# Patient Record
Sex: FEMALE | Race: WHITE | Employment: OTHER | URBAN - NONMETROPOLITAN AREA
[De-identification: names, ages, dates, MRNs, and addresses within clinical notes are randomized per-mention and may not be internally consistent; named-entity substitution may affect disease eponyms.]

---

## 2017-01-06 DIAGNOSIS — M15.9 GENERALIZED OA: ICD-10-CM

## 2017-01-31 ENCOUNTER — OFFICE VISIT (OUTPATIENT)
Dept: INTERNAL MEDICINE | Age: 77
End: 2017-01-31

## 2017-01-31 VITALS
HEIGHT: 62 IN | SYSTOLIC BLOOD PRESSURE: 136 MMHG | WEIGHT: 243 LBS | BODY MASS INDEX: 44.72 KG/M2 | DIASTOLIC BLOOD PRESSURE: 76 MMHG

## 2017-01-31 DIAGNOSIS — I10 BENIGN ESSENTIAL HTN: ICD-10-CM

## 2017-01-31 DIAGNOSIS — E66.01 MORBID OBESITY WITH BMI OF 40.0-44.9, ADULT (HCC): ICD-10-CM

## 2017-01-31 DIAGNOSIS — E66.01 MORBID OBESITY WITH BMI OF 45.0-49.9, ADULT (HCC): ICD-10-CM

## 2017-01-31 DIAGNOSIS — K76.0 NAFLD (NONALCOHOLIC FATTY LIVER DISEASE): ICD-10-CM

## 2017-01-31 DIAGNOSIS — R79.89 ABNORMAL LFTS: ICD-10-CM

## 2017-01-31 DIAGNOSIS — I10 ESSENTIAL HYPERTENSION: ICD-10-CM

## 2017-01-31 DIAGNOSIS — E78.00 HYPERCHOLESTEREMIA: ICD-10-CM

## 2017-01-31 DIAGNOSIS — R06.02 SHORTNESS OF BREATH: ICD-10-CM

## 2017-01-31 DIAGNOSIS — I48.0 PAROXYSMAL ATRIAL FIBRILLATION (HCC): ICD-10-CM

## 2017-01-31 DIAGNOSIS — I42.8 NONISCHEMIC CARDIOMYOPATHY (HCC): Primary | ICD-10-CM

## 2017-01-31 DIAGNOSIS — Z95.810 BIVENTRICULAR IMPLANTABLE CARDIOVERTER-DEFIBRILLATOR IN SITU: ICD-10-CM

## 2017-01-31 PROCEDURE — 3017F COLORECTAL CA SCREEN DOC REV: CPT | Performed by: INTERNAL MEDICINE

## 2017-01-31 PROCEDURE — 4040F PNEUMOC VAC/ADMIN/RCVD: CPT | Performed by: INTERNAL MEDICINE

## 2017-01-31 PROCEDURE — 1036F TOBACCO NON-USER: CPT | Performed by: INTERNAL MEDICINE

## 2017-01-31 PROCEDURE — G8419 CALC BMI OUT NRM PARAM NOF/U: HCPCS | Performed by: INTERNAL MEDICINE

## 2017-01-31 PROCEDURE — 1090F PRES/ABSN URINE INCON ASSESS: CPT | Performed by: INTERNAL MEDICINE

## 2017-01-31 PROCEDURE — 3014F SCREEN MAMMO DOC REV: CPT | Performed by: INTERNAL MEDICINE

## 2017-01-31 PROCEDURE — G8484 FLU IMMUNIZE NO ADMIN: HCPCS | Performed by: INTERNAL MEDICINE

## 2017-01-31 PROCEDURE — 1123F ACP DISCUSS/DSCN MKR DOCD: CPT | Performed by: INTERNAL MEDICINE

## 2017-01-31 PROCEDURE — G8400 PT W/DXA NO RESULTS DOC: HCPCS | Performed by: INTERNAL MEDICINE

## 2017-01-31 PROCEDURE — 93000 ELECTROCARDIOGRAM COMPLETE: CPT | Performed by: INTERNAL MEDICINE

## 2017-01-31 PROCEDURE — G8427 DOCREV CUR MEDS BY ELIG CLIN: HCPCS | Performed by: INTERNAL MEDICINE

## 2017-01-31 PROCEDURE — 99213 OFFICE O/P EST LOW 20 MIN: CPT | Performed by: INTERNAL MEDICINE

## 2017-01-31 RX ORDER — AMIODARONE HYDROCHLORIDE 200 MG/1
200 TABLET ORAL DAILY
Qty: 30 TABLET | Refills: 11 | Status: SHIPPED | OUTPATIENT
Start: 2017-01-31 | End: 2018-01-18 | Stop reason: SDUPTHER

## 2017-01-31 RX ORDER — NITROGLYCERIN 0.4 MG/1
0.4 TABLET SUBLINGUAL EVERY 5 MIN PRN
Qty: 25 TABLET | Refills: 5 | Status: SHIPPED | OUTPATIENT
Start: 2017-01-31 | End: 2018-07-24 | Stop reason: ALTCHOICE

## 2017-01-31 RX ORDER — QUINAPRIL 20 MG/1
40 TABLET ORAL 2 TIMES DAILY
Qty: 120 TABLET | Refills: 11 | Status: SHIPPED | OUTPATIENT
Start: 2017-01-31 | End: 2018-01-18 | Stop reason: SDUPTHER

## 2017-01-31 RX ORDER — DIGOXIN 125 MCG
125 TABLET ORAL DAILY
Qty: 30 TABLET | Refills: 11 | Status: SHIPPED | OUTPATIENT
Start: 2017-01-31 | End: 2018-01-18 | Stop reason: SDUPTHER

## 2017-01-31 RX ORDER — METOPROLOL SUCCINATE 200 MG/1
200 TABLET, EXTENDED RELEASE ORAL DAILY
Qty: 30 TABLET | Refills: 11 | Status: SHIPPED | OUTPATIENT
Start: 2017-01-31 | End: 2018-01-18 | Stop reason: SDUPTHER

## 2017-01-31 RX ORDER — LEVOTHYROXINE SODIUM 0.12 MG/1
125 TABLET ORAL DAILY
Qty: 30 TABLET | Refills: 11 | Status: SHIPPED | OUTPATIENT
Start: 2017-01-31 | End: 2018-01-18 | Stop reason: SDUPTHER

## 2017-01-31 RX ORDER — FUROSEMIDE 80 MG
TABLET ORAL
Qty: 30 TABLET | Refills: 11 | Status: SHIPPED | OUTPATIENT
Start: 2017-01-31 | End: 2017-08-07 | Stop reason: DRUGHIGH

## 2017-01-31 RX ORDER — SPIRONOLACTONE 25 MG/1
25 TABLET ORAL EVERY OTHER DAY
Qty: 15 TABLET | Refills: 11 | Status: SHIPPED | OUTPATIENT
Start: 2017-01-31 | End: 2018-01-18 | Stop reason: SDUPTHER

## 2017-02-01 ENCOUNTER — TELEPHONE (OUTPATIENT)
Dept: INTERNAL MEDICINE | Age: 77
End: 2017-02-01

## 2017-02-20 ENCOUNTER — NURSE ONLY (OUTPATIENT)
Dept: FAMILY MEDICINE CLINIC | Age: 77
End: 2017-02-20

## 2017-02-20 ENCOUNTER — TELEPHONE (OUTPATIENT)
Dept: FAMILY MEDICINE CLINIC | Age: 77
End: 2017-02-20

## 2017-02-20 DIAGNOSIS — I48.0 PAROXYSMAL ATRIAL FIBRILLATION (HCC): Primary | ICD-10-CM

## 2017-02-20 LAB
INTERNATIONAL NORMALIZATION RATIO, POC: 2.1
PROTHROMBIN TIME, POC: NORMAL

## 2017-02-20 PROCEDURE — 85610 PROTHROMBIN TIME: CPT | Performed by: FAMILY MEDICINE

## 2017-03-21 ENCOUNTER — NURSE ONLY (OUTPATIENT)
Dept: FAMILY MEDICINE CLINIC | Age: 77
End: 2017-03-21

## 2017-03-21 ENCOUNTER — TELEPHONE (OUTPATIENT)
Dept: FAMILY MEDICINE CLINIC | Age: 77
End: 2017-03-21

## 2017-03-21 DIAGNOSIS — I48.0 PAROXYSMAL ATRIAL FIBRILLATION (HCC): ICD-10-CM

## 2017-03-21 LAB
INTERNATIONAL NORMALIZATION RATIO, POC: 1.8
PROTHROMBIN TIME, POC: NORMAL

## 2017-03-21 PROCEDURE — 85610 PROTHROMBIN TIME: CPT | Performed by: FAMILY MEDICINE

## 2017-04-17 ENCOUNTER — NURSE ONLY (OUTPATIENT)
Dept: FAMILY MEDICINE CLINIC | Age: 77
End: 2017-04-17

## 2017-04-17 ENCOUNTER — TELEPHONE (OUTPATIENT)
Dept: FAMILY MEDICINE CLINIC | Age: 77
End: 2017-04-17

## 2017-04-17 DIAGNOSIS — I48.0 PAROXYSMAL ATRIAL FIBRILLATION (HCC): ICD-10-CM

## 2017-04-17 LAB
INTERNATIONAL NORMALIZATION RATIO, POC: 2.5
PROTHROMBIN TIME, POC: NORMAL

## 2017-04-17 PROCEDURE — 85610 PROTHROMBIN TIME: CPT | Performed by: FAMILY MEDICINE

## 2017-05-25 ENCOUNTER — NURSE ONLY (OUTPATIENT)
Dept: FAMILY MEDICINE CLINIC | Age: 77
End: 2017-05-25

## 2017-05-25 ENCOUNTER — TELEPHONE (OUTPATIENT)
Dept: FAMILY MEDICINE CLINIC | Age: 77
End: 2017-05-25

## 2017-05-25 DIAGNOSIS — I48.0 PAROXYSMAL ATRIAL FIBRILLATION (HCC): ICD-10-CM

## 2017-05-25 LAB
INTERNATIONAL NORMALIZATION RATIO, POC: 2.6
PROTHROMBIN TIME, POC: NORMAL

## 2017-05-25 PROCEDURE — 85610 PROTHROMBIN TIME: CPT | Performed by: FAMILY MEDICINE

## 2017-07-11 ENCOUNTER — NURSE ONLY (OUTPATIENT)
Dept: FAMILY MEDICINE CLINIC | Age: 77
End: 2017-07-11

## 2017-07-11 ENCOUNTER — TELEPHONE (OUTPATIENT)
Dept: FAMILY MEDICINE CLINIC | Age: 77
End: 2017-07-11

## 2017-07-11 DIAGNOSIS — I48.0 PAROXYSMAL ATRIAL FIBRILLATION (HCC): ICD-10-CM

## 2017-07-11 LAB
INTERNATIONAL NORMALIZATION RATIO, POC: 2.4
PROTHROMBIN TIME, POC: NORMAL

## 2017-07-11 PROCEDURE — 85610 PROTHROMBIN TIME: CPT | Performed by: FAMILY MEDICINE

## 2017-07-25 ENCOUNTER — OFFICE VISIT (OUTPATIENT)
Dept: INTERNAL MEDICINE CLINIC | Age: 77
End: 2017-07-25
Payer: MEDICARE

## 2017-07-25 VITALS
WEIGHT: 247 LBS | DIASTOLIC BLOOD PRESSURE: 80 MMHG | BODY MASS INDEX: 45.45 KG/M2 | HEIGHT: 62 IN | SYSTOLIC BLOOD PRESSURE: 110 MMHG | HEART RATE: 72 BPM

## 2017-07-25 DIAGNOSIS — I48.0 PAROXYSMAL ATRIAL FIBRILLATION (HCC): ICD-10-CM

## 2017-07-25 DIAGNOSIS — I42.8 NONISCHEMIC CARDIOMYOPATHY (HCC): Primary | ICD-10-CM

## 2017-07-25 DIAGNOSIS — T50.905S MEDICATION ADVERSE EFFECT, SEQUELA: ICD-10-CM

## 2017-07-25 DIAGNOSIS — I10 ESSENTIAL HYPERTENSION: ICD-10-CM

## 2017-07-25 PROCEDURE — 1123F ACP DISCUSS/DSCN MKR DOCD: CPT | Performed by: INTERNAL MEDICINE

## 2017-07-25 PROCEDURE — G8400 PT W/DXA NO RESULTS DOC: HCPCS | Performed by: INTERNAL MEDICINE

## 2017-07-25 PROCEDURE — 3017F COLORECTAL CA SCREEN DOC REV: CPT | Performed by: INTERNAL MEDICINE

## 2017-07-25 PROCEDURE — 1090F PRES/ABSN URINE INCON ASSESS: CPT | Performed by: INTERNAL MEDICINE

## 2017-07-25 PROCEDURE — G8417 CALC BMI ABV UP PARAM F/U: HCPCS | Performed by: INTERNAL MEDICINE

## 2017-07-25 PROCEDURE — 1036F TOBACCO NON-USER: CPT | Performed by: INTERNAL MEDICINE

## 2017-07-25 PROCEDURE — 4040F PNEUMOC VAC/ADMIN/RCVD: CPT | Performed by: INTERNAL MEDICINE

## 2017-07-25 PROCEDURE — G8427 DOCREV CUR MEDS BY ELIG CLIN: HCPCS | Performed by: INTERNAL MEDICINE

## 2017-07-25 PROCEDURE — 99213 OFFICE O/P EST LOW 20 MIN: CPT | Performed by: INTERNAL MEDICINE

## 2017-07-25 PROCEDURE — 3014F SCREEN MAMMO DOC REV: CPT | Performed by: INTERNAL MEDICINE

## 2017-07-31 ENCOUNTER — HOSPITAL ENCOUNTER (OUTPATIENT)
Dept: PULMONOLOGY | Age: 77
Discharge: HOME | End: 2017-07-31
Payer: MEDICARE

## 2017-07-31 DIAGNOSIS — I48.0 PAROXYSMAL ATRIAL FIBRILLATION (HCC): ICD-10-CM

## 2017-07-31 DIAGNOSIS — T50.905S MEDICATION ADVERSE EFFECT, SEQUELA: ICD-10-CM

## 2017-07-31 DIAGNOSIS — I42.8 NONISCHEMIC CARDIOMYOPATHY (HCC): ICD-10-CM

## 2017-07-31 PROCEDURE — 94729 DIFFUSING CAPACITY: CPT

## 2017-07-31 PROCEDURE — 94010 BREATHING CAPACITY TEST: CPT

## 2017-08-07 ENCOUNTER — HOSPITAL ENCOUNTER (OUTPATIENT)
Age: 77
Discharge: HOME | End: 2017-08-07
Payer: MEDICARE

## 2017-08-07 ENCOUNTER — TELEPHONE (OUTPATIENT)
Dept: INTERNAL MEDICINE CLINIC | Age: 77
End: 2017-08-07

## 2017-08-07 DIAGNOSIS — I42.8 NONISCHEMIC CARDIOMYOPATHY (HCC): Primary | ICD-10-CM

## 2017-08-07 DIAGNOSIS — I48.0 PAROXYSMAL ATRIAL FIBRILLATION (HCC): ICD-10-CM

## 2017-08-07 DIAGNOSIS — N28.9 RENAL INSUFFICIENCY: ICD-10-CM

## 2017-08-07 DIAGNOSIS — T50.905S MEDICATION ADVERSE EFFECT, SEQUELA: ICD-10-CM

## 2017-08-07 DIAGNOSIS — I42.8 NONISCHEMIC CARDIOMYOPATHY (HCC): ICD-10-CM

## 2017-08-07 DIAGNOSIS — I10 ESSENTIAL HYPERTENSION: ICD-10-CM

## 2017-08-07 LAB
ALBUMIN SERPL-MCNC: 3.7 G/DL (ref 3.5–5.1)
ALP BLD-CCNC: 132 U/L (ref 38–126)
ALT SERPL-CCNC: 47 U/L (ref 11–66)
ANION GAP SERPL CALCULATED.3IONS-SCNC: 12 MEQ/L (ref 8–16)
AST SERPL-CCNC: 56 U/L (ref 5–40)
BILIRUB SERPL-MCNC: 0.4 MG/DL (ref 0.3–1.2)
BILIRUBIN DIRECT: < 0.2 MG/DL (ref 0–0.3)
BUN BLDV-MCNC: 31 MG/DL (ref 7–22)
CALCIUM SERPL-MCNC: 9.3 MG/DL (ref 8.5–10.5)
CHLORIDE BLD-SCNC: 102 MEQ/L (ref 98–111)
CO2: 26 MEQ/L (ref 23–33)
CREAT SERPL-MCNC: 1.4 MG/DL (ref 0.4–1.2)
GFR SERPL CREATININE-BSD FRML MDRD: 37 ML/MIN/1.73M2
GLUCOSE BLD-MCNC: 163 MG/DL (ref 70–108)
POTASSIUM SERPL-SCNC: 4.7 MEQ/L (ref 3.5–5.2)
SODIUM BLD-SCNC: 140 MEQ/L (ref 135–145)
TOTAL PROTEIN: 7.6 G/DL (ref 6.1–8)
TSH SERPL DL<=0.05 MIU/L-ACNC: 2.11 UIU/ML (ref 0.4–4.2)

## 2017-08-07 PROCEDURE — 84443 ASSAY THYROID STIM HORMONE: CPT

## 2017-08-07 PROCEDURE — 36415 COLL VENOUS BLD VENIPUNCTURE: CPT

## 2017-08-07 PROCEDURE — 82248 BILIRUBIN DIRECT: CPT

## 2017-08-07 PROCEDURE — 80053 COMPREHEN METABOLIC PANEL: CPT

## 2017-08-07 RX ORDER — FUROSEMIDE 80 MG
40 TABLET ORAL DAILY
COMMUNITY
End: 2018-07-24 | Stop reason: SDUPTHER

## 2017-08-15 ENCOUNTER — TELEPHONE (OUTPATIENT)
Dept: INTERNAL MEDICINE CLINIC | Age: 77
End: 2017-08-15

## 2017-08-15 ENCOUNTER — HOSPITAL ENCOUNTER (OUTPATIENT)
Age: 77
Discharge: HOME | End: 2017-08-15
Payer: MEDICARE

## 2017-08-15 DIAGNOSIS — R73.9 HYPERGLYCEMIA: Primary | ICD-10-CM

## 2017-08-15 LAB
ANION GAP SERPL CALCULATED.3IONS-SCNC: 13 MEQ/L (ref 8–16)
BUN BLDV-MCNC: 27 MG/DL (ref 7–22)
CALCIUM SERPL-MCNC: 9.3 MG/DL (ref 8.5–10.5)
CHLORIDE BLD-SCNC: 101 MEQ/L (ref 98–111)
CO2: 24 MEQ/L (ref 23–33)
CREAT SERPL-MCNC: 1.2 MG/DL (ref 0.4–1.2)
GFR SERPL CREATININE-BSD FRML MDRD: 44 ML/MIN/1.73M2
GLUCOSE BLD-MCNC: 188 MG/DL (ref 70–108)
POTASSIUM SERPL-SCNC: 4.8 MEQ/L (ref 3.5–5.2)
SODIUM BLD-SCNC: 138 MEQ/L (ref 135–145)

## 2017-08-15 PROCEDURE — 36415 COLL VENOUS BLD VENIPUNCTURE: CPT

## 2017-08-15 PROCEDURE — 80048 BASIC METABOLIC PNL TOTAL CA: CPT

## 2017-08-17 ENCOUNTER — TELEPHONE (OUTPATIENT)
Dept: FAMILY MEDICINE CLINIC | Age: 77
End: 2017-08-17

## 2017-08-17 ENCOUNTER — NURSE ONLY (OUTPATIENT)
Dept: FAMILY MEDICINE CLINIC | Age: 77
End: 2017-08-17
Payer: MEDICARE

## 2017-08-17 DIAGNOSIS — I48.0 PAROXYSMAL ATRIAL FIBRILLATION (HCC): ICD-10-CM

## 2017-08-17 LAB
INTERNATIONAL NORMALIZATION RATIO, POC: 2
PROTHROMBIN TIME, POC: NORMAL

## 2017-08-17 PROCEDURE — 85610 PROTHROMBIN TIME: CPT | Performed by: FAMILY MEDICINE

## 2017-08-28 LAB
AVERAGE GLUCOSE: NORMAL
HBA1C MFR BLD: 6.7 %

## 2017-09-09 ENCOUNTER — PATIENT MESSAGE (OUTPATIENT)
Dept: INTERNAL MEDICINE CLINIC | Age: 77
End: 2017-09-09

## 2017-09-13 ENCOUNTER — TELEPHONE (OUTPATIENT)
Dept: INTERNAL MEDICINE CLINIC | Age: 77
End: 2017-09-13

## 2017-09-18 ENCOUNTER — TELEPHONE (OUTPATIENT)
Dept: ADMINISTRATIVE | Age: 77
End: 2017-09-18

## 2017-09-18 NOTE — TELEPHONE ENCOUNTER
Telephone Outcome: called pt to schedule a Medicare Annual Wellness visit. She wants to look at the packet before deciding to schedule the AWV.   Mailed 9/19

## 2017-09-20 ENCOUNTER — NURSE ONLY (OUTPATIENT)
Dept: FAMILY MEDICINE CLINIC | Age: 77
End: 2017-09-20
Payer: MEDICARE

## 2017-09-20 ENCOUNTER — TELEPHONE (OUTPATIENT)
Dept: FAMILY MEDICINE CLINIC | Age: 77
End: 2017-09-20

## 2017-09-20 DIAGNOSIS — I48.0 PAROXYSMAL ATRIAL FIBRILLATION (HCC): ICD-10-CM

## 2017-09-20 LAB
INTERNATIONAL NORMALIZATION RATIO, POC: 1.4
PROTHROMBIN TIME, POC: NORMAL

## 2017-09-20 PROCEDURE — 85610 PROTHROMBIN TIME: CPT | Performed by: FAMILY MEDICINE

## 2017-09-27 ENCOUNTER — TELEPHONE (OUTPATIENT)
Dept: FAMILY MEDICINE CLINIC | Age: 77
End: 2017-09-27

## 2017-09-27 ENCOUNTER — NURSE ONLY (OUTPATIENT)
Dept: FAMILY MEDICINE CLINIC | Age: 77
End: 2017-09-27
Payer: MEDICARE

## 2017-09-27 DIAGNOSIS — I48.0 PAROXYSMAL ATRIAL FIBRILLATION (HCC): ICD-10-CM

## 2017-09-27 LAB
INTERNATIONAL NORMALIZATION RATIO, POC: 1.6
PROTHROMBIN TIME, POC: NORMAL

## 2017-09-27 PROCEDURE — 85610 PROTHROMBIN TIME: CPT | Performed by: FAMILY MEDICINE

## 2017-11-03 ENCOUNTER — NURSE ONLY (OUTPATIENT)
Dept: FAMILY MEDICINE CLINIC | Age: 77
End: 2017-11-03
Payer: MEDICARE

## 2017-11-03 ENCOUNTER — TELEPHONE (OUTPATIENT)
Dept: FAMILY MEDICINE CLINIC | Age: 77
End: 2017-11-03

## 2017-11-03 DIAGNOSIS — I48.0 PAROXYSMAL ATRIAL FIBRILLATION (HCC): ICD-10-CM

## 2017-11-03 LAB
INTERNATIONAL NORMALIZATION RATIO, POC: 2.8
PROTHROMBIN TIME, POC: NORMAL

## 2017-11-03 PROCEDURE — 85610 PROTHROMBIN TIME: CPT | Performed by: FAMILY MEDICINE

## 2017-11-03 NOTE — TELEPHONE ENCOUNTER
----- Message from Brian Dover MD sent at 11/3/2017 12:26 PM EDT -----  INR good. Continue same dosage and recheck INR in 1 month.

## 2017-11-21 ENCOUNTER — OFFICE VISIT (OUTPATIENT)
Dept: FAMILY MEDICINE CLINIC | Age: 77
End: 2017-11-21
Payer: MEDICARE

## 2017-11-21 VITALS
SYSTOLIC BLOOD PRESSURE: 136 MMHG | HEIGHT: 62 IN | TEMPERATURE: 97.6 F | DIASTOLIC BLOOD PRESSURE: 82 MMHG | HEART RATE: 76 BPM | RESPIRATION RATE: 20 BRPM | WEIGHT: 242.8 LBS | BODY MASS INDEX: 44.68 KG/M2

## 2017-11-21 DIAGNOSIS — M25.512 ACUTE PAIN OF BOTH SHOULDERS: ICD-10-CM

## 2017-11-21 DIAGNOSIS — M25.511 ACUTE PAIN OF BOTH SHOULDERS: ICD-10-CM

## 2017-11-21 DIAGNOSIS — R53.1 GENERALIZED WEAKNESS: ICD-10-CM

## 2017-11-21 DIAGNOSIS — I48.0 PAROXYSMAL ATRIAL FIBRILLATION (HCC): ICD-10-CM

## 2017-11-21 DIAGNOSIS — M54.41 ACUTE MIDLINE LOW BACK PAIN WITH RIGHT-SIDED SCIATICA: Primary | ICD-10-CM

## 2017-11-21 LAB
INTERNATIONAL NORMALIZATION RATIO, POC: 3.1
PROTHROMBIN TIME, POC: NORMAL

## 2017-11-21 PROCEDURE — G8427 DOCREV CUR MEDS BY ELIG CLIN: HCPCS | Performed by: FAMILY MEDICINE

## 2017-11-21 PROCEDURE — 85610 PROTHROMBIN TIME: CPT | Performed by: FAMILY MEDICINE

## 2017-11-21 PROCEDURE — 4040F PNEUMOC VAC/ADMIN/RCVD: CPT | Performed by: FAMILY MEDICINE

## 2017-11-21 PROCEDURE — 1123F ACP DISCUSS/DSCN MKR DOCD: CPT | Performed by: FAMILY MEDICINE

## 2017-11-21 PROCEDURE — G8417 CALC BMI ABV UP PARAM F/U: HCPCS | Performed by: FAMILY MEDICINE

## 2017-11-21 PROCEDURE — 1036F TOBACCO NON-USER: CPT | Performed by: FAMILY MEDICINE

## 2017-11-21 PROCEDURE — G8484 FLU IMMUNIZE NO ADMIN: HCPCS | Performed by: FAMILY MEDICINE

## 2017-11-21 PROCEDURE — 1090F PRES/ABSN URINE INCON ASSESS: CPT | Performed by: FAMILY MEDICINE

## 2017-11-21 PROCEDURE — 3017F COLORECTAL CA SCREEN DOC REV: CPT | Performed by: FAMILY MEDICINE

## 2017-11-21 PROCEDURE — G8400 PT W/DXA NO RESULTS DOC: HCPCS | Performed by: FAMILY MEDICINE

## 2017-11-21 PROCEDURE — 99214 OFFICE O/P EST MOD 30 MIN: CPT | Performed by: FAMILY MEDICINE

## 2017-11-21 RX ORDER — PREDNISONE 20 MG/1
TABLET ORAL
Qty: 15 TABLET | Refills: 0 | Status: SHIPPED | OUTPATIENT
Start: 2017-11-21 | End: 2018-01-18 | Stop reason: ALTCHOICE

## 2017-11-21 ASSESSMENT — ENCOUNTER SYMPTOMS
BLOOD IN STOOL: 0
CONSTIPATION: 0
VOMITING: 0
NAUSEA: 0
SORE THROAT: 0
ABDOMINAL PAIN: 0
EYE PAIN: 0
COUGH: 0
BACK PAIN: 1
WHEEZING: 0
RHINORRHEA: 0
SHORTNESS OF BREATH: 0
CHEST TIGHTNESS: 0
DIARRHEA: 0

## 2017-11-22 ENCOUNTER — TELEPHONE (OUTPATIENT)
Dept: FAMILY MEDICINE CLINIC | Age: 77
End: 2017-11-22

## 2017-11-22 DIAGNOSIS — M25.512 ACUTE PAIN OF BOTH SHOULDERS: Primary | ICD-10-CM

## 2017-11-22 DIAGNOSIS — M25.511 ACUTE PAIN OF BOTH SHOULDERS: Primary | ICD-10-CM

## 2017-11-28 ENCOUNTER — HOSPITAL ENCOUNTER (OUTPATIENT)
Dept: OCCUPATIONAL THERAPY | Age: 77
Setting detail: THERAPIES SERIES
Discharge: HOME | End: 2017-11-28
Payer: MEDICARE

## 2017-11-28 PROCEDURE — G8984 CARRY CURRENT STATUS: HCPCS

## 2017-11-28 PROCEDURE — 97165 OT EVAL LOW COMPLEX 30 MIN: CPT

## 2017-11-28 PROCEDURE — G8985 CARRY GOAL STATUS: HCPCS

## 2017-11-28 PROCEDURE — 97110 THERAPEUTIC EXERCISES: CPT

## 2017-11-28 ASSESSMENT — PAIN DESCRIPTION - LOCATION
LOCATION: SHOULDER
LOCATION: SHOULDER

## 2017-11-28 ASSESSMENT — PAIN DESCRIPTION - ORIENTATION
ORIENTATION: RIGHT;LEFT
ORIENTATION: RIGHT;LEFT

## 2017-11-28 ASSESSMENT — PAIN SCALES - GENERAL
PAINLEVEL_OUTOF10: 2
PAINLEVEL_OUTOF10: 2

## 2017-11-28 NOTE — PROGRESS NOTES
Shoulder Ext Rotation  0-90: 58       RUE PROM (degrees)  R Shoulder Flex  0-180: 138  R Shoulder ABduction 0-180: 165  R Shoulder Int Rotation  0-70: 92  R Shoulder Ext Rotation  0-90: 88  RUE AROM (degrees)  R Shoulder Flexion 0-180: 122  R Shoulder Extension 0-45: 55  R Shoulder ABduction 0-180: 118  R Shoulder Int Rotation  0-70: thumb tip 6 inches above the waistband  R Shoulder Ext Rotation 0-90: 65                       Hand Assessment Comment  Hand Assessment Comment: no complaints with coordination         ADL  Additional Comments: At this time patient reports increased difficulty with dressing, washing hair, lifting up a dish into an upper cupboard, sleeping (reports that is her greatest problem)          Activity Tolerance: Additional Comments: Patient tolerated evaluation and initiation of treatment well. Assessment:  Performance deficits / Impairments: Decreased ADL status, Decreased ROM, Decreased endurance, Decreased high-level IADLs, Decreased strength  Assessment: Overall, low complexity for the evaluation: low complexity for history, medium complexity for exam due to performance deficits, and low complexity for decision making. Prognosis: Good  Clinical Decision Making: Clinical Decision making was of Low Complexity as the result of analysis of data from a problem focused assessment, a consideration of a limited number of treatment options, no significant comorbidities affecting the plan of care and no modification or assistance required to complete the evaluation. Patient Education:  Patient Education: OT POC, goals          Treatment Initiated : Initiation of HEP adn performance 10 reps each: supine hands clasped bench press, and flexion overhead to tolerance, scapular pinches, backward shoulder circles without pain, and table slides in flexion with hands clasped.       Plan:  Times per week: 2x  Plan weeks: 6 weeks  Current Treatment Recommendations: Strengthening, ROM, Patient/Caregiver Education & Training, Manual Therapy:  Jt Manip, Manual Therapy:  STM  Plan Comment: POC initiated this date  Specific instructions for Next Treatment: ROM, eventual strengthening as needed    Goals:  Patient goals : decrease pain, increase strength    Short term goals  Time Frame for Short term goals: 4 weeks  Short term goal 1: Patient will increase right shoulder AROM greater than 140 degrees flexion, 130 degrees abduction, thumb tip 7 inches above the waistband and 80 degrees ER and left shoulder AROM greater than 130 degrees flexion, 115 degrees abduction, and 70 degrees ER to increase ability to dress. Short term goal 2: Patient will report pain with activity no greater than 2/10 to increase ability to gather dishes from the cupboard. Short term goal 3: Patient will be independent with HEP to increase ease and ability with sleep. Long term goals  Time Frame for Long term goals : 6 weeks  Long term goal 1: Patient will sleep through the night without waking due to pain in the shoulders. Long term goal 2: Patient will remove dishes from the cupboard without pain level increase. OT G-codes  Functional Assessment Tool Used: Upper Extremity Functional Scale  Score: 63  Functional Limitation: Carrying, moving and handling objects  Carrying, Moving and Handling Objects Current Status (): At least 20 percent but less than 40 percent impaired, limited or restricted  Carrying, Moving and Handling Objects Goal Status (): At least 1 percent but less than 20 percent impaired, limited or restricted       Evaluation Complexity: Based on the findings of patient history, examination, clinical presentation, and decision making during this evaluation, this patient is of low complexity.     Ama Rutledge, MOT, OTR/L [id number]

## 2017-12-04 ENCOUNTER — HOSPITAL ENCOUNTER (OUTPATIENT)
Dept: PHYSICAL THERAPY | Age: 77
Setting detail: THERAPIES SERIES
Discharge: HOME | End: 2017-12-04
Payer: MEDICARE

## 2017-12-05 ENCOUNTER — HOSPITAL ENCOUNTER (OUTPATIENT)
Dept: OCCUPATIONAL THERAPY | Age: 77
Setting detail: THERAPIES SERIES
Discharge: HOME | End: 2017-12-05
Payer: MEDICARE

## 2018-01-03 ENCOUNTER — TELEPHONE (OUTPATIENT)
Dept: FAMILY MEDICINE CLINIC | Age: 78
End: 2018-01-03

## 2018-01-03 ENCOUNTER — NURSE ONLY (OUTPATIENT)
Dept: FAMILY MEDICINE CLINIC | Age: 78
End: 2018-01-03
Payer: MEDICARE

## 2018-01-03 DIAGNOSIS — I48.0 PAROXYSMAL ATRIAL FIBRILLATION (HCC): ICD-10-CM

## 2018-01-03 LAB
INTERNATIONAL NORMALIZATION RATIO, POC: 1.5
PROTHROMBIN TIME, POC: NORMAL

## 2018-01-03 PROCEDURE — 85610 PROTHROMBIN TIME: CPT | Performed by: FAMILY MEDICINE

## 2018-01-03 NOTE — TELEPHONE ENCOUNTER
----- Message from Elizabeth Felix MD sent at 1/3/2018  3:17 PM EST -----  INR good. Continue same dosage and recheck INR in 1 month.

## 2018-01-18 ENCOUNTER — NURSE ONLY (OUTPATIENT)
Dept: INTERNAL MEDICINE CLINIC | Age: 78
End: 2018-01-18
Payer: MEDICARE

## 2018-01-18 ENCOUNTER — TELEPHONE (OUTPATIENT)
Dept: INTERNAL MEDICINE CLINIC | Age: 78
End: 2018-01-18

## 2018-01-18 ENCOUNTER — OFFICE VISIT (OUTPATIENT)
Dept: INTERNAL MEDICINE CLINIC | Age: 78
End: 2018-01-18
Payer: MEDICARE

## 2018-01-18 VITALS
BODY MASS INDEX: 44.99 KG/M2 | DIASTOLIC BLOOD PRESSURE: 90 MMHG | OXYGEN SATURATION: 94 % | HEIGHT: 62 IN | SYSTOLIC BLOOD PRESSURE: 160 MMHG | WEIGHT: 244.5 LBS

## 2018-01-18 DIAGNOSIS — I10 ESSENTIAL HYPERTENSION: ICD-10-CM

## 2018-01-18 DIAGNOSIS — K76.0 NAFLD (NONALCOHOLIC FATTY LIVER DISEASE): ICD-10-CM

## 2018-01-18 DIAGNOSIS — I48.0 PAROXYSMAL ATRIAL FIBRILLATION (HCC): ICD-10-CM

## 2018-01-18 DIAGNOSIS — I42.8 NONISCHEMIC CARDIOMYOPATHY (HCC): Primary | ICD-10-CM

## 2018-01-18 DIAGNOSIS — R73.9 HYPERGLYCEMIA: Primary | ICD-10-CM

## 2018-01-18 DIAGNOSIS — T50.905S ADVERSE EFFECT OF DRUG, SEQUELA: ICD-10-CM

## 2018-01-18 DIAGNOSIS — E78.00 HYPERCHOLESTEREMIA: ICD-10-CM

## 2018-01-18 LAB
ALBUMIN SERPL-MCNC: 3.9 G/DL (ref 3.5–5.1)
ALP BLD-CCNC: 115 U/L (ref 38–126)
ALT SERPL-CCNC: 40 U/L (ref 11–66)
ANION GAP SERPL CALCULATED.3IONS-SCNC: 17 MEQ/L (ref 8–16)
AST SERPL-CCNC: 57 U/L (ref 5–40)
BILIRUB SERPL-MCNC: 0.4 MG/DL (ref 0.3–1.2)
BILIRUBIN DIRECT: < 0.2 MG/DL (ref 0–0.3)
BUN BLDV-MCNC: 26 MG/DL (ref 7–22)
CALCIUM SERPL-MCNC: 9.5 MG/DL (ref 8.5–10.5)
CHLORIDE BLD-SCNC: 100 MEQ/L (ref 98–111)
CO2: 23 MEQ/L (ref 23–33)
CREAT SERPL-MCNC: 1.1 MG/DL (ref 0.4–1.2)
GFR SERPL CREATININE-BSD FRML MDRD: 48 ML/MIN/1.73M2
GLUCOSE BLD-MCNC: 169 MG/DL (ref 70–108)
POTASSIUM SERPL-SCNC: 4.3 MEQ/L (ref 3.5–5.2)
SODIUM BLD-SCNC: 140 MEQ/L (ref 135–145)
TOTAL PROTEIN: 7.1 G/DL (ref 6.1–8)
TSH SERPL DL<=0.05 MIU/L-ACNC: 2.32 UIU/ML (ref 0.4–4.2)

## 2018-01-18 PROCEDURE — 3017F COLORECTAL CA SCREEN DOC REV: CPT | Performed by: INTERNAL MEDICINE

## 2018-01-18 PROCEDURE — 1036F TOBACCO NON-USER: CPT | Performed by: INTERNAL MEDICINE

## 2018-01-18 PROCEDURE — G8417 CALC BMI ABV UP PARAM F/U: HCPCS | Performed by: INTERNAL MEDICINE

## 2018-01-18 PROCEDURE — G8484 FLU IMMUNIZE NO ADMIN: HCPCS | Performed by: INTERNAL MEDICINE

## 2018-01-18 PROCEDURE — G8400 PT W/DXA NO RESULTS DOC: HCPCS | Performed by: INTERNAL MEDICINE

## 2018-01-18 PROCEDURE — 4040F PNEUMOC VAC/ADMIN/RCVD: CPT | Performed by: INTERNAL MEDICINE

## 2018-01-18 PROCEDURE — 1123F ACP DISCUSS/DSCN MKR DOCD: CPT | Performed by: INTERNAL MEDICINE

## 2018-01-18 PROCEDURE — 36415 COLL VENOUS BLD VENIPUNCTURE: CPT | Performed by: INTERNAL MEDICINE

## 2018-01-18 PROCEDURE — 1090F PRES/ABSN URINE INCON ASSESS: CPT | Performed by: INTERNAL MEDICINE

## 2018-01-18 PROCEDURE — 93000 ELECTROCARDIOGRAM COMPLETE: CPT | Performed by: INTERNAL MEDICINE

## 2018-01-18 PROCEDURE — 99213 OFFICE O/P EST LOW 20 MIN: CPT | Performed by: INTERNAL MEDICINE

## 2018-01-18 PROCEDURE — G8427 DOCREV CUR MEDS BY ELIG CLIN: HCPCS | Performed by: INTERNAL MEDICINE

## 2018-01-18 RX ORDER — DIGOXIN 125 MCG
125 TABLET ORAL DAILY
Qty: 30 TABLET | Refills: 11 | Status: SHIPPED | OUTPATIENT
Start: 2018-01-18 | End: 2019-01-28 | Stop reason: SDUPTHER

## 2018-01-18 RX ORDER — SPIRONOLACTONE 25 MG/1
25 TABLET ORAL EVERY OTHER DAY
Qty: 15 TABLET | Refills: 11 | Status: SHIPPED | OUTPATIENT
Start: 2018-01-18 | End: 2019-01-28 | Stop reason: SDUPTHER

## 2018-01-18 RX ORDER — QUINAPRIL 20 MG/1
40 TABLET ORAL 2 TIMES DAILY
Qty: 120 TABLET | Refills: 11 | Status: SHIPPED | OUTPATIENT
Start: 2018-01-18 | End: 2018-04-25 | Stop reason: SDUPTHER

## 2018-01-18 RX ORDER — AMIODARONE HYDROCHLORIDE 200 MG/1
200 TABLET ORAL DAILY
Qty: 30 TABLET | Refills: 11 | Status: SHIPPED | OUTPATIENT
Start: 2018-01-18 | End: 2019-01-28 | Stop reason: SDUPTHER

## 2018-01-18 RX ORDER — LEVOTHYROXINE SODIUM 0.12 MG/1
125 TABLET ORAL DAILY
Qty: 30 TABLET | Refills: 11 | Status: SHIPPED | OUTPATIENT
Start: 2018-01-18 | End: 2019-01-28 | Stop reason: SDUPTHER

## 2018-01-18 RX ORDER — METOPROLOL SUCCINATE 200 MG/1
200 TABLET, EXTENDED RELEASE ORAL DAILY
Qty: 30 TABLET | Refills: 11 | Status: SHIPPED | OUTPATIENT
Start: 2018-01-18 | End: 2019-01-28 | Stop reason: SDUPTHER

## 2018-01-18 ASSESSMENT — PATIENT HEALTH QUESTIONNAIRE - PHQ9
1. LITTLE INTEREST OR PLEASURE IN DOING THINGS: 0
2. FEELING DOWN, DEPRESSED OR HOPELESS: 0
SUM OF ALL RESPONSES TO PHQ9 QUESTIONS 1 & 2: 0
SUM OF ALL RESPONSES TO PHQ QUESTIONS 1-9: 0

## 2018-01-18 NOTE — PROGRESS NOTES
See above  Neurological ROS: no di\zzy spells  Dermatological ROS:  Hx melanoma hand; sees [facility]    Blood pressure (!) 160/90, height 5' 2\" (1.575 m), weight 244 lb 8 oz (110.9 kg), SpO2 94 %. Physical Examination: General appearance - alert, well appearing, and in no distress and overweight  Mental status - alert, oriented to person, place, and time  Neck - supple, no significant adenopathy, no JVD, or carotid bruits  Chest - clear to auscultation, no wheezes, rales or rhonchi, symmetric air entry  Heart - normal rate and regular rhythm, wide split S2  Abdomen - soft, nontender, nondistended, no masses or organomegaly  Neurological - alert, oriented, normal speech, no focal findings or movement disorder noted;   Musculoskeletal - no joint tenderness, deformity or swelling  Extremities - peripheral pulses normal, no pedal edema, no clubbing or cyanosis  Skin - skin graft hand  Breasts- no obvious masses, lesions         1. Nonischemic cardiomyopathy ([address]     2. Essential hypertension  EKG 12 Lead    Basic Metabolic Panel   3. Paroxysmal atrial fibrillation (HCC)  EKG 12 Lead    Basic Metabolic Panel    Hepatic Function Panel    TSH    Diffusion Capacity    Spirometry without bronchodilator   4. Hypercholesteremia     5. NAFLD (nonalcoholic fatty liver disease)  Hepatic Function Panel   6. Adverse effect of drug, sequela  Basic Metabolic Panel    Hepatic Function Panel    TSH    Diffusion Capacity    Spirometry without bronchodilator       Orders Placed This Encounter   Procedures    Basic Metabolic Panel     Standing Status:   Future     Standing Expiration Date:   1/18/2019    Hepatic Function Panel     Standing Status:   Future     Standing Expiration Date:   1/18/2019    TSH     Standing Status:   Future     Standing Expiration Date:   1/18/2019    EKG 12 Lead     Order Specific Question:   Reason for Exam?     Answer:    Other    Diffusion Capacity     Standing Status:   Future     Standing Expiration Date:   1/18/2019     Scheduling Instructions:      Please arrange at [facility] [place] on amiodarone    Spirometry without bronchodilator     Standing Status:   Future     Standing Expiration Date:   1/18/2019     Scheduling Instructions:      Please arrange at [facility] on amiodarone        IMP:  nonisch cm  afib  biv/icd     PLAN:  1. Advised to take periodic bps  2. Discussed entresto; will ck coverage  3. amio testing  Will schedule follow up appointment in 6m. Continue medications at current doses. Signed By:  Roldan Red M.D.

## 2018-01-22 ENCOUNTER — HOSPITAL ENCOUNTER (OUTPATIENT)
Dept: PULMONOLOGY | Age: 78
Discharge: HOME | End: 2018-01-22
Payer: MEDICARE

## 2018-01-22 DIAGNOSIS — T50.905S ADVERSE EFFECT OF DRUG, SEQUELA: ICD-10-CM

## 2018-01-22 DIAGNOSIS — I48.0 PAROXYSMAL ATRIAL FIBRILLATION (HCC): ICD-10-CM

## 2018-01-22 PROCEDURE — 94729 DIFFUSING CAPACITY: CPT

## 2018-01-22 PROCEDURE — 94010 BREATHING CAPACITY TEST: CPT

## 2018-02-01 DIAGNOSIS — I48.20 CHRONIC ATRIAL FIBRILLATION (HCC): ICD-10-CM

## 2018-02-01 RX ORDER — WARFARIN SODIUM 3 MG/1
TABLET ORAL
Qty: 90 TABLET | Refills: 2 | Status: SHIPPED | OUTPATIENT
Start: 2018-02-01 | End: 2018-10-31 | Stop reason: SDUPTHER

## 2018-02-09 ENCOUNTER — TELEPHONE (OUTPATIENT)
Dept: FAMILY MEDICINE CLINIC | Age: 78
End: 2018-02-09

## 2018-02-09 ENCOUNTER — NURSE ONLY (OUTPATIENT)
Dept: FAMILY MEDICINE CLINIC | Age: 78
End: 2018-02-09
Payer: MEDICARE

## 2018-02-09 DIAGNOSIS — I48.0 PAROXYSMAL ATRIAL FIBRILLATION (HCC): ICD-10-CM

## 2018-02-09 LAB
INTERNATIONAL NORMALIZATION RATIO, POC: 1.8
PROTHROMBIN TIME, POC: NORMAL

## 2018-02-09 PROCEDURE — 85610 PROTHROMBIN TIME: CPT | Performed by: FAMILY MEDICINE

## 2018-02-16 LAB
AVERAGE GLUCOSE: NORMAL
HBA1C MFR BLD: 6.6 %

## 2018-02-19 ENCOUNTER — TELEPHONE (OUTPATIENT)
Dept: INTERNAL MEDICINE CLINIC | Age: 78
End: 2018-02-19

## 2018-02-19 NOTE — TELEPHONE ENCOUNTER
Per hippa note, left message on voice mail that A1C is good at 6.6. If any questions, please call the office.

## 2018-02-26 RX ORDER — FUROSEMIDE 40 MG/1
40 TABLET ORAL DAILY
Qty: 30 TABLET | Refills: 11 | Status: SHIPPED | OUTPATIENT
Start: 2018-02-26 | End: 2018-10-02 | Stop reason: SDUPTHER

## 2018-03-07 ENCOUNTER — NURSE ONLY (OUTPATIENT)
Dept: FAMILY MEDICINE CLINIC | Age: 78
End: 2018-03-07
Payer: MEDICARE

## 2018-03-07 ENCOUNTER — TELEPHONE (OUTPATIENT)
Dept: FAMILY MEDICINE CLINIC | Age: 78
End: 2018-03-07

## 2018-03-07 DIAGNOSIS — I48.0 PAROXYSMAL ATRIAL FIBRILLATION (HCC): Primary | ICD-10-CM

## 2018-03-07 LAB
INTERNATIONAL NORMALIZATION RATIO, POC: 1.7
PROTHROMBIN TIME, POC: NORMAL

## 2018-03-07 PROCEDURE — 93793 ANTICOAG MGMT PT WARFARIN: CPT | Performed by: FAMILY MEDICINE

## 2018-03-07 PROCEDURE — 85610 PROTHROMBIN TIME: CPT | Performed by: FAMILY MEDICINE

## 2018-03-07 NOTE — TELEPHONE ENCOUNTER
----- Message from Abdoulaye Keene MD sent at 3/7/2018 11:44 AM EST -----  INR good. Continue same dosage and recheck INR in 1 month. Refer to Anticoagulation Tracking section for medication maintenance plan and return INR date. Office staff will notify patient of dose adjustments ( if applicable) and return INR date.

## 2018-04-13 ENCOUNTER — TELEPHONE (OUTPATIENT)
Dept: FAMILY MEDICINE CLINIC | Age: 78
End: 2018-04-13

## 2018-04-13 ENCOUNTER — NURSE ONLY (OUTPATIENT)
Dept: FAMILY MEDICINE CLINIC | Age: 78
End: 2018-04-13
Payer: MEDICARE

## 2018-04-13 DIAGNOSIS — I48.0 PAROXYSMAL ATRIAL FIBRILLATION (HCC): ICD-10-CM

## 2018-04-13 LAB
INTERNATIONAL NORMALIZATION RATIO, POC: 2.4
PROTHROMBIN TIME, POC: NORMAL

## 2018-04-13 PROCEDURE — 85610 PROTHROMBIN TIME: CPT | Performed by: FAMILY MEDICINE

## 2018-04-13 PROCEDURE — 93793 ANTICOAG MGMT PT WARFARIN: CPT | Performed by: FAMILY MEDICINE

## 2018-04-25 RX ORDER — QUINAPRIL 20 MG/1
40 TABLET ORAL 2 TIMES DAILY
Qty: 360 TABLET | Refills: 3 | Status: SHIPPED | OUTPATIENT
Start: 2018-04-25 | End: 2018-07-25 | Stop reason: ALTCHOICE

## 2018-05-02 ENCOUNTER — TELEPHONE (OUTPATIENT)
Dept: INTERNAL MEDICINE CLINIC | Age: 78
End: 2018-05-02

## 2018-05-22 ENCOUNTER — NURSE ONLY (OUTPATIENT)
Dept: FAMILY MEDICINE CLINIC | Age: 78
End: 2018-05-22
Payer: MEDICARE

## 2018-05-22 DIAGNOSIS — I48.0 PAROXYSMAL ATRIAL FIBRILLATION (HCC): ICD-10-CM

## 2018-05-22 LAB
INTERNATIONAL NORMALIZATION RATIO, POC: 2.2
PROTHROMBIN TIME, POC: NORMAL

## 2018-05-22 PROCEDURE — 85610 PROTHROMBIN TIME: CPT | Performed by: FAMILY MEDICINE

## 2018-05-23 ENCOUNTER — TELEPHONE (OUTPATIENT)
Dept: FAMILY MEDICINE CLINIC | Age: 78
End: 2018-05-23

## 2018-07-03 ENCOUNTER — NURSE ONLY (OUTPATIENT)
Dept: FAMILY MEDICINE CLINIC | Age: 78
End: 2018-07-03
Payer: MEDICARE

## 2018-07-03 DIAGNOSIS — I48.0 PAROXYSMAL ATRIAL FIBRILLATION (HCC): ICD-10-CM

## 2018-07-03 LAB
INTERNATIONAL NORMALIZATION RATIO, POC: 2.3
PROTHROMBIN TIME, POC: NORMAL

## 2018-07-03 PROCEDURE — 85610 PROTHROMBIN TIME: CPT | Performed by: FAMILY MEDICINE

## 2018-07-03 NOTE — PROGRESS NOTES
Pt walked in for INR, fingerstick obtained, Pt tolerated well. Pt notified continue same dose of coumadin and recheck INR in 1 month.

## 2018-07-24 ENCOUNTER — HOSPITAL ENCOUNTER (OUTPATIENT)
Age: 78
Discharge: HOME | End: 2018-07-24
Payer: MEDICARE

## 2018-07-24 ENCOUNTER — OFFICE VISIT (OUTPATIENT)
Dept: INTERNAL MEDICINE CLINIC | Age: 78
End: 2018-07-24
Payer: MEDICARE

## 2018-07-24 VITALS
DIASTOLIC BLOOD PRESSURE: 90 MMHG | SYSTOLIC BLOOD PRESSURE: 152 MMHG | HEART RATE: 70 BPM | OXYGEN SATURATION: 95 % | HEIGHT: 62 IN | WEIGHT: 238.5 LBS | BODY MASS INDEX: 43.89 KG/M2

## 2018-07-24 DIAGNOSIS — E03.4 HYPOTHYROIDISM DUE TO ACQUIRED ATROPHY OF THYROID: ICD-10-CM

## 2018-07-24 DIAGNOSIS — I48.20 CHRONIC ATRIAL FIBRILLATION (HCC): ICD-10-CM

## 2018-07-24 DIAGNOSIS — I42.8 NONISCHEMIC CARDIOMYOPATHY (HCC): ICD-10-CM

## 2018-07-24 DIAGNOSIS — T50.905S ADVERSE EFFECT OF DRUG, SEQUELA: ICD-10-CM

## 2018-07-24 DIAGNOSIS — I48.20 CHRONIC ATRIAL FIBRILLATION (HCC): Primary | ICD-10-CM

## 2018-07-24 DIAGNOSIS — K76.0 NAFLD (NONALCOHOLIC FATTY LIVER DISEASE): ICD-10-CM

## 2018-07-24 DIAGNOSIS — Z95.810 BIVENTRICULAR IMPLANTABLE CARDIOVERTER-DEFIBRILLATOR IN SITU: ICD-10-CM

## 2018-07-24 LAB
ALBUMIN SERPL-MCNC: 3.6 G/DL (ref 3.5–5.1)
ALP BLD-CCNC: 123 U/L (ref 38–126)
ALT SERPL-CCNC: 31 U/L (ref 11–66)
ANION GAP SERPL CALCULATED.3IONS-SCNC: 16 MEQ/L (ref 8–16)
AST SERPL-CCNC: 44 U/L (ref 5–40)
BILIRUB SERPL-MCNC: 0.5 MG/DL (ref 0.3–1.2)
BILIRUBIN DIRECT: < 0.2 MG/DL (ref 0–0.3)
BUN BLDV-MCNC: 22 MG/DL (ref 7–22)
CALCIUM SERPL-MCNC: 9.4 MG/DL (ref 8.5–10.5)
CHLORIDE BLD-SCNC: 101 MEQ/L (ref 98–111)
CO2: 23 MEQ/L (ref 23–33)
CREAT SERPL-MCNC: 1.3 MG/DL (ref 0.4–1.2)
GFR SERPL CREATININE-BSD FRML MDRD: 40 ML/MIN/1.73M2
GLUCOSE BLD-MCNC: 129 MG/DL (ref 70–108)
POTASSIUM SERPL-SCNC: 4.5 MEQ/L (ref 3.5–5.2)
SODIUM BLD-SCNC: 140 MEQ/L (ref 135–145)
TOTAL PROTEIN: 7.2 G/DL (ref 6.1–8)
TSH SERPL DL<=0.05 MIU/L-ACNC: 3.57 UIU/ML (ref 0.4–4.2)

## 2018-07-24 PROCEDURE — 99213 OFFICE O/P EST LOW 20 MIN: CPT | Performed by: INTERNAL MEDICINE

## 2018-07-24 PROCEDURE — 80053 COMPREHEN METABOLIC PANEL: CPT

## 2018-07-24 PROCEDURE — 1101F PT FALLS ASSESS-DOCD LE1/YR: CPT | Performed by: INTERNAL MEDICINE

## 2018-07-24 PROCEDURE — 4040F PNEUMOC VAC/ADMIN/RCVD: CPT | Performed by: INTERNAL MEDICINE

## 2018-07-24 PROCEDURE — 82248 BILIRUBIN DIRECT: CPT

## 2018-07-24 PROCEDURE — 93000 ELECTROCARDIOGRAM COMPLETE: CPT | Performed by: INTERNAL MEDICINE

## 2018-07-24 PROCEDURE — 1090F PRES/ABSN URINE INCON ASSESS: CPT | Performed by: INTERNAL MEDICINE

## 2018-07-24 PROCEDURE — G8427 DOCREV CUR MEDS BY ELIG CLIN: HCPCS | Performed by: INTERNAL MEDICINE

## 2018-07-24 PROCEDURE — G8400 PT W/DXA NO RESULTS DOC: HCPCS | Performed by: INTERNAL MEDICINE

## 2018-07-24 PROCEDURE — 1123F ACP DISCUSS/DSCN MKR DOCD: CPT | Performed by: INTERNAL MEDICINE

## 2018-07-24 PROCEDURE — 84443 ASSAY THYROID STIM HORMONE: CPT

## 2018-07-24 PROCEDURE — 3017F COLORECTAL CA SCREEN DOC REV: CPT | Performed by: INTERNAL MEDICINE

## 2018-07-24 PROCEDURE — 36415 COLL VENOUS BLD VENIPUNCTURE: CPT

## 2018-07-24 PROCEDURE — 1036F TOBACCO NON-USER: CPT | Performed by: INTERNAL MEDICINE

## 2018-07-24 PROCEDURE — G8417 CALC BMI ABV UP PARAM F/U: HCPCS | Performed by: INTERNAL MEDICINE

## 2018-07-24 NOTE — PATIENT INSTRUCTIONS
To start the new med [employer]), stop the Accupril for 36 hrs before (2.5 doses); when you start the new med, call in some pressures over the next few days. Suggest you start it the first of the week so our office will be open.

## 2018-07-26 ENCOUNTER — HOSPITAL ENCOUNTER (OUTPATIENT)
Dept: PULMONOLOGY | Age: 78
Discharge: HOME | End: 2018-07-26
Payer: MEDICARE

## 2018-07-26 DIAGNOSIS — T50.905S ADVERSE EFFECT OF DRUG, SEQUELA: ICD-10-CM

## 2018-07-26 DIAGNOSIS — I48.20 CHRONIC ATRIAL FIBRILLATION (HCC): ICD-10-CM

## 2018-07-26 PROCEDURE — 94729 DIFFUSING CAPACITY: CPT

## 2018-07-26 PROCEDURE — 94010 BREATHING CAPACITY TEST: CPT

## 2018-08-13 ENCOUNTER — NURSE ONLY (OUTPATIENT)
Dept: FAMILY MEDICINE CLINIC | Age: 78
End: 2018-08-13
Payer: MEDICARE

## 2018-08-13 ENCOUNTER — TELEPHONE (OUTPATIENT)
Dept: FAMILY MEDICINE CLINIC | Age: 78
End: 2018-08-13

## 2018-08-13 DIAGNOSIS — I48.0 PAROXYSMAL ATRIAL FIBRILLATION (HCC): ICD-10-CM

## 2018-08-13 LAB
INTERNATIONAL NORMALIZATION RATIO, POC: 2.2
PROTHROMBIN TIME, POC: NORMAL

## 2018-08-13 PROCEDURE — 85610 PROTHROMBIN TIME: CPT | Performed by: FAMILY MEDICINE

## 2018-08-13 NOTE — TELEPHONE ENCOUNTER
----- Message from Tam Boothe MD sent at 8/13/2018  5:50 PM EDT -----  INR good. Continue same dosage and recheck INR in 1 month.

## 2018-08-27 RX ORDER — FUROSEMIDE 80 MG
40 TABLET ORAL DAILY
Qty: 15 TABLET | Refills: 10 | Status: SHIPPED | OUTPATIENT
Start: 2018-08-27 | End: 2018-10-16 | Stop reason: SDUPTHER

## 2018-08-29 ENCOUNTER — TELEPHONE (OUTPATIENT)
Dept: INTERNAL MEDICINE CLINIC | Age: 78
End: 2018-08-29

## 2018-08-29 ENCOUNTER — OFFICE VISIT (OUTPATIENT)
Dept: INTERNAL MEDICINE CLINIC | Age: 78
End: 2018-08-29
Payer: MEDICARE

## 2018-08-29 VITALS
SYSTOLIC BLOOD PRESSURE: 136 MMHG | HEART RATE: 55 BPM | WEIGHT: 226.5 LBS | RESPIRATION RATE: 18 BRPM | OXYGEN SATURATION: 97 % | BODY MASS INDEX: 41.43 KG/M2 | DIASTOLIC BLOOD PRESSURE: 86 MMHG

## 2018-08-29 DIAGNOSIS — Z95.810 BIVENTRICULAR IMPLANTABLE CARDIOVERTER-DEFIBRILLATOR IN SITU: ICD-10-CM

## 2018-08-29 DIAGNOSIS — R79.89 ABNORMAL LFTS: ICD-10-CM

## 2018-08-29 DIAGNOSIS — I42.8 NONISCHEMIC CARDIOMYOPATHY (HCC): Primary | ICD-10-CM

## 2018-08-29 DIAGNOSIS — E66.01 MORBID OBESITY WITH BMI OF 40.0-44.9, ADULT (HCC): ICD-10-CM

## 2018-08-29 DIAGNOSIS — R05.9 COUGH: ICD-10-CM

## 2018-08-29 PROCEDURE — 1123F ACP DISCUSS/DSCN MKR DOCD: CPT | Performed by: INTERNAL MEDICINE

## 2018-08-29 PROCEDURE — 4040F PNEUMOC VAC/ADMIN/RCVD: CPT | Performed by: INTERNAL MEDICINE

## 2018-08-29 PROCEDURE — 1036F TOBACCO NON-USER: CPT | Performed by: INTERNAL MEDICINE

## 2018-08-29 PROCEDURE — 1101F PT FALLS ASSESS-DOCD LE1/YR: CPT | Performed by: INTERNAL MEDICINE

## 2018-08-29 PROCEDURE — 99213 OFFICE O/P EST LOW 20 MIN: CPT | Performed by: INTERNAL MEDICINE

## 2018-08-29 PROCEDURE — 3017F COLORECTAL CA SCREEN DOC REV: CPT | Performed by: INTERNAL MEDICINE

## 2018-08-29 PROCEDURE — G8400 PT W/DXA NO RESULTS DOC: HCPCS | Performed by: INTERNAL MEDICINE

## 2018-08-29 PROCEDURE — G8427 DOCREV CUR MEDS BY ELIG CLIN: HCPCS | Performed by: INTERNAL MEDICINE

## 2018-08-29 PROCEDURE — G8417 CALC BMI ABV UP PARAM F/U: HCPCS | Performed by: INTERNAL MEDICINE

## 2018-08-29 PROCEDURE — 1090F PRES/ABSN URINE INCON ASSESS: CPT | Performed by: INTERNAL MEDICINE

## 2018-08-29 NOTE — PROGRESS NOTES
conditions. Duration of 5 years. , Disp: 1 each, Rfl: 0    Probiotic Product (PROBIOTIC FORMULA PO), Take 1 tablet by mouth daily. , Disp: , Rfl:     Misc. Devices (LUMBAR SUPPORT CUSHION) MISC, by Does not apply route. Dx: low back pain, sciatica., Disp: 1 each, Rfl: 0    B Complex Vitamins (B COMPLEX 1 PO), Take  by mouth daily. , Disp: , Rfl:     Ascorbic Acid (VITAMIN C) 1000 MG tablet, Take 1,000 mg by mouth 2 times daily. , Disp: , Rfl:     Multiple Vitamin (MULTI-VITAMIN PO), Take  by mouth daily. , Disp: , Rfl:     Calcium Carbonate (CALCIUM 600 PO), Take  by mouth 2 times daily. , Disp: , Rfl:     BIOTIN PO, Take 5,000 mg by mouth daily. , Disp: , Rfl:   Allergies   Allergen Reactions    Ativan [Lorazepam]      Gets anxiety    Codeine     Nystatin      Mycostatin powder    Percocet [Oxycodone-Acetaminophen]     Relafen [Nabumetone]      Stomach upset    Tape Lendel Lyell Tape]     Celebrex [Celecoxib] Rash    Lorazepam Anxiety       Objective  Vitals:    08/29/18 1419   BP: 136/86   Pulse: 55   Resp: 18   SpO2: 97%     General Appearance:  awake, alert, oriented, in no acute distress and overweight  Lungs:  Bilateral basilar rales  Heart:  Heart sounds are normal.  Regular rate and rhythm without murmur, gallop or rub. Abdomen:  Soft, non-tender, normal bowel sounds. No bruits, organomegaly or masses. Extremities: Extremities warm to touch, pink, with no edema. Assessment  1. lv systolic dysfx- boost Entresto  2. Cough- cxr, procalcitonin  3.  See 6 wk    Plan  above

## 2018-08-30 ENCOUNTER — HOSPITAL ENCOUNTER (OUTPATIENT)
Age: 78
Discharge: HOME | End: 2018-08-30
Payer: MEDICARE

## 2018-08-30 ENCOUNTER — HOSPITAL ENCOUNTER (OUTPATIENT)
Dept: GENERAL RADIOLOGY | Age: 78
Discharge: HOME | End: 2018-08-30
Payer: MEDICARE

## 2018-08-30 DIAGNOSIS — R05.9 COUGH: ICD-10-CM

## 2018-08-30 DIAGNOSIS — I42.8 NONISCHEMIC CARDIOMYOPATHY (HCC): ICD-10-CM

## 2018-08-30 LAB
ANION GAP SERPL CALCULATED.3IONS-SCNC: 13 MEQ/L (ref 8–16)
BUN BLDV-MCNC: 20 MG/DL (ref 7–22)
CALCIUM SERPL-MCNC: 9.4 MG/DL (ref 8.5–10.5)
CHLORIDE BLD-SCNC: 101 MEQ/L (ref 98–111)
CO2: 25 MEQ/L (ref 23–33)
CREAT SERPL-MCNC: 1.1 MG/DL (ref 0.4–1.2)
GFR SERPL CREATININE-BSD FRML MDRD: 48 ML/MIN/1.73M2
GLUCOSE BLD-MCNC: 140 MG/DL (ref 70–108)
POTASSIUM SERPL-SCNC: 4.5 MEQ/L (ref 3.5–5.2)
PROCALCITONIN: 0.07 NG/ML (ref 0.01–0.09)
SODIUM BLD-SCNC: 139 MEQ/L (ref 135–145)

## 2018-08-30 PROCEDURE — 84145 PROCALCITONIN (PCT): CPT

## 2018-08-30 PROCEDURE — 80048 BASIC METABOLIC PNL TOTAL CA: CPT

## 2018-08-30 PROCEDURE — 71046 X-RAY EXAM CHEST 2 VIEWS: CPT

## 2018-08-30 PROCEDURE — 36415 COLL VENOUS BLD VENIPUNCTURE: CPT

## 2018-09-04 ENCOUNTER — TELEPHONE (OUTPATIENT)
Dept: INTERNAL MEDICINE CLINIC | Age: 78
End: 2018-09-04

## 2018-09-10 ENCOUNTER — OFFICE VISIT (OUTPATIENT)
Dept: FAMILY MEDICINE CLINIC | Age: 78
End: 2018-09-10
Payer: MEDICARE

## 2018-09-10 VITALS
RESPIRATION RATE: 14 BRPM | DIASTOLIC BLOOD PRESSURE: 84 MMHG | WEIGHT: 225.2 LBS | SYSTOLIC BLOOD PRESSURE: 128 MMHG | HEART RATE: 64 BPM | BODY MASS INDEX: 41.19 KG/M2

## 2018-09-10 DIAGNOSIS — I48.0 PAROXYSMAL ATRIAL FIBRILLATION (HCC): ICD-10-CM

## 2018-09-10 DIAGNOSIS — M51.36 DDD (DEGENERATIVE DISC DISEASE), LUMBAR: Primary | ICD-10-CM

## 2018-09-10 DIAGNOSIS — T45.511A COUMADIN TOXICITY, ACCIDENTAL OR UNINTENTIONAL, INITIAL ENCOUNTER: ICD-10-CM

## 2018-09-10 DIAGNOSIS — E66.01 MORBID OBESITY WITH BMI OF 40.0-44.9, ADULT (HCC): ICD-10-CM

## 2018-09-10 LAB
INTERNATIONAL NORMALIZATION RATIO, POC: 5.9
PROTHROMBIN TIME, POC: NORMAL

## 2018-09-10 PROCEDURE — 1090F PRES/ABSN URINE INCON ASSESS: CPT | Performed by: FAMILY MEDICINE

## 2018-09-10 PROCEDURE — 99214 OFFICE O/P EST MOD 30 MIN: CPT | Performed by: FAMILY MEDICINE

## 2018-09-10 PROCEDURE — 1036F TOBACCO NON-USER: CPT | Performed by: FAMILY MEDICINE

## 2018-09-10 PROCEDURE — G8400 PT W/DXA NO RESULTS DOC: HCPCS | Performed by: FAMILY MEDICINE

## 2018-09-10 PROCEDURE — 3017F COLORECTAL CA SCREEN DOC REV: CPT | Performed by: FAMILY MEDICINE

## 2018-09-10 PROCEDURE — 85610 PROTHROMBIN TIME: CPT | Performed by: FAMILY MEDICINE

## 2018-09-10 PROCEDURE — 1101F PT FALLS ASSESS-DOCD LE1/YR: CPT | Performed by: FAMILY MEDICINE

## 2018-09-10 PROCEDURE — 1123F ACP DISCUSS/DSCN MKR DOCD: CPT | Performed by: FAMILY MEDICINE

## 2018-09-10 PROCEDURE — 4040F PNEUMOC VAC/ADMIN/RCVD: CPT | Performed by: FAMILY MEDICINE

## 2018-09-10 PROCEDURE — G8427 DOCREV CUR MEDS BY ELIG CLIN: HCPCS | Performed by: FAMILY MEDICINE

## 2018-09-10 PROCEDURE — G8417 CALC BMI ABV UP PARAM F/U: HCPCS | Performed by: FAMILY MEDICINE

## 2018-09-10 RX ORDER — PREDNISONE 20 MG/1
TABLET ORAL
Qty: 15 TABLET | Refills: 0 | Status: SHIPPED | OUTPATIENT
Start: 2018-09-10 | End: 2018-10-16 | Stop reason: ALTCHOICE

## 2018-09-10 ASSESSMENT — ENCOUNTER SYMPTOMS
VOMITING: 0
CONSTIPATION: 0
CHEST TIGHTNESS: 0
DIARRHEA: 0
SHORTNESS OF BREATH: 0
EYE PAIN: 0
WHEEZING: 0
BLOOD IN STOOL: 0
RHINORRHEA: 0
COUGH: 0
SORE THROAT: 0
ABDOMINAL PAIN: 0
BACK PAIN: 1
NAUSEA: 0

## 2018-09-10 NOTE — PROGRESS NOTES
Subjective:      Patient ID: Graciela London is a 78 y.o. female. HPI  Pt here for a check up. REviewed BMI of 41. Encouraged diet, exercise and weight loss. Complains of low back pain. No new injury. Chronic, slowly progressing. Uses tylenol. Inr checked today at 5.9. New meds of entresto and robitussin for bronchitis. , nonsmoker,pmh reviewed. Review of Systems   Constitutional: Negative for chills, fatigue, fever and unexpected weight change. HENT: Negative for congestion, ear pain, rhinorrhea and sore throat. Eyes: Negative for pain and visual disturbance. Respiratory: Negative for cough, chest tightness, shortness of breath and wheezing. Cardiovascular: Negative for chest pain and palpitations. Gastrointestinal: Negative for abdominal pain, blood in stool, constipation, diarrhea, nausea and vomiting. Genitourinary: Negative for difficulty urinating, frequency, hematuria and urgency. Musculoskeletal: Positive for back pain. Negative for joint swelling, myalgias and neck pain. Skin: Negative for rash. Neurological: Negative for dizziness and headaches. Hematological: Negative for adenopathy. Does not bruise/bleed easily. Psychiatric/Behavioral: Negative for behavioral problems and sleep disturbance. The patient is not nervous/anxious. Objective:   Physical Exam   Constitutional: She is oriented to person, place, and time. She appears well-developed and well-nourished. HENT:   Head: Normocephalic and atraumatic. Right Ear: External ear normal.   Left Ear: External ear normal.   Nose: Nose normal.   Mouth/Throat: Oropharynx is clear and moist.   Eyes: Pupils are equal, round, and reactive to light. EOM are normal.   Neck: Neck supple. Carotid bruit is not present. No thyromegaly present. Cardiovascular: Normal rate, regular rhythm and normal heart sounds. Pulmonary/Chest: Breath sounds normal. She has no wheezes. She has no rales. Abdominal: Soft.

## 2018-09-10 NOTE — PATIENT INSTRUCTIONS
not prevent blood clots very well. You will need regular blood tests to check how long it takes for your blood to form a clot. This test is called a PT or prothrombin time test. The result of the test is called an INR level. Depending on the test results, your doctor or anticoagulation clinic may adjust your dose of warfarin. Follow-up care is a key part of your treatment and safety. Be sure to make and go to all appointments, and call your doctor if you are having problems. It's also a good idea to know your test results and keep a list of the medicines you take. How can you care for yourself at home? Take warfarin safely  · Take your warfarin at the same time each day. · If you miss a dose of warfarin, don't take an extra dose to make up for it. Your doctor can tell you exactly what to do so you don't take too much or too little. · Wear medical alert jewelry that lets others know that you take warfarin. You can buy this at most drugstores. · Don't take warfarin if you are pregnant or planning to get pregnant. Talk to your doctor about how you can prevent getting pregnant while you are taking it. · Don't change your dose or stop taking warfarin unless your doctor tells you to. Effects of medicines and food on warfarin  · Don't start or stop taking any medicines, vitamins, or natural remedies unless you first talk to your doctor. Many medicines can affect how warfarin works. These include aspirin and other pain relievers, over-the-counter medicines, multivitamins, dietary supplements, and herbal products. · Tell all of your doctors and pharmacists that you take warfarin. Some prescription medicines can affect how warfarin works. · Keep the amount of vitamin K in your diet about the same from day to day. Do not suddenly eat a lot more or a lot less food that is rich in vitamin K than you usually do. Vitamin K affects how warfarin works and how your blood clots.  Talk with your doctor before making big dry cereal, or ½ cup of cooked rice, pasta, or cooked cereal. Try to choose whole-grain products as much as possible. · Limit lean meat, poultry, and fish to 2 servings each day. A serving is 3 ounces, about the size of a deck of cards. · Eat 4 to 5 servings of nuts, seeds, and legumes (cooked dried beans, lentils, and split peas) each week. A serving is 1/3 cup of nuts, 2 tablespoons of seeds, or ½ cup of cooked beans or peas. · Limit fats and oils to 2 to 3 servings each day. A serving is 1 teaspoon of vegetable oil or 2 tablespoons of salad dressing. · Limit sweets and added sugars to 5 servings or less a week. A serving is 1 tablespoon jelly or jam, ½ cup sorbet, or 1 cup of lemonade. · Eat less than 2,300 milligrams (mg) of sodium a day. If you limit your sodium to 1,500 mg a day, you can lower your blood pressure even more. Tips for success  · Start small. Do not try to make dramatic changes to your diet all at once. You might feel that you are missing out on your favorite foods and then be more likely to not follow the plan. Make small changes, and stick with them. Once those changes become habit, add a few more changes. · Try some of the following:  ¨ Make it a goal to eat a fruit or vegetable at every meal and at snacks. This will make it easy to get the recommended amount of fruits and vegetables each day. ¨ Try yogurt topped with fruit and nuts for a snack or healthy dessert. ¨ Add lettuce, tomato, cucumber, and onion to sandwiches. ¨ Combine a ready-made pizza crust with low-fat mozzarella cheese and lots of vegetable toppings. Try using tomatoes, squash, spinach, broccoli, carrots, cauliflower, and onions. ¨ Have a variety of cut-up vegetables with a low-fat dip as an appetizer instead of chips and dip. ¨ Sprinkle sunflower seeds or chopped almonds over salads. Or try adding chopped walnuts or almonds to cooked vegetables. ¨ Try some vegetarian meals using beans and peas.  Add garbanzo or kidney beans to salads. Make burritos and tacos with mashed alvarez beans or black beans. Where can you learn more? Go to https://[employer]web.[employer]. org and sign in to your [employer] account. Enter Q015 in the [facility] box to learn more about \"DASH Diet: Care Instructions. \"     If you do not have an account, please click on the \"Sign Up Now\" link. Current as of: December 6, 2017  Content Version: 11.7  © 5172-0061 [employer]. Care instructions adapted under license by [facility] ([facility]). If you have questions about a medical condition or this instruction, always ask your healthcare professional. [address] any warranty or liability for your use of this information.

## 2018-09-13 ENCOUNTER — NURSE ONLY (OUTPATIENT)
Dept: FAMILY MEDICINE CLINIC | Age: 78
End: 2018-09-13
Payer: MEDICARE

## 2018-09-13 ENCOUNTER — TELEPHONE (OUTPATIENT)
Dept: FAMILY MEDICINE CLINIC | Age: 78
End: 2018-09-13

## 2018-09-13 DIAGNOSIS — I48.0 PAROXYSMAL ATRIAL FIBRILLATION (HCC): ICD-10-CM

## 2018-09-13 LAB
INTERNATIONAL NORMALIZATION RATIO, POC: 4.3
PROTHROMBIN TIME, POC: NORMAL

## 2018-09-13 PROCEDURE — 85610 PROTHROMBIN TIME: CPT | Performed by: FAMILY MEDICINE

## 2018-09-14 ENCOUNTER — HOSPITAL ENCOUNTER (OUTPATIENT)
Dept: GENERAL RADIOLOGY | Age: 78
Discharge: HOME | End: 2018-09-14
Payer: MEDICARE

## 2018-09-14 ENCOUNTER — TELEPHONE (OUTPATIENT)
Dept: FAMILY MEDICINE CLINIC | Age: 78
End: 2018-09-14

## 2018-09-14 ENCOUNTER — HOSPITAL ENCOUNTER (OUTPATIENT)
Age: 78
Discharge: HOME | End: 2018-09-14
Payer: MEDICARE

## 2018-09-14 DIAGNOSIS — M51.36 DDD (DEGENERATIVE DISC DISEASE), LUMBAR: ICD-10-CM

## 2018-09-14 PROCEDURE — 72100 X-RAY EXAM L-S SPINE 2/3 VWS: CPT

## 2018-09-14 NOTE — TELEPHONE ENCOUNTER
Pt notified of results and states the prednisone has helped. She will contact the office if things worsen.

## 2018-09-14 NOTE — TELEPHONE ENCOUNTER
----- Message from Alissa Boss MD sent at 9/14/2018 10:09 AM EDT -----  Lumbar films show arthritis and narrowing. Any improvement with the prednisone? ?

## 2018-09-17 ENCOUNTER — NURSE ONLY (OUTPATIENT)
Dept: FAMILY MEDICINE CLINIC | Age: 78
End: 2018-09-17
Payer: MEDICARE

## 2018-09-17 ENCOUNTER — TELEPHONE (OUTPATIENT)
Dept: FAMILY MEDICINE CLINIC | Age: 78
End: 2018-09-17

## 2018-09-17 DIAGNOSIS — I48.0 PAROXYSMAL ATRIAL FIBRILLATION (HCC): ICD-10-CM

## 2018-09-17 LAB
INTERNATIONAL NORMALIZATION RATIO, POC: 1.8
PROTHROMBIN TIME, POC: NORMAL

## 2018-09-17 PROCEDURE — 85610 PROTHROMBIN TIME: CPT | Performed by: FAMILY MEDICINE

## 2018-09-26 ENCOUNTER — TELEPHONE (OUTPATIENT)
Dept: FAMILY MEDICINE CLINIC | Age: 78
End: 2018-09-26

## 2018-09-26 ENCOUNTER — NURSE ONLY (OUTPATIENT)
Dept: FAMILY MEDICINE CLINIC | Age: 78
End: 2018-09-26
Payer: MEDICARE

## 2018-09-26 DIAGNOSIS — I48.0 PAROXYSMAL ATRIAL FIBRILLATION (HCC): ICD-10-CM

## 2018-09-26 LAB
INTERNATIONAL NORMALIZATION RATIO, POC: 1.8
PROTHROMBIN TIME, POC: NORMAL

## 2018-09-26 PROCEDURE — 85610 PROTHROMBIN TIME: CPT | Performed by: FAMILY MEDICINE

## 2018-09-26 NOTE — TELEPHONE ENCOUNTER
----- Message from Morena Del Castillo MD sent at 9/26/2018  4:58 PM EDT -----  INR good. Continue same dosage and recheck INR in 1 month.

## 2018-10-02 RX ORDER — FUROSEMIDE 40 MG/1
40 TABLET ORAL DAILY
Qty: 30 TABLET | Refills: 11 | Status: SHIPPED | OUTPATIENT
Start: 2018-10-02 | End: 2019-01-28 | Stop reason: SDUPTHER

## 2018-10-16 ENCOUNTER — OFFICE VISIT (OUTPATIENT)
Dept: INTERNAL MEDICINE CLINIC | Age: 78
End: 2018-10-16
Payer: MEDICARE

## 2018-10-16 VITALS
HEIGHT: 62 IN | WEIGHT: 223.5 LBS | DIASTOLIC BLOOD PRESSURE: 64 MMHG | BODY MASS INDEX: 41.13 KG/M2 | SYSTOLIC BLOOD PRESSURE: 130 MMHG | HEART RATE: 66 BPM

## 2018-10-16 DIAGNOSIS — E66.01 MORBID OBESITY WITH BMI OF 40.0-44.9, ADULT (HCC): ICD-10-CM

## 2018-10-16 DIAGNOSIS — I51.9 LEFT VENTRICULAR SYSTOLIC DYSFUNCTION: Primary | ICD-10-CM

## 2018-10-16 DIAGNOSIS — Z95.810 BIVENTRICULAR IMPLANTABLE CARDIOVERTER-DEFIBRILLATOR IN SITU: ICD-10-CM

## 2018-10-16 DIAGNOSIS — I10 BENIGN ESSENTIAL HTN: ICD-10-CM

## 2018-10-16 DIAGNOSIS — I48.20 CHRONIC ATRIAL FIBRILLATION (HCC): ICD-10-CM

## 2018-10-16 PROCEDURE — 99213 OFFICE O/P EST LOW 20 MIN: CPT | Performed by: INTERNAL MEDICINE

## 2018-10-16 PROCEDURE — G8427 DOCREV CUR MEDS BY ELIG CLIN: HCPCS | Performed by: INTERNAL MEDICINE

## 2018-10-16 PROCEDURE — 1101F PT FALLS ASSESS-DOCD LE1/YR: CPT | Performed by: INTERNAL MEDICINE

## 2018-10-16 PROCEDURE — 1123F ACP DISCUSS/DSCN MKR DOCD: CPT | Performed by: INTERNAL MEDICINE

## 2018-10-16 PROCEDURE — G8484 FLU IMMUNIZE NO ADMIN: HCPCS | Performed by: INTERNAL MEDICINE

## 2018-10-16 PROCEDURE — 1090F PRES/ABSN URINE INCON ASSESS: CPT | Performed by: INTERNAL MEDICINE

## 2018-10-16 PROCEDURE — 4040F PNEUMOC VAC/ADMIN/RCVD: CPT | Performed by: INTERNAL MEDICINE

## 2018-10-16 PROCEDURE — G8417 CALC BMI ABV UP PARAM F/U: HCPCS | Performed by: INTERNAL MEDICINE

## 2018-10-16 PROCEDURE — G8400 PT W/DXA NO RESULTS DOC: HCPCS | Performed by: INTERNAL MEDICINE

## 2018-10-16 PROCEDURE — 1036F TOBACCO NON-USER: CPT | Performed by: INTERNAL MEDICINE

## 2018-10-23 DIAGNOSIS — I48.20 CHRONIC ATRIAL FIBRILLATION (HCC): ICD-10-CM

## 2018-10-31 ENCOUNTER — TELEPHONE (OUTPATIENT)
Dept: FAMILY MEDICINE CLINIC | Age: 78
End: 2018-10-31

## 2018-10-31 ENCOUNTER — NURSE ONLY (OUTPATIENT)
Dept: FAMILY MEDICINE CLINIC | Age: 78
End: 2018-10-31
Payer: MEDICARE

## 2018-10-31 DIAGNOSIS — I48.0 PAROXYSMAL ATRIAL FIBRILLATION (HCC): ICD-10-CM

## 2018-10-31 LAB
INTERNATIONAL NORMALIZATION RATIO, POC: 1.8
PROTHROMBIN TIME, POC: NORMAL

## 2018-10-31 PROCEDURE — 93793 ANTICOAG MGMT PT WARFARIN: CPT | Performed by: FAMILY MEDICINE

## 2018-10-31 PROCEDURE — 85610 PROTHROMBIN TIME: CPT | Performed by: FAMILY MEDICINE

## 2018-10-31 RX ORDER — WARFARIN SODIUM 3 MG/1
TABLET ORAL
Qty: 30 TABLET | Refills: 1 | OUTPATIENT
Start: 2018-10-31

## 2018-10-31 RX ORDER — WARFARIN SODIUM 3 MG/1
TABLET ORAL
Qty: 90 TABLET | Refills: 0 | Status: SHIPPED | OUTPATIENT
Start: 2018-10-31 | End: 2019-01-27 | Stop reason: SDUPTHER

## 2018-12-11 ENCOUNTER — TELEPHONE (OUTPATIENT)
Dept: FAMILY MEDICINE CLINIC | Age: 78
End: 2018-12-11

## 2018-12-11 ENCOUNTER — NURSE ONLY (OUTPATIENT)
Dept: FAMILY MEDICINE CLINIC | Age: 78
End: 2018-12-11
Payer: MEDICARE

## 2018-12-11 DIAGNOSIS — I48.0 PAROXYSMAL ATRIAL FIBRILLATION (HCC): ICD-10-CM

## 2018-12-11 LAB
INTERNATIONAL NORMALIZATION RATIO, POC: 1.7
PROTHROMBIN TIME, POC: NORMAL

## 2018-12-11 PROCEDURE — 85610 PROTHROMBIN TIME: CPT | Performed by: FAMILY MEDICINE

## 2019-01-14 ENCOUNTER — TELEPHONE (OUTPATIENT)
Dept: FAMILY MEDICINE CLINIC | Age: 79
End: 2019-01-14

## 2019-01-14 ENCOUNTER — NURSE ONLY (OUTPATIENT)
Dept: FAMILY MEDICINE CLINIC | Age: 79
End: 2019-01-14
Payer: MEDICARE

## 2019-01-14 DIAGNOSIS — I48.0 PAROXYSMAL ATRIAL FIBRILLATION (HCC): ICD-10-CM

## 2019-01-14 LAB
INTERNATIONAL NORMALIZATION RATIO, POC: 3
PROTHROMBIN TIME, POC: NORMAL

## 2019-01-14 PROCEDURE — 93793 ANTICOAG MGMT PT WARFARIN: CPT | Performed by: FAMILY MEDICINE

## 2019-01-14 PROCEDURE — 85610 PROTHROMBIN TIME: CPT | Performed by: FAMILY MEDICINE

## 2019-01-27 DIAGNOSIS — I48.20 CHRONIC ATRIAL FIBRILLATION (HCC): ICD-10-CM

## 2019-01-28 RX ORDER — DIGOXIN 125 MCG
125 TABLET ORAL DAILY
Qty: 30 TABLET | Refills: 11 | Status: SHIPPED | OUTPATIENT
Start: 2019-01-28 | End: 2019-10-24 | Stop reason: SDUPTHER

## 2019-01-28 RX ORDER — METOPROLOL SUCCINATE 200 MG/1
200 TABLET, EXTENDED RELEASE ORAL DAILY
Qty: 30 TABLET | Refills: 11 | Status: SHIPPED | OUTPATIENT
Start: 2019-01-28 | End: 2019-10-24 | Stop reason: SDUPTHER

## 2019-01-28 RX ORDER — SPIRONOLACTONE 25 MG/1
25 TABLET ORAL EVERY OTHER DAY
Qty: 15 TABLET | Refills: 11 | Status: SHIPPED | OUTPATIENT
Start: 2019-01-28 | End: 2019-11-04 | Stop reason: SDUPTHER

## 2019-01-28 RX ORDER — WARFARIN SODIUM 3 MG/1
TABLET ORAL
Qty: 90 TABLET | Refills: 2 | Status: SHIPPED | OUTPATIENT
Start: 2019-01-28 | End: 2019-08-11 | Stop reason: SDUPTHER

## 2019-01-28 RX ORDER — AMIODARONE HYDROCHLORIDE 200 MG/1
200 TABLET ORAL DAILY
Qty: 30 TABLET | Refills: 11 | Status: SHIPPED | OUTPATIENT
Start: 2019-01-28 | End: 2019-10-24 | Stop reason: SDUPTHER

## 2019-01-28 RX ORDER — LEVOTHYROXINE SODIUM 0.12 MG/1
125 TABLET ORAL DAILY
Qty: 30 TABLET | Refills: 11 | Status: SHIPPED | OUTPATIENT
Start: 2019-01-28 | End: 2019-10-24 | Stop reason: SDUPTHER

## 2019-01-28 RX ORDER — FUROSEMIDE 40 MG/1
40 TABLET ORAL DAILY
Qty: 30 TABLET | Refills: 11 | Status: SHIPPED | OUTPATIENT
Start: 2019-01-28 | End: 2019-10-10 | Stop reason: SDUPTHER

## 2019-02-21 ENCOUNTER — NURSE ONLY (OUTPATIENT)
Dept: FAMILY MEDICINE CLINIC | Age: 79
End: 2019-02-21
Payer: MEDICARE

## 2019-02-21 ENCOUNTER — TELEPHONE (OUTPATIENT)
Dept: FAMILY MEDICINE CLINIC | Age: 79
End: 2019-02-21

## 2019-02-21 ENCOUNTER — TELEPHONE (OUTPATIENT)
Dept: INTERNAL MEDICINE CLINIC | Age: 79
End: 2019-02-21

## 2019-02-21 DIAGNOSIS — I48.0 PAROXYSMAL ATRIAL FIBRILLATION (HCC): ICD-10-CM

## 2019-02-21 DIAGNOSIS — Z95.810 BIVENTRICULAR IMPLANTABLE CARDIOVERTER-DEFIBRILLATOR IN SITU: Primary | ICD-10-CM

## 2019-02-21 DIAGNOSIS — Z45.02 AICD AT END OF BATTERY LIFE: ICD-10-CM

## 2019-02-21 LAB
INTERNATIONAL NORMALIZATION RATIO, POC: 2.4
PROTHROMBIN TIME, POC: NORMAL

## 2019-02-21 PROCEDURE — 85610 PROTHROMBIN TIME: CPT | Performed by: FAMILY MEDICINE

## 2019-02-21 PROCEDURE — 93793 ANTICOAG MGMT PT WARFARIN: CPT | Performed by: FAMILY MEDICINE

## 2019-02-25 ENCOUNTER — NURSE ONLY (OUTPATIENT)
Dept: INTERNAL MEDICINE CLINIC | Age: 79
End: 2019-02-25
Payer: MEDICARE

## 2019-02-25 ENCOUNTER — OFFICE VISIT (OUTPATIENT)
Dept: INTERNAL MEDICINE CLINIC | Age: 79
End: 2019-02-25
Payer: MEDICARE

## 2019-02-25 VITALS
SYSTOLIC BLOOD PRESSURE: 124 MMHG | DIASTOLIC BLOOD PRESSURE: 66 MMHG | WEIGHT: 210.5 LBS | BODY MASS INDEX: 38.74 KG/M2 | HEIGHT: 62 IN

## 2019-02-25 DIAGNOSIS — E03.4 HYPOTHYROIDISM DUE TO ACQUIRED ATROPHY OF THYROID: ICD-10-CM

## 2019-02-25 DIAGNOSIS — I48.20 CHRONIC ATRIAL FIBRILLATION (HCC): Primary | ICD-10-CM

## 2019-02-25 DIAGNOSIS — T50.905S ADVERSE EFFECT OF DRUG, SEQUELA: ICD-10-CM

## 2019-02-25 DIAGNOSIS — I42.8 NONISCHEMIC CARDIOMYOPATHY (HCC): ICD-10-CM

## 2019-02-25 DIAGNOSIS — I48.20 CHRONIC ATRIAL FIBRILLATION (HCC): ICD-10-CM

## 2019-02-25 LAB
ALBUMIN SERPL-MCNC: 3.8 G/DL (ref 3.5–5.1)
ALP BLD-CCNC: 180 U/L (ref 38–126)
ALT SERPL-CCNC: 47 U/L (ref 11–66)
ANION GAP SERPL CALCULATED.3IONS-SCNC: 18 MEQ/L (ref 8–16)
AST SERPL-CCNC: 75 U/L (ref 5–40)
BILIRUB SERPL-MCNC: 0.6 MG/DL (ref 0.3–1.2)
BILIRUBIN DIRECT: < 0.2 MG/DL (ref 0–0.3)
BUN BLDV-MCNC: 23 MG/DL (ref 7–22)
CALCIUM SERPL-MCNC: 9.4 MG/DL (ref 8.5–10.5)
CHLORIDE BLD-SCNC: 101 MEQ/L (ref 98–111)
CO2: 22 MEQ/L (ref 23–33)
CREAT SERPL-MCNC: 1.1 MG/DL (ref 0.4–1.2)
GFR SERPL CREATININE-BSD FRML MDRD: 48 ML/MIN/1.73M2
GLUCOSE BLD-MCNC: 144 MG/DL (ref 70–108)
POTASSIUM SERPL-SCNC: 4.1 MEQ/L (ref 3.5–5.2)
SODIUM BLD-SCNC: 141 MEQ/L (ref 135–145)
TOTAL PROTEIN: 7.2 G/DL (ref 6.1–8)
TSH SERPL DL<=0.05 MIU/L-ACNC: 6.86 UIU/ML (ref 0.4–4.2)

## 2019-02-25 PROCEDURE — 99213 OFFICE O/P EST LOW 20 MIN: CPT | Performed by: INTERNAL MEDICINE

## 2019-02-25 PROCEDURE — 1036F TOBACCO NON-USER: CPT | Performed by: INTERNAL MEDICINE

## 2019-02-25 PROCEDURE — 1123F ACP DISCUSS/DSCN MKR DOCD: CPT | Performed by: INTERNAL MEDICINE

## 2019-02-25 PROCEDURE — G8484 FLU IMMUNIZE NO ADMIN: HCPCS | Performed by: INTERNAL MEDICINE

## 2019-02-25 PROCEDURE — 4040F PNEUMOC VAC/ADMIN/RCVD: CPT | Performed by: INTERNAL MEDICINE

## 2019-02-25 PROCEDURE — 1101F PT FALLS ASSESS-DOCD LE1/YR: CPT | Performed by: INTERNAL MEDICINE

## 2019-02-25 PROCEDURE — G8417 CALC BMI ABV UP PARAM F/U: HCPCS | Performed by: INTERNAL MEDICINE

## 2019-02-25 PROCEDURE — 1090F PRES/ABSN URINE INCON ASSESS: CPT | Performed by: INTERNAL MEDICINE

## 2019-02-25 PROCEDURE — 36415 COLL VENOUS BLD VENIPUNCTURE: CPT | Performed by: INTERNAL MEDICINE

## 2019-02-25 PROCEDURE — G8400 PT W/DXA NO RESULTS DOC: HCPCS | Performed by: INTERNAL MEDICINE

## 2019-02-25 PROCEDURE — 93000 ELECTROCARDIOGRAM COMPLETE: CPT | Performed by: INTERNAL MEDICINE

## 2019-02-25 PROCEDURE — G8427 DOCREV CUR MEDS BY ELIG CLIN: HCPCS | Performed by: INTERNAL MEDICINE

## 2019-02-25 ASSESSMENT — PATIENT HEALTH QUESTIONNAIRE - PHQ9
1. LITTLE INTEREST OR PLEASURE IN DOING THINGS: 0
SUM OF ALL RESPONSES TO PHQ QUESTIONS 1-9: 0
2. FEELING DOWN, DEPRESSED OR HOPELESS: 0
SUM OF ALL RESPONSES TO PHQ QUESTIONS 1-9: 0
SUM OF ALL RESPONSES TO PHQ9 QUESTIONS 1 & 2: 0

## 2019-02-26 ENCOUNTER — TELEPHONE (OUTPATIENT)
Dept: INTERNAL MEDICINE CLINIC | Age: 79
End: 2019-02-26

## 2019-02-26 DIAGNOSIS — E03.9 HYPOTHYROIDISM, UNSPECIFIED TYPE: ICD-10-CM

## 2019-02-26 DIAGNOSIS — R79.89 ELEVATED TSH: ICD-10-CM

## 2019-02-26 DIAGNOSIS — R79.89 ABNORMAL LIVER FUNCTION TEST: Primary | ICD-10-CM

## 2019-02-26 DIAGNOSIS — R79.89 ABNORMAL TSH: ICD-10-CM

## 2019-03-05 ENCOUNTER — APPOINTMENT (OUTPATIENT)
Dept: CARDIAC CATH/INVASIVE PROCEDURES | Age: 79
End: 2019-03-05
Payer: MEDICARE

## 2019-03-05 ENCOUNTER — HOSPITAL ENCOUNTER (OUTPATIENT)
Dept: INPATIENT UNIT | Age: 79
Discharge: HOME | End: 2019-03-05
Attending: INTERNAL MEDICINE | Admitting: INTERNAL MEDICINE
Payer: MEDICARE

## 2019-03-05 VITALS
HEIGHT: 62 IN | DIASTOLIC BLOOD PRESSURE: 71 MMHG | OXYGEN SATURATION: 100 % | SYSTOLIC BLOOD PRESSURE: 159 MMHG | TEMPERATURE: 99.8 F | HEART RATE: 60 BPM | RESPIRATION RATE: 20 BRPM | WEIGHT: 210 LBS | BODY MASS INDEX: 38.64 KG/M2

## 2019-03-05 PROBLEM — I47.29 VENTRICULAR TACHYCARDIA (PAROXYSMAL) (HCC): Status: ACTIVE | Noted: 2019-03-05

## 2019-03-05 LAB
ANION GAP SERPL CALCULATED.3IONS-SCNC: 13 MEQ/L (ref 8–16)
BUN BLDV-MCNC: 24 MG/DL (ref 7–22)
CALCIUM SERPL-MCNC: 9.1 MG/DL (ref 8.5–10.5)
CHLORIDE BLD-SCNC: 103 MEQ/L (ref 98–111)
CO2: 24 MEQ/L (ref 23–33)
CREAT SERPL-MCNC: 1.1 MG/DL (ref 0.4–1.2)
EKG ATRIAL RATE: 74 BPM
EKG Q-T INTERVAL: 464 MS
EKG QRS DURATION: 148 MS
EKG QTC CALCULATION (BAZETT): 467 MS
EKG R AXIS: -75 DEGREES
EKG T AXIS: 90 DEGREES
EKG VENTRICULAR RATE: 61 BPM
ERYTHROCYTE [DISTWIDTH] IN BLOOD BY AUTOMATED COUNT: 16.3 % (ref 11.5–14.5)
ERYTHROCYTE [DISTWIDTH] IN BLOOD BY AUTOMATED COUNT: 58.6 FL (ref 35–45)
GFR SERPL CREATININE-BSD FRML MDRD: 48 ML/MIN/1.73M2
GLUCOSE BLD-MCNC: 128 MG/DL (ref 70–108)
HCT VFR BLD CALC: 44.2 % (ref 37–47)
HEMOGLOBIN: 14 GM/DL (ref 12–16)
INR BLD: 1.41 (ref 0.85–1.13)
MCH RBC QN AUTO: 30.8 PG (ref 26–33)
MCHC RBC AUTO-ENTMCNC: 31.7 GM/DL (ref 32.2–35.5)
MCV RBC AUTO: 97.4 FL (ref 81–99)
PLATELET # BLD: 245 THOU/MM3 (ref 130–400)
PMV BLD AUTO: 10.5 FL (ref 9.4–12.4)
POTASSIUM REFLEX MAGNESIUM: 4.2 MEQ/L (ref 3.5–5.2)
RBC # BLD: 4.54 MILL/MM3 (ref 4.2–5.4)
SODIUM BLD-SCNC: 140 MEQ/L (ref 135–145)
WBC # BLD: 5.8 THOU/MM3 (ref 4.8–10.8)

## 2019-03-05 PROCEDURE — 6360000002 HC RX W HCPCS

## 2019-03-05 PROCEDURE — 2709999900 HC NON-CHARGEABLE SUPPLY

## 2019-03-05 PROCEDURE — 2580000003 HC RX 258: Performed by: INTERNAL MEDICINE

## 2019-03-05 PROCEDURE — C1882 AICD, OTHER THAN SING/DUAL: HCPCS

## 2019-03-05 PROCEDURE — 93005 ELECTROCARDIOGRAM TRACING: CPT | Performed by: INTERNAL MEDICINE

## 2019-03-05 PROCEDURE — 36415 COLL VENOUS BLD VENIPUNCTURE: CPT

## 2019-03-05 PROCEDURE — 80048 BASIC METABOLIC PNL TOTAL CA: CPT

## 2019-03-05 PROCEDURE — 85610 PROTHROMBIN TIME: CPT

## 2019-03-05 PROCEDURE — 33264 RMVL & RPLCMT DFB GEN MLT LD: CPT | Performed by: INTERNAL MEDICINE

## 2019-03-05 PROCEDURE — 93010 ELECTROCARDIOGRAM REPORT: CPT | Performed by: INTERNAL MEDICINE

## 2019-03-05 PROCEDURE — 85027 COMPLETE CBC AUTOMATED: CPT

## 2019-03-05 PROCEDURE — 2500000003 HC RX 250 WO HCPCS

## 2019-03-05 RX ORDER — ONDANSETRON 2 MG/ML
4 INJECTION INTRAMUSCULAR; INTRAVENOUS EVERY 6 HOURS PRN
Status: DISCONTINUED | OUTPATIENT
Start: 2019-03-05 | End: 2019-03-05 | Stop reason: HOSPADM

## 2019-03-05 RX ORDER — SODIUM CHLORIDE 0.9 % (FLUSH) 0.9 %
10 SYRINGE (ML) INJECTION EVERY 12 HOURS SCHEDULED
Status: DISCONTINUED | OUTPATIENT
Start: 2019-03-05 | End: 2019-03-05 | Stop reason: HOSPADM

## 2019-03-05 RX ORDER — SODIUM CHLORIDE 0.9 % (FLUSH) 0.9 %
10 SYRINGE (ML) INJECTION PRN
Status: DISCONTINUED | OUTPATIENT
Start: 2019-03-05 | End: 2019-03-05 | Stop reason: HOSPADM

## 2019-03-05 RX ORDER — SODIUM CHLORIDE 9 MG/ML
INJECTION, SOLUTION INTRAVENOUS CONTINUOUS
Status: DISCONTINUED | OUTPATIENT
Start: 2019-03-05 | End: 2019-03-05 | Stop reason: HOSPADM

## 2019-03-05 RX ORDER — ACETAMINOPHEN 325 MG/1
650 TABLET ORAL EVERY 4 HOURS PRN
Status: DISCONTINUED | OUTPATIENT
Start: 2019-03-05 | End: 2019-03-05 | Stop reason: HOSPADM

## 2019-03-05 RX ADMIN — SODIUM CHLORIDE: 9 INJECTION, SOLUTION INTRAVENOUS at 12:58

## 2019-03-05 ASSESSMENT — PAIN SCALES - GENERAL: PAINLEVEL_OUTOF10: 0

## 2019-03-06 ENCOUNTER — TELEPHONE (OUTPATIENT)
Dept: FAMILY MEDICINE CLINIC | Age: 79
End: 2019-03-06

## 2019-03-12 ENCOUNTER — HOSPITAL ENCOUNTER (OUTPATIENT)
Dept: PULMONOLOGY | Age: 79
Discharge: HOME | End: 2019-03-12
Payer: MEDICARE

## 2019-03-12 DIAGNOSIS — T50.905S ADVERSE EFFECT OF DRUG, SEQUELA: ICD-10-CM

## 2019-03-12 PROCEDURE — 94010 BREATHING CAPACITY TEST: CPT

## 2019-03-12 PROCEDURE — 94729 DIFFUSING CAPACITY: CPT

## 2019-03-13 ENCOUNTER — OFFICE VISIT (OUTPATIENT)
Dept: INTERNAL MEDICINE CLINIC | Age: 79
End: 2019-03-13
Payer: MEDICARE

## 2019-03-13 VITALS
HEIGHT: 62 IN | HEART RATE: 66 BPM | WEIGHT: 208.5 LBS | DIASTOLIC BLOOD PRESSURE: 66 MMHG | BODY MASS INDEX: 38.37 KG/M2 | SYSTOLIC BLOOD PRESSURE: 132 MMHG

## 2019-03-13 DIAGNOSIS — Z95.810 BIVENTRICULAR IMPLANTABLE CARDIOVERTER-DEFIBRILLATOR IN SITU: ICD-10-CM

## 2019-03-13 DIAGNOSIS — I10 BENIGN ESSENTIAL HTN: ICD-10-CM

## 2019-03-13 DIAGNOSIS — I48.20 CHRONIC ATRIAL FIBRILLATION (HCC): ICD-10-CM

## 2019-03-13 DIAGNOSIS — G47.9 SLEEP DISTURBANCE: ICD-10-CM

## 2019-03-13 DIAGNOSIS — R53.82 CHRONIC FATIGUE: Primary | ICD-10-CM

## 2019-03-13 PROCEDURE — 1123F ACP DISCUSS/DSCN MKR DOCD: CPT | Performed by: INTERNAL MEDICINE

## 2019-03-13 PROCEDURE — G8427 DOCREV CUR MEDS BY ELIG CLIN: HCPCS | Performed by: INTERNAL MEDICINE

## 2019-03-13 PROCEDURE — 1090F PRES/ABSN URINE INCON ASSESS: CPT | Performed by: INTERNAL MEDICINE

## 2019-03-13 PROCEDURE — 1101F PT FALLS ASSESS-DOCD LE1/YR: CPT | Performed by: INTERNAL MEDICINE

## 2019-03-13 PROCEDURE — 4040F PNEUMOC VAC/ADMIN/RCVD: CPT | Performed by: INTERNAL MEDICINE

## 2019-03-13 PROCEDURE — G8417 CALC BMI ABV UP PARAM F/U: HCPCS | Performed by: INTERNAL MEDICINE

## 2019-03-13 PROCEDURE — G8400 PT W/DXA NO RESULTS DOC: HCPCS | Performed by: INTERNAL MEDICINE

## 2019-03-13 PROCEDURE — 1036F TOBACCO NON-USER: CPT | Performed by: INTERNAL MEDICINE

## 2019-03-13 PROCEDURE — G8484 FLU IMMUNIZE NO ADMIN: HCPCS | Performed by: INTERNAL MEDICINE

## 2019-03-13 PROCEDURE — 99213 OFFICE O/P EST LOW 20 MIN: CPT | Performed by: INTERNAL MEDICINE

## 2019-03-15 ENCOUNTER — TELEPHONE (OUTPATIENT)
Dept: INTERNAL MEDICINE CLINIC | Age: 79
End: 2019-03-15

## 2019-03-21 ENCOUNTER — NURSE ONLY (OUTPATIENT)
Dept: FAMILY MEDICINE CLINIC | Age: 79
End: 2019-03-21
Payer: MEDICARE

## 2019-03-21 DIAGNOSIS — I48.0 PAROXYSMAL ATRIAL FIBRILLATION (HCC): Primary | ICD-10-CM

## 2019-03-21 LAB
INTERNATIONAL NORMALIZATION RATIO, POC: 2.4
PROTHROMBIN TIME, POC: NORMAL

## 2019-03-21 PROCEDURE — 85610 PROTHROMBIN TIME: CPT | Performed by: FAMILY MEDICINE

## 2019-03-21 PROCEDURE — 93793 ANTICOAG MGMT PT WARFARIN: CPT | Performed by: FAMILY MEDICINE

## 2019-03-22 ENCOUNTER — TELEPHONE (OUTPATIENT)
Dept: FAMILY MEDICINE CLINIC | Age: 79
End: 2019-03-22

## 2019-04-10 ENCOUNTER — TELEPHONE (OUTPATIENT)
Dept: FAMILY MEDICINE CLINIC | Age: 79
End: 2019-04-10

## 2019-04-10 ENCOUNTER — NURSE ONLY (OUTPATIENT)
Dept: FAMILY MEDICINE CLINIC | Age: 79
End: 2019-04-10
Payer: MEDICARE

## 2019-04-10 DIAGNOSIS — I48.0 PAROXYSMAL ATRIAL FIBRILLATION (HCC): Primary | ICD-10-CM

## 2019-04-10 LAB
INTERNATIONAL NORMALIZATION RATIO, POC: 2.3
PROTHROMBIN TIME, POC: NORMAL

## 2019-04-10 PROCEDURE — 85610 PROTHROMBIN TIME: CPT | Performed by: FAMILY MEDICINE

## 2019-04-10 NOTE — TELEPHONE ENCOUNTER
----- Message from Jose Burrows MD sent at 4/10/2019  4:42 PM EDT -----  INR good. Continue same dosage and recheck INR in 1 month.

## 2019-04-23 ENCOUNTER — NURSE ONLY (OUTPATIENT)
Dept: LAB | Age: 79
End: 2019-04-23

## 2019-04-23 DIAGNOSIS — E03.9 HYPOTHYROIDISM, UNSPECIFIED TYPE: ICD-10-CM

## 2019-04-23 DIAGNOSIS — R79.89 ELEVATED TSH: ICD-10-CM

## 2019-04-23 DIAGNOSIS — R79.89 ABNORMAL TSH: ICD-10-CM

## 2019-04-23 DIAGNOSIS — R79.89 ABNORMAL LIVER FUNCTION TEST: ICD-10-CM

## 2019-04-23 LAB
ALBUMIN SERPL-MCNC: 3.8 G/DL (ref 3.5–5.1)
ALP BLD-CCNC: 155 U/L (ref 38–126)
ALT SERPL-CCNC: 38 U/L (ref 11–66)
AST SERPL-CCNC: 64 U/L (ref 5–40)
BILIRUB SERPL-MCNC: 0.4 MG/DL (ref 0.3–1.2)
BILIRUBIN DIRECT: < 0.2 MG/DL (ref 0–0.3)
TOTAL PROTEIN: 7 G/DL (ref 6.1–8)
TSH SERPL DL<=0.05 MIU/L-ACNC: 3.01 UIU/ML (ref 0.4–4.2)

## 2019-04-23 RX ORDER — SACUBITRIL AND VALSARTAN 97; 103 MG/1; MG/1
TABLET, FILM COATED ORAL
Qty: 60 TABLET | Refills: 11 | Status: SHIPPED | OUTPATIENT
Start: 2019-04-23 | End: 2019-05-09 | Stop reason: DRUGHIGH

## 2019-04-25 ENCOUNTER — TELEPHONE (OUTPATIENT)
Dept: INTERNAL MEDICINE CLINIC | Age: 79
End: 2019-04-25

## 2019-04-25 DIAGNOSIS — R74.8 ELEVATED LIVER ENZYMES: Primary | ICD-10-CM

## 2019-04-25 NOTE — TELEPHONE ENCOUNTER
----- Message from Kaleigh Prasad MD sent at 4/24/2019  3:02 PM EDT -----  Tell her she has mild elev lft but no worse; but would like her to consider hepC test and us liver

## 2019-04-25 NOTE — TELEPHONE ENCOUNTER
Notified pt her lft are mildly elevated  But no worse. Advised her Dr. Sunny Altamirano would like her to consider hep c test and u/s of liver. Pt in agreement.  will contact her tomorrow to schedule.

## 2019-05-01 ENCOUNTER — TELEPHONE (OUTPATIENT)
Dept: INTERNAL MEDICINE CLINIC | Age: 79
End: 2019-05-01

## 2019-05-01 ENCOUNTER — HOSPITAL ENCOUNTER (OUTPATIENT)
Age: 79
Discharge: HOME | End: 2019-05-01
Payer: MEDICARE

## 2019-05-01 ENCOUNTER — HOSPITAL ENCOUNTER (OUTPATIENT)
Dept: ULTRASOUND IMAGING | Age: 79
Discharge: HOME | End: 2019-05-01
Payer: MEDICARE

## 2019-05-01 DIAGNOSIS — R74.8 ELEVATED LIVER ENZYMES: ICD-10-CM

## 2019-05-01 LAB — HEPATITIS C ANTIBODY: NEGATIVE

## 2019-05-01 PROCEDURE — 76705 ECHO EXAM OF ABDOMEN: CPT

## 2019-05-01 PROCEDURE — 36415 COLL VENOUS BLD VENIPUNCTURE: CPT

## 2019-05-01 PROCEDURE — 86803 HEPATITIS C AB TEST: CPT

## 2019-05-08 ENCOUNTER — PATIENT MESSAGE (OUTPATIENT)
Dept: INTERNAL MEDICINE CLINIC | Age: 79
End: 2019-05-08

## 2019-05-08 NOTE — TELEPHONE ENCOUNTER
From: Clarissa Ta  To: Alek Rodriguez MD  Sent: 5/8/2019 10:30 AM EDT  Subject: Non-Urgent Medical Question    My blood pressure has been running low. The days it's low I've held my blood pressure medicine. Here are the readings.      Monday 97/78 morning 124/98 evening  Tuesday 80/66 morning 95/79 evening  Wednesday 109/84 morning 108/78 evening  Thursday 106/77 morning 83/70 evening  Friday 80/77 morning 120/88 evening  Saturday 103/85 morning 96/73 evening  Sunday 137/94 morning 105/81 evening    Been feeling extremely tired some days

## 2019-05-09 ENCOUNTER — TELEPHONE (OUTPATIENT)
Dept: INTERNAL MEDICINE CLINIC | Age: 79
End: 2019-05-09

## 2019-05-09 NOTE — TELEPHONE ENCOUNTER
Notified pt that Dr Malik Mcdonough would like her to get a pill cutter and cut the Entresto in half and see if that helps. She verbalizes understanding and will send in some readings next week.

## 2019-05-11 ENCOUNTER — APPOINTMENT (OUTPATIENT)
Dept: INTERVENTIONAL RADIOLOGY/VASCULAR | Age: 79
DRG: 287 | End: 2019-05-11
Payer: MEDICARE

## 2019-05-11 ENCOUNTER — HOSPITAL ENCOUNTER (EMERGENCY)
Age: 79
Discharge: HOME | DRG: 287 | End: 2019-05-11
Payer: MEDICARE

## 2019-05-11 VITALS
HEART RATE: 65 BPM | RESPIRATION RATE: 16 BRPM | HEIGHT: 62 IN | DIASTOLIC BLOOD PRESSURE: 68 MMHG | TEMPERATURE: 98.3 F | BODY MASS INDEX: 36.8 KG/M2 | SYSTOLIC BLOOD PRESSURE: 147 MMHG | WEIGHT: 200 LBS | OXYGEN SATURATION: 99 %

## 2019-05-11 DIAGNOSIS — M79.605 LEFT LEG PAIN: Primary | ICD-10-CM

## 2019-05-11 DIAGNOSIS — M25.462 FLUID IN LEFT KNEE JOINT: ICD-10-CM

## 2019-05-11 DIAGNOSIS — D68.32 WARFARIN-INDUCED COAGULOPATHY (HCC): ICD-10-CM

## 2019-05-11 DIAGNOSIS — T45.515A WARFARIN-INDUCED COAGULOPATHY (HCC): ICD-10-CM

## 2019-05-11 LAB — INR BLD: 2.18 (ref 0.85–1.13)

## 2019-05-11 PROCEDURE — 93971 EXTREMITY STUDY: CPT

## 2019-05-11 PROCEDURE — 2709999900 HC NON-CHARGEABLE SUPPLY

## 2019-05-11 PROCEDURE — 99283 EMERGENCY DEPT VISIT LOW MDM: CPT

## 2019-05-11 PROCEDURE — 36415 COLL VENOUS BLD VENIPUNCTURE: CPT

## 2019-05-11 PROCEDURE — 85610 PROTHROMBIN TIME: CPT

## 2019-05-11 RX ORDER — TRAMADOL HYDROCHLORIDE 50 MG/1
50 TABLET ORAL EVERY 6 HOURS PRN
Qty: 10 TABLET | Refills: 0 | Status: SHIPPED | OUTPATIENT
Start: 2019-05-11 | End: 2019-05-14

## 2019-05-11 ASSESSMENT — PAIN DESCRIPTION - PAIN TYPE: TYPE: ACUTE PAIN

## 2019-05-11 ASSESSMENT — ENCOUNTER SYMPTOMS
SHORTNESS OF BREATH: 0
DIARRHEA: 0
ABDOMINAL PAIN: 0
SORE THROAT: 0
RHINORRHEA: 0
VOMITING: 0
EYE PAIN: 0
WHEEZING: 0
EYE DISCHARGE: 0
BACK PAIN: 0
COUGH: 0
NAUSEA: 0

## 2019-05-11 ASSESSMENT — PAIN DESCRIPTION - FREQUENCY: FREQUENCY: INTERMITTENT

## 2019-05-11 ASSESSMENT — PAIN DESCRIPTION - LOCATION: LOCATION: LEG

## 2019-05-11 ASSESSMENT — PAIN DESCRIPTION - ORIENTATION: ORIENTATION: LEFT

## 2019-05-11 ASSESSMENT — PAIN SCALES - GENERAL: PAINLEVEL_OUTOF10: 8

## 2019-05-11 NOTE — ED PROVIDER NOTES
Breanne Frey [address] COMPLAINT       Chief Complaint   Patient presents with    Leg Pain     left       Nurses Notes reviewed and I agreeexcept as noted in the HPI. HISTORY OF PRESENT ILLNESS    Shannan Guevara is a 70 y.o. female who presents to the ED for left posterior knee pain and left lateral calf pain onset yesterday. She denies injury. She states her pain is intermittent and it gets worse with straightening, bending or movement of her leg and better when she is sitting still. She denies back pain, numbness, tingling, chest pain or shortness of breath. She denies being on a recent ATB. Patient states she is on Coumadin for her A-fib and is concerned for a DVT. She denies having a history of DVT/PE. She states her last INR was checked 1 month ago. The patient has a past medical history of pacemaker, CAD, CHF, HLD and HTN. No further complaints at the time of the initial encounter. REVIEW OF SYSTEMS     Review of Systems   Constitutional: Negative for appetite change, chills, fatigue and fever. HENT: Negative for congestion, ear pain, rhinorrhea and sore throat. Eyes: Negative for pain, discharge and visual disturbance. Respiratory: Negative for cough, shortness of breath and wheezing. Cardiovascular: Negative for chest pain, palpitations and leg swelling. Gastrointestinal: Negative for abdominal pain, diarrhea, nausea and vomiting. Genitourinary: Negative for difficulty urinating, dysuria, hematuria and vaginal discharge. Musculoskeletal: Positive for arthralgias (left posterior knee pain and left lateral calf). Negative for back pain, joint swelling and neck pain. Skin: Negative for pallor and rash. Neurological: Negative for dizziness, syncope, weakness, light-headedness, numbness and headaches. Hematological: Negative for adenopathy. Psychiatric/Behavioral: Negative for confusion and suicidal ideas.  The patient is not nervous/anxious. PAST MEDICAL HISTORY    has a past medical history of AICD (automatic cardioverter/defibrillator) present, Atrial fibrillation ([facility] Biventricular ICD (implantable cardiac defibrillator) in place, C. difficile diarrhea, CAD (coronary artery disease), Cancer ([facility] [address] CHF (congestive heart failure) ([facility] [address] Diverticulitis, GERD (gastroesophageal reflux disease), Hiatal hernia, Hyperlipidemia, Hypertension, Menopause, NAFLD (nonalcoholic fatty liver disease), Nonischemic cardiomyopathy ([facility] [address] Pacemaker, Retroperitoneal hemorrhage, and Thyroid disease. SURGICAL HISTORY      has a past surgical history that includes Cholecystectomy, laparoscopic; Cardiac defibrillator placement; Colonoscopy (2008); skin biopsy (2010); Endoscopy, colon, diagnostic; and pacemaker placement (1999).     CURRENT MEDICATIONS       Discharge Medication List as of 5/11/2019  1:46 PM      CONTINUE these medications which have NOT CHANGED    Details   sacubitril-valsartan (ENTRESTO)  MG per tablet Take 0.5 tablets by mouth 2 times dailyHistorical Med      warfarin (COUMADIN) 3 MG tablet TAKE ONE TABLET BY MOUTH ONCE DAILY, Disp-90 tablet, R-2**Patient requests 90 days supply**Normal      amiodarone (CORDARONE) 200 MG tablet Take 1 tablet by mouth daily, Disp-30 tablet, R-11Normal      digoxin (LANOXIN) 125 MCG tablet Take 1 tablet by mouth daily, Disp-30 tablet, R-11Normal      furosemide (LASIX) 40 MG tablet Take 1 tablet by mouth daily, Disp-30 tablet, R-11Normal      levothyroxine (LEVOTHROID) 125 MCG tablet Take 1 tablet by mouth daily, Disp-30 tablet, R-11Normal      metoprolol succinate (TOPROL XL) 200 MG extended release tablet Take 1 tablet by mouth daily, Disp-30 tablet, R-11Normal      spironolactone (ALDACTONE) 25 MG tablet Take 1 tablet by mouth every other day, Disp-15 tablet, R-11Normal      Handicap Placard [facility] Starting 1/6/2017, Until Discontinued, Disp-1 each, R-0, PrintRequest parking placard due to She is active and cooperative. Non-toxic appearance. No distress. HENT:   Head: Normocephalic and atraumatic. Right Ear: Hearing normal.   Left Ear: Hearing normal.   Nose: Nose normal. No nasal deformity. No epistaxis. Mouth/Throat: Oropharynx is clear and moist.   Eyes: Conjunctivae, EOM and lids are normal.   Neck: Trachea normal. No neck rigidity. No tracheal deviation present. Cardiovascular: Normal rate and regular rhythm. Pulses:       Dorsalis pedis pulses are 2+ on the right side, and 2+ on the left side. Posterior tibial pulses are 2+ on the right side, and 2+ on the left side. Pulmonary/Chest: Effort normal. No tachypnea. Abdominal: Soft. She exhibits no distension. Musculoskeletal:        Left hip: Normal. She exhibits no tenderness. Left knee: No tenderness found. Left ankle: No tenderness. Lateral calf and posterior knee pain that is reproduced with extension of the leg. Tib fib and knee are non-tender. Negative Homans sign. Neurological: She is alert. She has normal strength. No sensory deficit. Gait normal. GCS eye subscore is 4. GCS verbal subscore is 5. GCS motor subscore is 6. Skin: Skin is warm, dry and intact. No rash noted. She is not diaphoretic. No pallor. Psychiatric: She has a normal mood and affect. Her speech is normal and behavior is normal. Thought content normal. Cognition and memory are normal.   Nursing note and vitals reviewed.       DIFFERENTIAL DIAGNOSIS:   Including but not limited to: strain, contusion, bursitis, less likely but considered DVT    DIAGNOSTIC RESULTS     EKG: All EKG's are interpreted by [facility] who either signs or Co-signs this chart in the absence of a cardiologist.  None    RADIOLOGY: non-plain film images(s) such as CT,Ultrasound and MRI are read by the radiologist.  Plain radiographic images are visualized and preliminarily interpreted by the emergency physician unless otherwise stated below.  VL DUP LOWER EXTREMITY VENOUS LEFT   Final Result   1. No evidence of deep venous thrombosis in left lower extremity. 2. Small subcutaneous fluid collection at the lateral left knee. Final report electronically signed by Dr. Thomas Cha on 5/11/2019 1:38 PM          LABS:   Labs Reviewed   PROTIME-INR - Abnormal; Notable for the following components:       Result Value    INR 2.18 (*)     All other components within normal limits       EMERGENCY DEPARTMENT COURSE:   Vitals:    Vitals:    05/11/19 1201 05/11/19 1345   BP: (!) 147/68    Pulse: 65    Resp:  16   Temp: 98.3 °F (36.8 °C)    TempSrc: Oral    SpO2: 99%    Weight: 200 lb (90.7 kg)    Height: 5' 2\" (1.575 m)        The patient was seen and evaluated within the ED today for left posterior knee pain and left lateral calf pain. On exam, I appreciated lateral calf and posterior knee pain that is reproduced with extension of the leg. Tib fib and knee are non-tender. Old records were reviewed. Within the department, I observed the patient's vital signs to be within acceptable range. Radiological studies within the department revealed No evidence of deep venous thrombosis in left lower extremity. Small subcutaneous fluid collection at the lateral left knee. Laboratory work showed INR level of 2.18. I observed the patient's condition to remain stable during the duration of their stay. On re-exam the patient remained neurovascularly intact. Vital signs have remained stable. The patient remains non-toxic appearing with no distress evident in the ER. The patient was comfortable with the plan of discharge home and to follow up with Jose Burrows MD. Although there were no signs of abscess currently, follow-up was highly stressed. Anticipatory guidance was given. The patient is instructed to return to the emergency department for any worsening of their symptoms.  Patient was discharged from the emergency department in good condition with all questions answered. See disposition below. I have given the patient strict written and verbal instructions about care at home, follow-up, and signs and symptoms of worsening of condition and they did verbalize understanding. CRITICAL CARE:   None    CONSULTS:  None    PROCEDURES:  None    FINAL IMPRESSION      1. Left leg pain    2. Warfarin-induced coagulopathy ([address]    3. Fluid collection in left lateral knee          DISPOSITION/PLAN     1. Left leg pain    2. Warfarin-induced coagulopathy ([address]    3. Fluid collection in left lateral knee        PATIENT REFERRED TO:  Lizbet Villanueva MD  [address] [place] [address]  [phone]    Schedule an appointment as soon as possible for a visit in 2 days        DISCHARGE MEDICATIONS:  Discharge Medication List as of 5/11/2019  1:46 PM      START taking these medications    Details   traMADol (ULTRAM) 50 MG tablet Take 1 tablet by mouth every 6 hours as needed for Pain for up to 3 days. , Disp-10 tablet, R-0Print             (Please note that portions of this note were completed with a voice recognition program.  Efforts were made to edit the dictations but occasionally words aremis-transcribed.)    Scribe:  Simmie Barthel 5/11/19 12:31 PM Scribing for and in the presence of NURIA Sierra. Signed by:Janie Branch 05/11/19 11:44 PM    Provider:  I personally performed the services described in the documentation, reviewed and edited the documentation which was dictated to the scribe in my presence, andit accurately records my words and actions.     NURIA Sierra 05/11/19 11:44 PM    NURIA Sierra [place]  05/11/19 [id number]

## 2019-05-13 ENCOUNTER — APPOINTMENT (OUTPATIENT)
Dept: GENERAL RADIOLOGY | Age: 79
DRG: 287 | End: 2019-05-13
Payer: MEDICARE

## 2019-05-13 ENCOUNTER — HOSPITAL ENCOUNTER (INPATIENT)
Age: 79
LOS: 1 days | Discharge: HOME | DRG: 287 | End: 2019-05-15
Attending: INTERNAL MEDICINE | Admitting: INTERNAL MEDICINE
Payer: MEDICARE

## 2019-05-13 DIAGNOSIS — R07.9 CHEST PAIN, UNSPECIFIED TYPE: Primary | ICD-10-CM

## 2019-05-13 LAB
ALBUMIN SERPL-MCNC: 3.8 G/DL (ref 3.5–5.1)
ALP BLD-CCNC: 175 U/L (ref 38–126)
ALT SERPL-CCNC: 44 U/L (ref 11–66)
ANION GAP SERPL CALCULATED.3IONS-SCNC: 13 MEQ/L (ref 8–16)
AST SERPL-CCNC: 68 U/L (ref 5–40)
BASOPHILS # BLD: 1.6 %
BASOPHILS ABSOLUTE: 0.1 THOU/MM3 (ref 0–0.1)
BILIRUB SERPL-MCNC: 0.6 MG/DL (ref 0.3–1.2)
BILIRUBIN DIRECT: < 0.2 MG/DL (ref 0–0.3)
BILIRUBIN URINE: NEGATIVE
BLOOD, URINE: NEGATIVE
BUN BLDV-MCNC: 19 MG/DL (ref 7–22)
CALCIUM SERPL-MCNC: 9.2 MG/DL (ref 8.5–10.5)
CHARACTER, URINE: CLEAR
CHLORIDE BLD-SCNC: 100 MEQ/L (ref 98–111)
CO2: 24 MEQ/L (ref 23–33)
COLOR: YELLOW
CREAT SERPL-MCNC: 1.3 MG/DL (ref 0.4–1.2)
D-DIMER QUANTITATIVE: 269 NG/ML FEU (ref 0–500)
EKG ATRIAL RATE: 70 BPM
EKG Q-T INTERVAL: 432 MS
EKG QRS DURATION: 150 MS
EKG QTC CALCULATION (BAZETT): 498 MS
EKG R AXIS: -76 DEGREES
EKG T AXIS: 87 DEGREES
EKG VENTRICULAR RATE: 80 BPM
EOSINOPHIL # BLD: 3.1 %
EOSINOPHILS ABSOLUTE: 0.2 THOU/MM3 (ref 0–0.4)
ERYTHROCYTE [DISTWIDTH] IN BLOOD BY AUTOMATED COUNT: 14.6 % (ref 11.5–14.5)
ERYTHROCYTE [DISTWIDTH] IN BLOOD BY AUTOMATED COUNT: 53.3 FL (ref 35–45)
GFR SERPL CREATININE-BSD FRML MDRD: 40 ML/MIN/1.73M2
GLUCOSE BLD-MCNC: 180 MG/DL (ref 70–108)
GLUCOSE URINE: NEGATIVE MG/DL
HCT VFR BLD CALC: 43.8 % (ref 37–47)
HEMOGLOBIN: 14.3 GM/DL (ref 12–16)
IMMATURE GRANS (ABS): 0.03 THOU/MM3 (ref 0–0.07)
IMMATURE GRANULOCYTES: 0.6 %
INR BLD: 1.95 (ref 0.85–1.13)
KETONES, URINE: NEGATIVE
LEUKOCYTE ESTERASE, URINE: NEGATIVE
LIPASE: 41.4 U/L (ref 5.6–51.3)
LYMPHOCYTES # BLD: 17.2 %
LYMPHOCYTES ABSOLUTE: 0.9 THOU/MM3 (ref 1–4.8)
MCH RBC QN AUTO: 32.1 PG (ref 26–33)
MCHC RBC AUTO-ENTMCNC: 32.6 GM/DL (ref 32.2–35.5)
MCV RBC AUTO: 98.2 FL (ref 81–99)
MONOCYTES # BLD: 10.4 %
MONOCYTES ABSOLUTE: 0.5 THOU/MM3 (ref 0.4–1.3)
NITRITE, URINE: NEGATIVE
NUCLEATED RED BLOOD CELLS: 0 /100 WBC
OSMOLALITY CALCULATION: 280.6 MOSMOL/KG (ref 275–300)
PH UA: 7.5 (ref 5–9)
PLATELET # BLD: 256 THOU/MM3 (ref 130–400)
PMV BLD AUTO: 10.9 FL (ref 9.4–12.4)
POTASSIUM SERPL-SCNC: 4.7 MEQ/L (ref 3.5–5.2)
PRO-BNP: 1357 PG/ML (ref 0–1800)
PROTEIN UA: NEGATIVE
RBC # BLD: 4.46 MILL/MM3 (ref 4.2–5.4)
SEG NEUTROPHILS: 67.1 %
SEGMENTED NEUTROPHILS ABSOLUTE COUNT: 3.4 THOU/MM3 (ref 1.8–7.7)
SODIUM BLD-SCNC: 137 MEQ/L (ref 135–145)
SPECIFIC GRAVITY, URINE: 1.01 (ref 1–1.03)
TOTAL PROTEIN: 7.4 G/DL (ref 6.1–8)
TROPONIN T: < 0.01 NG/ML
UROBILINOGEN, URINE: 0.2 EU/DL (ref 0–1)
WBC # BLD: 5.1 THOU/MM3 (ref 4.8–10.8)

## 2019-05-13 PROCEDURE — 93005 ELECTROCARDIOGRAM TRACING: CPT | Performed by: INTERNAL MEDICINE

## 2019-05-13 PROCEDURE — 80076 HEPATIC FUNCTION PANEL: CPT

## 2019-05-13 PROCEDURE — 85025 COMPLETE CBC W/AUTO DIFF WBC: CPT

## 2019-05-13 PROCEDURE — 81003 URINALYSIS AUTO W/O SCOPE: CPT

## 2019-05-13 PROCEDURE — 93010 ELECTROCARDIOGRAM REPORT: CPT | Performed by: INTERNAL MEDICINE

## 2019-05-13 PROCEDURE — 96365 THER/PROPH/DIAG IV INF INIT: CPT

## 2019-05-13 PROCEDURE — 96372 THER/PROPH/DIAG INJ SC/IM: CPT

## 2019-05-13 PROCEDURE — 83690 ASSAY OF LIPASE: CPT

## 2019-05-13 PROCEDURE — 85379 FIBRIN DEGRADATION QUANT: CPT

## 2019-05-13 PROCEDURE — 6370000000 HC RX 637 (ALT 250 FOR IP): Performed by: INTERNAL MEDICINE

## 2019-05-13 PROCEDURE — 6360000002 HC RX W HCPCS: Performed by: INTERNAL MEDICINE

## 2019-05-13 PROCEDURE — 94761 N-INVAS EAR/PLS OXIMETRY MLT: CPT

## 2019-05-13 PROCEDURE — 80048 BASIC METABOLIC PNL TOTAL CA: CPT

## 2019-05-13 PROCEDURE — 85610 PROTHROMBIN TIME: CPT

## 2019-05-13 PROCEDURE — 71045 X-RAY EXAM CHEST 1 VIEW: CPT

## 2019-05-13 PROCEDURE — 96366 THER/PROPH/DIAG IV INF ADDON: CPT

## 2019-05-13 PROCEDURE — 2500000003 HC RX 250 WO HCPCS: Performed by: INTERNAL MEDICINE

## 2019-05-13 PROCEDURE — 84484 ASSAY OF TROPONIN QUANT: CPT

## 2019-05-13 PROCEDURE — 36415 COLL VENOUS BLD VENIPUNCTURE: CPT

## 2019-05-13 PROCEDURE — 83880 ASSAY OF NATRIURETIC PEPTIDE: CPT

## 2019-05-13 PROCEDURE — G0378 HOSPITAL OBSERVATION PER HR: HCPCS

## 2019-05-13 PROCEDURE — 99285 EMERGENCY DEPT VISIT HI MDM: CPT

## 2019-05-13 RX ORDER — TRAMADOL HYDROCHLORIDE 50 MG/1
50 TABLET ORAL EVERY 6 HOURS PRN
Status: DISCONTINUED | OUTPATIENT
Start: 2019-05-13 | End: 2019-05-15 | Stop reason: HOSPADM

## 2019-05-13 RX ORDER — ONDANSETRON 2 MG/ML
4 INJECTION INTRAMUSCULAR; INTRAVENOUS EVERY 6 HOURS PRN
Status: DISCONTINUED | OUTPATIENT
Start: 2019-05-13 | End: 2019-05-15 | Stop reason: SDUPTHER

## 2019-05-13 RX ORDER — SODIUM CHLORIDE 0.9 % (FLUSH) 0.9 %
10 SYRINGE (ML) INJECTION EVERY 12 HOURS SCHEDULED
Status: DISCONTINUED | OUTPATIENT
Start: 2019-05-13 | End: 2019-05-15 | Stop reason: SDUPTHER

## 2019-05-13 RX ORDER — POTASSIUM CHLORIDE 20 MEQ/1
40 TABLET, EXTENDED RELEASE ORAL PRN
Status: DISCONTINUED | OUTPATIENT
Start: 2019-05-13 | End: 2019-05-15 | Stop reason: HOSPADM

## 2019-05-13 RX ORDER — WARFARIN SODIUM 3 MG/1
3 TABLET ORAL
Status: COMPLETED | OUTPATIENT
Start: 2019-05-13 | End: 2019-05-13

## 2019-05-13 RX ORDER — PANTOPRAZOLE SODIUM 40 MG/1
40 TABLET, DELAYED RELEASE ORAL
Status: DISCONTINUED | OUTPATIENT
Start: 2019-05-14 | End: 2019-05-15 | Stop reason: HOSPADM

## 2019-05-13 RX ORDER — FUROSEMIDE 40 MG/1
40 TABLET ORAL DAILY
Status: DISCONTINUED | OUTPATIENT
Start: 2019-05-14 | End: 2019-05-15 | Stop reason: HOSPADM

## 2019-05-13 RX ORDER — ATORVASTATIN CALCIUM 40 MG/1
40 TABLET, FILM COATED ORAL NIGHTLY
Status: DISCONTINUED | OUTPATIENT
Start: 2019-05-13 | End: 2019-05-13

## 2019-05-13 RX ORDER — MULTIVITAMIN WITH FOLIC ACID 400 MCG
1 TABLET ORAL DAILY
Status: DISCONTINUED | OUTPATIENT
Start: 2019-05-13 | End: 2019-05-15 | Stop reason: HOSPADM

## 2019-05-13 RX ORDER — SODIUM CHLORIDE 0.9 % (FLUSH) 0.9 %
10 SYRINGE (ML) INJECTION PRN
Status: DISCONTINUED | OUTPATIENT
Start: 2019-05-13 | End: 2019-05-15 | Stop reason: SDUPTHER

## 2019-05-13 RX ORDER — NITROGLYCERIN 0.4 MG/1
0.4 TABLET SUBLINGUAL EVERY 5 MIN PRN
Status: DISCONTINUED | OUTPATIENT
Start: 2019-05-13 | End: 2019-05-15 | Stop reason: SDUPTHER

## 2019-05-13 RX ORDER — LEVOTHYROXINE SODIUM 0.12 MG/1
125 TABLET ORAL DAILY
Status: DISCONTINUED | OUTPATIENT
Start: 2019-05-14 | End: 2019-05-15 | Stop reason: HOSPADM

## 2019-05-13 RX ORDER — CALCIUM CARBONATE 500(1250)
500 TABLET ORAL 2 TIMES DAILY
Status: DISCONTINUED | OUTPATIENT
Start: 2019-05-13 | End: 2019-05-15 | Stop reason: HOSPADM

## 2019-05-13 RX ORDER — ASCORBIC ACID 500 MG
1000 TABLET ORAL 2 TIMES DAILY
Status: DISCONTINUED | OUTPATIENT
Start: 2019-05-14 | End: 2019-05-15 | Stop reason: HOSPADM

## 2019-05-13 RX ORDER — METOPROLOL SUCCINATE 100 MG/1
200 TABLET, EXTENDED RELEASE ORAL DAILY
Status: DISCONTINUED | OUTPATIENT
Start: 2019-05-14 | End: 2019-05-15 | Stop reason: HOSPADM

## 2019-05-13 RX ORDER — SPIRONOLACTONE 25 MG/1
25 TABLET ORAL EVERY OTHER DAY
Status: DISCONTINUED | OUTPATIENT
Start: 2019-05-15 | End: 2019-05-15 | Stop reason: HOSPADM

## 2019-05-13 RX ORDER — NITROGLYCERIN 20 MG/100ML
2.5 INJECTION INTRAVENOUS CONTINUOUS
Status: DISCONTINUED | OUTPATIENT
Start: 2019-05-13 | End: 2019-05-15

## 2019-05-13 RX ORDER — ASPIRIN 81 MG/1
81 TABLET, CHEWABLE ORAL DAILY
Status: DISCONTINUED | OUTPATIENT
Start: 2019-05-14 | End: 2019-05-15 | Stop reason: HOSPADM

## 2019-05-13 RX ORDER — AMIODARONE HYDROCHLORIDE 200 MG/1
200 TABLET ORAL DAILY
Status: DISCONTINUED | OUTPATIENT
Start: 2019-05-14 | End: 2019-05-15 | Stop reason: HOSPADM

## 2019-05-13 RX ORDER — POTASSIUM CHLORIDE 7.45 MG/ML
10 INJECTION INTRAVENOUS PRN
Status: DISCONTINUED | OUTPATIENT
Start: 2019-05-13 | End: 2019-05-15 | Stop reason: HOSPADM

## 2019-05-13 RX ORDER — DIGOXIN 125 MCG
125 TABLET ORAL DAILY
Status: DISCONTINUED | OUTPATIENT
Start: 2019-05-13 | End: 2019-05-15 | Stop reason: HOSPADM

## 2019-05-13 RX ADMIN — Medication 125 MCG: at 23:07

## 2019-05-13 RX ADMIN — ENOXAPARIN SODIUM 40 MG: 40 INJECTION SUBCUTANEOUS at 17:03

## 2019-05-13 RX ADMIN — NITROGLYCERIN 2.5 MCG/MIN: 20 INJECTION INTRAVENOUS at 12:38

## 2019-05-13 RX ADMIN — WARFARIN SODIUM 3 MG: 3 TABLET ORAL at 18:55

## 2019-05-13 ASSESSMENT — ENCOUNTER SYMPTOMS
DIARRHEA: 0
EYE DISCHARGE: 0
EYE PAIN: 0
BACK PAIN: 0
NAUSEA: 0
VOMITING: 0
SHORTNESS OF BREATH: 1
ABDOMINAL PAIN: 0
RHINORRHEA: 0
COUGH: 0
WHEEZING: 0
SORE THROAT: 0

## 2019-05-13 ASSESSMENT — PAIN DESCRIPTION - LOCATION
LOCATION: CHEST
LOCATION: CHEST

## 2019-05-13 ASSESSMENT — PAIN SCALES - GENERAL
PAINLEVEL_OUTOF10: 0
PAINLEVEL_OUTOF10: 3
PAINLEVEL_OUTOF10: 4
PAINLEVEL_OUTOF10: 0
PAINLEVEL_OUTOF10: 0

## 2019-05-13 ASSESSMENT — PAIN DESCRIPTION - DESCRIPTORS: DESCRIPTORS: DISCOMFORT;PRESSURE;OTHER (COMMENT)

## 2019-05-13 ASSESSMENT — PAIN DESCRIPTION - ORIENTATION
ORIENTATION: MID
ORIENTATION: MID

## 2019-05-13 ASSESSMENT — PAIN DESCRIPTION - PAIN TYPE
TYPE: ACUTE PAIN
TYPE: ACUTE PAIN

## 2019-05-13 ASSESSMENT — PAIN DESCRIPTION - FREQUENCY: FREQUENCY: INTERMITTENT

## 2019-05-13 NOTE — CARE COORDINATION
ED Care Transition    2019    Patient Name: Carolin Mazariegos   : 1940  MRN: [id number]    JEFF Score: 2  PCP: Elizabeth Talavera MD   Specialist: yes - Dr. Monika Coley   Active ACC/CTC: no                       Utilization Review:  ED visits:  1  19 - Leg pain, concerned for DVT     Admissions: 1  19 - Current - Chest Pain     Problem List:  Patient Active Problem List   Diagnosis    HTN (hypertension)    Hypercholesteremia    Nonischemic cardiomyopathy ([facility] [address]    Atrial fibrillation ([facility] [address]    Hypothyroidism    Morbid [facility] with [facility] of 40.0-44.9, [facility]    Biventricular implantable cardioverter-defibrillator in situ    ICD (implantable cardioverter-defibrillator) battery depletion    Abnormal LFTs    Morbid obesity with BMI of 45.0-49.9, adult ([facility] [address]    Benign essential HTN    NAFLD (nonalcoholic fatty liver disease)    Left ventricular systolic dysfunction    [facility]oxys[facility]    Chest pain         Did you call your PCP before coming to the ED? No: Warranted ED    What do you feel that you needed at home that would have prevented ED visit? None     Housing Review:  Current Housing:   Private Residence   Who do you live with?   with family:  spouse       Are you an active caregiver in your home?   no    Medication Review:  Are you able to afford your meds? Yes  Do you take your medications as prescribed? Yes   Do you manage your medications? Yes    Social Support/Self Care:  Who helps you at home?  a good social support network  longterm Support:  Family assistance [address]:  Do you have any DME? straight cane   Patient Home Respiratory Equipment no      Summary:  Met with Flora and family member at bedside, introduced self/role. Presented to ED for evaluation of Chest pain, SOB.      Patient resides at home with spouse, independent, active , understands medications, able to afford medications, uses cane occasionally, denies further needs/assistance at home. Plan is for admission, observation. Patient has medications in original bottles. Home medications reconciled. Patient declines further needs/questions/concerns at this time.         Follow up appointments:    Future Appointments   Date Time Provider Deacon Ana   5/15/2019 10:30 AM MD Pavel Casas [facility] [place]   6/17/2019 10:15 AM Vickie Humphrey MD [facility]       Review Due Health Maintenance:  Health Maintenance Due   Topic Date Due    DTaP/Tdap/Td vaccine (1 - Tdap) 09/23/1961    Shingles Vaccine (1 of 2) 09/23/1992    Pneumococcal 65+ years Vaccine (1 of 2 - PCV13) 09/23/2007     JESSICA Martinez 11, BSN  [phone]  [phone]

## 2019-05-13 NOTE — ED TRIAGE NOTES
Presents to ED with c/o chest pain and sob that started a half hour ago. Rates pain 4/10. Describes pain as a pressure. Denies nausea.

## 2019-05-13 NOTE — ED NOTES
Patient resting in bed. Respirations easy and unlabored. No distress noted. Call light within reach. Updated on POC. Will monitor. Reports pain 3/10 at this time.       Marcelina Cano RN  05/13/19 [id number]

## 2019-05-13 NOTE — ED PROVIDER NOTES
[facility]  eMERGENCY dEPARTMENT eNCOUnter          CHIEF COMPLAINT       Chief Complaint   Patient presents with    Chest Pain       Nurses Notes reviewed and I agree except as noted in the HPI. HISTORY OF PRESENT ILLNESS    Marie Walter is a 70 y.o. female who presents to the Emergency Department with a medical history of CAD, CHF, HLF, and HTN for the evaluation of mid sternal chest pain which the patient reports suddenly became present this morning while she was rest. Due to the severity of her presenting pain, the patient came to the ED for evaluation. She rates her current pain as a 4/10 in severity and aching in character. The patient reports no radiating pain. She reports no relieving or exacerbating factors. Patient reports shortness of breath secondary to onset of her chest pain but denies to be experiencing any cough, fever, chills, dizziness, diaphoresis, palpitations, nausea, vomiting, diarrhea, or constipation. She denies to have taken any ASA or NTG. Patient denies a medical history of acid reflux and has not recently had a cardiac stress test completed. She is currently anticoagulated with coumadin and her last INR, which was checked two days ago, was reported to be 2.1. Patient's cardiologist is Dr. Carlos Hahn. The patient reports no additional symptoms or complaints at this time. The HPI was provided by the patient. REVIEW OF SYSTEMS     Review of Systems   Constitutional: Negative for appetite change, chills, fatigue and fever. HENT: Negative for congestion, ear pain, rhinorrhea and sore throat. Eyes: Negative for pain, discharge and visual disturbance. Respiratory: Positive for shortness of breath. Negative for cough and wheezing. Cardiovascular: Positive for chest pain (mid sternal). Negative for palpitations and leg swelling. Gastrointestinal: Negative for abdominal pain, diarrhea, nausea and vomiting.    Genitourinary: Negative for difficulty urinating, dysuria, hematuria and vaginal discharge. Musculoskeletal: Negative for arthralgias, back pain, joint swelling and neck pain. Skin: Negative for pallor and rash. Neurological: Negative for dizziness, syncope, weakness, light-headedness, numbness and headaches. Hematological: Negative for adenopathy. Psychiatric/Behavioral: Negative for confusion and suicidal ideas. The patient is not nervous/anxious. PAST MEDICAL HISTORY    has a past medical history of AICD (automatic cardioverter/defibrillator) present, Atrial fibrillation ([facility] [address] Biventricular ICD (implantable cardiac defibrillator) in place, C. difficile diarrhea, CAD (coronary artery disease), Cancer ([address] CHF (congestive heart failure) ([facility] [address] Diverticulitis, GERD (gastroesophageal reflux disease), Hiatal hernia, Hyperlipidemia, Hypertension, Menopause, NAFLD (nonalcoholic fatty liver disease), Nonischemic cardiomyopathy ([address] Pacemaker, Retroperitoneal hemorrhage, and Thyroid disease. SURGICAL HISTORY      has a past surgical history that includes Cholecystectomy, laparoscopic; Cardiac defibrillator placement; Colonoscopy (2008); skin biopsy (2010); Endoscopy, colon, diagnostic; and pacemaker placement (1999). CURRENT MEDICATIONS       Previous Medications    AMIODARONE (CORDARONE) 200 MG TABLET    Take 1 tablet by mouth daily    ASCORBIC ACID (VITAMIN C) 1000 MG TABLET    Take 1,000 mg by mouth 2 times daily. B COMPLEX VITAMINS (B COMPLEX 1 PO)    Take  by mouth daily. BIOTIN PO    Take 5,000 mg by mouth daily. CALCIUM CARBONATE (CALCIUM 600 PO)    Take  by mouth 2 times daily. DIGOXIN (LANOXIN) 125 MCG TABLET    Take 1 tablet by mouth daily    FUROSEMIDE (LASIX) 40 MG TABLET    Take 1 tablet by mouth daily    HANDICAP PLACARD MISC    by Does not apply route Request parking placard due to medical conditions. Duration of 5 years.     LEVOTHYROXINE (LEVOTHROID) 125 MCG TABLET    Take 1 tablet by mouth daily    METOPROLOL SUCCINATE (TOPROL XL) 200 MG EXTENDED RELEASE TABLET    Take 1 tablet by mouth daily    MISC. DEVICES (LUMBAR SUPPORT CUSHION) MISC    by Does not apply route. Dx: low back pain, sciatica. MULTIPLE VITAMIN (MULTI-VITAMIN PO)    Take  by mouth daily. PROBIOTIC PRODUCT (PROBIOTIC FORMULA PO)    Take 1 tablet by mouth daily. SACUBITRIL-VALSARTAN (ENTRESTO)  MG PER TABLET    Take 0.5 tablets by mouth 2 times daily    SPIRONOLACTONE (ALDACTONE) 25 MG TABLET    Take 1 tablet by mouth every other day    TRAMADOL (ULTRAM) 50 MG TABLET    Take 1 tablet by mouth every 6 hours as needed for Pain for up to 3 days. WARFARIN (COUMADIN) 3 MG TABLET    TAKE ONE TABLET BY MOUTH ONCE DAILY       ALLERGIES     is allergic to ativan [lorazepam]; codeine; nystatin; percocet [oxycodone-acetaminophen]; relafen [nabumetone]; tape [adhesive tape]; celebrex [celecoxib]; and lorazepam.    FAMILY HISTORY     indicated that her mother is . She indicated that her father is . She indicated that three of her four sisters are alive. She indicated that seven of her nine brothers are alive. She indicated that the status of her daughter is unknown. She indicated that the status of her neg hx is unknown.   family history includes Cancer in her sister; Diabetes in her brother and mother; Heart Disease in her brother and mother; High Blood Pressure in her brother, brother, brother, and brother; High Cholesterol in her father and mother; Kidney Disease in her brother; Rogene Montague / [place] in her daughter; Parkinsonism in her brother. SOCIAL HISTORY      reports that she has never smoked. She has never used smokeless tobacco. She reports that she does not drink alcohol or use drugs. PHYSICAL EXAM     INITIAL VITALS:  oral temperature is 97.6 °F (36.4 °C). Her blood pressure is 132/68 and her pulse is 62. Her respiration is 18 and oxygen saturation is 100%.     Physical Exam   Constitutional: She is oriented to person, place, and time. She appears well-developed and well-nourished. Non-toxic appearance. HENT:   Head: Normocephalic and atraumatic. Right Ear: Tympanic membrane and external ear normal.   Left Ear: Tympanic membrane and external ear normal.   Nose: Nose normal.   Mouth/Throat: Oropharynx is clear and moist and mucous membranes are normal. No oropharyngeal exudate, posterior oropharyngeal edema or posterior oropharyngeal erythema. Eyes: Conjunctivae and EOM are normal.   Neck: Normal range of motion. Neck supple. No JVD present. Cardiovascular: Normal rate, regular rhythm, normal heart sounds, intact distal pulses and normal pulses. Exam reveals no gallop and no friction rub. No murmur heard. Pulmonary/Chest: Effort normal and breath sounds normal. No respiratory distress. She has no decreased breath sounds. She has no wheezes. She has no rhonchi. She has no rales. Abdominal: Soft. Bowel sounds are normal. She exhibits no distension. There is no tenderness. There is no rebound, no guarding and no CVA tenderness. Musculoskeletal: Normal range of motion. She exhibits no edema. Neurological: She is alert and oriented to person, place, and time. She exhibits normal muscle tone. Coordination normal.   Skin: Skin is warm and dry. No rash noted. She is not diaphoretic. Nursing note and vitals reviewed. DIAGNOSTIC RESULTS     EKG: All EKG's are interpreted by the Emergency Department Physician who either signs or Co-signs this chart in the absence of a cardiologist.  EKG interpreted by Nikhil David MD:    Vent. Rate: 80 bpm  PA interval: * ms  QRS duration: 150 ms  QTc: 498 ms  P-R-T axes: *, -76, 87  No STEMI. EKG gives impression of ventricular paced rhythm. Compared to old EKG on 5-Mar-2019    RADIOLOGY: non-plain film images(s) such as CT, Ultrasound and MRI are read by the radiologist.    XR CHEST PORTABLE   Final Result   Cardiomegaly.    Possible small left pleural effusion or blunting of the costophrenic angle. **This report has been created using voice recognition software. It may contain minor errors which are inherent in voice recognition technology. **      Final report electronically signed by Dr. Jaden Ramos on 5/13/2019 12:47 PM          LABS:   Labs Reviewed   CBC WITH AUTO DIFFERENTIAL - Abnormal; Notable for the following components:       Result Value    RDW-CV 14.6 (*)     RDW-SD 53.3 (*)     Lymphocytes # 0.9 (*)     All other components within normal limits   PROTIME-INR - Abnormal; Notable for the following components:    INR 1.95 (*)     All other components within normal limits   BASIC METABOLIC PANEL - Abnormal; Notable for the following components:    Glucose 180 (*)     CREATININE 1.3 (*)     All other components within normal limits   HEPATIC FUNCTION PANEL - Abnormal; Notable for the following components:    Alkaline Phosphatase 175 (*)     AST 68 (*)     All other components within normal limits   GLOMERULAR FILTRATION RATE, ESTIMATED - Abnormal; Notable for the following components:    Est, Glom Filt Rate 40 (*)     All other components within normal limits   D-DIMER, QUANTITATIVE   BRAIN NATRIURETIC PEPTIDE   LIPASE   TROPONIN   URINE RT REFLEX TO CULTURE   ANION GAP   OSMOLALITY       EMERGENCY DEPARTMENT COURSE:   Vitals:    Vitals:    05/13/19 1146 05/13/19 1237   BP: (!) 150/79 132/68   Pulse: 61 62   Resp: 15 18   Temp: 97.6 °F (36.4 °C)    TempSrc: Oral    SpO2: 98% 100%       11:55 AM: The patient was seen and evaluated. MDM:  Patient's d-dimer was 269, proBNP was 1357, white count was 5100 with hemoglobin of 14.3. Patient's lipase, troponin was within normal limits. Alkaline phosphatase was 175. Chest x-ray showed possible small left pleural effusion or blunting of the costophrenic angle. Patient will be admitted by Dr. Constantine He for further workup and management.  All of patient's lab the testing that reviewed discussed with the patient and also with the admitting physician. CRITICAL CARE:   None     CONSULTS:  Dr. Claude Schuster:  None     FINAL IMPRESSION      1. Chest pain, unspecified type          DISPOSITION/PLAN   Admit    PATIENT REFERRED TO:  Hermes Deluca MD  [address] [facility] [address] [phone]          Hermes Deluca, [address]  [phone]            DISCHARGE MEDICATIONS:  New Prescriptions    No medications on file       (Please note that portions of this note were completed with a voice recognition program.  Efforts were made to edit the dictations butoccasionally words are mis-transcribed.)    Scribe:  Melody King 5/13/19 11:55 AM Scribing for and in the presence of Maribel Fuentes MD.    Signed by: Neo Smith, 05/13/19 1:18 PM    Provider:  Lawrence Almonte performed the services described in the documentation, reviewed and edited the documentation which was dictated to the scribe in my presence, and it accurately records my words and actions.     Tacho Julian MD5/13/19 1:18 PM       Maribel Fuentes MD  05/13/19 3952

## 2019-05-14 PROBLEM — I20.0 UNSTABLE ANGINA PECTORIS (HCC): Status: ACTIVE | Noted: 2019-05-14

## 2019-05-14 LAB
ALBUMIN SERPL-MCNC: 3.1 G/DL (ref 3.5–5.1)
ALP BLD-CCNC: 139 U/L (ref 38–126)
ALT SERPL-CCNC: 35 U/L (ref 11–66)
AST SERPL-CCNC: 54 U/L (ref 5–40)
BILIRUB SERPL-MCNC: 0.4 MG/DL (ref 0.3–1.2)
BILIRUBIN DIRECT: < 0.2 MG/DL (ref 0–0.3)
CHOLESTEROL, TOTAL: 291 MG/DL (ref 100–199)
EKG ATRIAL RATE: 61 BPM
EKG Q-T INTERVAL: 456 MS
EKG QRS DURATION: 140 MS
EKG QTC CALCULATION (BAZETT): 466 MS
EKG R AXIS: -75 DEGREES
EKG T AXIS: 71 DEGREES
EKG VENTRICULAR RATE: 63 BPM
ERYTHROCYTE [DISTWIDTH] IN BLOOD BY AUTOMATED COUNT: 14.7 % (ref 11.5–14.5)
ERYTHROCYTE [DISTWIDTH] IN BLOOD BY AUTOMATED COUNT: 53.8 FL (ref 35–45)
HCT VFR BLD CALC: 38.5 % (ref 37–47)
HDLC SERPL-MCNC: 40 MG/DL
HEMOGLOBIN: 12.6 GM/DL (ref 12–16)
INR BLD: 1.96 (ref 0.85–1.13)
LDL CHOLESTEROL CALCULATED: 216 MG/DL
LV EF: 23 %
LVEF MODALITY: NORMAL
MCH RBC QN AUTO: 32.2 PG (ref 26–33)
MCHC RBC AUTO-ENTMCNC: 32.7 GM/DL (ref 32.2–35.5)
MCV RBC AUTO: 98.5 FL (ref 81–99)
PLATELET # BLD: 219 THOU/MM3 (ref 130–400)
PMV BLD AUTO: 11 FL (ref 9.4–12.4)
RBC # BLD: 3.91 MILL/MM3 (ref 4.2–5.4)
TOTAL PROTEIN: 6.3 G/DL (ref 6.1–8)
TRIGL SERPL-MCNC: 177 MG/DL (ref 0–199)
WBC # BLD: 6.2 THOU/MM3 (ref 4.8–10.8)

## 2019-05-14 PROCEDURE — 96366 THER/PROPH/DIAG IV INF ADDON: CPT

## 2019-05-14 PROCEDURE — 93306 TTE W/DOPPLER COMPLETE: CPT

## 2019-05-14 PROCEDURE — 93010 ELECTROCARDIOGRAM REPORT: CPT | Performed by: INTERNAL MEDICINE

## 2019-05-14 PROCEDURE — 2709999900 HC NON-CHARGEABLE SUPPLY

## 2019-05-14 PROCEDURE — 94761 N-INVAS EAR/PLS OXIMETRY MLT: CPT

## 2019-05-14 PROCEDURE — 85027 COMPLETE CBC AUTOMATED: CPT

## 2019-05-14 PROCEDURE — 1200000003 HC TELEMETRY R&B

## 2019-05-14 PROCEDURE — 6370000000 HC RX 637 (ALT 250 FOR IP): Performed by: INTERNAL MEDICINE

## 2019-05-14 PROCEDURE — 80076 HEPATIC FUNCTION PANEL: CPT

## 2019-05-14 PROCEDURE — 93005 ELECTROCARDIOGRAM TRACING: CPT | Performed by: INTERNAL MEDICINE

## 2019-05-14 PROCEDURE — 80061 LIPID PANEL: CPT

## 2019-05-14 PROCEDURE — 85610 PROTHROMBIN TIME: CPT

## 2019-05-14 PROCEDURE — 36415 COLL VENOUS BLD VENIPUNCTURE: CPT

## 2019-05-14 RX ORDER — ACETAMINOPHEN 325 MG/1
650 TABLET ORAL EVERY 6 HOURS PRN
COMMUNITY

## 2019-05-14 RX ORDER — ACETAMINOPHEN 325 MG/1
650 TABLET ORAL EVERY 6 HOURS PRN
Status: DISCONTINUED | OUTPATIENT
Start: 2019-05-14 | End: 2019-05-15 | Stop reason: SDUPTHER

## 2019-05-14 RX ADMIN — LEVOTHYROXINE SODIUM 125 MCG: 0.12 TABLET ORAL at 05:36

## 2019-05-14 RX ADMIN — Medication 125 MCG: at 20:55

## 2019-05-14 RX ADMIN — CALCIUM 500 MG: 500 TABLET ORAL at 20:53

## 2019-05-14 RX ADMIN — Medication 1000 MG: at 20:53

## 2019-05-14 RX ADMIN — PANTOPRAZOLE SODIUM 40 MG: 40 TABLET, DELAYED RELEASE ORAL at 05:35

## 2019-05-14 RX ADMIN — AMIODARONE HYDROCHLORIDE 200 MG: 200 TABLET ORAL at 08:03

## 2019-05-14 RX ADMIN — METOPROLOL SUCCINATE 200 MG: 100 TABLET, EXTENDED RELEASE ORAL at 08:02

## 2019-05-14 RX ADMIN — FUROSEMIDE 40 MG: 40 TABLET ORAL at 08:01

## 2019-05-14 RX ADMIN — ACETAMINOPHEN 650 MG: 325 TABLET ORAL at 05:35

## 2019-05-14 ASSESSMENT — PAIN SCALES - GENERAL
PAINLEVEL_OUTOF10: 0
PAINLEVEL_OUTOF10: 2
PAINLEVEL_OUTOF10: 0
PAINLEVEL_OUTOF10: 0
PAINLEVEL_OUTOF10: 3

## 2019-05-14 ASSESSMENT — PAIN DESCRIPTION - ORIENTATION: ORIENTATION: MID;LEFT

## 2019-05-14 ASSESSMENT — PAIN DESCRIPTION - DESCRIPTORS: DESCRIPTORS: PRESSURE

## 2019-05-14 ASSESSMENT — PAIN DESCRIPTION - PAIN TYPE: TYPE: ACUTE PAIN

## 2019-05-14 ASSESSMENT — PAIN DESCRIPTION - FREQUENCY: FREQUENCY: INTERMITTENT

## 2019-05-14 ASSESSMENT — PAIN DESCRIPTION - LOCATION: LOCATION: CHEST

## 2019-05-14 NOTE — PLAN OF CARE
Problem: Falls - Risk of:  Goal: Will remain free from falls  Description  Will remain free from falls  Outcome: Ongoing  Note:   Patient free from falls. Fall precautions in place. Patient encouraged to use call light. Bed alarm on. Problem: Falls - Risk of:  Goal: Absence of physical injury  Description  Absence of physical injury  Outcome: Ongoing  Note:   Patient free from physical injury. Bed in low locked position, call light and personal belongings within reach. Patient uses call light appropriately. Problem: Pain:  Goal: Pain level will decrease  Description  Pain level will decrease  Outcome: Ongoing  Note:   Patient will have decrease pain. Patient will rate pain on a 0-10 scale. Non-pharmaceutical pain interventions will be used before medications. Problem: Pain:  Goal: Control of acute pain  Description  Control of acute pain  Outcome: Ongoing  Note:   Nitro drip in place. Problem: Discharge Planning:  Goal: Discharged to appropriate level of care  Description  Discharged to appropriate level of care  Outcome: Ongoing  Note:   Patient to discharge home with  once medically stable. Problem: Tissue Perfusion - Cardiopulmonary, Altered:  Goal: Absence of angina  Description  Absence of angina  Outcome: Ongoing  Note:   Patient has no complaints of angina. RN will continue to monitor during hourly rounding. Nitro drip on. Problem: Tissue Perfusion - Cardiopulmonary, Altered:  Goal: Circulatory function within specified parameters  Description  Circulatory function within specified parameters  Outcome: Ongoing  Note:   Pedal and radial pulses present. Capillary refill <3 seconds. Skin pink and warm. Problem: Tissue Perfusion - Cardiopulmonary, Altered:  Goal: Hemodynamic stability will improve  Description  Hemodynamic stability will improve  Outcome: Ongoing  Note:   Patient will remain hemodynamically stable.   Blood pressure stable and within normal range. Care plan reviewed with patient no questions or concerns at this time.

## 2019-05-14 NOTE — PLAN OF CARE
Problem: Falls - Risk of:  Goal: Will remain free from falls  Description  Will remain free from falls  5/14/2019 1155 by Ramandeep Granado RN  Note:   No falls throughout the shift. Falling star program within place. Assistance provided with ambulation. Bed alarm on. Call light within reach. Problem: Falls - Risk of:  Goal: Absence of physical injury  Description  Absence of physical injury  5/14/2019 1155 by Ramandeep Granado RN  Note:   No falls throughout the shift. Falling star program within place. Assistance provided with ambulation. Bed alarm on. Call light within reach. Problem: Pain:  Goal: Pain level will decrease  Description  Pain level will decrease  5/14/2019 1155 by Ramandeep Granado RN  Note:   C/O pressure of a 2/10, nitro increased, relief felt. Will monitor. Problem: Pain:  Goal: Control of acute pain  Description  Control of acute pain  5/14/2019 1155 by Ramandeep Granado RN  Note:   C/O pressure of a 2/10, nitro increased, relief felt. Will monitor. Problem: Discharge Planning:  Goal: Discharged to appropriate level of care  Description  Discharged to appropriate level of care  5/14/2019 1155 by Ramandeep Granado RN  Note:   Plans to go home at discharge. Problem: Tissue Perfusion - Cardiopulmonary, Altered:  Goal: Absence of angina  Description  Absence of angina  5/14/2019 1155 by Ramandeep Granado RN  Note:   C/O pressure of a 2/10, nitro increased, relief felt. Will monitor. Problem: Tissue Perfusion - Cardiopulmonary, Altered:  Goal: Circulatory function within specified parameters  Description  Circulatory function within specified parameters  5/14/2019 1155 by Ramandeep Granado RN  Note:   Pulses WNL. Problem: Tissue Perfusion - Cardiopulmonary, Altered:  Goal: Hemodynamic stability will improve  Description  Hemodynamic stability will improve  5/14/2019 1155 by Ramandeep Granado RN  Note:   VS WNL.    Care plan

## 2019-05-14 NOTE — PROGRESS NOTES
Clinical Pharmacy Note    Warfarin consult follow-up    Recent Labs     05/14/19  0532   INR 1.96*     Recent Labs     05/13/19  1229 05/14/19  0532   HGB 14.3 12.6   HCT 43.8 38.5    219       Significant Drug-Drug Interactions:  New warfarin drug-drug interactions: amiodarone (HM), aspirin, levothyroxine   Discontinued drug-drug interactions: none  Current warfarin drug-drug interactions: amiodarone (HM), aspirin, levothyroxine         Date INR Warfarin Dose   5/13/19 1.95 3 mg   5/14/19  1.96 1.5 mg                                                Notes:                     Daily PT/INR until stable within therapeutic range.

## 2019-05-14 NOTE — CARE COORDINATION
CASE MANAGEMENT OBSERVATION NOTE       5/14/19, 7:11 AM    [address]       Admitted from: ER 5/13/2019/ 1134   Location: [facility]19/019-A Reason for admit: Chest pain [R07.9]   Admit order signed?: no    Procedure: Echo planned for today. 5-13-19 CXR  Cardiomegaly. Possible small left pleural effusion or blunting of the costophrenic angle. Pertinent Info/Orders/Treatment Plan:  Telemetry. Follow labs. Troponin neg x 3. Echo planned for today. Ntg gtt. PCP: Chrys Halsted, MD    Discharge Plan: Met with pt today. She is from home with spouse who is somewhat disabled from a stroke. Pt has no home services, she may use a cane when she leaves the house. She has a PCP, she drives and she has no issues obtaining her medications. She voices no needs at present time.

## 2019-05-14 NOTE — H&P
[address] [place] [address]                              HISTORY AND PHYSICAL    PATIENT NAME: Gee Evans                    :        1940  MED REC NO:   [id number]                           ROOM:       0019  ACCOUNT NO:   [de-identified]                           ADMIT DATE: 2019  PROVIDER:     Vanessa Valle. Sarwat Lim M.D. REASON FOR EVALUATION:  Chest pain. HISTORY OF PRESENT ILLNESS:  This is a patient, who is a 61-year-old  lady admitted to the hospital through the emergency room. Apparently,  the patient has a nonischemic cardiomyopathy and she had a history of an  AICD implantation. She had battery exchange recently. The patient came  to the emergency room because of the chest pain. She described the pain  as a discomfort in the epigastric area. The patient states the pain  waxes and wanes. She has no symptoms of congestive heart failure. She  has a history of atrial fibrillation in the past.  She has been on the  Coumadin. The patient has no history of a syncopal episode. She denies  palpitation. REVIEW OF SYSTEMS:  She has no history of CVA or TIA. She has a history  of hypothyroidism, on hormonal replacement. She does have a history of  nonischemic cardiomyopathy, history of hypertension, history of  hypercholesterolemia, history of hiatal hernia, gastroesophageal reflux,  peptic ulcer disease. She has history of C. diff infection. She has a  history of Bi-V AICD implantation. The patient has no change in her weight. She has history of  hypertension. She reports that she is not aware of diabetes. She has a  history of hypercholesterolemia, but she could not tolerate the statins. SOCIAL HISTORY:  Denies smoking or alcohol abuse. ALLERGIES:  She is allergic to ATIVAN and CODEINE. CODE STATUS:  She is a full code.     PHYSICAL EXAMINATION:  VITAL SIGNS:  Showed her blood pressure is 140/66, she was in sinus  rhythm, she was afebrile, respiratory rate was 18. NEUROLOGICAL:  The patient has no focal neurological deficits. NECK:  She has no JVD. There was no carotid bruit. There was no  thyromegaly. No neck lymphadenopathy. HEENT:  No discharge from the throat, ear, or the nose. HEART:  Apical pulsation is laterally displaced. There is 2/6 systolic  murmur. ABDOMEN:  Soft. GENITAL/RECTAL:  Deferred. EXTREMITIES:  Lower limbs, there is no edema. LABORATORY DATA:  This patient's lab work showed her BUN is 19,  creatinine 1.3, blood sugar is 180. Normal liver profile. IMPRESSION:  1. The patient with atypical chest pain. She will continue the  nitroglycerin and the patient will have an echocardiogram.  We will have  her serial cardiac enzymes. 2.  History of an AICD implantation. 3.  History of diabetes mellitus. 4.  Hypercholesterolemia. 5.  Hypertension. 6.  Arthritis. PLAN:  The patient admitted to the telemetry bed. We will obtain serial  cardiac enzymes. We will continue with the IV nitroglycerin. The  patient will be placed on Protonix. Medication will be adjusted and  further recommendation pending the results of the above tests and  hospitalization course.         Anthony Fischer M.D.    D: 05/13/2019 17:49:51       T: 05/13/2019 22:22:33     [email or web address]  Job#: [id number]     Doc#: [id number]    CC:  , REFERRING SERVICE

## 2019-05-15 ENCOUNTER — APPOINTMENT (OUTPATIENT)
Dept: CARDIAC CATH/INVASIVE PROCEDURES | Age: 79
DRG: 287 | End: 2019-05-15
Payer: MEDICARE

## 2019-05-15 ENCOUNTER — TELEPHONE (OUTPATIENT)
Dept: FAMILY MEDICINE CLINIC | Age: 79
End: 2019-05-15

## 2019-05-15 VITALS
BODY MASS INDEX: 36.47 KG/M2 | HEIGHT: 62 IN | OXYGEN SATURATION: 97 % | DIASTOLIC BLOOD PRESSURE: 56 MMHG | TEMPERATURE: 97.5 F | RESPIRATION RATE: 16 BRPM | HEART RATE: 60 BPM | WEIGHT: 198.2 LBS | SYSTOLIC BLOOD PRESSURE: 108 MMHG

## 2019-05-15 LAB
ALBUMIN SERPL-MCNC: 3.5 G/DL (ref 3.5–5.1)
ALP BLD-CCNC: 158 U/L (ref 38–126)
ALT SERPL-CCNC: 40 U/L (ref 11–66)
ANION GAP SERPL CALCULATED.3IONS-SCNC: 15 MEQ/L (ref 8–16)
AST SERPL-CCNC: 59 U/L (ref 5–40)
BILIRUB SERPL-MCNC: 0.4 MG/DL (ref 0.3–1.2)
BUN BLDV-MCNC: 23 MG/DL (ref 7–22)
CALCIUM SERPL-MCNC: 9.1 MG/DL (ref 8.5–10.5)
CHLORIDE BLD-SCNC: 102 MEQ/L (ref 98–111)
CO2: 23 MEQ/L (ref 23–33)
CREAT SERPL-MCNC: 1.3 MG/DL (ref 0.4–1.2)
EKG ATRIAL RATE: 54 BPM
EKG Q-T INTERVAL: 500 MS
EKG QRS DURATION: 148 MS
EKG QTC CALCULATION (BAZETT): 511 MS
EKG R AXIS: -70 DEGREES
EKG T AXIS: 64 DEGREES
EKG VENTRICULAR RATE: 63 BPM
ERYTHROCYTE [DISTWIDTH] IN BLOOD BY AUTOMATED COUNT: 14.6 % (ref 11.5–14.5)
ERYTHROCYTE [DISTWIDTH] IN BLOOD BY AUTOMATED COUNT: 52.6 FL (ref 35–45)
GFR SERPL CREATININE-BSD FRML MDRD: 40 ML/MIN/1.73M2
GLUCOSE BLD-MCNC: 113 MG/DL (ref 70–108)
HCT VFR BLD CALC: 42.2 % (ref 37–47)
HEMOGLOBIN: 13.6 GM/DL (ref 12–16)
INR BLD: 1.88 (ref 0.85–1.13)
MCH RBC QN AUTO: 31.5 PG (ref 26–33)
MCHC RBC AUTO-ENTMCNC: 32.2 GM/DL (ref 32.2–35.5)
MCV RBC AUTO: 97.7 FL (ref 81–99)
PLATELET # BLD: 260 THOU/MM3 (ref 130–400)
PMV BLD AUTO: 11 FL (ref 9.4–12.4)
POTASSIUM REFLEX MAGNESIUM: 4.4 MEQ/L (ref 3.5–5.2)
RBC # BLD: 4.32 MILL/MM3 (ref 4.2–5.4)
SODIUM BLD-SCNC: 140 MEQ/L (ref 135–145)
TOTAL PROTEIN: 6.8 G/DL (ref 6.1–8)
WBC # BLD: 6.7 THOU/MM3 (ref 4.8–10.8)

## 2019-05-15 PROCEDURE — 80053 COMPREHEN METABOLIC PANEL: CPT

## 2019-05-15 PROCEDURE — 85610 PROTHROMBIN TIME: CPT

## 2019-05-15 PROCEDURE — C1894 INTRO/SHEATH, NON-LASER: HCPCS

## 2019-05-15 PROCEDURE — 4A023N7 MEASUREMENT OF CARDIAC SAMPLING AND PRESSURE, LEFT HEART, PERCUTANEOUS APPROACH: ICD-10-PCS | Performed by: INTERNAL MEDICINE

## 2019-05-15 PROCEDURE — 6360000002 HC RX W HCPCS

## 2019-05-15 PROCEDURE — 94761 N-INVAS EAR/PLS OXIMETRY MLT: CPT

## 2019-05-15 PROCEDURE — 6360000004 HC RX CONTRAST MEDICATION: Performed by: INTERNAL MEDICINE

## 2019-05-15 PROCEDURE — 36415 COLL VENOUS BLD VENIPUNCTURE: CPT

## 2019-05-15 PROCEDURE — 6370000000 HC RX 637 (ALT 250 FOR IP): Performed by: INTERNAL MEDICINE

## 2019-05-15 PROCEDURE — 2500000003 HC RX 250 WO HCPCS

## 2019-05-15 PROCEDURE — 93458 L HRT ARTERY/VENTRICLE ANGIO: CPT | Performed by: INTERNAL MEDICINE

## 2019-05-15 PROCEDURE — B2151ZZ FLUOROSCOPY OF LEFT HEART USING LOW OSMOLAR CONTRAST: ICD-10-PCS | Performed by: INTERNAL MEDICINE

## 2019-05-15 PROCEDURE — 2580000003 HC RX 258: Performed by: INTERNAL MEDICINE

## 2019-05-15 PROCEDURE — C1769 GUIDE WIRE: HCPCS

## 2019-05-15 PROCEDURE — 93010 ELECTROCARDIOGRAM REPORT: CPT | Performed by: INTERNAL MEDICINE

## 2019-05-15 PROCEDURE — 2709999900 HC NON-CHARGEABLE SUPPLY

## 2019-05-15 PROCEDURE — B2111ZZ FLUOROSCOPY OF MULTIPLE CORONARY ARTERIES USING LOW OSMOLAR CONTRAST: ICD-10-PCS | Performed by: INTERNAL MEDICINE

## 2019-05-15 PROCEDURE — 85027 COMPLETE CBC AUTOMATED: CPT

## 2019-05-15 PROCEDURE — 93005 ELECTROCARDIOGRAM TRACING: CPT | Performed by: INTERNAL MEDICINE

## 2019-05-15 RX ORDER — SODIUM CHLORIDE 0.9 % (FLUSH) 0.9 %
10 SYRINGE (ML) INJECTION PRN
Status: DISCONTINUED | OUTPATIENT
Start: 2019-05-15 | End: 2019-05-15 | Stop reason: HOSPADM

## 2019-05-15 RX ORDER — DIPHENHYDRAMINE HCL 25 MG
50 TABLET ORAL ONCE
Status: DISCONTINUED | OUTPATIENT
Start: 2019-05-15 | End: 2019-05-15 | Stop reason: HOSPADM

## 2019-05-15 RX ORDER — ASPIRIN 325 MG
325 TABLET ORAL ONCE
Status: DISCONTINUED | OUTPATIENT
Start: 2019-05-15 | End: 2019-05-15 | Stop reason: HOSPADM

## 2019-05-15 RX ORDER — PANTOPRAZOLE SODIUM 20 MG/1
40 TABLET, DELAYED RELEASE ORAL DAILY
Qty: 30 TABLET | Refills: 3 | Status: SHIPPED | OUTPATIENT
Start: 2019-05-15 | End: 2019-06-17 | Stop reason: SDUPTHER

## 2019-05-15 RX ORDER — ONDANSETRON 2 MG/ML
4 INJECTION INTRAMUSCULAR; INTRAVENOUS EVERY 6 HOURS PRN
Status: DISCONTINUED | OUTPATIENT
Start: 2019-05-15 | End: 2019-05-15 | Stop reason: HOSPADM

## 2019-05-15 RX ORDER — SODIUM CHLORIDE 0.9 % (FLUSH) 0.9 %
10 SYRINGE (ML) INJECTION EVERY 12 HOURS SCHEDULED
Status: DISCONTINUED | OUTPATIENT
Start: 2019-05-15 | End: 2019-05-15 | Stop reason: HOSPADM

## 2019-05-15 RX ORDER — ACETAMINOPHEN 325 MG/1
650 TABLET ORAL EVERY 4 HOURS PRN
Status: DISCONTINUED | OUTPATIENT
Start: 2019-05-15 | End: 2019-05-15 | Stop reason: HOSPADM

## 2019-05-15 RX ORDER — DIPHENHYDRAMINE HCL 25 MG
25 TABLET ORAL
Status: DISCONTINUED | OUTPATIENT
Start: 2019-05-15 | End: 2019-05-15 | Stop reason: HOSPADM

## 2019-05-15 RX ORDER — WARFARIN SODIUM 3 MG/1
3 TABLET ORAL
Status: COMPLETED | OUTPATIENT
Start: 2019-05-15 | End: 2019-05-15

## 2019-05-15 RX ORDER — SODIUM CHLORIDE 0.9 % (FLUSH) 0.9 %
10 SYRINGE (ML) INJECTION PRN
Status: DISCONTINUED | OUTPATIENT
Start: 2019-05-15 | End: 2019-05-15 | Stop reason: SDUPTHER

## 2019-05-15 RX ORDER — SODIUM CHLORIDE 9 MG/ML
100 INJECTION, SOLUTION INTRAVENOUS CONTINUOUS
Status: ACTIVE | OUTPATIENT
Start: 2019-05-15 | End: 2019-05-15

## 2019-05-15 RX ORDER — SODIUM CHLORIDE 9 MG/ML
INJECTION, SOLUTION INTRAVENOUS CONTINUOUS
Status: DISCONTINUED | OUTPATIENT
Start: 2019-05-15 | End: 2019-05-15 | Stop reason: SDUPTHER

## 2019-05-15 RX ORDER — NITROGLYCERIN 0.4 MG/1
0.4 TABLET SUBLINGUAL EVERY 5 MIN PRN
Status: DISCONTINUED | OUTPATIENT
Start: 2019-05-15 | End: 2019-05-15 | Stop reason: HOSPADM

## 2019-05-15 RX ORDER — ALPRAZOLAM 0.5 MG/1
0.5 TABLET ORAL
Status: DISCONTINUED | OUTPATIENT
Start: 2019-05-15 | End: 2019-05-15 | Stop reason: HOSPADM

## 2019-05-15 RX ORDER — ATROPINE SULFATE 0.4 MG/ML
0.5 AMPUL (ML) INJECTION
Status: DISCONTINUED | OUTPATIENT
Start: 2019-05-15 | End: 2019-05-15 | Stop reason: HOSPADM

## 2019-05-15 RX ORDER — ATROPINE SULFATE 0.1 MG/ML
0.5 INJECTION INTRAVENOUS ONCE
Status: DISCONTINUED | OUTPATIENT
Start: 2019-05-15 | End: 2019-05-15 | Stop reason: HOSPADM

## 2019-05-15 RX ORDER — SODIUM CHLORIDE 0.9 % (FLUSH) 0.9 %
10 SYRINGE (ML) INJECTION EVERY 12 HOURS SCHEDULED
Status: DISCONTINUED | OUTPATIENT
Start: 2019-05-15 | End: 2019-05-15 | Stop reason: SDUPTHER

## 2019-05-15 RX ADMIN — ASPIRIN 81 MG 81 MG: 81 TABLET ORAL at 05:44

## 2019-05-15 RX ADMIN — LEVOTHYROXINE SODIUM 125 MCG: 0.12 TABLET ORAL at 05:45

## 2019-05-15 RX ADMIN — IOPAMIDOL 90 ML: 755 INJECTION, SOLUTION INTRAVENOUS at 07:38

## 2019-05-15 RX ADMIN — WARFARIN SODIUM 3 MG: 3 TABLET ORAL at 18:06

## 2019-05-15 RX ADMIN — LIDOCAINE HYDROCHLORIDE: 20 SOLUTION ORAL; TOPICAL at 17:00

## 2019-05-15 RX ADMIN — SODIUM CHLORIDE: 9 INJECTION, SOLUTION INTRAVENOUS at 03:28

## 2019-05-15 RX ADMIN — PANTOPRAZOLE SODIUM 40 MG: 40 TABLET, DELAYED RELEASE ORAL at 05:44

## 2019-05-15 ASSESSMENT — PAIN SCALES - GENERAL: PAINLEVEL_OUTOF10: 0

## 2019-05-15 NOTE — PROGRESS NOTES
Discharge teaching and instructions for diagnosis/procedure of chest pain with heart catheterization completed with patient using teachback method. AVS reviewed. Printed prescriptions given to patient. Patient voiced understanding regarding prescriptions, follow up appointments, and care of self at home.  Discharged in a wheelchair to  home with support per family

## 2019-05-15 NOTE — PLAN OF CARE
Problem: Falls - Risk of:  Goal: Will remain free from falls  Description  Will remain free from falls  5/15/2019 1415 by Zita Doll RN  Note:   No falls throughout shift. Falling star program within place. Assistance provided with ambulation. Bed alarm on. Call light within reach. Problem: Falls - Risk of:  Goal: Absence of physical injury  Description  Absence of physical injury  5/15/2019 1415 by Zita Doll RN  Note:   No falls throughout shift. Falling star program within place. Assistance provided with ambulation. Bed alarm on. Call light within reach. Problem: Pain:  Goal: Pain level will decrease  Description  Pain level will decrease  5/15/2019 1415 by Zita Doll RN  Note:   Denies pain throughout shift. Will monitor. Problem: Pain:  Goal: Control of acute pain  Description  Control of acute pain  5/15/2019 1415 by Zita Doll RN  Note:   Denies pain throughout shift. Will monitor. Problem: Discharge Planning:  Goal: Discharged to appropriate level of care  Description  Discharged to appropriate level of care  5/15/2019 1415 by Zita Doll RN  Note:   Plans to go home at discharge. Problem: Tissue Perfusion - Cardiopulmonary, Altered:  Goal: Absence of angina  Description  Absence of angina  5/15/2019 1415 by Zita Doll RN  Note:   Heart cath today. Problem: Tissue Perfusion - Cardiopulmonary, Altered:  Goal: Circulatory function within specified parameters  Description  Circulatory function within specified parameters  5/15/2019 1415 by Zita Doll RN  Note:   Pulses palpable. Problem: Tissue Perfusion - Cardiopulmonary, Altered:  Goal: Hemodynamic stability will improve  Description  Hemodynamic stability will improve  5/15/2019 1415 by Zita Doll RN  Note:   VS monitored. Care plan reviewed with patient.   Patient verbalize understanding of the plan of care and contribute to goal setting.

## 2019-05-15 NOTE — DISCHARGE INSTR - PHARMACY
Recommendations for discharge:   Date Warfarin Dose   5/15/19 3 mg (in hospital)   5/16/19 1.5 mg   5/17/19 INR     Provider dosing warfarin: Dr. Hall So INR:  5/17/19 with results to Dr. Eva Palacios

## 2019-05-15 NOTE — PROGRESS NOTES
Clinical Pharmacy Note    Warfarin consult follow-up    Recent Labs     05/15/19  0332   INR 1.88*     Recent Labs     05/13/19  1229 05/14/19  0532 05/15/19  0332   HGB 14.3 12.6 13.6   HCT 43.8 38.5 42.2    219 260       Significant Drug-Drug Interactions:  New warfarin drug-drug interactions: none  Discontinued drug-drug interactions: none  Current warfarin drug-drug interactions: amiodarone (HM), aspirin, levothyroxine         Date INR Warfarin Dose   5/13/19 1.95 3 mg   5/14/19  1.96 1.5 mg     5/15/19 1.88  3 mg                                        Notes:                     Daily PT/INR until stable within therapeutic range.

## 2019-05-15 NOTE — CARE COORDINATION
[address] Transitions Interview     5/15/2019    Patient: Carolin Mazariegos Patient : 1940   MRN: [id number]  Reason for Admission: Chest pain [R07.9]  Unstable angina pectoris ([address] [I20.0]  RARS: Readmission Risk Score: 20    Met with: Rohith Parks for inpatient Care Transition interview. Identified self/role. Explained CTC process, verbalized understanding and agreeable to CTC calls. Readmission Risk  Patient Active Problem List   Diagnosis    HTN (hypertension)    Hypercholesteremia    [facility]ischemic cardiomyopathy [facility]    Atrial fibrillation ([facility]    Hypothyroidism    Morbid obesity with BMI of 40.0-44.9, adult ([facility] [address]    Biventricular implantable cardioverter-defibrillator in situ    ICD (implantable cardioverter-defibrillator) battery depletion    Abnormal LFTs    Morbid obesity with BMI of 45.0-49.9, adult ([facility] [address]    Benign essential HTN    NAFLD (nonalcoholic fatty liver disease)    Left ventricular systolic dysfunction    Ventricular tachycardia (paroxysmal) (HCC)    Chest pain    Unstable angina pectoris [facility])       Inpatient Assessment  Care Transitions Summary    Care Transitions Inpatient Review  Medication Review  Are you able to afford your medications?:  Yes  How often do you have difficulty taking your medications?:  I always take them as prescribed. Housing Review  Who do you live with?:  Partner/Spouse/SO  Are you an active caregiver in your home?:  No  Social Support  Do you have a ?:  No  Do you have a [address] [place] [address]?:  No  Durable Medical Equipment  Patient DME:  Straight cane  Functional Review  Ability to seek help/take action for Emergent/Urgent situations i.e. fire, crime, inclement weather or health crisis. :  Independent  Ability handle personal hygiene needs (bathing/dressing/grooming): Independent  Ability to manage medications:   Independent  Ability to prepare food:  Independent  Ability to maintain home (clean home, laundry):

## 2019-05-15 NOTE — TELEPHONE ENCOUNTER
.Transition of Care visit scheduled.   5/21/2019  Patient is being discharged to home   Date of discharge 5/15/19  Discharge from facility [facility]  Reason for admission chest pain

## 2019-05-15 NOTE — PLAN OF CARE
Problem: Falls - Risk of:  Goal: Will remain free from falls  Description  Will remain free from falls  5/15/2019 0112 by Rosalba Palacios RN  Outcome: Ongoing  Note:   Absence of falls this shift. Fall prevention in place. Fall band applied. Bed alarm activated as needed. Problem: Falls - Risk of:  Goal: Absence of physical injury  Description  Absence of physical injury  5/15/2019 0112 by Rosalba Palacios RN  Outcome: Ongoing  Note:   Absence of physical injury. Problem: Pain:  Goal: Pain level will decrease  Description  Pain level will decrease  5/15/2019 0112 by Rosalba Palacios RN  Outcome: Ongoing  Note:   Nitro infusing at this time. Pt denies pain. Pain assessed every 4 hours, and as needed. PRN pain medications given as ordered. Problem: Pain:  Goal: Control of acute pain  Description  Control of acute pain  5/15/2019 0112 by Rosalba Palacios RN  Outcome: Ongoing  Note:   Pain assessed every 4 hours, and as needed. PRN pain medications given as ordered. Problem: Discharge Planning:  Goal: Discharged to appropriate level of care  Description  Discharged to appropriate level of care  5/15/2019 0112 by Rosalba Palacios RN  Outcome: Ongoing  Note:   Pt plans to be discharged home when appropriate. Problem: Tissue Perfusion - Cardiopulmonary, Altered:  Goal: Absence of angina  Description  Absence of angina  5/15/2019 0112 by Rosalba Palacios RN  Outcome: Ongoing  Note:   Pt denies chest pain. Nitro drip infusing at this time. Problem: Tissue Perfusion - Cardiopulmonary, Altered:  Goal: Circulatory function within specified parameters  Description  Circulatory function within specified parameters  5/15/2019 0112 by Rosalba Palacios RN  Outcome: Ongoing  Note:   Radial and pedal pulses strong and present bilaterally.      Problem: Tissue Perfusion - Cardiopulmonary, Altered:  Goal: Hemodynamic stability will improve  Description  Hemodynamic stability will improve  5/15/2019 0112 by Rosalba Palacios RN  Outcome: Ongoing  Note:

## 2019-05-15 NOTE — DISCHARGE INSTR - ACTIVITY
Cardiac Cath Discharge Instructions  1. Take it easy for 3-4 days. 2.  No driving for 2 days. 3.  No lifting of 5 lbs or more for 5 days with the affected arm. 4.  Can shower after 24 hours. 5.  Remove arm board after 24 hours. 6.  Apply a band aid to the insertion site daily for 5 days. May apply antibiotic ointment if desired, but not necessary. Wash site daily with soap and water. 7.  No creams, ointments, or powders near the insertion site. 8.  No tub baths, swimming, hot tubs, or hand washing dishes for 1 week. 9.  Watch for signs of infection (redness, warmth, swelling, or pus drainage)  10. If bleeding occurs from insertion site, apply pressure and call 911.

## 2019-05-15 NOTE — OP NOTE
[address] [address]  Sedation/Analgesia Post Sedation Record      Pt Name: Ryan Weeks  MRN: [id number]  YOB: 1940  Procedure Performed By: Smitha Waller MD  Primary Care Physician: Penny Smith MD    POST-PROCEDURE    Physician: Smitha Waller MD    Procedure Performed:  Left Heart Catheterization and Left Heart Cath    Sedation/Anesthesia:  Local Anesthesia and IV Conscious Sedation with continuous O2 monitoring    Estimated Blood Loss:  Minimal    Specimens Removed:  None    Complications:  None     Post Procedure Diagnosis/Findings:  Coronary Artery Disease    Recommendations:  Medical treatment and review films.        Smitha Waller MD  Electronically signed 5/15/2019 at 7:43 AM

## 2019-05-15 NOTE — PROGRESS NOTES
[facility]   Sedation/Analgesia History and Physical    Pt Name: Enrike Soto  MRN: [id number]  YOB: 1940  Provider Performing Procedure: Imtiaz Paula MD  Primary Care Physician: Ford Guallpa MD      Pre-Procedure: {CARDIAC SYMPTOMS:[id number]::\"Chest pain, possible coronary artery disease,    cardiomyopathy    Consent: I have discussed with the patient and/or the patient representative the indication, alternatives, and the possible risks and/or complications of the planned procedure and the anesthesia methods. The patient and/or representative appear to understand and agree to proceed. Medical History:   has a past medical history of AICD (automatic cardioverter/defibrillator) present, Atrial fibrillation ([facility] [address] [facility] Biventricular ICD (implantable cardiac defibrillator) in place, C. difficile diarrhea, CAD (coronary artery disease), Cancer ([address] CHF (congestive heart failure) ([facility] [address] Diverticulitis, GERD (gastroesophageal reflux disease), Hiatal hernia, Hyperlipidemia, Hypertension, Menopause, NAFLD (nonalcoholic fatty liver disease), Nonischemic cardiomyopathy ([address] Pacemaker, Retroperitoneal hemorrhage, and Thyroid disease. .    Surgical History:     has a past surgical history that includes Cholecystectomy, laparoscopic; Cardiac defibrillator placement; Colonoscopy (2008); skin biopsy (2010); Endoscopy, colon, diagnostic; and pacemaker placement (1999). Allergies:    Allergies as of 05/13/2019 - Review Complete 05/13/2019   Allergen Reaction Noted    Ativan [lorazepam]  02/25/2016    Codeine  07/25/2011    Nystatin  07/25/2011    Percocet [oxycodone-acetaminophen]  07/25/2011    Relafen [nabumetone]  07/25/2011    Tape [adhesive tape]  07/25/2011    Celebrex [celecoxib] Rash 07/25/2011    Lorazepam Anxiety 07/25/2011       Medications:   Coumadin use last 5 days:  No  Antiplatelet drug therapy use last 5 days:  Yes  Other anticoagulant use last 5 days:  No   warfarin  1.5 mg Oral Once    sodium chloride flush  10 mL Intravenous 2 times per day    aspirin  81 mg Oral Daily    pantoprazole  40 mg Oral QAM AC    warfarin (COUMADIN) daily dosing (placeholder)   Other RX Placeholder    amiodarone  200 mg Oral Daily    vitamin C  1,000 mg Oral BID    calcium elemental  500 mg Oral BID    digoxin  125 mcg Oral Daily    furosemide  40 mg Oral Daily    levothyroxine  125 mcg Oral Daily    metoprolol succinate  200 mg Oral Daily    multivitamin  1 tablet Oral Daily    sacubitril-valsartan  1 tablet Oral BID    [START ON 5/15/2019] spironolactone  25 mg Oral Every Other Day     Prior to Admission medications    Medication Sig Start Date End Date Taking? Authorizing Provider   acetaminophen (TYLENOL) 325 MG tablet Take 650 mg by mouth every 6 hours as needed for Pain or Fever   Yes Historical Provider, MD   sacubitril-valsartan (ENTRESTO)  MG per tablet Take 0.5 tablets by mouth 2 times daily   Yes Historical Provider, MD   warfarin (COUMADIN) 3 MG tablet TAKE ONE TABLET BY MOUTH ONCE DAILY  Patient taking differently: 3 mg every Monday and Friday, 1.5 mg the other 5 days 1/28/19  Yes Renetta De La Fuente MD   amiodarone (CORDARONE) 200 MG tablet Take 1 tablet by mouth daily 1/28/19  Yes Vandana Champion MD   digoxin [facility]) 125 MCG tablet Take 1 tablet by mouth daily 1/28/19  Yes Vandana Champion MD   furosemide (LASIX) 40 MG tablet Take 1 tablet by mouth daily 1/28/19  Yes Vandana Champion MD   levothyroxine (LEVOTHROID) 125 MCG tablet Take 1 tablet by mouth daily 1/28/19  Yes Vandana Champion MD   metoprolol succinate (TOPROL XL) 200 MG extended release tablet Take 1 tablet by mouth daily 1/28/19  Yes Vandana Champion MD   spironolactone (ALDACTONE) 25 MG tablet Take 1 tablet by mouth every other day 1/28/19  Yes Vandana Champion MD   Handicap Placard MISC by Does not apply route Request parking placard due to medical conditions. Duration of 5 years.  1/6/17  Yes Mariza Gomez [id number] [facility] Record Number: [id number]  Date: 5/14/2019   Time: 8:25 PM   Room/Bed: 8A-19/019-A

## 2019-05-15 NOTE — PROGRESS NOTES
Clinical Pharmacy Note                                               Warfarin Discharge Recommendations      Pt discharged from [facility] today after admission for Chest Pain    INR today:  Recent Labs     05/15/19  0332   INR 1.88*       Coumadin 3 mg tabs    Interacting medications at discharge: amiodarone, aspirin, levothyroxine     INR goal during admission: 2-3    Recommendations for discharge:   Date Warfarin Dose   5/15/19 3 mg (in hospital)   5/16/19 1.5 mg   5/17/19 INR     Provider dosing warfarin: Dr. Rishi Stinson INR:  5/17/19 with results to Dr. Joselin Rider, PharmD, BCPS  5/15/2019  4:09 PM

## 2019-05-16 ENCOUNTER — TELEPHONE (OUTPATIENT)
Dept: FAMILY MEDICINE CLINIC | Age: 79
End: 2019-05-16

## 2019-05-16 ENCOUNTER — CARE COORDINATION (OUTPATIENT)
Dept: CASE MANAGEMENT | Age: 79
End: 2019-05-16

## 2019-05-16 DIAGNOSIS — R07.9 CHEST PAIN, UNSPECIFIED TYPE: Primary | ICD-10-CM

## 2019-05-16 PROCEDURE — 1111F DSCHRG MED/CURRENT MED MERGE: CPT | Performed by: FAMILY MEDICINE

## 2019-05-16 NOTE — TELEPHONE ENCOUNTER
Patient's daughter, Johnnie Barrios called. She was released from the hospital yesterday evening and has an appt with you on 5/21/19. They told her to come in the office on Friday to have her INR tested. The daughter wants to know if it is ok to combine the test and the 5/21 appt. She said they could come on Monday if you want it repeated sooner.   Her last INR was 1.95 yesterday at the hospital.  Please call the daughter back at [phone]

## 2019-05-16 NOTE — DISCHARGE SUMMARY
[address] [id number]                               DISCHARGE SUMMARY    PATIENT NAME: Rene Blount                    :        1940  MED REC NO:   [id number]                           ROOM:       0019  ACCOUNT NO:   [de-identified]                           ADMIT DATE: 2019  PROVIDER:     Louann Eller. Alex Rosenthal M.D.               Angie Sers: 05/15/2019    FINAL DIAGNOSES:  1. Atypical chest pain. 2.  Severe dilated cardiomyopathy - nonischemic. 3.  Nonobstructive disease of the LAD. 4.  Dyslipidemia. 5.  History of hypothyroidism. 6.  History of BiV AICD pacemaker implantation. 7.  History of dyslipidemia. 8.  History of gastroesophageal reflux. 9.  Hiatal hernia. 10.  History of atrial fib. 11.  History of cholecystectomy. PROCEDURE PERFORMED DURING THIS HOSPITALIZATION:  Left heart cath, left  ventriculogram, selective coronary angiogram.    BRIEF HISTORY:  This patient is a 70years old lady, admitted to the  hospital through the emergency room with the chest pain. The patient  has history of cardiomyopathy, BiV AICD. For the rest of H and P,  please refer to my H and P.    HOSPITALIZATION COURSE:  The patient admitted to the telemetry bed,  continued to have the chest pain. We treated with IV nitroglycerin. She has been on the Coumadin, the patient continued. Cardiac enzymes  were negative. The patient was then taken to the cath lab, heart  catheterization showed dilated left ventricle, ejection fraction about  15% to 20%. This patient tolerated the procedure well. It was felt  that her pain is probably noncardiac. She was placed on the Protonix,  home medication continued. She was ambulated. She will be discharged  home. To follow up with family physician. To follow up with her  gastroenterologist.  She will be seen in the office in few weeks.   The  patient needs her PFTs and chest x-ray and liver enzymes, and thyroid  function monitored very closely as she has been on the amiodarone. The  patient to continue taking the Coumadin as before. She is to seek  medical attention if she has any change in clinical condition. Discharged home in a stable condition.         Cassie Simons M.D.    D: 05/15/2019 19:45:51       T: 05/15/2019 23:15:25     [email or web address]  Job#: [id number]     Doc#: [id number]    CC:

## 2019-05-16 NOTE — PROCEDURES
[address] [place] [address]                            CARDIAC CATHETERIZATION    PATIENT NAME: Drusilla Leyden                    :        1940  MED REC NO:   [id number]                           ROOM:       0019  ACCOUNT NO:   [de-identified]                           ADMIT DATE: 2019  PROVIDER:     Tom Santoyo. Jeanette Tang M.D.    Precious Villatorooll:  05/15/2019    INDICATION FOR PROCEDURE:  This is a patient, 72-year-old lady, with  history of cardiomyopathy and hypercholesterolemia admitted to the  hospital through the emergency room. The patient was treated with IV  nitro, continued to have the chest pain, and admitted for a heart cath  and possible intervention. She has a history of BiV AICD. She has a  history of hypercholesterolemia and intolerance to statins. PROCEDURES PERFORMED:  1.  IV conscious sedation. The patient was given the IV conscious  sedation in incremental dosages by the circulating cath lab RN,  monitored by the cath lab monitor tech under my supervision. The procedure started at 07:00 a.m. and finished at 07:30 a.m. No acute  complications from the procedure. 2.  Left heart cath. The right radial artery was cannulated with a  6-Mexican radial sheath. The patient was given the verapamil,  nitroglycerin, and heparin through the sheath. The heart catheterization showed the RCA is the dominant artery. The  RCA was patent. The left main was patent and bifurcates to the LAD and  circumflex. The LAD had mild atheromatous nonobstructive disease about  40% proximal.  Distally, it was patent. Circumflex was patent. The  left ventriculogram showed dilated left ventricle, severe diffuse  hypokinesis of the left ventricle, and ejection fraction was 15% to 20%. No mitral regurg. No gradient across the aortic valve. The patient has  diastolic dysfunction of the left ventricle.   The patient had no

## 2019-05-16 NOTE — CARE COORDINATION
home?:  Partner/Spouse/SO  Do you feel like you have everything you need to keep you well at home?:  Yes  Are you an active caregiver in your home?:  No  Care Transitions Interventions  No Identified Needs       Patient presented to the ED on 05/13/2019 with report of chest pain. PMH includes CAD, CHF, and HTN. Patient admitted with Cardiology for Chest pain [R07.9] and Unstable angina pectoris ([facility] [address] [I20.0]. 05/13 ECHO showed EF 20-25%. Cardiac cath completed 05/15 with no intervention. Patient returned home where she resides with her . Patient states she is doing well. Patient reports fatigue but understands it can take time following an admission to regain strength and energy. Patient denies chest pain, SOB, leg swelling, fever, chills, and N/V/D. Patient states armboard remains on over her cath site. Patient denies visible drainage. Reviewed AVS instructions and restrictions following cath, verbalized understanding. Patient states she is eating/drinking and reports normal bowel movements. Patient denies bleeding or bloody stools due to anticoagulant therapy. Patient reports no weight gain and states blood pressure WNL. Reviewed medications, 1111F completed. Reviewed upcoming appointments, verbalized understanding. Patient denies additional needs or concerns at this time. Patient instructed to notify Pre-Service or CTC for any new or worsening symptoms, contact information provided. Patient verbalized understanding. CTC will continue to follow.       Follow Up  Future Appointments   Date Time Provider Deacon Perez   5/21/2019 10:30 AM MD Pavel Easton [facility] Ramy Thompson   6/17/2019 10:15 AM Xu West MD [facility] - Daryle Papa, DAGOBERTO  Care Transition Coordinator  (W) [phone]  (S) [phone]

## 2019-05-20 ENCOUNTER — CARE COORDINATION (OUTPATIENT)
Dept: CASE MANAGEMENT | Age: 79
End: 2019-05-20

## 2019-05-20 NOTE — CARE COORDINATION
[facility] Transitions Follow Up Call    2019    Patient: Saad Rowan  Patient : 1940   MRN: [id number]  Reason for Admission: No admission diagnoses are documented for this encounter. Discharge Date: 5/15/19 RARS: Readmission Risk Score: 20    Spoke with: Flora for sub Care Transition follow up. Identified self/role. Care Transitions Subsequent and Final Call    Subsequent and Final Calls  Do you have any ongoing symptoms?:  Yes  Patient-reported symptoms:  Fatigue  Have your medications changed?:  No  Do you have any questions related to your medications?:  No  Do you currently have any active services?:  No  Do you have any needs or concerns that I can assist you with?:  No  Identified Barriers:  None  Care Transitions Interventions  No Identified Needs  Other Interventions:          Patient states she is doing \"better\". Patient states fatigue is improving. Patient denies chest pain, SOB, leg swelling, fever, chills, and N/V/D. Patient denies bleeding or bloody stools due to anticoagulant therapy. Patient states cath site is healed. Patient states weight is stable, no gain and blood pressure WNL. Reviewed DAVID appointment scheduled 2019, verbalized understanding. Patient denies additional needs or concerns at this time. Patient instructed to notify Pre-Service or CTC for any new or worsening symptoms, contact information provided. Patient verbalized understanding. CTC will continue to follow.       Follow Up  Future Appointments   Date Time Provider Deacon Perez   2019 10:30 AM Chrys Halsted, MD [employer] [facility]   2019 10:15 AM Mita Winston MD [facility] - Neda Wharton, DAGOBERTO  Care Transition Coordinator  (C) [phone]  (A) [phone]

## 2019-05-21 ENCOUNTER — TELEPHONE (OUTPATIENT)
Dept: FAMILY MEDICINE CLINIC | Age: 79
End: 2019-05-21

## 2019-05-21 ENCOUNTER — OFFICE VISIT (OUTPATIENT)
Dept: FAMILY MEDICINE CLINIC | Age: 79
End: 2019-05-21
Payer: MEDICARE

## 2019-05-21 VITALS
RESPIRATION RATE: 12 BRPM | HEART RATE: 60 BPM | SYSTOLIC BLOOD PRESSURE: 108 MMHG | DIASTOLIC BLOOD PRESSURE: 70 MMHG | BODY MASS INDEX: 36.32 KG/M2 | WEIGHT: 198.6 LBS

## 2019-05-21 DIAGNOSIS — N18.30 CHRONIC KIDNEY DISEASE, STAGE III (MODERATE) (HCC): ICD-10-CM

## 2019-05-21 DIAGNOSIS — I48.20 CHRONIC ATRIAL FIBRILLATION (HCC): ICD-10-CM

## 2019-05-21 DIAGNOSIS — Z23 NEED FOR PNEUMOCOCCAL VACCINATION: ICD-10-CM

## 2019-05-21 DIAGNOSIS — I42.8 NONISCHEMIC CARDIOMYOPATHY (HCC): Primary | ICD-10-CM

## 2019-05-21 PROBLEM — F41.9 ANXIETY: Status: ACTIVE | Noted: 2019-05-21

## 2019-05-21 PROBLEM — I49.9 VENTRICULAR ARRHYTHMIA: Status: ACTIVE | Noted: 2019-05-21

## 2019-05-21 PROBLEM — M19.90 ARTHRITIS: Status: ACTIVE | Noted: 2019-05-21

## 2019-05-21 LAB
INTERNATIONAL NORMALIZATION RATIO, POC: 2.3
PROTHROMBIN TIME, POC: NORMAL

## 2019-05-21 PROCEDURE — 90670 PCV13 VACCINE IM: CPT | Performed by: FAMILY MEDICINE

## 2019-05-21 PROCEDURE — 85610 PROTHROMBIN TIME: CPT | Performed by: FAMILY MEDICINE

## 2019-05-21 PROCEDURE — G0009 ADMIN PNEUMOCOCCAL VACCINE: HCPCS | Performed by: FAMILY MEDICINE

## 2019-05-21 PROCEDURE — 99495 TRANSJ CARE MGMT MOD F2F 14D: CPT | Performed by: FAMILY MEDICINE

## 2019-05-21 PROCEDURE — 1111F DSCHRG MED/CURRENT MED MERGE: CPT | Performed by: FAMILY MEDICINE

## 2019-05-21 ASSESSMENT — ENCOUNTER SYMPTOMS
COUGH: 0
SORE THROAT: 0
CHEST TIGHTNESS: 0
DIARRHEA: 0
WHEEZING: 0
BACK PAIN: 0
EYE PAIN: 0
NAUSEA: 0
RHINORRHEA: 0
SHORTNESS OF BREATH: 0
BLOOD IN STOOL: 0
ABDOMINAL PAIN: 0
VOMITING: 0
CONSTIPATION: 0

## 2019-05-21 NOTE — PROGRESS NOTES
Post-Discharge Transitional Care Management Services or Hospital Follow Up      [address]   YOB: 1940    Date of Office Visit:  5/21/2019  Date of Hospital Admission: 5/13/19  Date of Hospital Discharge: 5/15/19  Readmission Risk Score(high >=14%.  Medium >=10%):Readmission Risk Score: 20      Care management risk score Rising risk (score 2-5) and Complex Care (Scores >=6): 2     Non face to face  following discharge, date last encounter closed (first attempt may have been earlier): 5/16/2019  2:08 PM 5/16/2019  2:08 PM    Call initiated 2 business days of discharge: Yes     Patient Active Problem List   Diagnosis    HTN (hypertension)    Hypercholesteremia    Nonischemic cardiomyopathy ([facility] [address]    Atrial fibrillation ([facility] [address]    Hypothyroidism    Morbid obesity with BMI of 40.0-44.9, adult ([facility] [address]    Biventricular implantable cardioverter-defibrillator in situ    ICD (implantable cardioverter-defibrillator) battery depletion    Abnormal LFTs    Morbid obesity with BMI of 45.0-49.9, adult ([facility] [address]    Benign essential HTN    NAFLD (nonalcoholic fatty liver disease)    Left ventricular systolic dysfunction    Ventricular tachycardia (paroxysmal) (HCC)    Chest pain    Unstable angina pectoris (HCC)    Anxiety    Arthritis    Ventricular arrhythmia       Allergies   Allergen Reactions    Ativan [Lorazepam]      Gets anxiety    Codeine     Nystatin      Mycostatin powder    Percocet [Oxycodone-Acetaminophen]     Relafen [Nabumetone]      Stomach upset    Tape [Adhesive Tape]     Celebrex [Celecoxib] Rash    Lorazepam Anxiety       Medications listed as ordered at the time of discharge from hospital   Leah Swenson [facility] Medication Instructions ANGELES:    Printed on:05/21/19 1799   Medication Information                      acetaminophen (TYLENOL) 325 MG tablet  Take 650 mg by mouth every 6 hours as needed for Pain or Fever             amiodarone (CORDARONE) 200 MG tablet  Take 1 tablet by mouth daily             Ascorbic Acid (VITAMIN C) 1000 MG tablet  Take 1,000 mg by mouth 2 times daily. BIOTIN PO  Take 5,000 mg by mouth daily. Calcium Carbonate (CALCIUM 600 PO)  Take  by mouth 2 times daily. digoxin (LANOXIN) 125 MCG tablet  Take 1 tablet by mouth daily             furosemide (LASIX) 40 MG tablet  Take 1 tablet by mouth daily             Handicap Placard MISC  by Does not apply route Request parking placard due to medical conditions. Duration of 5 years. levothyroxine (LEVOTHROID) 125 MCG tablet  Take 1 tablet by mouth daily             metoprolol succinate (TOPROL XL) 200 MG extended release tablet  Take 1 tablet by mouth daily             Misc. Devices (LUMBAR SUPPORT CUSHION) MISC  by Does not apply route. Dx: low back pain, sciatica. Multiple Vitamin (MULTI-VITAMIN PO)  Take  by mouth daily. pantoprazole (PROTONIX) 20 MG tablet  Take 2 tablets by mouth daily             Probiotic Product (PROBIOTIC FORMULA PO)  Take 1 tablet by mouth daily.              sacubitril-valsartan (ENTRESTO)  MG per tablet  Take 0.5 tablets by mouth 2 times daily             spironolactone (ALDACTONE) 25 MG tablet  Take 1 tablet by mouth every other day             warfarin (COUMADIN) 3 MG tablet  TAKE ONE TABLET BY MOUTH ONCE DAILY                   Medications marked \"taking\" at this time  Outpatient Medications Marked as Taking for the 5/21/19 encounter (Office Visit) with Penny Smith MD   Medication Sig Dispense Refill    pantoprazole (PROTONIX) 20 MG tablet Take 2 tablets by mouth daily 30 tablet 3    acetaminophen (TYLENOL) 325 MG tablet Take 650 mg by mouth every 6 hours as needed for Pain or Fever      sacubitril-valsartan (ENTRESTO)  MG per tablet Take 0.5 tablets by mouth 2 times daily      warfarin (COUMADIN) 3 MG tablet TAKE ONE TABLET BY MOUTH ONCE DAILY (Patient taking differently: 3 mg every Monday and Friday, 1.5 mg the other 5 days) 90 tablet 2    amiodarone (CORDARONE) 200 MG tablet Take 1 tablet by mouth daily 30 tablet 11    digoxin (LANOXIN) 125 MCG tablet Take 1 tablet by mouth daily 30 tablet 11    furosemide (LASIX) 40 MG tablet Take 1 tablet by mouth daily 30 tablet 11    levothyroxine (LEVOTHROID) 125 MCG tablet Take 1 tablet by mouth daily 30 tablet 11    metoprolol succinate (TOPROL XL) 200 MG extended release tablet Take 1 tablet by mouth daily 30 tablet 11    spironolactone (ALDACTONE) 25 MG tablet Take 1 tablet by mouth every other day 15 tablet 11    Handicap Placard MISC by Does not apply route Request parking placard due to medical conditions. Duration of 5 years. 1 each 0    Probiotic Product (PROBIOTIC FORMULA PO) Take 1 tablet by mouth daily.  Misc. Devices (LUMBAR SUPPORT CUSHION) MISC by Does not apply route. Dx: low back pain, sciatica. 1 each 0    Ascorbic Acid (VITAMIN C) 1000 MG tablet Take 1,000 mg by mouth 2 times daily.  Multiple Vitamin (MULTI-VITAMIN PO) Take  by mouth daily.  Calcium Carbonate (CALCIUM 600 PO) Take  by mouth 2 times daily.  BIOTIN PO Take 5,000 mg by mouth daily. Medications patient taking as of now reconciled against medications ordered at time of hospital discharge: Yes    Chief Complaint   Patient presents with   [address] from [address] 05/15/2019 DAVID. Saman Moody INR today 2.3       HPI  Presented [facility] with chest pains    Inpatient course: Discharge summary reviewed- see chart. Interval history/Current status: Blood work was good. Cath shows EF of 15-20%. Feeling overall better. Was given protonix for possible gerd. Reviewed BMI of 36. Encouraged diet, exercise, and weight loss. , non smoker, pmh reviewed. Reviewed health maintenance, interested in prevnar 13. Review of Systems   Constitutional: Negative for chills, fatigue, fever and unexpected weight change.    HENT: Negative for

## 2019-05-21 NOTE — PROGRESS NOTES
After obtaining consent, and per orders of Dr. Paddy Villela, injection of Prevnar 13 0.5 mL IM left deltoid given by Waqar Wright. Patient tolerated well. Pt walked in for INR, fingerstick obtained, Pt tolerated well. Verbal per RAR, continue same dose, recheck in 1 month. Pt notified.

## 2019-05-21 NOTE — TELEPHONE ENCOUNTER
----- Message from Richard Alvarenga MD sent at 5/21/2019 12:16 PM EDT -----  INR good. Continue same dosage and recheck INR in 1 month.

## 2019-05-21 NOTE — PATIENT INSTRUCTIONS

## 2019-05-24 ENCOUNTER — CARE COORDINATION (OUTPATIENT)
Dept: CASE MANAGEMENT | Age: 79
End: 2019-05-24

## 2019-05-28 ENCOUNTER — CARE COORDINATION (OUTPATIENT)
Dept: CASE MANAGEMENT | Age: 79
End: 2019-05-28

## 2019-05-28 NOTE — CARE COORDINATION
[facility] Transitions Follow Up Call    2019    Patient: Maria A Brownlee  Patient : 1940   MRN: [id number]  Reason for Admission: chest pain  Discharge Date: 5/15/19 RARS: Readmission Risk Score: 20         Spoke with: Maria A Brownlee  Patient stated she is doing good. Denies chest pain, chest pressure, and edema. Reviewed up coming appts. Denies questions or concerns. Understands this is her final call. Care Transitions Subsequent and Final Call    Subsequent and Final Calls  Do you have any ongoing symptoms?:  No  Have your medications changed?:  No  Do you have any questions related to your medications?:  No  Do you currently have any active services?:  No  Do you have any needs or concerns that I can assist you with?:  No  Identified Barriers:  None  Care Transitions Interventions  No Identified Needs  Other Interventions:             Follow Up  Future Appointments   Date Time Provider Department Center   2019 10:15 AM Radha Lopez MD [facility] - Corrie Lesch, RN

## 2019-06-17 ENCOUNTER — OFFICE VISIT (OUTPATIENT)
Dept: INTERNAL MEDICINE CLINIC | Age: 79
End: 2019-06-17
Payer: MEDICARE

## 2019-06-17 VITALS
DIASTOLIC BLOOD PRESSURE: 76 MMHG | HEIGHT: 62 IN | HEART RATE: 64 BPM | BODY MASS INDEX: 36.53 KG/M2 | WEIGHT: 198.5 LBS | SYSTOLIC BLOOD PRESSURE: 140 MMHG

## 2019-06-17 DIAGNOSIS — I48.20 CHRONIC ATRIAL FIBRILLATION (HCC): ICD-10-CM

## 2019-06-17 DIAGNOSIS — Z95.810 BIVENTRICULAR IMPLANTABLE CARDIOVERTER-DEFIBRILLATOR IN SITU: ICD-10-CM

## 2019-06-17 DIAGNOSIS — I20.8 OTHER FORMS OF ANGINA PECTORIS (HCC): Primary | ICD-10-CM

## 2019-06-17 PROCEDURE — G8598 ASA/ANTIPLAT THER USED: HCPCS | Performed by: INTERNAL MEDICINE

## 2019-06-17 PROCEDURE — 4040F PNEUMOC VAC/ADMIN/RCVD: CPT | Performed by: INTERNAL MEDICINE

## 2019-06-17 PROCEDURE — G8417 CALC BMI ABV UP PARAM F/U: HCPCS | Performed by: INTERNAL MEDICINE

## 2019-06-17 PROCEDURE — 99213 OFFICE O/P EST LOW 20 MIN: CPT | Performed by: INTERNAL MEDICINE

## 2019-06-17 PROCEDURE — G8400 PT W/DXA NO RESULTS DOC: HCPCS | Performed by: INTERNAL MEDICINE

## 2019-06-17 PROCEDURE — 1036F TOBACCO NON-USER: CPT | Performed by: INTERNAL MEDICINE

## 2019-06-17 PROCEDURE — 1090F PRES/ABSN URINE INCON ASSESS: CPT | Performed by: INTERNAL MEDICINE

## 2019-06-17 PROCEDURE — 1123F ACP DISCUSS/DSCN MKR DOCD: CPT | Performed by: INTERNAL MEDICINE

## 2019-06-17 PROCEDURE — G8427 DOCREV CUR MEDS BY ELIG CLIN: HCPCS | Performed by: INTERNAL MEDICINE

## 2019-06-17 RX ORDER — PANTOPRAZOLE SODIUM 20 MG/1
40 TABLET, DELAYED RELEASE ORAL DAILY
Qty: 60 TABLET | Refills: 11 | Status: SHIPPED | OUTPATIENT
Start: 2019-06-17 | End: 2019-11-26 | Stop reason: SDUPTHER

## 2019-06-17 NOTE — PROGRESS NOTES
Subjective    Seen in followup after  hosp with chest pain. Had cath, miild cad. The chest pain described as pressure. She has a hx of intermittent dysphagia with food sticking in her esophagus.   She is to see Dr Ramiro Singh for egd/colonoscopy  Past Medical History:   Diagnosis Date    AICD (automatic cardioverter/defibrillator) present 05/1999    Arthritis 5/21/2019    Atrial fibrillation ([address]     chronic    Biventricular ICD (implantable cardiac defibrillator) in place 10/09    C. 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    transfusion x3    Thyroid disease      Patient Active Problem List   Diagnosis    HTN (hypertension)    Hypercholesteremia    Nonischemic cardiomyopathy ([address]    Atrial fibrillation ([address]    Hypothyroidism    Morbid obesity with BMI of 40.0-44.9, adult ([address]    Biventricular implantable cardioverter-defibrillator in situ    ICD (implantable cardioverter-defibrillator) battery depletion    Abnormal LFTs    Morbid obesity with BMI of 45.0-49.9, adult ([address]    Benign essential HTN    NAFLD (nonalcoholic fatty liver disease)    Left ventricular systolic dysfunction    Ventricular tachycardia (paroxysmal) (HCC)    Chest pain    Unstable angina pectoris ([address]    Anxiety    Arthritis    Ventricular arrhythmia    Chronic kidney disease, stage III (moderate) ([address]     Past Surgical History:   Procedure Laterality Date    CARDIAC DEFIBRILLATOR PLACEMENT     Louis Paz  2008    w/ EGD    ENDOSCOPY, COLON, DIAGNOSTIC      PACEMAKER PLACEMENT  1999    replaced 3x    SKIN BIOPSY  2010    Skin CA with graft.      Family History   Problem Relation Age of Onset    Heart Disease Mother     Diabetes Mother     High Cholesterol Mother     High Blood Pressure Brother     Cancer Sister     High Blood Pressure Brother     High Blood Pressure Brother     Parkinsonism Brother     Diabetes Brother     Heart Disease Brother     Kidney Disease Brother     High Blood Pressure Brother     High Cholesterol Father     [de-identified] / Stillbirths Daughter     Arthritis Neg Hx     Asthma Neg Hx     Birth Defects Neg Hx     Depression Neg Hx     Early Death Neg Hx     Learning Disabilities Neg Hx     Mental Illness Neg Hx     Mental Retardation Neg Hx     Stroke Neg Hx     Substance Abuse Neg Hx     Vision Loss Neg Hx     Other Neg Hx      Social History     Socioeconomic History    Marital status:      Spouse name: Michelle Bunn     Number of children: 3    Years of education: Not on file    Highest education level: Not on file   Occupational History     Employer: [employer]   Social Needs    Financial resource strain: Not on file    Food insecurity:     Worry: Not on file     Inability: Not on file    Transportation needs:     Medical: Not on file     Non-medical: Not on file   Tobacco Use    Smoking status: Never Smoker    Smokeless tobacco: Never Used   Substance and Sexual Activity    Alcohol use: No     Alcohol/week: 0.0 oz    Drug use: No    Sexual activity: Not Currently   Lifestyle    Physical activity:     Days per week: Not on file     Minutes per session: Not on file    Stress: Not on file   Relationships    Social connections:     Talks on phone: Not on file     Gets together: Not on file     Attends [employer] service: Not on file     Active member of club or organization: Not on file     Attends meetings of clubs or organizations: Not on file     Relationship placard due to medical conditions. Duration of 5 years. , Disp: 1 each, Rfl: 0    Misc. Devices (LUMBAR SUPPORT CUSHION) MISC, by Does not apply route. Dx: low back pain, sciatica., Disp: 1 each, Rfl: 0  Allergies   Allergen Reactions    Ativan [Lorazepam]      Gets anxiety    Codeine     Nystatin      Mycostatin powder    Percocet [Oxycodone-Acetaminophen]     Relafen [Nabumetone]      Stomach upset    Tape Sindy Ng Tape]     Celebrex [Celecoxib] Rash    Lorazepam Anxiety       Objective  Vitals:    06/17/19 1038   BP: (!) 140/76   Pulse: 64     General Appearance:  awake, alert, oriented, in no acute distress  Lungs:  Normal expansion. Clear to auscultation. No rales, rhonchi, or wheezing. Heart:  Heart sounds are normal.  Regular rate and rhythm without murmur, gallop or rub. Abdomen:  Soft, non-tender, normal bowel sounds. No bruits, organomegaly or masses. Extremities: Extremities warm to touch, pink, with no edema. Assessment  1. Chest pain, likely esophageal origin  2. Nonischemic cardiomyopathy  3. Chronic afib    Plan  1. Ok to be off coumadin 5 days.   2. See 3 m

## 2019-07-16 ENCOUNTER — HOSPITAL ENCOUNTER (OUTPATIENT)
Age: 79
Setting detail: OUTPATIENT SURGERY
Discharge: HOME | End: 2019-07-16
Attending: INTERNAL MEDICINE | Admitting: INTERNAL MEDICINE
Payer: MEDICARE

## 2019-07-16 ENCOUNTER — ANESTHESIA (OUTPATIENT)
Dept: ENDOSCOPY | Age: 79
End: 2019-07-16
Payer: MEDICARE

## 2019-07-16 ENCOUNTER — ANESTHESIA EVENT (OUTPATIENT)
Dept: ENDOSCOPY | Age: 79
End: 2019-07-16
Payer: MEDICARE

## 2019-07-16 VITALS
SYSTOLIC BLOOD PRESSURE: 135 MMHG | WEIGHT: 193.6 LBS | HEIGHT: 62 IN | RESPIRATION RATE: 16 BRPM | DIASTOLIC BLOOD PRESSURE: 60 MMHG | BODY MASS INDEX: 35.63 KG/M2 | HEART RATE: 74 BPM | OXYGEN SATURATION: 97 % | TEMPERATURE: 97.6 F

## 2019-07-16 VITALS
SYSTOLIC BLOOD PRESSURE: 112 MMHG | RESPIRATION RATE: 29 BRPM | DIASTOLIC BLOOD PRESSURE: 58 MMHG | OXYGEN SATURATION: 95 %

## 2019-07-16 PROCEDURE — 3609010600 HC COLONOSCOPY POLYPECTOMY SNARE/COLD BIOPSY: Performed by: INTERNAL MEDICINE

## 2019-07-16 PROCEDURE — 6360000002 HC RX W HCPCS: Performed by: NURSE ANESTHETIST, CERTIFIED REGISTERED

## 2019-07-16 PROCEDURE — 7100000001 HC PACU RECOVERY - ADDTL 15 MIN: Performed by: INTERNAL MEDICINE

## 2019-07-16 PROCEDURE — 86677 HELICOBACTER PYLORI ANTIBODY: CPT

## 2019-07-16 PROCEDURE — 2709999900 HC NON-CHARGEABLE SUPPLY: Performed by: INTERNAL MEDICINE

## 2019-07-16 PROCEDURE — 3609012700 HC EGD DILATION SAVORY: Performed by: INTERNAL MEDICINE

## 2019-07-16 PROCEDURE — 88305 TISSUE EXAM BY PATHOLOGIST: CPT

## 2019-07-16 PROCEDURE — 3609012400 HC EGD TRANSORAL BIOPSY SINGLE/MULTIPLE: Performed by: INTERNAL MEDICINE

## 2019-07-16 PROCEDURE — 3700000001 HC ADD 15 MINUTES (ANESTHESIA): Performed by: INTERNAL MEDICINE

## 2019-07-16 PROCEDURE — 2580000003 HC RX 258: Performed by: INTERNAL MEDICINE

## 2019-07-16 PROCEDURE — 2500000003 HC RX 250 WO HCPCS: Performed by: NURSE ANESTHETIST, CERTIFIED REGISTERED

## 2019-07-16 PROCEDURE — 7100000000 HC PACU RECOVERY - FIRST 15 MIN: Performed by: INTERNAL MEDICINE

## 2019-07-16 PROCEDURE — 3700000000 HC ANESTHESIA ATTENDED CARE: Performed by: INTERNAL MEDICINE

## 2019-07-16 RX ORDER — SODIUM CHLORIDE 450 MG/100ML
INJECTION, SOLUTION INTRAVENOUS CONTINUOUS
Status: DISCONTINUED | OUTPATIENT
Start: 2019-07-16 | End: 2019-07-16 | Stop reason: HOSPADM

## 2019-07-16 RX ORDER — LIDOCAINE HYDROCHLORIDE 20 MG/ML
INJECTION, SOLUTION INFILTRATION; PERINEURAL PRN
Status: DISCONTINUED | OUTPATIENT
Start: 2019-07-16 | End: 2019-07-16 | Stop reason: SDUPTHER

## 2019-07-16 RX ORDER — PROPOFOL 10 MG/ML
INJECTION, EMULSION INTRAVENOUS PRN
Status: DISCONTINUED | OUTPATIENT
Start: 2019-07-16 | End: 2019-07-16 | Stop reason: SDUPTHER

## 2019-07-16 RX ADMIN — PROPOFOL 20 MG: 10 INJECTION, EMULSION INTRAVENOUS at 09:18

## 2019-07-16 RX ADMIN — PROPOFOL 20 MG: 10 INJECTION, EMULSION INTRAVENOUS at 09:15

## 2019-07-16 RX ADMIN — PROPOFOL 20 MG: 10 INJECTION, EMULSION INTRAVENOUS at 08:47

## 2019-07-16 RX ADMIN — SODIUM CHLORIDE: 4.5 INJECTION, SOLUTION INTRAVENOUS at 07:42

## 2019-07-16 RX ADMIN — PROPOFOL 20 MG: 10 INJECTION, EMULSION INTRAVENOUS at 08:43

## 2019-07-16 RX ADMIN — PROPOFOL 20 MG: 10 INJECTION, EMULSION INTRAVENOUS at 09:12

## 2019-07-16 RX ADMIN — PROPOFOL 20 MG: 10 INJECTION, EMULSION INTRAVENOUS at 08:36

## 2019-07-16 RX ADMIN — PROPOFOL 20 MG: 10 INJECTION, EMULSION INTRAVENOUS at 08:51

## 2019-07-16 RX ADMIN — PROPOFOL 20 MG: 10 INJECTION, EMULSION INTRAVENOUS at 09:07

## 2019-07-16 RX ADMIN — PROPOFOL 20 MG: 10 INJECTION, EMULSION INTRAVENOUS at 08:55

## 2019-07-16 RX ADMIN — PROPOFOL 20 MG: 10 INJECTION, EMULSION INTRAVENOUS at 09:05

## 2019-07-16 RX ADMIN — PROPOFOL 20 MG: 10 INJECTION, EMULSION INTRAVENOUS at 08:32

## 2019-07-16 RX ADMIN — PROPOFOL 20 MG: 10 INJECTION, EMULSION INTRAVENOUS at 09:02

## 2019-07-16 RX ADMIN — PROPOFOL 20 MG: 10 INJECTION, EMULSION INTRAVENOUS at 08:34

## 2019-07-16 RX ADMIN — PROPOFOL 20 MG: 10 INJECTION, EMULSION INTRAVENOUS at 09:00

## 2019-07-16 RX ADMIN — LIDOCAINE HYDROCHLORIDE 100 MG: 20 INJECTION, SOLUTION INFILTRATION; PERINEURAL at 08:30

## 2019-07-16 RX ADMIN — PROPOFOL 20 MG: 10 INJECTION, EMULSION INTRAVENOUS at 08:49

## 2019-07-16 RX ADMIN — PROPOFOL 50 MG: 10 INJECTION, EMULSION INTRAVENOUS at 08:30

## 2019-07-16 RX ADMIN — PROPOFOL 20 MG: 10 INJECTION, EMULSION INTRAVENOUS at 08:45

## 2019-07-16 RX ADMIN — PROPOFOL 20 MG: 10 INJECTION, EMULSION INTRAVENOUS at 08:41

## 2019-07-16 RX ADMIN — PROPOFOL 20 MG: 10 INJECTION, EMULSION INTRAVENOUS at 08:38

## 2019-07-16 RX ADMIN — PROPOFOL 20 MG: 10 INJECTION, EMULSION INTRAVENOUS at 08:53

## 2019-07-16 RX ADMIN — PROPOFOL 20 MG: 10 INJECTION, EMULSION INTRAVENOUS at 09:09

## 2019-07-16 ASSESSMENT — PAIN SCALES - GENERAL
PAINLEVEL_OUTOF10: 0

## 2019-07-16 ASSESSMENT — PAIN - FUNCTIONAL ASSESSMENT: PAIN_FUNCTIONAL_ASSESSMENT: 0-10

## 2019-07-16 NOTE — PROGRESS NOTES
Pt in bed pt encouraged to pass flatus. Pt is drinking and eating. Pain is0. Pt is passing flatus. Family by side.

## 2019-07-16 NOTE — H&P
EGD  and possible dilation and colonoscopy, its indications and complications  including but not limited to perforation, bleeding, infection, adverse  reaction to medicine, very slight chance of missing significant lesions. The patient expressed her understanding. Further plans pending the  above test results.         Teto Fatima M.D.    D: 07/16/2019 8:29:51       T: 07/16/2019 9:08:58     [email or web address]  Job#: [id number]     Doc#: [id number]    CC:

## 2019-07-16 NOTE — OP NOTE
[address] [id number]                                OPERATIVE REPORT    PATIENT NAME: Quitman Lombard                    :        1940  MED REC NO:   [id number]                           ROOM:  ACCOUNT NO:   [de-identified]                           ADMIT DATE: 2019  PROVIDER:     Dixon Ennis M.D.    DATE OF PROCEDURE:  2019    PROCEDURE:  Esophagogastroduodenoscopy and colonoscopy. INDICATION:  A 70year-old pleasant female who has substernal chest  pain, difficulty swallowing, personal history of adenomatous polyp. She  is undergoing further evaluation. Please see my brief history for  details and for physical examination. INSTRUMENT:  PCF-H190AL pediatric colonoscope, GIF-190 gastroscope, 54  and 60-Mongolian American dilators. PHOTOGRAPHS:  Yes. BIOPSIES:  Yes. CONSENT:  Procedure indications and complications including but not  limited to perforation, bleeding, infection, adverse reaction to  medicine, very slight chance of missing significant lesions discussed  with the patient. The patient expressed her understanding and a written  consent was obtained. OPERATIVE PROCEDURE:  The patient was placed in the left lateral  decubitus position in the endo room #13. The patient was placed on  appropriate monitoring of vitals including blood pressure, heart rate  and pulse ox. Esophagogastroduodenoscopy report:  Oropharynx was sprayed with  Cetacaine spray, and bite block was placed between maxilla and mandible. After the adequate total intravenous anesthesia was given by Anesthesia,  the GIF-190 gastroscope was inserted into the oropharynx and the  esophagus was intubated under direct vision. In the distal esophagus,  the patient had benign Schatzki's ring noted as shown in picture #3. Scope was able to pass through the ring through the stomach into second  portion of duodenum.   On careful inspection, the duodenum looks normal  as shown in picture #8. In the antrum, the patient had diffuse antral  erythema consistent with antral gastritis noted as shown in picture #6  and 7. Body of the stomach looked normal as shown in picture #5. On  retroflex, fundus looked normal as shown in picture #4. In the distal  esophagus, benign distal esophageal ring noted as shown in picture #3. Mid and proximal esophagus looked normal as shown in picture #1 and 2. Scope was advanced into the antrum of the stomach. Guidewire was passed  through the scope. Tip of the guidewire was placed in the antrum of the  stomach. Scope was removed while advancing the guidewire. #5 marking  was noted at the bite block after complete removal of the scope. A  54-Thai and 60-Thai American dilators were advanced over the  guidewire, and the esophageal ring was dilated serially. After the  60-Thai American dilator was used to dilate the esophageal ring, the  dilator and guidewire were removed out as unit. No immediate  complications noted. Colonoscopy Report:  The patient position was changed after the rectal  examination, continued on total intravenous anesthesia. The PCF-H190AL  pediatric colonoscope was inserted into the rectum and advanced up to  the cecum without any difficulty. On careful inspection, the  preparation was good. On withdrawal of the scope, the appendiceal  orifice and cecum looked normal as shown in picture #4 and 5. Proximal  ascending colon looked normal as shown in picture #7. In the distal  ascending colon, the patient had two 4 to 6 mm polyps as shown in  picture #8, removed by snare polypectomy. Transverse colon looked  normal as shown in picture #9. In the descending colon and sigmoid  colon, there was a total of six polyps, one polyp around 1.5 cm as shown  in picture #11. All the polyps were  removed by snare polypectomy by  utilizing the standard settings.   In the left colon,

## 2019-07-18 LAB — UREASE-CLO-C. PYLORI: NEGATIVE

## 2019-07-24 ENCOUNTER — NURSE ONLY (OUTPATIENT)
Dept: FAMILY MEDICINE CLINIC | Age: 79
End: 2019-07-24
Payer: MEDICARE

## 2019-07-24 ENCOUNTER — TELEPHONE (OUTPATIENT)
Dept: FAMILY MEDICINE CLINIC | Age: 79
End: 2019-07-24

## 2019-07-24 DIAGNOSIS — I48.20 CHRONIC ATRIAL FIBRILLATION (HCC): ICD-10-CM

## 2019-07-24 LAB
INTERNATIONAL NORMALIZATION RATIO, POC: 1.8
PROTHROMBIN TIME, POC: NORMAL

## 2019-07-24 PROCEDURE — 85610 PROTHROMBIN TIME: CPT | Performed by: FAMILY MEDICINE

## 2019-08-11 DIAGNOSIS — I48.20 CHRONIC ATRIAL FIBRILLATION (HCC): ICD-10-CM

## 2019-08-12 RX ORDER — WARFARIN SODIUM 3 MG/1
TABLET ORAL
Qty: 30 TABLET | Refills: 0 | Status: SHIPPED | OUTPATIENT
Start: 2019-08-12 | End: 2020-09-22

## 2019-08-28 ENCOUNTER — TELEPHONE (OUTPATIENT)
Dept: FAMILY MEDICINE CLINIC | Age: 79
End: 2019-08-28

## 2019-08-28 ENCOUNTER — NURSE ONLY (OUTPATIENT)
Dept: FAMILY MEDICINE CLINIC | Age: 79
End: 2019-08-28
Payer: MEDICARE

## 2019-08-28 DIAGNOSIS — I48.20 CHRONIC ATRIAL FIBRILLATION (HCC): ICD-10-CM

## 2019-08-28 LAB
INTERNATIONAL NORMALIZATION RATIO, POC: 3.1
PROTHROMBIN TIME, POC: NORMAL

## 2019-08-28 PROCEDURE — 85610 PROTHROMBIN TIME: CPT | Performed by: FAMILY MEDICINE

## 2019-08-28 PROCEDURE — 93793 ANTICOAG MGMT PT WARFARIN: CPT | Performed by: FAMILY MEDICINE

## 2019-08-28 NOTE — TELEPHONE ENCOUNTER
----- Message from Anu Askew MD sent at 8/28/2019  1:42 PM EDT -----  inr at 3.1. Decrease 3 mg to once a week and 1.5 mg all other days. Recheck inr in 2 weeks.

## 2019-09-11 ENCOUNTER — TELEPHONE (OUTPATIENT)
Dept: FAMILY MEDICINE CLINIC | Age: 79
End: 2019-09-11

## 2019-09-11 ENCOUNTER — NURSE ONLY (OUTPATIENT)
Dept: FAMILY MEDICINE CLINIC | Age: 79
End: 2019-09-11
Payer: MEDICARE

## 2019-09-11 DIAGNOSIS — I48.20 CHRONIC ATRIAL FIBRILLATION (HCC): ICD-10-CM

## 2019-09-11 LAB
INTERNATIONAL NORMALIZATION RATIO, POC: 1.8
PROTHROMBIN TIME, POC: NORMAL

## 2019-09-11 PROCEDURE — 93793 ANTICOAG MGMT PT WARFARIN: CPT | Performed by: FAMILY MEDICINE

## 2019-09-11 PROCEDURE — 85610 PROTHROMBIN TIME: CPT | Performed by: FAMILY MEDICINE

## 2019-09-11 NOTE — PROGRESS NOTES
Pt walked in for INR, fingerstick obtained by MELODIE Moore [employer]. Pt tolerated well.

## 2019-10-11 RX ORDER — FUROSEMIDE 40 MG/1
TABLET ORAL
Qty: 30 TABLET | Refills: 0 | Status: SHIPPED | OUTPATIENT
Start: 2019-10-11 | End: 2019-11-04 | Stop reason: SDUPTHER

## 2019-10-16 ENCOUNTER — TELEPHONE (OUTPATIENT)
Dept: FAMILY MEDICINE CLINIC | Age: 79
End: 2019-10-16

## 2019-10-16 ENCOUNTER — NURSE ONLY (OUTPATIENT)
Dept: FAMILY MEDICINE CLINIC | Age: 79
End: 2019-10-16
Payer: MEDICARE

## 2019-10-16 DIAGNOSIS — I48.20 CHRONIC ATRIAL FIBRILLATION (HCC): ICD-10-CM

## 2019-10-16 LAB
INTERNATIONAL NORMALIZATION RATIO, POC: 1.7
PROTHROMBIN TIME, POC: NORMAL

## 2019-10-16 PROCEDURE — 93793 ANTICOAG MGMT PT WARFARIN: CPT | Performed by: FAMILY MEDICINE

## 2019-10-16 PROCEDURE — 85610 PROTHROMBIN TIME: CPT | Performed by: FAMILY MEDICINE

## 2019-10-22 ENCOUNTER — APPOINTMENT (OUTPATIENT)
Dept: GENERAL RADIOLOGY | Age: 79
End: 2019-10-22
Payer: MEDICARE

## 2019-10-22 ENCOUNTER — HOSPITAL ENCOUNTER (OUTPATIENT)
Age: 79
Setting detail: OBSERVATION
Discharge: HOME | End: 2019-10-23
Attending: INTERNAL MEDICINE | Admitting: INTERNAL MEDICINE
Payer: MEDICARE

## 2019-10-22 LAB
ALBUMIN SERPL-MCNC: 2.9 G/DL (ref 3.5–5.1)
ALP BLD-CCNC: 122 U/L (ref 38–126)
ALT SERPL-CCNC: 25 U/L (ref 11–66)
ANION GAP SERPL CALCULATED.3IONS-SCNC: 10 MEQ/L (ref 8–16)
AST SERPL-CCNC: 41 U/L (ref 5–40)
BASOPHILS # BLD: 0.8 %
BASOPHILS ABSOLUTE: 0 THOU/MM3 (ref 0–0.1)
BILIRUB SERPL-MCNC: 0.3 MG/DL (ref 0.3–1.2)
BILIRUBIN DIRECT: < 0.2 MG/DL (ref 0–0.3)
BUN BLDV-MCNC: 22 MG/DL (ref 7–22)
CALCIUM SERPL-MCNC: 7.3 MG/DL (ref 8.5–10.5)
CHLORIDE BLD-SCNC: 112 MEQ/L (ref 98–111)
CO2: 20 MEQ/L (ref 23–33)
CREAT SERPL-MCNC: 1 MG/DL (ref 0.4–1.2)
EKG ATRIAL RATE: 91 BPM
EKG Q-T INTERVAL: 430 MS
EKG QRS DURATION: 154 MS
EKG QTC CALCULATION (BAZETT): 493 MS
EKG R AXIS: -81 DEGREES
EKG T AXIS: 78 DEGREES
EKG VENTRICULAR RATE: 79 BPM
EOSINOPHIL # BLD: 1.8 %
EOSINOPHILS ABSOLUTE: 0.1 THOU/MM3 (ref 0–0.4)
ERYTHROCYTE [DISTWIDTH] IN BLOOD BY AUTOMATED COUNT: 14.9 % (ref 11.5–14.5)
ERYTHROCYTE [DISTWIDTH] IN BLOOD BY AUTOMATED COUNT: 55 FL (ref 35–45)
GFR SERPL CREATININE-BSD FRML MDRD: 54 ML/MIN/1.73M2
GLUCOSE BLD-MCNC: 99 MG/DL (ref 70–108)
HCT VFR BLD CALC: 29.4 % (ref 37–47)
HEMOGLOBIN: 9 GM/DL (ref 12–16)
IMMATURE GRANS (ABS): 0.02 THOU/MM3 (ref 0–0.07)
IMMATURE GRANULOCYTES: 0.4 %
INR BLD: 1.68 (ref 0.85–1.13)
LIPASE: 51 U/L (ref 5.6–51.3)
LYMPHOCYTES # BLD: 20.2 %
LYMPHOCYTES ABSOLUTE: 1 THOU/MM3 (ref 1–4.8)
MAGNESIUM: 1.9 MG/DL (ref 1.6–2.4)
MCH RBC QN AUTO: 31.4 PG (ref 26–33)
MCHC RBC AUTO-ENTMCNC: 30.6 GM/DL (ref 32.2–35.5)
MCV RBC AUTO: 102.4 FL (ref 81–99)
MONOCYTES # BLD: 13.1 %
MONOCYTES ABSOLUTE: 0.7 THOU/MM3 (ref 0.4–1.3)
NUCLEATED RED BLOOD CELLS: 0 /100 WBC
OSMOLALITY CALCULATION: 286.5 MOSMOL/KG (ref 275–300)
PLATELET # BLD: 168 THOU/MM3 (ref 130–400)
PMV BLD AUTO: 11 FL (ref 9.4–12.4)
POTASSIUM REFLEX MAGNESIUM: 3.4 MEQ/L (ref 3.5–5.2)
PRO-BNP: 941.9 PG/ML (ref 0–1800)
RBC # BLD: 2.87 MILL/MM3 (ref 4.2–5.4)
REASON FOR REJECTION: NORMAL
REJECTED TEST: NORMAL
SEG NEUTROPHILS: 63.7 %
SEGMENTED NEUTROPHILS ABSOLUTE COUNT: 3.2 THOU/MM3 (ref 1.8–7.7)
SODIUM BLD-SCNC: 142 MEQ/L (ref 135–145)
TOTAL PROTEIN: 5.8 G/DL (ref 6.1–8)
TROPONIN T: < 0.01 NG/ML
WBC # BLD: 5.1 THOU/MM3 (ref 4.8–10.8)

## 2019-10-22 PROCEDURE — 83735 ASSAY OF MAGNESIUM: CPT

## 2019-10-22 PROCEDURE — 83690 ASSAY OF LIPASE: CPT

## 2019-10-22 PROCEDURE — 2580000003 HC RX 258: Performed by: PHYSICIAN ASSISTANT

## 2019-10-22 PROCEDURE — 80076 HEPATIC FUNCTION PANEL: CPT

## 2019-10-22 PROCEDURE — 84484 ASSAY OF TROPONIN QUANT: CPT

## 2019-10-22 PROCEDURE — 99285 EMERGENCY DEPT VISIT HI MDM: CPT

## 2019-10-22 PROCEDURE — 36415 COLL VENOUS BLD VENIPUNCTURE: CPT

## 2019-10-22 PROCEDURE — G0378 HOSPITAL OBSERVATION PER HR: HCPCS

## 2019-10-22 PROCEDURE — 93005 ELECTROCARDIOGRAM TRACING: CPT | Performed by: NURSE PRACTITIONER

## 2019-10-22 PROCEDURE — 85610 PROTHROMBIN TIME: CPT

## 2019-10-22 PROCEDURE — 99220 PR INITIAL OBSERVATION CARE/DAY 70 MINUTES: CPT | Performed by: PHYSICIAN ASSISTANT

## 2019-10-22 PROCEDURE — 99218 PR INITIAL OBSERVATION CARE/DAY 30 MINUTES: CPT | Performed by: INTERNAL MEDICINE

## 2019-10-22 PROCEDURE — 83880 ASSAY OF NATRIURETIC PEPTIDE: CPT

## 2019-10-22 PROCEDURE — 85025 COMPLETE CBC W/AUTO DIFF WBC: CPT

## 2019-10-22 PROCEDURE — 6370000000 HC RX 637 (ALT 250 FOR IP): Performed by: PHYSICIAN ASSISTANT

## 2019-10-22 PROCEDURE — 80053 COMPREHEN METABOLIC PANEL: CPT

## 2019-10-22 PROCEDURE — 71046 X-RAY EXAM CHEST 2 VIEWS: CPT

## 2019-10-22 PROCEDURE — 6370000000 HC RX 637 (ALT 250 FOR IP): Performed by: INTERNAL MEDICINE

## 2019-10-22 RX ORDER — ASCORBIC ACID 500 MG
1000 TABLET ORAL 2 TIMES DAILY
Status: DISCONTINUED | OUTPATIENT
Start: 2019-10-22 | End: 2019-10-23 | Stop reason: HOSPADM

## 2019-10-22 RX ORDER — ATORVASTATIN CALCIUM 40 MG/1
40 TABLET, FILM COATED ORAL NIGHTLY
Status: DISCONTINUED | OUTPATIENT
Start: 2019-10-22 | End: 2019-10-23 | Stop reason: HOSPADM

## 2019-10-22 RX ORDER — NITROGLYCERIN 0.4 MG/1
0.4 TABLET SUBLINGUAL EVERY 5 MIN PRN
Status: DISCONTINUED | OUTPATIENT
Start: 2019-10-22 | End: 2019-10-23 | Stop reason: HOSPADM

## 2019-10-22 RX ORDER — DIGOXIN 125 MCG
125 TABLET ORAL NIGHTLY
Status: DISCONTINUED | OUTPATIENT
Start: 2019-10-22 | End: 2019-10-23 | Stop reason: HOSPADM

## 2019-10-22 RX ORDER — ONDANSETRON 2 MG/ML
4 INJECTION INTRAMUSCULAR; INTRAVENOUS EVERY 6 HOURS PRN
Status: DISCONTINUED | OUTPATIENT
Start: 2019-10-22 | End: 2019-10-23 | Stop reason: HOSPADM

## 2019-10-22 RX ORDER — PANTOPRAZOLE SODIUM 40 MG/1
40 TABLET, DELAYED RELEASE ORAL NIGHTLY
Status: DISCONTINUED | OUTPATIENT
Start: 2019-10-22 | End: 2019-10-23 | Stop reason: HOSPADM

## 2019-10-22 RX ORDER — METOPROLOL SUCCINATE 100 MG/1
200 TABLET, EXTENDED RELEASE ORAL DAILY
Status: DISCONTINUED | OUTPATIENT
Start: 2019-10-23 | End: 2019-10-23 | Stop reason: HOSPADM

## 2019-10-22 RX ORDER — SPIRONOLACTONE 25 MG/1
25 TABLET ORAL EVERY OTHER DAY
Status: DISCONTINUED | OUTPATIENT
Start: 2019-10-24 | End: 2019-10-23 | Stop reason: HOSPADM

## 2019-10-22 RX ORDER — WARFARIN SODIUM 3 MG/1
3 TABLET ORAL
Status: COMPLETED | OUTPATIENT
Start: 2019-10-22 | End: 2019-10-22

## 2019-10-22 RX ORDER — ASPIRIN 81 MG/1
81 TABLET, CHEWABLE ORAL DAILY
Status: DISCONTINUED | OUTPATIENT
Start: 2019-10-23 | End: 2019-10-23 | Stop reason: HOSPADM

## 2019-10-22 RX ORDER — FUROSEMIDE 40 MG/1
40 TABLET ORAL DAILY
Status: DISCONTINUED | OUTPATIENT
Start: 2019-10-23 | End: 2019-10-23 | Stop reason: HOSPADM

## 2019-10-22 RX ORDER — ACETAMINOPHEN 325 MG/1
650 TABLET ORAL EVERY 4 HOURS PRN
Status: DISCONTINUED | OUTPATIENT
Start: 2019-10-22 | End: 2019-10-23 | Stop reason: HOSPADM

## 2019-10-22 RX ORDER — AMIODARONE HYDROCHLORIDE 200 MG/1
200 TABLET ORAL DAILY
Status: DISCONTINUED | OUTPATIENT
Start: 2019-10-23 | End: 2019-10-23 | Stop reason: HOSPADM

## 2019-10-22 RX ORDER — POTASSIUM CHLORIDE 20 MEQ/1
40 TABLET, EXTENDED RELEASE ORAL ONCE
Status: COMPLETED | OUTPATIENT
Start: 2019-10-23 | End: 2019-10-22

## 2019-10-22 RX ORDER — POLYETHYLENE GLYCOL 3350 17 G/17G
17 POWDER, FOR SOLUTION ORAL DAILY PRN
Status: DISCONTINUED | OUTPATIENT
Start: 2019-10-22 | End: 2019-10-23 | Stop reason: HOSPADM

## 2019-10-22 RX ORDER — SODIUM CHLORIDE 0.9 % (FLUSH) 0.9 %
10 SYRINGE (ML) INJECTION EVERY 12 HOURS SCHEDULED
Status: DISCONTINUED | OUTPATIENT
Start: 2019-10-22 | End: 2019-10-23 | Stop reason: HOSPADM

## 2019-10-22 RX ORDER — LEVOTHYROXINE SODIUM 0.12 MG/1
125 TABLET ORAL
Status: DISCONTINUED | OUTPATIENT
Start: 2019-10-23 | End: 2019-10-23 | Stop reason: HOSPADM

## 2019-10-22 RX ORDER — SODIUM CHLORIDE 0.9 % (FLUSH) 0.9 %
10 SYRINGE (ML) INJECTION PRN
Status: DISCONTINUED | OUTPATIENT
Start: 2019-10-22 | End: 2019-10-23 | Stop reason: HOSPADM

## 2019-10-22 RX ADMIN — DIGOXIN 125 MCG: 125 TABLET ORAL at 22:19

## 2019-10-22 RX ADMIN — WARFARIN SODIUM 3 MG: 3 TABLET ORAL at 22:17

## 2019-10-22 RX ADMIN — POTASSIUM CHLORIDE 40 MEQ: 1500 TABLET, EXTENDED RELEASE ORAL at 23:51

## 2019-10-22 RX ADMIN — PANTOPRAZOLE SODIUM 40 MG: 40 TABLET, DELAYED RELEASE ORAL at 22:19

## 2019-10-22 RX ADMIN — Medication 10 ML: at 22:24

## 2019-10-22 ASSESSMENT — ENCOUNTER SYMPTOMS
SORE THROAT: 0
COUGH: 0
WHEEZING: 0
ABDOMINAL PAIN: 0
VOMITING: 0
EYE PAIN: 0
ALLERGIC/IMMUNOLOGIC NEGATIVE: 1
NAUSEA: 0
GASTROINTESTINAL NEGATIVE: 1
SHORTNESS OF BREATH: 0
EYE DISCHARGE: 0
BACK PAIN: 0
RHINORRHEA: 0
EYES NEGATIVE: 1
DIARRHEA: 0

## 2019-10-22 ASSESSMENT — PAIN DESCRIPTION - PAIN TYPE
TYPE: ACUTE PAIN
TYPE: ACUTE PAIN

## 2019-10-22 ASSESSMENT — PAIN DESCRIPTION - ORIENTATION
ORIENTATION: MID
ORIENTATION: MID

## 2019-10-22 ASSESSMENT — PAIN DESCRIPTION - LOCATION
LOCATION: CHEST
LOCATION: CHEST

## 2019-10-22 ASSESSMENT — PAIN SCALES - GENERAL
PAINLEVEL_OUTOF10: 3
PAINLEVEL_OUTOF10: 1

## 2019-10-22 ASSESSMENT — HEART SCORE: ECG: 1

## 2019-10-22 ASSESSMENT — PAIN DESCRIPTION - FREQUENCY: FREQUENCY: INTERMITTENT

## 2019-10-22 ASSESSMENT — PAIN DESCRIPTION - DESCRIPTORS: DESCRIPTORS: PRESSURE

## 2019-10-23 ENCOUNTER — TELEPHONE (OUTPATIENT)
Dept: FAMILY MEDICINE CLINIC | Age: 79
End: 2019-10-23

## 2019-10-23 VITALS
WEIGHT: 184.2 LBS | HEIGHT: 62 IN | DIASTOLIC BLOOD PRESSURE: 62 MMHG | TEMPERATURE: 98 F | HEART RATE: 60 BPM | RESPIRATION RATE: 16 BRPM | SYSTOLIC BLOOD PRESSURE: 127 MMHG | BODY MASS INDEX: 33.9 KG/M2 | OXYGEN SATURATION: 98 %

## 2019-10-23 LAB
ANION GAP SERPL CALCULATED.3IONS-SCNC: 9 MEQ/L (ref 8–16)
BUN BLDV-MCNC: 24 MG/DL (ref 7–22)
CALCIUM SERPL-MCNC: 8.7 MG/DL (ref 8.5–10.5)
CHLORIDE BLD-SCNC: 104 MEQ/L (ref 98–111)
CO2: 26 MEQ/L (ref 23–33)
CREAT SERPL-MCNC: 1.1 MG/DL (ref 0.4–1.2)
DIGOXIN LEVEL: 1.9 NG/ML (ref 0.5–2)
EKG ATRIAL RATE: 267 BPM
EKG Q-T INTERVAL: 460 MS
EKG QRS DURATION: 150 MS
EKG QTC CALCULATION (BAZETT): 482 MS
EKG R AXIS: -85 DEGREES
EKG T AXIS: 67 DEGREES
EKG VENTRICULAR RATE: 66 BPM
ERYTHROCYTE [DISTWIDTH] IN BLOOD BY AUTOMATED COUNT: 14.7 % (ref 11.5–14.5)
ERYTHROCYTE [DISTWIDTH] IN BLOOD BY AUTOMATED COUNT: 53.1 FL (ref 35–45)
GFR SERPL CREATININE-BSD FRML MDRD: 48 ML/MIN/1.73M2
GLUCOSE BLD-MCNC: 93 MG/DL (ref 70–108)
HCT VFR BLD CALC: 37.6 % (ref 37–47)
HEMOGLOBIN: 11.6 GM/DL (ref 12–16)
INR BLD: 1.65 (ref 0.85–1.13)
MCH RBC QN AUTO: 30.7 PG (ref 26–33)
MCHC RBC AUTO-ENTMCNC: 30.9 GM/DL (ref 32.2–35.5)
MCV RBC AUTO: 99.5 FL (ref 81–99)
PLATELET # BLD: 216 THOU/MM3 (ref 130–400)
PMV BLD AUTO: 10.2 FL (ref 9.4–12.4)
POTASSIUM REFLEX MAGNESIUM: 4.6 MEQ/L (ref 3.5–5.2)
RBC # BLD: 3.78 MILL/MM3 (ref 4.2–5.4)
SODIUM BLD-SCNC: 139 MEQ/L (ref 135–145)
WBC # BLD: 5.9 THOU/MM3 (ref 4.8–10.8)

## 2019-10-23 PROCEDURE — 2580000003 HC RX 258: Performed by: PHYSICIAN ASSISTANT

## 2019-10-23 PROCEDURE — G0378 HOSPITAL OBSERVATION PER HR: HCPCS

## 2019-10-23 PROCEDURE — 85610 PROTHROMBIN TIME: CPT

## 2019-10-23 PROCEDURE — 93005 ELECTROCARDIOGRAM TRACING: CPT | Performed by: PHYSICIAN ASSISTANT

## 2019-10-23 PROCEDURE — 85027 COMPLETE CBC AUTOMATED: CPT

## 2019-10-23 PROCEDURE — 99215 OFFICE O/P EST HI 40 MIN: CPT | Performed by: INTERNAL MEDICINE

## 2019-10-23 PROCEDURE — 80048 BASIC METABOLIC PNL TOTAL CA: CPT

## 2019-10-23 PROCEDURE — 80162 ASSAY OF DIGOXIN TOTAL: CPT

## 2019-10-23 PROCEDURE — 94760 N-INVAS EAR/PLS OXIMETRY 1: CPT

## 2019-10-23 PROCEDURE — 36415 COLL VENOUS BLD VENIPUNCTURE: CPT

## 2019-10-23 PROCEDURE — 99217 PR OBSERVATION CARE DISCHARGE MANAGEMENT: CPT | Performed by: NURSE PRACTITIONER

## 2019-10-23 RX ORDER — ATORVASTATIN CALCIUM 40 MG/1
40 TABLET, FILM COATED ORAL NIGHTLY
Qty: 30 TABLET | Refills: 3 | Status: SHIPPED | OUTPATIENT
Start: 2019-10-23 | End: 2019-11-04 | Stop reason: SINTOL

## 2019-10-23 RX ADMIN — AMIODARONE HYDROCHLORIDE 200 MG: 200 TABLET ORAL at 07:53

## 2019-10-23 RX ADMIN — METOPROLOL SUCCINATE 200 MG: 100 TABLET, EXTENDED RELEASE ORAL at 12:26

## 2019-10-23 RX ADMIN — LEVOTHYROXINE SODIUM 125 MCG: 0.12 TABLET ORAL at 04:27

## 2019-10-23 RX ADMIN — Medication 10 ML: at 07:58

## 2019-10-23 ASSESSMENT — PAIN SCALES - GENERAL
PAINLEVEL_OUTOF10: 0
PAINLEVEL_OUTOF10: 0

## 2019-10-24 ENCOUNTER — TELEPHONE (OUTPATIENT)
Dept: FAMILY MEDICINE CLINIC | Age: 79
End: 2019-10-24

## 2019-10-24 RX ORDER — AMIODARONE HYDROCHLORIDE 200 MG/1
200 TABLET ORAL DAILY
Qty: 90 TABLET | Refills: 3 | Status: SHIPPED | OUTPATIENT
Start: 2019-10-24 | End: 2020-07-30 | Stop reason: SDUPTHER

## 2019-10-24 RX ORDER — LEVOTHYROXINE SODIUM 0.12 MG/1
125 TABLET ORAL DAILY
Qty: 90 TABLET | Refills: 3 | Status: SHIPPED | OUTPATIENT
Start: 2019-10-24 | End: 2020-07-30 | Stop reason: SDUPTHER

## 2019-10-24 RX ORDER — METOPROLOL SUCCINATE 200 MG/1
200 TABLET, EXTENDED RELEASE ORAL DAILY
Qty: 90 TABLET | Refills: 3 | Status: SHIPPED | OUTPATIENT
Start: 2019-10-24 | End: 2020-07-30 | Stop reason: SDUPTHER

## 2019-10-24 RX ORDER — DIGOXIN 125 MCG
125 TABLET ORAL DAILY
Qty: 90 TABLET | Refills: 3 | Status: SHIPPED | OUTPATIENT
Start: 2019-10-24 | End: 2020-02-12 | Stop reason: DRUGHIGH

## 2019-10-30 ENCOUNTER — OFFICE VISIT (OUTPATIENT)
Dept: FAMILY MEDICINE CLINIC | Age: 79
End: 2019-10-30
Payer: MEDICARE

## 2019-10-30 ENCOUNTER — TELEPHONE (OUTPATIENT)
Dept: FAMILY MEDICINE CLINIC | Age: 79
End: 2019-10-30

## 2019-10-30 VITALS
BODY MASS INDEX: 33.51 KG/M2 | WEIGHT: 183.2 LBS | SYSTOLIC BLOOD PRESSURE: 124 MMHG | RESPIRATION RATE: 14 BRPM | HEART RATE: 64 BPM | DIASTOLIC BLOOD PRESSURE: 68 MMHG

## 2019-10-30 DIAGNOSIS — I48.0 PAROXYSMAL ATRIAL FIBRILLATION (HCC): ICD-10-CM

## 2019-10-30 DIAGNOSIS — I48.20 CHRONIC ATRIAL FIBRILLATION (HCC): ICD-10-CM

## 2019-10-30 DIAGNOSIS — I42.8 NONISCHEMIC CARDIOMYOPATHY (HCC): ICD-10-CM

## 2019-10-30 DIAGNOSIS — R07.9 CHEST PAIN, UNSPECIFIED TYPE: Primary | ICD-10-CM

## 2019-10-30 LAB
INTERNATIONAL NORMALIZATION RATIO, POC: 1.8
PROTHROMBIN TIME, POC: NORMAL

## 2019-10-30 PROCEDURE — 85610 PROTHROMBIN TIME: CPT | Performed by: FAMILY MEDICINE

## 2019-10-30 PROCEDURE — 99495 TRANSJ CARE MGMT MOD F2F 14D: CPT | Performed by: FAMILY MEDICINE

## 2019-10-30 PROCEDURE — 1111F DSCHRG MED/CURRENT MED MERGE: CPT | Performed by: FAMILY MEDICINE

## 2019-10-30 ASSESSMENT — ENCOUNTER SYMPTOMS
RHINORRHEA: 0
CONSTIPATION: 0
NAUSEA: 0
SORE THROAT: 0
EYE PAIN: 0
DIARRHEA: 0
CHEST TIGHTNESS: 0
WHEEZING: 0
SHORTNESS OF BREATH: 0
BLOOD IN STOOL: 0
BACK PAIN: 0
VOMITING: 0
ABDOMINAL PAIN: 0
COUGH: 0

## 2019-11-04 ENCOUNTER — OFFICE VISIT (OUTPATIENT)
Dept: INTERNAL MEDICINE CLINIC | Age: 79
End: 2019-11-04
Payer: MEDICARE

## 2019-11-04 VITALS
BODY MASS INDEX: 33.57 KG/M2 | SYSTOLIC BLOOD PRESSURE: 124 MMHG | WEIGHT: 182.4 LBS | HEART RATE: 68 BPM | DIASTOLIC BLOOD PRESSURE: 80 MMHG | HEIGHT: 62 IN

## 2019-11-04 DIAGNOSIS — I20.8 OTHER FORMS OF ANGINA PECTORIS (HCC): Primary | ICD-10-CM

## 2019-11-04 DIAGNOSIS — I48.20 CHRONIC ATRIAL FIBRILLATION (HCC): ICD-10-CM

## 2019-11-04 DIAGNOSIS — I51.9 LEFT VENTRICULAR SYSTOLIC DYSFUNCTION: ICD-10-CM

## 2019-11-04 DIAGNOSIS — R63.4 WEIGHT LOSS: ICD-10-CM

## 2019-11-04 PROCEDURE — G8417 CALC BMI ABV UP PARAM F/U: HCPCS | Performed by: INTERNAL MEDICINE

## 2019-11-04 PROCEDURE — 1036F TOBACCO NON-USER: CPT | Performed by: INTERNAL MEDICINE

## 2019-11-04 PROCEDURE — 99213 OFFICE O/P EST LOW 20 MIN: CPT | Performed by: INTERNAL MEDICINE

## 2019-11-04 PROCEDURE — 1123F ACP DISCUSS/DSCN MKR DOCD: CPT | Performed by: INTERNAL MEDICINE

## 2019-11-04 PROCEDURE — 1090F PRES/ABSN URINE INCON ASSESS: CPT | Performed by: INTERNAL MEDICINE

## 2019-11-04 PROCEDURE — G8598 ASA/ANTIPLAT THER USED: HCPCS | Performed by: INTERNAL MEDICINE

## 2019-11-04 PROCEDURE — G8484 FLU IMMUNIZE NO ADMIN: HCPCS | Performed by: INTERNAL MEDICINE

## 2019-11-04 PROCEDURE — 4040F PNEUMOC VAC/ADMIN/RCVD: CPT | Performed by: INTERNAL MEDICINE

## 2019-11-04 PROCEDURE — G8400 PT W/DXA NO RESULTS DOC: HCPCS | Performed by: INTERNAL MEDICINE

## 2019-11-04 PROCEDURE — G8427 DOCREV CUR MEDS BY ELIG CLIN: HCPCS | Performed by: INTERNAL MEDICINE

## 2019-11-04 RX ORDER — FUROSEMIDE 40 MG/1
TABLET ORAL
Qty: 90 TABLET | Refills: 3 | Status: SHIPPED | OUTPATIENT
Start: 2019-11-04 | End: 2020-02-12 | Stop reason: DRUGHIGH

## 2019-11-04 RX ORDER — SPIRONOLACTONE 25 MG/1
25 TABLET ORAL EVERY OTHER DAY
Qty: 45 TABLET | Refills: 3 | Status: SHIPPED | OUTPATIENT
Start: 2019-11-04 | End: 2020-10-26

## 2019-11-11 RX ORDER — SPIRONOLACTONE 25 MG/1
25 TABLET ORAL EVERY OTHER DAY
Qty: 45 TABLET | Refills: 3 | Status: SHIPPED | OUTPATIENT
Start: 2019-11-11 | End: 2020-02-12 | Stop reason: SDUPTHER

## 2019-11-18 ENCOUNTER — TELEPHONE (OUTPATIENT)
Dept: FAMILY MEDICINE CLINIC | Age: 79
End: 2019-11-18

## 2019-11-18 ENCOUNTER — NURSE ONLY (OUTPATIENT)
Dept: FAMILY MEDICINE CLINIC | Age: 79
End: 2019-11-18
Payer: MEDICARE

## 2019-11-18 DIAGNOSIS — I48.20 CHRONIC ATRIAL FIBRILLATION (HCC): ICD-10-CM

## 2019-11-18 LAB
INTERNATIONAL NORMALIZATION RATIO, POC: 1.5
PROTHROMBIN TIME, POC: NORMAL

## 2019-11-18 PROCEDURE — 93793 ANTICOAG MGMT PT WARFARIN: CPT | Performed by: FAMILY MEDICINE

## 2019-11-18 PROCEDURE — 85610 PROTHROMBIN TIME: CPT | Performed by: FAMILY MEDICINE

## 2019-11-26 RX ORDER — PANTOPRAZOLE SODIUM 20 MG/1
40 TABLET, DELAYED RELEASE ORAL DAILY
Qty: 180 TABLET | Refills: 3 | Status: SHIPPED | OUTPATIENT
Start: 2019-11-26 | End: 2020-02-12 | Stop reason: SDUPTHER

## 2019-11-27 ENCOUNTER — TELEPHONE (OUTPATIENT)
Dept: FAMILY MEDICINE CLINIC | Age: 79
End: 2019-11-27

## 2019-11-27 ENCOUNTER — NURSE ONLY (OUTPATIENT)
Dept: FAMILY MEDICINE CLINIC | Age: 79
End: 2019-11-27
Payer: MEDICARE

## 2019-11-27 DIAGNOSIS — I48.20 CHRONIC ATRIAL FIBRILLATION (HCC): ICD-10-CM

## 2019-11-27 LAB
INTERNATIONAL NORMALIZATION RATIO, POC: 2
PROTHROMBIN TIME, POC: NORMAL

## 2019-11-27 PROCEDURE — 93793 ANTICOAG MGMT PT WARFARIN: CPT | Performed by: FAMILY MEDICINE

## 2019-11-27 PROCEDURE — 85610 PROTHROMBIN TIME: CPT | Performed by: FAMILY MEDICINE

## 2019-12-02 ENCOUNTER — HOSPITAL ENCOUNTER (OUTPATIENT)
Age: 79
Setting detail: OUTPATIENT SURGERY
Discharge: HOME | End: 2019-12-02
Attending: INTERNAL MEDICINE | Admitting: INTERNAL MEDICINE
Payer: MEDICARE

## 2019-12-02 VITALS
HEIGHT: 62 IN | HEART RATE: 61 BPM | BODY MASS INDEX: 34.52 KG/M2 | DIASTOLIC BLOOD PRESSURE: 73 MMHG | OXYGEN SATURATION: 99 % | WEIGHT: 187.6 LBS | SYSTOLIC BLOOD PRESSURE: 146 MMHG | RESPIRATION RATE: 18 BRPM

## 2019-12-02 PROCEDURE — 2500000003 HC RX 250 WO HCPCS

## 2019-12-02 PROCEDURE — 3609015500 HC GASTRIC/DUODENAL MOTILITY &/OR MANOMETRY STUDY: Performed by: INTERNAL MEDICINE

## 2020-01-02 ENCOUNTER — NURSE ONLY (OUTPATIENT)
Dept: FAMILY MEDICINE CLINIC | Age: 80
End: 2020-01-02
Payer: MEDICARE

## 2020-01-02 ENCOUNTER — TELEPHONE (OUTPATIENT)
Dept: FAMILY MEDICINE CLINIC | Age: 80
End: 2020-01-02

## 2020-01-02 LAB
INTERNATIONAL NORMALIZATION RATIO, POC: 1.6
PROTHROMBIN TIME, POC: NORMAL

## 2020-01-02 PROCEDURE — 85610 PROTHROMBIN TIME: CPT | Performed by: FAMILY MEDICINE

## 2020-01-02 NOTE — PROGRESS NOTES
Pt walked in for POCT INR, fingerstick obtained, pt tolerated well. Pt informed me that she is getting dental work on the 10th and the dentist is wondering if she can hold her coumadin for 1 week prior to dental work.

## 2020-01-15 ENCOUNTER — NURSE ONLY (OUTPATIENT)
Dept: FAMILY MEDICINE CLINIC | Age: 80
End: 2020-01-15
Payer: MEDICARE

## 2020-01-15 ENCOUNTER — TELEPHONE (OUTPATIENT)
Dept: FAMILY MEDICINE CLINIC | Age: 80
End: 2020-01-15

## 2020-01-15 LAB
INTERNATIONAL NORMALIZATION RATIO, POC: 2
PROTHROMBIN TIME, POC: NORMAL

## 2020-01-15 PROCEDURE — 85610 PROTHROMBIN TIME: CPT | Performed by: FAMILY MEDICINE

## 2020-01-15 PROCEDURE — 93793 ANTICOAG MGMT PT WARFARIN: CPT | Performed by: FAMILY MEDICINE

## 2020-01-15 NOTE — TELEPHONE ENCOUNTER
----- Message from Louisa Rashid MD sent at 1/15/2020 11:00 AM EST -----  INR good. Continue same dosage and recheck INR in 1 month.

## 2020-02-11 ENCOUNTER — TELEPHONE (OUTPATIENT)
Dept: INTERNAL MEDICINE CLINIC | Age: 80
End: 2020-02-11

## 2020-02-11 NOTE — TELEPHONE ENCOUNTER
Pt called in saying she has been having some chest burning and heaviness since yesterday. Off and on. Seems to be worsened by walking around the house and gets SOB with it. BP is good. She tried some mylanta last night. Slept good through the night. Woke up this am and walked out to the kitchen and it came on. She doesnot want to go to ER if not really necessary. She had an esophagus test through Dr. Zoe Mullen last month and he told her she does not have  Acid reflux. She is on prontonix 40 mg. Daily. Per EBF/I notified pt it would seem unlikely cardiac in origin given cath results of less than 1 year ago. If wants to come in tomorrow he can see her. She should try mylanta on a regular scheduled basis, l hr after meals and at bedtime and should boost her protonix from 40 mg. Daily to 40 mg. Bid. Pt in agreement with that and an appt was given. I advised pt if symptoms worsen in the meantime should report to ER. She verbalizes understanding.

## 2020-02-12 ENCOUNTER — TELEPHONE (OUTPATIENT)
Dept: INTERNAL MEDICINE CLINIC | Age: 80
End: 2020-02-12

## 2020-02-12 ENCOUNTER — NURSE ONLY (OUTPATIENT)
Dept: LAB | Age: 80
End: 2020-02-12

## 2020-02-12 ENCOUNTER — OFFICE VISIT (OUTPATIENT)
Dept: INTERNAL MEDICINE CLINIC | Age: 80
End: 2020-02-12
Payer: MEDICARE

## 2020-02-12 VITALS
WEIGHT: 187.8 LBS | HEIGHT: 62 IN | SYSTOLIC BLOOD PRESSURE: 130 MMHG | DIASTOLIC BLOOD PRESSURE: 64 MMHG | BODY MASS INDEX: 34.56 KG/M2

## 2020-02-12 LAB
ALBUMIN SERPL-MCNC: 3.8 G/DL (ref 3.5–5.1)
ALP BLD-CCNC: 130 U/L (ref 38–126)
ALT SERPL-CCNC: 27 U/L (ref 11–66)
AST SERPL-CCNC: 47 U/L (ref 5–40)
BILIRUB SERPL-MCNC: 0.6 MG/DL (ref 0.3–1.2)
BILIRUBIN DIRECT: < 0.2 MG/DL (ref 0–0.3)
TOTAL PROTEIN: 7.3 G/DL (ref 6.1–8)
TROPONIN T: < 0.01 NG/ML
TSH SERPL DL<=0.05 MIU/L-ACNC: 6.85 UIU/ML (ref 0.4–4.2)

## 2020-02-12 PROCEDURE — G8427 DOCREV CUR MEDS BY ELIG CLIN: HCPCS | Performed by: INTERNAL MEDICINE

## 2020-02-12 PROCEDURE — 1123F ACP DISCUSS/DSCN MKR DOCD: CPT | Performed by: INTERNAL MEDICINE

## 2020-02-12 PROCEDURE — 93000 ELECTROCARDIOGRAM COMPLETE: CPT | Performed by: INTERNAL MEDICINE

## 2020-02-12 PROCEDURE — 1036F TOBACCO NON-USER: CPT | Performed by: INTERNAL MEDICINE

## 2020-02-12 PROCEDURE — 99213 OFFICE O/P EST LOW 20 MIN: CPT | Performed by: INTERNAL MEDICINE

## 2020-02-12 PROCEDURE — G8484 FLU IMMUNIZE NO ADMIN: HCPCS | Performed by: INTERNAL MEDICINE

## 2020-02-12 PROCEDURE — 1090F PRES/ABSN URINE INCON ASSESS: CPT | Performed by: INTERNAL MEDICINE

## 2020-02-12 PROCEDURE — G8400 PT W/DXA NO RESULTS DOC: HCPCS | Performed by: INTERNAL MEDICINE

## 2020-02-12 PROCEDURE — 4040F PNEUMOC VAC/ADMIN/RCVD: CPT | Performed by: INTERNAL MEDICINE

## 2020-02-12 PROCEDURE — G8417 CALC BMI ABV UP PARAM F/U: HCPCS | Performed by: INTERNAL MEDICINE

## 2020-02-12 RX ORDER — DIGOXIN 125 MCG
125 TABLET ORAL EVERY OTHER DAY
COMMUNITY
End: 2020-10-26 | Stop reason: SDUPTHER

## 2020-02-12 RX ORDER — PANTOPRAZOLE SODIUM 20 MG/1
40 TABLET, DELAYED RELEASE ORAL 2 TIMES DAILY
Qty: 360 TABLET | Refills: 3 | Status: SHIPPED | OUTPATIENT
Start: 2020-02-12 | End: 2021-01-13 | Stop reason: SDUPTHER

## 2020-02-12 RX ORDER — FUROSEMIDE 40 MG/1
40 TABLET ORAL EVERY OTHER DAY
Status: ON HOLD | COMMUNITY
End: 2020-03-11 | Stop reason: HOSPADM

## 2020-02-12 ASSESSMENT — PATIENT HEALTH QUESTIONNAIRE - PHQ9
2. FEELING DOWN, DEPRESSED OR HOPELESS: 0
SUM OF ALL RESPONSES TO PHQ QUESTIONS 1-9: 0
SUM OF ALL RESPONSES TO PHQ9 QUESTIONS 1 & 2: 0
1. LITTLE INTEREST OR PLEASURE IN DOING THINGS: 0
SUM OF ALL RESPONSES TO PHQ QUESTIONS 1-9: 0

## 2020-02-12 NOTE — TELEPHONE ENCOUNTER
----- Message from Winnie Berry MD sent at 2/12/2020  3:28 PM EST -----  Tell pt labs stable  The heart enzyme is normal; it is used to detect heart attacks

## 2020-02-12 NOTE — PROGRESS NOTES
disorder. Respiratory ROS: positive for - shortness of breath  Cardiovascular ROS: See above    Gastrointestinal ROS: See above    Genito-Urinary ROS: no dysuria, trouble voiding, or hematuria  Musculoskeletal ROS: negative  Neurological ROS: no TIA or stroke symptoms  Dermatological ROS: negative    Blood pressure 130/64, height 5' 2.01\" (1.575 m), weight 187 lb 12.8 oz (85.2 kg). Physical Examination: General appearance - alert, well appearing, and in no distress  Mental status - alert, oriented to person, place, and time  Neck - supple, no significant adenopathy, no JVD, or carotid bruits  Chest - clear to auscultation, no wheezes, rales or rhonchi, symmetric air entry  Heart - normal rate, regular rhythm, normal S1, S2, no murmurs, rubs, clicks or gallops  Abdomen - soft, nontender, nondistended, no masses or organomegaly  Neurological - alert, oriented, normal speech, no focal findings or movement disorder noted  Musculoskeletal - no joint tenderness, deformity or swelling  Extremities - peripheral pulses normal, no pedal edema, no clubbing or cyanosis  Skin - normal coloration and turgor, no rashes, no suspicious skin lesions noted          Diagnosis Orders   1. Chest pain, unspecified type  EKG 12 Lead    Troponin   2. SOB (shortness of breath)  EKG 12 Lead   3. Adverse effect of drug, sequela  Carbon Monoxide Diffusing Capacity    Spirometry Without Bronchodilator    TSH    Hepatic Function Panel   4. Chronic atrial fibrillation  Carbon Monoxide Diffusing Capacity    Spirometry Without Bronchodilator    TSH    Hepatic Function Panel   5. Hypothyroidism, unspecified type  TSH   6. Biventricular implantable cardioverter-defibrillator in situ     7.  Left ventricular systolic dysfunction         Orders Placed This Encounter   Procedures    TSH     Standing Status:   Future     Standing Expiration Date:   2/12/2021    Hepatic Function Panel     Standing Status:   Future     Standing Expiration Date:

## 2020-02-19 ENCOUNTER — NURSE ONLY (OUTPATIENT)
Dept: FAMILY MEDICINE CLINIC | Age: 80
End: 2020-02-19
Payer: MEDICARE

## 2020-02-19 ENCOUNTER — TELEPHONE (OUTPATIENT)
Dept: FAMILY MEDICINE CLINIC | Age: 80
End: 2020-02-19

## 2020-02-19 LAB
INTERNATIONAL NORMALIZATION RATIO, POC: 3.6
PROTHROMBIN TIME, POC: NORMAL

## 2020-02-19 PROCEDURE — 93793 ANTICOAG MGMT PT WARFARIN: CPT | Performed by: FAMILY MEDICINE

## 2020-02-19 PROCEDURE — 85610 PROTHROMBIN TIME: CPT | Performed by: FAMILY MEDICINE

## 2020-02-19 NOTE — TELEPHONE ENCOUNTER
Patient notified to alternate coumadin 3 mg and 1.5 mg and to recheckinr in 2 weeks. Pt verbalized understanding.

## 2020-03-04 ENCOUNTER — TELEPHONE (OUTPATIENT)
Dept: INTERNAL MEDICINE CLINIC | Age: 80
End: 2020-03-04

## 2020-03-04 ENCOUNTER — OFFICE VISIT (OUTPATIENT)
Dept: INTERNAL MEDICINE CLINIC | Age: 80
End: 2020-03-04
Payer: MEDICARE

## 2020-03-04 ENCOUNTER — NURSE ONLY (OUTPATIENT)
Dept: LAB | Age: 80
End: 2020-03-04

## 2020-03-04 VITALS
DIASTOLIC BLOOD PRESSURE: 70 MMHG | OXYGEN SATURATION: 92 % | HEIGHT: 62 IN | BODY MASS INDEX: 34.82 KG/M2 | HEART RATE: 68 BPM | SYSTOLIC BLOOD PRESSURE: 136 MMHG | WEIGHT: 189.2 LBS

## 2020-03-04 LAB — PRO-BNP: 3928 PG/ML (ref 0–1800)

## 2020-03-04 PROCEDURE — 99213 OFFICE O/P EST LOW 20 MIN: CPT | Performed by: INTERNAL MEDICINE

## 2020-03-04 PROCEDURE — 1123F ACP DISCUSS/DSCN MKR DOCD: CPT | Performed by: INTERNAL MEDICINE

## 2020-03-04 PROCEDURE — 1090F PRES/ABSN URINE INCON ASSESS: CPT | Performed by: INTERNAL MEDICINE

## 2020-03-04 PROCEDURE — G8417 CALC BMI ABV UP PARAM F/U: HCPCS | Performed by: INTERNAL MEDICINE

## 2020-03-04 PROCEDURE — G8427 DOCREV CUR MEDS BY ELIG CLIN: HCPCS | Performed by: INTERNAL MEDICINE

## 2020-03-04 PROCEDURE — G8400 PT W/DXA NO RESULTS DOC: HCPCS | Performed by: INTERNAL MEDICINE

## 2020-03-04 PROCEDURE — 1036F TOBACCO NON-USER: CPT | Performed by: INTERNAL MEDICINE

## 2020-03-04 PROCEDURE — G8484 FLU IMMUNIZE NO ADMIN: HCPCS | Performed by: INTERNAL MEDICINE

## 2020-03-04 PROCEDURE — 4040F PNEUMOC VAC/ADMIN/RCVD: CPT | Performed by: INTERNAL MEDICINE

## 2020-03-04 NOTE — PATIENT INSTRUCTIONS
INCREASE YOUR ENTRESTO TO ONE TAB TWICE DAILY; LET US KNOW YOUR BLOOD PRESSURE AND WHETHER YOUR SYMPTOMS IMPROVE

## 2020-03-04 NOTE — PROGRESS NOTES
file   Tobacco Use    Smoking status: Never Smoker    Smokeless tobacco: Never Used   Substance and Sexual Activity    Alcohol use: No     Alcohol/week: 0.0 standard drinks    Drug use: No    Sexual activity: Not Currently   Lifestyle    Physical activity:     Days per week: Not on file     Minutes per session: Not on file    Stress: Not on file   Relationships    Social connections:     Talks on phone: Not on file     Gets together: Not on file     Attends Rastafarian service: Not on file     Active member of club or organization: Not on file     Attends meetings of clubs or organizations: Not on file     Relationship status: Not on file    Intimate partner violence:     Fear of current or ex partner: Not on file     Emotionally abused: Not on file     Physically abused: Not on file     Forced sexual activity: Not on file   Other Topics Concern    Not on file   Social History Narrative    Not on file       Current Outpatient Medications:     Alum & Mag Hydroxide-Simeth (MYLANTA PO), Take by mouth 1 hr after meals and at bedtime, Disp: , Rfl:     digoxin (LANOXIN) 125 MCG tablet, Take 125 mcg by mouth every other day, Disp: , Rfl:     furosemide (LASIX) 40 MG tablet, Take 40 mg by mouth every other day, Disp: , Rfl:     pantoprazole (PROTONIX) 20 MG tablet, Take 2 tablets by mouth 2 times daily, Disp: 360 tablet, Rfl: 3    spironolactone (ALDACTONE) 25 MG tablet, Take 1 tablet by mouth every other day, Disp: 45 tablet, Rfl: 3    sacubitril-valsartan (ENTRESTO)  MG per tablet, Take 0.5 tablets by mouth 2 times daily, Disp: 90 tablet, Rfl: 3    levothyroxine (SYNTHROID) 125 MCG tablet, TAKE 1 TABLET BY MOUTH DAILY, Disp: 90 tablet, Rfl: 3    metoprolol succinate (TOPROL XL) 200 MG extended release tablet, TAKE 1 TABLET BY MOUTH DAILY, Disp: 90 tablet, Rfl: 3    amiodarone (CORDARONE) 200 MG tablet, TAKE 1 TABLET BY MOUTH DAILY, Disp: 90 tablet, Rfl: 3    warfarin (COUMADIN) 3 MG tablet, 3 mg every Monday and Friday, 1.5 mg the other 5 days, Disp: 30 tablet, Rfl: 0    acetaminophen (TYLENOL) 325 MG tablet, Take 650 mg by mouth every 6 hours as needed for Pain or Fever, Disp: , Rfl:     Probiotic Product (PROBIOTIC FORMULA PO), Take 1 tablet by mouth daily. , Disp: , Rfl:     Ascorbic Acid (VITAMIN C) 1000 MG tablet, Take 1,000 mg by mouth 2 times daily. , Disp: , Rfl:     Multiple Vitamin (MULTI-VITAMIN PO), Take  by mouth daily. , Disp: , Rfl:     Calcium Carbonate (CALCIUM 600 PO), Take  by mouth 2 times daily. , Disp: , Rfl:     BIOTIN PO, Take 5,000 mg by mouth daily. , Disp: , Rfl:     Handicap Placard MISC, by Does not apply route Request parking placard due to medical conditions. Duration of 5 years. , Disp: 1 each, Rfl: 0    Misc. Devices (LUMBAR SUPPORT CUSHION) MISC, by Does not apply route. Dx: low back pain, sciatica., Disp: 1 each, Rfl: 0  Allergies   Allergen Reactions    Ativan [Lorazepam]      Gets anxiety    Codeine     Nystatin      Mycostatin powder    Percocet [Oxycodone-Acetaminophen]     Relafen [Nabumetone]      Stomach upset    Tape [Adhesive Tape]     Celebrex [Celecoxib] Rash       Objective  Vitals:    03/04/20 0904   BP: 136/70   Pulse:    SpO2:      General Appearance:  awake, alert, oriented, in no acute distress  Neck:  neck- supple, no mass, non-tender and no jvd    Lungs:  Normal expansion. Clear to auscultation. No rales, rhonchi, or wheezing. Heart:  Heart sounds are normal.  Regular rate and rhythm without murmur, gallop or rub. Abdomen:  Liver appears palpable  Extremities: Extremities warm to touch, pink, with no edema. Assessment  1. Chest tightness with exertion and sob; her cath from 5.19 would suggest large vessel dz not an issue. Could be lv dysfx    Plan  1. Boost entresto to 1 tab bid; take bp  2. Consider add nitrate if sxs persist  3. bnp  4.  See 2 wks

## 2020-03-04 NOTE — TELEPHONE ENCOUNTER
----- Message from Aminta Collazo MD sent at 3/4/2020 11:10 AM EST -----  Tell her this is high; implies her heart is being overworked; increasing the entresto may hel[p; keep us updated

## 2020-03-09 ENCOUNTER — HOSPITAL ENCOUNTER (INPATIENT)
Age: 80
LOS: 2 days | Discharge: HOME | DRG: 291 | End: 2020-03-11
Attending: INTERNAL MEDICINE | Admitting: INTERNAL MEDICINE
Payer: MEDICARE

## 2020-03-09 ENCOUNTER — APPOINTMENT (OUTPATIENT)
Dept: GENERAL RADIOLOGY | Age: 80
DRG: 291 | End: 2020-03-09
Payer: MEDICARE

## 2020-03-09 ENCOUNTER — APPOINTMENT (OUTPATIENT)
Dept: CT IMAGING | Age: 80
DRG: 291 | End: 2020-03-09
Payer: MEDICARE

## 2020-03-09 PROBLEM — I50.9 CHF WITH UNKNOWN LVEF (HCC): Status: ACTIVE | Noted: 2020-03-09

## 2020-03-09 PROBLEM — I50.23 ACUTE ON CHRONIC SYSTOLIC CHF (CONGESTIVE HEART FAILURE) (HCC): Status: ACTIVE | Noted: 2020-03-09

## 2020-03-09 LAB
ALBUMIN SERPL-MCNC: 3.9 G/DL (ref 3.5–5.1)
ALP BLD-CCNC: 131 U/L (ref 38–126)
ALT SERPL-CCNC: 22 U/L (ref 11–66)
ANION GAP SERPL CALCULATED.3IONS-SCNC: 12 MEQ/L (ref 8–16)
AST SERPL-CCNC: 45 U/L (ref 5–40)
BASOPHILS # BLD: 1.1 %
BASOPHILS ABSOLUTE: 0.1 THOU/MM3 (ref 0–0.1)
BILIRUB SERPL-MCNC: 0.5 MG/DL (ref 0.3–1.2)
BILIRUBIN DIRECT: < 0.2 MG/DL (ref 0–0.3)
BILIRUBIN URINE: NEGATIVE
BLOOD, URINE: NEGATIVE
BUN BLDV-MCNC: 24 MG/DL (ref 7–22)
CALCIUM SERPL-MCNC: 9.2 MG/DL (ref 8.5–10.5)
CHARACTER, URINE: CLEAR
CHLORIDE BLD-SCNC: 105 MEQ/L (ref 98–111)
CO2: 25 MEQ/L (ref 23–33)
COLOR: YELLOW
CREAT SERPL-MCNC: 1.2 MG/DL (ref 0.4–1.2)
EKG ATRIAL RATE: 59 BPM
EKG Q-T INTERVAL: 466 MS
EKG QRS DURATION: 154 MS
EKG QTC CALCULATION (BAZETT): 495 MS
EKG R AXIS: -70 DEGREES
EKG T AXIS: 88 DEGREES
EKG VENTRICULAR RATE: 68 BPM
EOSINOPHIL # BLD: 2.1 %
EOSINOPHILS ABSOLUTE: 0.1 THOU/MM3 (ref 0–0.4)
ERYTHROCYTE [DISTWIDTH] IN BLOOD BY AUTOMATED COUNT: 16.2 % (ref 11.5–14.5)
ERYTHROCYTE [DISTWIDTH] IN BLOOD BY AUTOMATED COUNT: 53.6 FL (ref 35–45)
GFR SERPL CREATININE-BSD FRML MDRD: 44 ML/MIN/1.73M2
GLUCOSE BLD-MCNC: 133 MG/DL (ref 70–108)
GLUCOSE URINE: NEGATIVE MG/DL
HCT VFR BLD CALC: 32.3 % (ref 37–47)
HEMOGLOBIN: 9.5 GM/DL (ref 12–16)
IMMATURE GRANS (ABS): 0.02 THOU/MM3 (ref 0–0.07)
IMMATURE GRANULOCYTES: 0.4 %
INR BLD: 3.28 (ref 0.85–1.13)
IRON: 30 UG/DL (ref 50–170)
KETONES, URINE: NEGATIVE
LEUKOCYTE ESTERASE, URINE: NEGATIVE
LIPASE: 36.8 U/L (ref 5.6–51.3)
LYMPHOCYTES # BLD: 18.1 %
LYMPHOCYTES ABSOLUTE: 1 THOU/MM3 (ref 1–4.8)
MCH RBC QN AUTO: 27 PG (ref 26–33)
MCHC RBC AUTO-ENTMCNC: 29.4 GM/DL (ref 32.2–35.5)
MCV RBC AUTO: 91.8 FL (ref 81–99)
MONOCYTES # BLD: 12.8 %
MONOCYTES ABSOLUTE: 0.7 THOU/MM3 (ref 0.4–1.3)
NITRITE, URINE: NEGATIVE
NUCLEATED RED BLOOD CELLS: 0 /100 WBC
OSMOLALITY CALCULATION: 289.1 MOSMOL/KG (ref 275–300)
PH UA: 8 (ref 5–9)
PLATELET # BLD: 292 THOU/MM3 (ref 130–400)
PMV BLD AUTO: 10.7 FL (ref 9.4–12.4)
POTASSIUM SERPL-SCNC: 4.8 MEQ/L (ref 3.5–5.2)
PRO-BNP: 4154 PG/ML (ref 0–1800)
PROTEIN UA: NEGATIVE
RBC # BLD: 3.52 MILL/MM3 (ref 4.2–5.4)
SEG NEUTROPHILS: 65.5 %
SEGMENTED NEUTROPHILS ABSOLUTE COUNT: 3.7 THOU/MM3 (ref 1.8–7.7)
SODIUM BLD-SCNC: 142 MEQ/L (ref 135–145)
SPECIFIC GRAVITY, URINE: 1.01 (ref 1–1.03)
T4 FREE: 2 NG/DL (ref 0.93–1.76)
TOTAL IRON BINDING CAPACITY: 370 UG/DL (ref 171–450)
TOTAL PROTEIN: 7.5 G/DL (ref 6.1–8)
TROPONIN T: < 0.01 NG/ML
UROBILINOGEN, URINE: 0.2 EU/DL (ref 0–1)
WBC # BLD: 5.7 THOU/MM3 (ref 4.8–10.8)

## 2020-03-09 PROCEDURE — 85610 PROTHROMBIN TIME: CPT

## 2020-03-09 PROCEDURE — 6370000000 HC RX 637 (ALT 250 FOR IP): Performed by: INTERNAL MEDICINE

## 2020-03-09 PROCEDURE — 93005 ELECTROCARDIOGRAM TRACING: CPT | Performed by: INTERNAL MEDICINE

## 2020-03-09 PROCEDURE — 82248 BILIRUBIN DIRECT: CPT

## 2020-03-09 PROCEDURE — 84439 ASSAY OF FREE THYROXINE: CPT

## 2020-03-09 PROCEDURE — 81003 URINALYSIS AUTO W/O SCOPE: CPT

## 2020-03-09 PROCEDURE — 2580000003 HC RX 258: Performed by: INTERNAL MEDICINE

## 2020-03-09 PROCEDURE — 83690 ASSAY OF LIPASE: CPT

## 2020-03-09 PROCEDURE — 85025 COMPLETE CBC W/AUTO DIFF WBC: CPT

## 2020-03-09 PROCEDURE — 2500000003 HC RX 250 WO HCPCS: Performed by: INTERNAL MEDICINE

## 2020-03-09 PROCEDURE — 6370000000 HC RX 637 (ALT 250 FOR IP)

## 2020-03-09 PROCEDURE — 80053 COMPREHEN METABOLIC PANEL: CPT

## 2020-03-09 PROCEDURE — 94761 N-INVAS EAR/PLS OXIMETRY MLT: CPT

## 2020-03-09 PROCEDURE — 36415 COLL VENOUS BLD VENIPUNCTURE: CPT

## 2020-03-09 PROCEDURE — 6360000002 HC RX W HCPCS: Performed by: PHYSICIAN ASSISTANT

## 2020-03-09 PROCEDURE — 83550 IRON BINDING TEST: CPT

## 2020-03-09 PROCEDURE — 2140000000 HC CCU INTERMEDIATE R&B

## 2020-03-09 PROCEDURE — 83540 ASSAY OF IRON: CPT

## 2020-03-09 PROCEDURE — 84484 ASSAY OF TROPONIN QUANT: CPT

## 2020-03-09 PROCEDURE — 93010 ELECTROCARDIOGRAM REPORT: CPT | Performed by: NUCLEAR MEDICINE

## 2020-03-09 PROCEDURE — 83880 ASSAY OF NATRIURETIC PEPTIDE: CPT

## 2020-03-09 PROCEDURE — 99285 EMERGENCY DEPT VISIT HI MDM: CPT

## 2020-03-09 PROCEDURE — 99223 1ST HOSP IP/OBS HIGH 75: CPT | Performed by: INTERNAL MEDICINE

## 2020-03-09 PROCEDURE — 71250 CT THORAX DX C-: CPT

## 2020-03-09 RX ORDER — ACETAMINOPHEN 650 MG/1
650 SUPPOSITORY RECTAL EVERY 6 HOURS PRN
Status: DISCONTINUED | OUTPATIENT
Start: 2020-03-09 | End: 2020-03-11 | Stop reason: HOSPADM

## 2020-03-09 RX ORDER — ACETAMINOPHEN 325 MG/1
650 TABLET ORAL EVERY 6 HOURS PRN
Status: DISCONTINUED | OUTPATIENT
Start: 2020-03-09 | End: 2020-03-11 | Stop reason: HOSPADM

## 2020-03-09 RX ORDER — POLYETHYLENE GLYCOL 3350 17 G/17G
17 POWDER, FOR SOLUTION ORAL DAILY
Status: DISCONTINUED | OUTPATIENT
Start: 2020-03-09 | End: 2020-03-11 | Stop reason: HOSPADM

## 2020-03-09 RX ORDER — METOPROLOL SUCCINATE 100 MG/1
200 TABLET, EXTENDED RELEASE ORAL DAILY
Status: DISCONTINUED | OUTPATIENT
Start: 2020-03-10 | End: 2020-03-11 | Stop reason: HOSPADM

## 2020-03-09 RX ORDER — WARFARIN SODIUM 2 MG/1
2 TABLET ORAL DAILY
Status: DISCONTINUED | OUTPATIENT
Start: 2020-03-09 | End: 2020-03-09 | Stop reason: ALTCHOICE

## 2020-03-09 RX ORDER — PANTOPRAZOLE SODIUM 40 MG/1
40 TABLET, DELAYED RELEASE ORAL 2 TIMES DAILY
Status: DISCONTINUED | OUTPATIENT
Start: 2020-03-09 | End: 2020-03-11 | Stop reason: HOSPADM

## 2020-03-09 RX ORDER — SODIUM CHLORIDE 0.9 % (FLUSH) 0.9 %
10 SYRINGE (ML) INJECTION EVERY 12 HOURS SCHEDULED
Status: DISCONTINUED | OUTPATIENT
Start: 2020-03-09 | End: 2020-03-11 | Stop reason: HOSPADM

## 2020-03-09 RX ORDER — LEVOTHYROXINE SODIUM 0.12 MG/1
125 TABLET ORAL DAILY
Status: DISCONTINUED | OUTPATIENT
Start: 2020-03-10 | End: 2020-03-11 | Stop reason: HOSPADM

## 2020-03-09 RX ORDER — FUROSEMIDE 10 MG/ML
20 INJECTION INTRAMUSCULAR; INTRAVENOUS ONCE
Status: COMPLETED | OUTPATIENT
Start: 2020-03-09 | End: 2020-03-09

## 2020-03-09 RX ORDER — ASPIRIN 81 MG/1
81 TABLET, CHEWABLE ORAL ONCE
Status: COMPLETED | OUTPATIENT
Start: 2020-03-09 | End: 2020-03-09

## 2020-03-09 RX ORDER — ASPIRIN 81 MG/1
TABLET, CHEWABLE ORAL
Status: COMPLETED
Start: 2020-03-09 | End: 2020-03-09

## 2020-03-09 RX ORDER — SODIUM CHLORIDE 0.9 % (FLUSH) 0.9 %
10 SYRINGE (ML) INJECTION PRN
Status: DISCONTINUED | OUTPATIENT
Start: 2020-03-09 | End: 2020-03-11 | Stop reason: HOSPADM

## 2020-03-09 RX ORDER — SPIRONOLACTONE 25 MG/1
25 TABLET ORAL EVERY OTHER DAY
Status: DISCONTINUED | OUTPATIENT
Start: 2020-03-10 | End: 2020-03-10

## 2020-03-09 RX ORDER — ASPIRIN 81 MG/1
81 TABLET, CHEWABLE ORAL DAILY
Status: DISCONTINUED | OUTPATIENT
Start: 2020-03-10 | End: 2020-03-09

## 2020-03-09 RX ORDER — ACETAMINOPHEN 325 MG/1
650 TABLET ORAL EVERY 6 HOURS PRN
Status: DISCONTINUED | OUTPATIENT
Start: 2020-03-09 | End: 2020-03-09

## 2020-03-09 RX ORDER — ASCORBIC ACID 500 MG
1000 TABLET ORAL 2 TIMES DAILY
Status: DISCONTINUED | OUTPATIENT
Start: 2020-03-09 | End: 2020-03-11 | Stop reason: HOSPADM

## 2020-03-09 RX ORDER — POTASSIUM CHLORIDE 20 MEQ/1
40 TABLET, EXTENDED RELEASE ORAL PRN
Status: DISCONTINUED | OUTPATIENT
Start: 2020-03-09 | End: 2020-03-11 | Stop reason: HOSPADM

## 2020-03-09 RX ORDER — ONDANSETRON 2 MG/ML
4 INJECTION INTRAMUSCULAR; INTRAVENOUS EVERY 6 HOURS PRN
Status: DISCONTINUED | OUTPATIENT
Start: 2020-03-09 | End: 2020-03-11 | Stop reason: HOSPADM

## 2020-03-09 RX ORDER — BUMETANIDE 0.25 MG/ML
0.5 INJECTION, SOLUTION INTRAMUSCULAR; INTRAVENOUS EVERY 12 HOURS
Status: DISCONTINUED | OUTPATIENT
Start: 2020-03-09 | End: 2020-03-10

## 2020-03-09 RX ORDER — POTASSIUM CHLORIDE 7.45 MG/ML
10 INJECTION INTRAVENOUS PRN
Status: DISCONTINUED | OUTPATIENT
Start: 2020-03-09 | End: 2020-03-11 | Stop reason: HOSPADM

## 2020-03-09 RX ORDER — MAGNESIUM SULFATE IN WATER 40 MG/ML
2 INJECTION, SOLUTION INTRAVENOUS PRN
Status: DISCONTINUED | OUTPATIENT
Start: 2020-03-09 | End: 2020-03-11 | Stop reason: HOSPADM

## 2020-03-09 RX ORDER — WARFARIN SODIUM 1 MG/1
1 TABLET ORAL
Status: COMPLETED | OUTPATIENT
Start: 2020-03-09 | End: 2020-03-09

## 2020-03-09 RX ORDER — PROMETHAZINE HYDROCHLORIDE 25 MG/1
12.5 TABLET ORAL EVERY 6 HOURS PRN
Status: DISCONTINUED | OUTPATIENT
Start: 2020-03-09 | End: 2020-03-11 | Stop reason: HOSPADM

## 2020-03-09 RX ORDER — AMIODARONE HYDROCHLORIDE 200 MG/1
200 TABLET ORAL DAILY
Status: DISCONTINUED | OUTPATIENT
Start: 2020-03-10 | End: 2020-03-11 | Stop reason: HOSPADM

## 2020-03-09 RX ORDER — DIGOXIN 125 MCG
125 TABLET ORAL EVERY OTHER DAY
Status: DISCONTINUED | OUTPATIENT
Start: 2020-03-10 | End: 2020-03-11 | Stop reason: HOSPADM

## 2020-03-09 RX ADMIN — OXYCODONE HYDROCHLORIDE AND ACETAMINOPHEN 1000 MG: 500 TABLET ORAL at 21:38

## 2020-03-09 RX ADMIN — PANTOPRAZOLE SODIUM 40 MG: 40 TABLET, DELAYED RELEASE ORAL at 21:38

## 2020-03-09 RX ADMIN — ASPIRIN 81 MG: 81 TABLET, CHEWABLE ORAL at 12:43

## 2020-03-09 RX ADMIN — ASPIRIN 81 MG 81 MG: 81 TABLET ORAL at 12:43

## 2020-03-09 RX ADMIN — SACUBITRIL AND VALSARTAN 2 TABLET: 49; 51 TABLET, FILM COATED ORAL at 21:38

## 2020-03-09 RX ADMIN — FUROSEMIDE 20 MG: 10 INJECTION, SOLUTION INTRAMUSCULAR; INTRAVENOUS at 16:01

## 2020-03-09 RX ADMIN — BUMETANIDE 0.5 MG: 0.25 INJECTION INTRAMUSCULAR; INTRAVENOUS at 22:37

## 2020-03-09 RX ADMIN — WARFARIN SODIUM 1 MG: 1 TABLET ORAL at 22:38

## 2020-03-09 RX ADMIN — Medication 10 ML: at 22:39

## 2020-03-09 ASSESSMENT — PAIN DESCRIPTION - LOCATION
LOCATION: CHEST
LOCATION: CHEST

## 2020-03-09 ASSESSMENT — ENCOUNTER SYMPTOMS
COUGH: 0
NAUSEA: 0
ABDOMINAL PAIN: 0
SHORTNESS OF BREATH: 1
VOMITING: 0

## 2020-03-09 ASSESSMENT — PAIN DESCRIPTION - FREQUENCY
FREQUENCY: INTERMITTENT
FREQUENCY: INTERMITTENT

## 2020-03-09 ASSESSMENT — PAIN DESCRIPTION - DESCRIPTORS
DESCRIPTORS: POUNDING;BURNING
DESCRIPTORS: BURNING;POUNDING

## 2020-03-09 ASSESSMENT — PAIN SCALES - GENERAL
PAINLEVEL_OUTOF10: 2
PAINLEVEL_OUTOF10: 0
PAINLEVEL_OUTOF10: 0

## 2020-03-09 ASSESSMENT — PAIN DESCRIPTION - PAIN TYPE
TYPE: ACUTE PAIN
TYPE: ACUTE PAIN

## 2020-03-09 NOTE — ED TRIAGE NOTES
Patient presents to ER with complaints of intermittent chest pain over the past week. Patient reports chest pain in mid lower region/ upper abdominal region. EKG obtained. Telemetry applied.

## 2020-03-09 NOTE — ED PROVIDER NOTES
[facility] EMERGENCY DEPT    EMERGENCY MEDICINE     Pt Name: Kristyn Gregorio  MRN: [id number]  [place] 1940  Date of evaluation: 3/9/2020  Provider: Aleida Corbett Dr       Chief Complaint   Patient presents with    Chest Pain     Intermittent x1 week       HISTORY OF PRESENT ILLNESS    Kristyn Gregorio is a 70 y.o. female who presents to the emergency department from home for the evaluation of intermittent centralized chest pain for the past week. She described her chest pain as intermittent \"pouding pain\" that alternates with an intermittent \"burning\" pain. The patient also reported complaints of shortness of breath with physical exertion. She denied jaw pain, neck pain, or pain radiation. The patient further denied leg swelling, palpitations, cough, nausea, vomiting, and abdominal pain. She denied fever, fatigue, and chills. She denied pain aggravation after eating. The patient stated she visited the ED today because \"she wanted answers for her chest pain\". The patient was seen by Dr. Emma Clement (Internal Medicine) for \"chest distress with exertion\" on 03/04/2020. The patient was instructed to boost entresto to 1 tab thiago twice daily and to take her blood pressure. The patient stated she takes lasix and warfarin. The patient has medical history of pacemaker, automatic cardioverter/defibrillator, implantable cardiac defibrillator, nonischemic cardiomyopathy, atrial fibrillation, CHF, and CAD. The patient has surgical history of cholecystectomy and colonoscopy. The patient has no further acute complaints at this time. Triage notes and Nursing notes were reviewed by myself. Any discrepancies are addressed above. PAST MEDICAL HISTORY     Past Medical History:   Diagnosis Date    AICD (automatic cardioverter/defibrillator) present 05/1999    Arthritis 5/21/2019    Atrial fibrillation (HCC)     chronic    Biventricular ICD (implantable cardiac defibrillator) in place 10/09    C. difficile diarrhea     CAD (coronary artery disease)     Cancer (HCC)     right hand    CHF (congestive heart failure) (HCC)     Diverticulitis 01/2013    GERD (gastroesophageal reflux disease)     hiatal hernia    Hiatal hernia     Hyperlipidemia     Hypertension     Menopause     1994    NAFLD (nonalcoholic fatty liver disease) 1/31/2017    Nonischemic cardiomyopathy ([address]     chronic    Pacemaker     Retroperitoneal hemorrhage 07/99    transfusion x3    Thyroid disease        SURGICAL HISTORY       Past Surgical History:   Procedure Laterality Date    CARDIAC DEFIBRILLATOR PLACEMENT     Mitzi Pina  2008    w/ EGD    COLONOSCOPY N/A 7/16/2019    COLONOSCOPY POLYPECTOMY SNARE/COLD BIOPSY performed by Mary Bergman MD at [facility]    ENDOSCOPY, COLON, DIAGNOSTIC     [address]    replaced 3x    SKIN BIOPSY  2010    Skin CA with graft.  UPPER GASTROINTESTINAL ENDOSCOPY N/A 7/16/2019    EGD DILATION SAVORY performed by Mary Bergman MD at [address] [place] [address] Left 7/16/2019    EGD BIOPSY performed by Mary Bergman MD at [address] [place] [address] N/A 12/2/2019    EGD ESOPHAGEAL MANOMETRY AND PH MONITORING 24 HR performed by Mary Bergman MD at [address] [facility]       Previous Medications    ACETAMINOPHEN (TYLENOL) 325 MG TABLET    Take 650 mg by mouth every 6 hours as needed for Pain or Fever    ALUM & MAG HYDROXIDE-SIMETH (MYLANTA PO)    Take by mouth 1 hr after meals and at bedtime    AMIODARONE (CORDARONE) 200 MG TABLET    TAKE 1 TABLET BY MOUTH DAILY    ASCORBIC ACID (VITAMIN C) 1000 MG TABLET    Take 1,000 mg by mouth 2 times daily. BIOTIN PO    Take 5,000 mg by mouth daily. CALCIUM CARBONATE (CALCIUM 600 PO)    Take  by mouth 2 times daily.       DIGOXIN (LANOXIN) 125 MCG TABLET    Take 125 mcg by mouth every other day Socioeconomic History    Marital status:      Spouse name: Meghana Wilson     Number of children: 4    Years of education: None    Highest education level: None   Occupational History     Employer: [employer]   Social [employer]    Financial resource strain: None    Food insecurity     Worry: None     Inability: None    Transportation needs     Medical: None     Non-medical: None   Tobacco Use    Smoking status: Never Smoker    Smokeless tobacco: Never Used   Substance and Sexual Activity    Alcohol use: No     Alcohol/week: 0.0 standard drinks    Drug use: No    Sexual activity: Not Currently   Lifestyle    Physical activity     Days per week: None     Minutes per session: None    Stress: None   Relationships    Social connections     Talks on phone: None     Gets together: None     Attends Mu-ism service: None     Active member of club or organization: None     Attends meetings of clubs or organizations: None     Relationship status: None    Intimate partner violence     Fear of current or ex partner: None     Emotionally abused: None     Physically abused: None     Forced sexual activity: None   Other Topics Concern    None   Social History Narrative    None       REVIEW OF SYSTEMS     Review of Systems   Constitutional: Negative for chills, fatigue and fever. Respiratory: Positive for shortness of breath. Negative for cough. Cardiovascular: Positive for chest pain (intermittent centralized). Negative for leg swelling. Gastrointestinal: Negative for abdominal pain, nausea and vomiting. Musculoskeletal: Negative for arthralgias (jaw pain), neck pain and neck stiffness. Except as noted above the remainder of the review of systems was reviewed and is.    PHYSICAL EXAM    (up to 7 for level 4, 8 or more for level 5)     ED Triage Vitals [03/09/20 1206]   BP Temp Temp Source Pulse Resp SpO2 Height Weight   137/71 97.5 °F (36.4 °C) Oral 78 18 100 % 5' 2\" (1.575 ms  QTc: 495 ms  P-R-T axes: *, -70, 88  No STEMI  Ventricular-paced rhythm  Biventricular pacemaker detected  Abnormal ECG  When compared with ECG of 23-OCT-2019 04:56,  Premature ventricular complexes are no longer Present  Ventricular rate has increased BY   2 BPM      RADIOLOGY: (none if blank)   Interpretation per the Radiologistbelow, if available at the time of this note:    CT CHEST WO CONTRAST   Final Result      1. Small right pleural effusion with adjacent atelectasis. Cardiomegaly. There is right infrahilar probable atelectasis versus infiltrate. Correlation and follow-up advised. 2. mild nodular hepatic contour and increased attenuation of the liver correlation with LFTs. Possible underlying metabolic etiology. Further evaluation with nonemergent abdominal MRI can be performed as clinically warranted. **This report has been created using voice recognition software. It may contain minor errors which are inherent in voice recognition technology. **      Final report electronically signed by Dr. Nargis Goode on 3/9/2020 1:35 PM          LABS:  Labs Reviewed   CBC WITH AUTO DIFFERENTIAL - Abnormal; Notable for the following components:       Result Value    RBC 3.52 (*)     Hemoglobin 9.5 (*)     Hematocrit 32.3 (*)     MCHC 29.4 (*)     RDW-CV 16.2 (*)     RDW-SD 53.6 (*)     All other components within normal limits   BASIC METABOLIC PANEL - Abnormal; Notable for the following components:    Glucose 133 (*)     BUN 24 (*)     All other components within normal limits   BRAIN NATRIURETIC PEPTIDE - Abnormal; Notable for the following components:    Pro-BNP 4154.0 (*)     All other components within normal limits   PROTIME-INR - Abnormal; Notable for the following components:    INR 3.28 (*)     All other components within normal limits   GLOMERULAR FILTRATION RATE, ESTIMATED - Abnormal; Notable for the following components:    Est, Glom Filt Rate 44 (*)     All other components within normal limits   HEPATIC FUNCTION PANEL - Abnormal; Notable for the following components:    Alkaline Phosphatase 131 (*)     AST 45 (*)     All other components within normal limits   TROPONIN   URINE RT REFLEX TO CULTURE   LIPASE   TROPONIN   ANION GAP   OSMOLALITY       All other labs were within normal range or not returned as of this dictation. Please note, any cultures that may have been sent were not resulted at the time of this patient visit. EMERGENCY DEPARTMENT COURSE andMedical Decision Making:     MDM/   Is a 82-year-old female who presented to the ED today with a chief complaint of worsening chest pain over the past few days. Patient has chronic congestive heart failure as well as A. fib and is ventricularly paced. Pt was initially given ASA 81 r/t her chest pain. Due to the patient's reported symptoms we obtained blood work which was notable for negative troponin, elevated BNP 41, and pleural effusion were evident on the patient's CT. while in bed the patient was breathing normally and was not short of breath and was satting greater than 90%, however once patient ambulated her sats dropped into the 80s and she complained of shortness of breath. Patient was given single dose of Lasix 20. I do not believe this pt has ACS, Dissection, pneumonia. Due to the pt's concern for SOB during ambulation I recommend that she be admitted for CHF exacerbation. Strict return precautions and follow up instructions were discussed with the patient with which the patient agrees. Dr. Franko Lim was present and participated in the care of this patient. ED Medications administered this visit:    Medications   furosemide (LASIX) injection 20 mg (has no administration in time range)   aspirin chewable tablet 81 mg (81 mg Oral Given 3/9/20 1243)         Procedures: (None if blank)       CLINICAL       1.  Acute on chronic congestive heart failure, unspecified heart failure type ([facility] [address]          DISPOSITION/PLAN

## 2020-03-09 NOTE — PROGRESS NOTES
Patient to 3B from ED. Hooked up to telemetry, vital signs stable, oriented to room and educated that [facility] is not responsible fo any lost or broken items.

## 2020-03-09 NOTE — H&P
History & Physical        Patient:  Daniel Louise  YOB: 1940    MRN: [id number]     Acct: [de-identified]        Assessment and Plan:        1. Acute on chronic systolic congestive heart failure with ejection fraction of 20 to 25%: We will Hep-Lock IV we will start gentle diuresis with Bumex DC home dose of Lasix, obtain a cardiology consult  2. Chronic kidney disease stage III: We will follow creatinine with gentle diuresis  3. Hypertension we will continue with her home medications  4. Hypothyroidism we will check a free T4 and continue her home medications  5. Anticoagulated with Coumadin: We will try and simplify regime will contact pharmacy for consult    CC: Chest pain, dyspnea on exertion    HPI: 61-year-old female who presents to the emergency department from home for evaluation of intermittent centralized chest pain for the past week. She describes her chest pain is very intermittent pounding pain that alternates with a intermittent burning pain patient's main complaint is shortness of breath with any type of activity. She Dagoberto denies jaw pain neck pain or pain radiation she denies leg swelling, palpitations, cough, nausea, vomiting or abdominal pain. No fever or chills she does admit to some fatigue with trying to ambulate. The patient was instructed to increase her Entresto to 1 tab twice daily she states she takes her Lasix and warfarin as scheduled she has a automatic cardioverter defibrillator secondary to nonischemic cardiomyopathy her past medical history also includes atrial fib CHF and coronary disease mild    ROS (14 point review of systems completed. Pertinent positives noted. Otherwise ROS is negative) :  Shortness of breath with any activity intermittent chest pain    PMH:  Per HPI and       Diagnosis Date    AICD (automatic cardioverter/defibrillator) present 05/1999    Arthritis 5/21/2019    Atrial fibrillation (HCC)     chronic    Biventricular ICD (implantable cardiac defibrillator) in place 10/09    C. difficile diarrhea     CAD (coronary artery disease)     Cancer (HCC)     right hand    [facility]estive [facility] failure) [facility]     Diverticulitis 01/2013    GERD (gastroesophageal reflux disease)     hiatal hernia    Hiatal hernia     Hyperlipidemia     Hypertension     Menopause     1994    NAFLD (nonalcoholic fatty liver disease) 1/31/2017    Nonischemic cardiomyopathy ([facility] [address]     chronic    Pacemaker     Retroperitoneal hemorrhage 07/99    transfusion x3    Thyroid disease      SHX:        Procedure Laterality Date    CARDIAC DEFIBRILLATOR PLACEMENT     Gabriella Suero  2008    w/ EGD    COLONOSCOPY N/A 7/16/2019    COLONOSCOPY POLYPECTOMY SNARE/COLD BIOPSY performed by Loulou Munoz MD at [facility]    ENDOSCOPY, COLON, DIAGNOSTIC     [address]    replaced 3x    SKIN BIOPSY  2010    Skin CA with graft.     UPPER GASTROINTESTINAL ENDOSCOPY N/A 7/16/2019    EGD DILATION SAVORY performed by Loulou Munoz MD at [address] [facility] ENDOSCOPY Left 7/16/2019    EGD BIOPSY performed by Loulou Munoz MD at [facility] N/A 12/2/2019    EGD ESOPHAGEAL MANOMETRY AND PH MONITORING 24 HR performed by Loulou Munoz MD at [facility] End[facility]     [address] [facility],[address]:       Problem Relation Age of Onset    Heart Disease Mother     Diabetes Mother     High Cholesterol Mother     High Blood Pressure Brother     Cancer Sister     High Blood Pressure Brother     High Blood Pressure Brother     Parkinsonism Brother     Diabetes Brother     Heart Disease Brother     Kidney Disease Brother     High Blood Pressure Brother     High Cholesterol Father     Miscarriages / Stillbirths Daughter     Arthritis Neg Hx     Asthma Neg Hx     Birth Defects Neg Hx     Depression Neg Hx     Early Death Neg Hx     Learning Disabilities Neg Hx     Mental reactive to light. Extra ocular muscles intact. Conjunctivae/corneas clear. Neck: Supple, with full range of motion. no jugular venous distention. Trachea midline. no carotid bruits  Respiratory:  Normal respiratory effort. Bibasilar crackles  Cardiovascular:  Regular rate and rhythm with normal S1/S2 without murmurs, rubs or gallops. PMI non displaced  Abdomen: Soft, non-tender, non-distended with normal bowel sounds. No guarding, rebound. Musculoskeletal:  No clubbing, cyanosis or edema bilaterally. Full range of motion without deformity. Skin: Skin color, texture, turgor normal.  No rashes or lesions, or suspicious lesions. Neurologic:  Neurovascularly intact without any focal sensory/motor deficits. Cranial nerves: II-XII intact, grossly non-focal.  Psychiatric:  Alert and oriented, thought content appropriate, normal insight  Capillary Refill: Brisk,< 2 seconds   Peripheral Pulses: +2 palpable, equal bilaterally upper and lower extremities  Lymphatics: no lymphadenopathy    Data: (All radiographs, tracings, PFTs, and imaging are personally viewed and interpreted unless otherwise noted).  WBC 5.7   Hemoglobin 9.5 INR 3.28   EKG paced rhythm no acute changes per my read  Recent Labs     03/09/20  1219   WBC 5.7   HGB 9.5*   HCT 32.3*         Recent Labs     03/09/20  1219      K 4.8      CO2 25   BUN 24*   CREATININE 1.2   CALCIUM 9.2      Recent Labs     03/09/20  1219   AST 45*   ALT 22   BILIDIR <0.2   BILITOT 0.5   ALKPHOS 131*      Recent Labs     03/09/20  1215   INR 3.28*       No results for input(s): CKTOTAL, TROPONINI in the last 72 hours. Radiology reports-   Ct Chest Wo Contrast    Result Date: 3/9/2020  PROCEDURE: CT CHEST WO CONTRAST CLINICAL INFORMATION: Chest Pain. COMPARISON: No prior study. TECHNIQUE: 5 mm noncontrast axial images were obtained through the chest. Sagittal and coronal reconstructions were obtained.  All CT scans at this facility use dose modulation, iterative reconstruction, and/or weight-based dosing when appropriate to reduce radiation dose to as low as reasonably achievable. FINDINGS: Limitations: Evaluation of the mediastinum and vascular structures is limited due to the lack of IV contrast. Fatty lipoma in the right anterolateral chest wall musculature. Normal caliber thoracic aorta with atherosclerotic changes. Cardiomegaly. Vascular calcifications. Left chest wall permanent pacer. No pericardial effusion. Small right pleural effusion with adjacent atelectasis. There is right infrahilar probable atelectasis versus infiltrate. Correlation and follow-up advised. No acute osseous findings. Upper abdominal images demonstrate possibly mild nodular hepatic contour and increased attenuation of the liver correlation with LFTs. Possible underlying metabolic etiology. Further evaluation with nonemergent abdominal MRI can be performed as clinically warranted. 1. Small right pleural effusion with adjacent atelectasis. Cardiomegaly. There is right infrahilar probable atelectasis versus infiltrate. Correlation and follow-up advised. 2. mild nodular hepatic contour and increased attenuation of the liver correlation with LFTs. Possible underlying metabolic etiology. Further evaluation with nonemergent abdominal MRI can be performed as clinically warranted. **This report has been created using voice recognition software. It may contain minor errors which are inherent in voice recognition technology. ** Final report electronically signed by Dr. Ivan Stratton on 3/9/2020 1:35 PM      Electronically signed by Sydnie Gómez DO on 3/9/2020 at 3:56 PM

## 2020-03-09 NOTE — ED NOTES
Pt resting in bed eating dinner. Daughter at bedside. Updated on POC.       Ansley Salamanca RN  03/09/20 3194

## 2020-03-10 ENCOUNTER — HOSPITAL ENCOUNTER (OUTPATIENT)
Dept: PHYSICAL THERAPY | Age: 80
Setting detail: THERAPIES SERIES
Discharge: HOME | End: 2020-03-10
Payer: MEDICARE

## 2020-03-10 LAB
ANION GAP SERPL CALCULATED.3IONS-SCNC: 12 MEQ/L (ref 8–16)
ANION GAP SERPL CALCULATED.3IONS-SCNC: 12 MEQ/L (ref 8–16)
BASOPHILS # BLD: 1.1 %
BASOPHILS ABSOLUTE: 0.1 THOU/MM3 (ref 0–0.1)
BUN BLDV-MCNC: 25 MG/DL (ref 7–22)
BUN BLDV-MCNC: 25 MG/DL (ref 7–22)
CALCIUM SERPL-MCNC: 8.5 MG/DL (ref 8.5–10.5)
CALCIUM SERPL-MCNC: 8.9 MG/DL (ref 8.5–10.5)
CHLORIDE BLD-SCNC: 103 MEQ/L (ref 98–111)
CHLORIDE BLD-SCNC: 104 MEQ/L (ref 98–111)
CHOLESTEROL, TOTAL: 221 MG/DL (ref 100–199)
CO2: 24 MEQ/L (ref 23–33)
CO2: 26 MEQ/L (ref 23–33)
CREAT SERPL-MCNC: 1.2 MG/DL (ref 0.4–1.2)
CREAT SERPL-MCNC: 1.3 MG/DL (ref 0.4–1.2)
DIGOXIN LEVEL: 0.8 NG/ML (ref 0.5–2)
EOSINOPHIL # BLD: 2.6 %
EOSINOPHILS ABSOLUTE: 0.1 THOU/MM3 (ref 0–0.4)
ERYTHROCYTE [DISTWIDTH] IN BLOOD BY AUTOMATED COUNT: 16.2 % (ref 11.5–14.5)
ERYTHROCYTE [DISTWIDTH] IN BLOOD BY AUTOMATED COUNT: 53.2 FL (ref 35–45)
GFR SERPL CREATININE-BSD FRML MDRD: 40 ML/MIN/1.73M2
GFR SERPL CREATININE-BSD FRML MDRD: 44 ML/MIN/1.73M2
GLUCOSE BLD-MCNC: 115 MG/DL (ref 70–108)
GLUCOSE BLD-MCNC: 82 MG/DL (ref 70–108)
HCT VFR BLD CALC: 27.7 % (ref 37–47)
HDLC SERPL-MCNC: 40 MG/DL
HEMOCCULT STL QL: NEGATIVE
HEMOGLOBIN: 8.2 GM/DL (ref 12–16)
IMMATURE GRANS (ABS): 0.02 THOU/MM3 (ref 0–0.07)
IMMATURE GRANULOCYTES: 0.4 %
INR BLD: 3.61 (ref 0.85–1.13)
LDL CHOLESTEROL CALCULATED: 158 MG/DL
LYMPHOCYTES # BLD: 28.2 %
LYMPHOCYTES ABSOLUTE: 1.6 THOU/MM3 (ref 1–4.8)
MAGNESIUM: 2 MG/DL (ref 1.6–2.4)
MCH RBC QN AUTO: 26.5 PG (ref 26–33)
MCHC RBC AUTO-ENTMCNC: 29.6 GM/DL (ref 32.2–35.5)
MCV RBC AUTO: 89.6 FL (ref 81–99)
MONOCYTES # BLD: 15.6 %
MONOCYTES ABSOLUTE: 0.9 THOU/MM3 (ref 0.4–1.3)
NUCLEATED RED BLOOD CELLS: 0 /100 WBC
PLATELET # BLD: 242 THOU/MM3 (ref 130–400)
PMV BLD AUTO: 10.7 FL (ref 9.4–12.4)
POTASSIUM SERPL-SCNC: 3.7 MEQ/L (ref 3.5–5.2)
POTASSIUM SERPL-SCNC: 3.9 MEQ/L (ref 3.5–5.2)
RBC # BLD: 3.09 MILL/MM3 (ref 4.2–5.4)
SEG NEUTROPHILS: 52.1 %
SEGMENTED NEUTROPHILS ABSOLUTE COUNT: 3 THOU/MM3 (ref 1.8–7.7)
SODIUM BLD-SCNC: 140 MEQ/L (ref 135–145)
SODIUM BLD-SCNC: 141 MEQ/L (ref 135–145)
TRIGL SERPL-MCNC: 113 MG/DL (ref 0–199)
TROPONIN T: < 0.01 NG/ML
WBC # BLD: 5.7 THOU/MM3 (ref 4.8–10.8)

## 2020-03-10 PROCEDURE — 83735 ASSAY OF MAGNESIUM: CPT

## 2020-03-10 PROCEDURE — 2580000003 HC RX 258: Performed by: INTERNAL MEDICINE

## 2020-03-10 PROCEDURE — 2140000000 HC CCU INTERMEDIATE R&B

## 2020-03-10 PROCEDURE — 80048 BASIC METABOLIC PNL TOTAL CA: CPT

## 2020-03-10 PROCEDURE — 93307 TTE W/O DOPPLER COMPLETE: CPT

## 2020-03-10 PROCEDURE — 80061 LIPID PANEL: CPT

## 2020-03-10 PROCEDURE — 2500000003 HC RX 250 WO HCPCS: Performed by: INTERNAL MEDICINE

## 2020-03-10 PROCEDURE — 36415 COLL VENOUS BLD VENIPUNCTURE: CPT

## 2020-03-10 PROCEDURE — 6370000000 HC RX 637 (ALT 250 FOR IP): Performed by: INTERNAL MEDICINE

## 2020-03-10 PROCEDURE — 99233 SBSQ HOSP IP/OBS HIGH 50: CPT | Performed by: INTERNAL MEDICINE

## 2020-03-10 PROCEDURE — 85025 COMPLETE CBC W/AUTO DIFF WBC: CPT

## 2020-03-10 PROCEDURE — 85610 PROTHROMBIN TIME: CPT

## 2020-03-10 PROCEDURE — 94760 N-INVAS EAR/PLS OXIMETRY 1: CPT

## 2020-03-10 PROCEDURE — 82272 OCCULT BLD FECES 1-3 TESTS: CPT

## 2020-03-10 PROCEDURE — 80162 ASSAY OF DIGOXIN TOTAL: CPT

## 2020-03-10 RX ORDER — SPIRONOLACTONE 25 MG/1
25 TABLET ORAL DAILY
Status: DISCONTINUED | OUTPATIENT
Start: 2020-03-11 | End: 2020-03-11 | Stop reason: HOSPADM

## 2020-03-10 RX ORDER — BUMETANIDE 1 MG/1
1 TABLET ORAL DAILY
Status: DISCONTINUED | OUTPATIENT
Start: 2020-03-10 | End: 2020-03-11 | Stop reason: HOSPADM

## 2020-03-10 RX ORDER — BUMETANIDE 1 MG/1
1 TABLET ORAL DAILY
Status: DISCONTINUED | OUTPATIENT
Start: 2020-03-11 | End: 2020-03-10

## 2020-03-10 RX ADMIN — SPIRONOLACTONE 25 MG: 25 TABLET ORAL at 08:53

## 2020-03-10 RX ADMIN — AMIODARONE HYDROCHLORIDE 200 MG: 200 TABLET ORAL at 08:53

## 2020-03-10 RX ADMIN — SACUBITRIL AND VALSARTAN 2 TABLET: 49; 51 TABLET, FILM COATED ORAL at 08:54

## 2020-03-10 RX ADMIN — OXYCODONE HYDROCHLORIDE AND ACETAMINOPHEN 1000 MG: 500 TABLET ORAL at 20:12

## 2020-03-10 RX ADMIN — PANTOPRAZOLE SODIUM 40 MG: 40 TABLET, DELAYED RELEASE ORAL at 20:12

## 2020-03-10 RX ADMIN — PANTOPRAZOLE SODIUM 40 MG: 40 TABLET, DELAYED RELEASE ORAL at 08:54

## 2020-03-10 RX ADMIN — Medication 10 ML: at 20:14

## 2020-03-10 RX ADMIN — OXYCODONE HYDROCHLORIDE AND ACETAMINOPHEN 1000 MG: 500 TABLET ORAL at 08:54

## 2020-03-10 RX ADMIN — LEVOTHYROXINE SODIUM 125 MCG: 125 TABLET ORAL at 07:44

## 2020-03-10 RX ADMIN — DIGOXIN 125 MCG: 125 TABLET ORAL at 17:49

## 2020-03-10 RX ADMIN — BUMETANIDE 0.5 MG: 0.25 INJECTION INTRAMUSCULAR; INTRAVENOUS at 08:54

## 2020-03-10 RX ADMIN — BUMETANIDE 1 MG: 1 TABLET ORAL at 14:15

## 2020-03-10 RX ADMIN — Medication 10 ML: at 08:58

## 2020-03-10 RX ADMIN — METOPROLOL SUCCINATE 200 MG: 100 TABLET, FILM COATED, EXTENDED RELEASE ORAL at 08:53

## 2020-03-10 RX ADMIN — ACETAMINOPHEN 650 MG: 325 TABLET ORAL at 07:41

## 2020-03-10 ASSESSMENT — PAIN SCALES - GENERAL
PAINLEVEL_OUTOF10: 0
PAINLEVEL_OUTOF10: 6
PAINLEVEL_OUTOF10: 6
PAINLEVEL_OUTOF10: 1
PAINLEVEL_OUTOF10: 0
PAINLEVEL_OUTOF10: 0

## 2020-03-10 ASSESSMENT — PAIN DESCRIPTION - PAIN TYPE: TYPE: ACUTE PAIN

## 2020-03-10 ASSESSMENT — PAIN DESCRIPTION - LOCATION: LOCATION: HEAD

## 2020-03-10 NOTE — CARE COORDINATION
03/10/20  140Francisco  Spoke with patient to f/u on discharge needs. Reports she lives at home with  and daughter. She uses a cane and is independent. She has transportation to and from \[facility]\"". She follows with a PCP. Does not feel HH or dme is needed. Encouraged patient to notify staff if needs arise, verbalizes understanding. CM to continue following.

## 2020-03-10 NOTE — PROGRESS NOTES
Assessment and Plan:        1. Sob- likely due to lv dysfx- on max therapy; she feels worse since on entresto- will go back to quinapril after washout 36 hr.  Her ct does not show significant congestion. She has no obstructive cad by cath 5.19.  2. Anemia- iron low- will check stools. Follow up as outpt      CC:  Sob several weeks  HPI:  Pt with long hx of dilated cm, has biv pacer, on standard rx for chf including entresto; feels more sob past few weeks. Wonders if due to [employer]. No weight change  ROS (12 point review of systems completed. Pertinent positives noted.  Otherwise ROS is negative) :   PMH:  Per HPI  SHX:   , nonsmoker  FHX: heart, cancer, dm  Allergies: See above    Medications:       [START ON 3/11/2020] spironolactone  25 mg Oral Daily    [START ON 3/11/2020] bumetanide  1 mg Oral Daily    amiodarone  200 mg Oral Daily    vitamin C  1,000 mg Oral BID    digoxin  125 mcg Oral Every Other Day    levothyroxine  125 mcg Oral Daily    metoprolol succinate  200 mg Oral Daily    pantoprazole  40 mg Oral BID    sodium chloride flush  10 mL Intravenous 2 times per day    polyethylene glycol  17 g Oral Daily    warfarin (COUMADIN) daily dosing (placeholder)   Other RX Placeholder       Vital Signs:   /62   Pulse 65   Temp 97.8 °F (36.6 °C) (Oral)   Resp 18   Ht 5' 2\" (1.575 m)   Wt 181 lb 9.6 oz (82.4 kg)   SpO2 95%   BMI 33.22 kg/m²      Intake/Output Summary (Last 24 hours) at 3/10/2020 1000  Last data filed at 3/10/2020 0429  Gross per 24 hour   Intake 200 ml   Output 700 ml   Net -500 ml        Physical Examination: General appearance - alert, well appearing, and in no distress  Mental status - alert, oriented to person, place, and time  Neck - supple, no significant adenopathy, no JVD, or carotid bruits  Chest - clear to auscultation, no wheezes, rales or rhonchi, symmetric air entry  Heart - normal rate, regular rhythm, normal S1, S2, no murmurs, rubs, clicks or gallops  Abdomen - soft, nontender, nondistended, no masses or organomegaly  Neurological - alert, oriented, normal speech, no focal findings or movement disorder noted  Musculoskeletal - no joint tenderness, deformity or swelling  Extremities - peripheral pulses normal, no pedal edema, no clubbing or cyanosis  Skin - normal coloration and turgor, no rashes, no suspicious skin lesions noted    Data: (All radiographs, tracings, PFTs, and imaging are personally viewed and interpreted unless otherwise noted).     Reviewed labs ct; her T4 is elevated; will check tsh      Electronically signed by Sebastian Ortega MD on 3/10/2020 at 10:00 AM

## 2020-03-10 NOTE — CARE COORDINATION
3/10/20, 9:45 AM EDT  DISCHARGE PLANNING EVALUATION:    Cristopher Henry [address]70       Admitted from: ED 3/9/2020/ [facility] day: 1   Location: [address] [place] [address]- Reason for admit: CHF with unknown LVEF ([address] [I50.9] Status: IP  Admit order signed?: yes  PMH:  has a past medical history of AICD (automatic cardioverter/defibrillator) present, Arthritis, Atrial fibrillation ([address] Biventricular ICD (implantable cardiac defibrillator) in place, C. difficile diarrhea, CAD (coronary artery disease), Cancer ([address] CHF (congestive heart failure) ([address] Diverticulitis, GERD (gastroesophageal reflux disease), Hiatal hernia, Hyperlipidemia, Hypertension, Menopause, NAFLD (nonalcoholic fatty liver disease), Nonischemic cardiomyopathy ([address] Pacemaker, Retroperitoneal hemorrhage, and Thyroid disease. Procedure: None  Pertinent abnormal Imaging: 3/9 CT chest - Small right pleural effusion with adjacent atelectasis. Cardiomegaly. There is right infrahilar probable atelectasis versus infiltrate. Medications:  Scheduled Meds:   [START ON 3/11/2020] spironolactone  25 mg Oral Daily    amiodarone  200 mg Oral Daily    vitamin C  1,000 mg Oral BID    digoxin  125 mcg Oral Every Other Day    levothyroxine  125 mcg Oral Daily    metoprolol succinate  200 mg Oral Daily    pantoprazole  40 mg Oral BID    sacubitril-valsartan  2 tablet Oral BID    sodium chloride flush  10 mL Intravenous 2 times per day    polyethylene glycol  17 g Oral Daily    bumetanide  0.5 mg Intravenous Q12H    warfarin (COUMADIN) daily dosing (placeholder)   Other RX Placeholder     Continuous Infusions:   Pertinent Info/Orders/Treatment Plan:  Admitted through ED with CP, SOB. Known EF of 20-25%, has AICD. Troponins negative. BNP 4154. Hgb 9.5 and 8.2. Now on room air. Bumex iv q12hr. Entresto. HF RN and Dietitian consulted. Diet: DIET GENERAL;   Smoking status:  reports that she has never smoked.  She has never used smokeless tobacco.   PCP: Haresh Caputo, MD  Readmission 30 days or less: No  Readmission Risk Score: 20%    Discharge Planning Evaluation  Current Residence:  Private Residence  Living Arrangements:  Spouse/Significant Other, Children   Support Systems:  Spouse/Significant Other, Children  Current Services PTA:     Potential Assistance Needed:  Outpatient PT/OT  Potential Assistance Purchasing Medications:  No  Does patient want to participate in local refill/ meds to beds program?  Yes  Type of Home Care Services:  None  Patient expects to be discharged to:  home   Expected Discharge date:  03/12/20  Follow Up Appointment: Best Day/ Time: Tuesday AM    Patient Goals/Plan/Treatment Preferences: Meet and greet done by Boo Donnelly, please see note dated 3/10/2020. No needs. Transportation/Food Security/Housekeeping Addressed:  No issues identified.     Evaluation: no

## 2020-03-10 NOTE — PROGRESS NOTES
Clinical Pharmacy Note    Warfarin consult follow-up    Recent Labs     03/10/20  0412   INR 3.61*     Recent Labs     03/09/20  1219 03/10/20  0412   HGB 9.5* 8.2*   HCT 32.3* 27.7*    242       Significant Drug-Drug Interactions:  New warfarin drug-drug interactions: none  Discontinued drug-drug interactions: aspirin 81mg x1 dose 3/9/20  Current warfarin drug-drug interactions: amiodarone (HM), levothyroxine (HM)  Note: new bumex IV 0.5mg Q12H      Date INR Warfarin Dose   3/9/2020 3.28 1 mg     3/10/2020 3.61  No warfarin                                                 Notes:                     Daily PT/INR until stable within therapeutic range. H/H noted. Will continue to follow and monitor.    Shannan Tellez, PharmD 3/10/2020 7:34 AM

## 2020-03-10 NOTE — PLAN OF CARE
Problem: Falls - Risk of:  Goal: Will remain free from falls  Description: Will remain free from falls  Outcome: Met This Shift  Note: Continue with fall precautions, up with assist.      Problem: Falls - Risk of:  Goal: Absence of physical injury  Description: Absence of physical injury  Outcome: Met This Shift     Problem: Pain:  Goal: Pain level will decrease  Description: Pain level will decrease  Outcome: Met This Shift  Note: Denies pain. Problem: OXYGENATION/RESPIRATORY FUNCTION  Goal: Patient will maintain patent airway  Outcome: Met This Shift  Note: Continues on room air. Denies SOB. Problem: OXYGENATION/RESPIRATORY FUNCTION  Goal: Patient will achieve/maintain normal respiratory rate/effort  Description: Respiratory rate and effort will be within normal limits for the patient  Outcome: Met This Shift     Problem: HEMODYNAMIC STATUS  Goal: Patient has stable vital signs and fluid balance  Outcome: Ongoing  Note: BP stable, crackles to lung bases at times. Trace non-pitting ankle edema. Continue to assess lungs, assess for swelling. Problem: FLUID AND ELECTROLYTE IMBALANCE  Goal: Fluid and electrolyte balance are achieved/maintained  Outcome: Ongoing  Note: Continue to assess labs. Problem: Pain:  Goal: Control of acute pain  Description: Control of acute pain  Note: Denies pain. Problem: Pain:  Goal: Control of chronic pain  Description: Control of chronic pain  Note: Denies pain. Care plan reviewed with patient. Patient verbalize understanding of the plan of care and contribute to goal setting.

## 2020-03-10 NOTE — PROGRESS NOTES
Clinical Pharmacy Note    Marco Antonio Mcfarland is a 70 y.o. female for whom pharmacy has been asked to manage warfarin therapy. Reason for Admission: CHF    Consulting Physician: Dr. Tressie Romberg  Warfarin dose prior to admission: alternating 3 mg /1.5 mg   Warfarin indication: afib  Target INR range: 2-3   Outpatient warfarin provider: Dr. Derik Hercules     Past Medical History:   Diagnosis Date    AICD (automatic cardioverter/defibrillator) present 05/1999    Arthritis 5/21/2019    Atrial fibrillation ([facility] [address]     chronic    Biventricular ICD (implantable cardiac defibrillator) in place 10/09    C. difficile diarrhea     CAD (coronary artery disease)     Cancer ([facility] [address]     right hand    CHF (congestive heart failure) ([facility] [address]     Diverticulitis 01/2013    GERD (gastroesophageal reflux disease)     hiatal hernia    Hiatal hernia     Hyperlipidemia     Hypertension     Menopause     1994    NAFLD (nonalcoholic fatty liver disease) 1/31/2017    Nonischemic cardiomyopathy ([facility] [address]     chronic    Pacemaker     Retroperitoneal hemorrhage 07/99    transfusion x3    Thyroid disease                 Recent Labs     03/09/20  1215   INR 3.28*     Recent Labs     03/09/20  1219   HGB 9.5*   HCT 32.3*          Current warfarin drug-drug interactions: amiodarone (HM), levothyroxine (HM)      Date INR Warfarin Dose   3/9/2020 3.28 1 mg                                    Daily PT/INR until stable within therapeutic range. Thank you for the consult.      Jacques Farris, PharmD, BCPS  3/9/2020 8:35 PM

## 2020-03-11 ENCOUNTER — TELEPHONE (OUTPATIENT)
Dept: FAMILY MEDICINE CLINIC | Age: 80
End: 2020-03-11

## 2020-03-11 VITALS
BODY MASS INDEX: 33.36 KG/M2 | TEMPERATURE: 98.6 F | WEIGHT: 181.3 LBS | SYSTOLIC BLOOD PRESSURE: 110 MMHG | OXYGEN SATURATION: 98 % | HEART RATE: 61 BPM | HEIGHT: 62 IN | DIASTOLIC BLOOD PRESSURE: 54 MMHG | RESPIRATION RATE: 18 BRPM

## 2020-03-11 LAB
ANION GAP SERPL CALCULATED.3IONS-SCNC: 12 MEQ/L (ref 8–16)
BUN BLDV-MCNC: 23 MG/DL (ref 7–22)
CALCIUM SERPL-MCNC: 8.3 MG/DL (ref 8.5–10.5)
CHLORIDE BLD-SCNC: 103 MEQ/L (ref 98–111)
CO2: 27 MEQ/L (ref 23–33)
CREAT SERPL-MCNC: 1.2 MG/DL (ref 0.4–1.2)
GFR SERPL CREATININE-BSD FRML MDRD: 44 ML/MIN/1.73M2
GLUCOSE BLD-MCNC: 81 MG/DL (ref 70–108)
HCT VFR BLD CALC: 28.2 % (ref 37–47)
HEMOGLOBIN: 8.4 GM/DL (ref 12–16)
INR BLD: 3.63 (ref 0.85–1.13)
MAGNESIUM: 2 MG/DL (ref 1.6–2.4)
POTASSIUM SERPL-SCNC: 4 MEQ/L (ref 3.5–5.2)
PRO-BNP: 3223 PG/ML (ref 0–1800)
SODIUM BLD-SCNC: 142 MEQ/L (ref 135–145)
TSH SERPL DL<=0.05 MIU/L-ACNC: 8.6 UIU/ML (ref 0.4–4.2)

## 2020-03-11 PROCEDURE — 85018 HEMOGLOBIN: CPT

## 2020-03-11 PROCEDURE — 36415 COLL VENOUS BLD VENIPUNCTURE: CPT

## 2020-03-11 PROCEDURE — 83735 ASSAY OF MAGNESIUM: CPT

## 2020-03-11 PROCEDURE — 84443 ASSAY THYROID STIM HORMONE: CPT

## 2020-03-11 PROCEDURE — 85014 HEMATOCRIT: CPT

## 2020-03-11 PROCEDURE — 85610 PROTHROMBIN TIME: CPT

## 2020-03-11 PROCEDURE — 6370000000 HC RX 637 (ALT 250 FOR IP): Performed by: INTERNAL MEDICINE

## 2020-03-11 PROCEDURE — 83880 ASSAY OF NATRIURETIC PEPTIDE: CPT

## 2020-03-11 PROCEDURE — 99239 HOSP IP/OBS DSCHRG MGMT >30: CPT | Performed by: FAMILY MEDICINE

## 2020-03-11 PROCEDURE — 80048 BASIC METABOLIC PNL TOTAL CA: CPT

## 2020-03-11 PROCEDURE — 2580000003 HC RX 258: Performed by: INTERNAL MEDICINE

## 2020-03-11 RX ORDER — QUINAPRIL 5 1/1
5 TABLET ORAL DAILY
Qty: 30 TABLET | Refills: 0 | Status: SHIPPED | OUTPATIENT
Start: 2020-03-13 | End: 2020-04-01 | Stop reason: SDUPTHER

## 2020-03-11 RX ORDER — BUMETANIDE 1 MG/1
1 TABLET ORAL DAILY
Qty: 30 TABLET | Refills: 0 | Status: SHIPPED | OUTPATIENT
Start: 2020-03-12 | End: 2020-04-01 | Stop reason: SDUPTHER

## 2020-03-11 RX ADMIN — Medication 10 ML: at 08:40

## 2020-03-11 RX ADMIN — PANTOPRAZOLE SODIUM 40 MG: 40 TABLET, DELAYED RELEASE ORAL at 08:36

## 2020-03-11 RX ADMIN — METOPROLOL SUCCINATE 200 MG: 100 TABLET, FILM COATED, EXTENDED RELEASE ORAL at 08:36

## 2020-03-11 RX ADMIN — POLYETHYLENE GLYCOL 3350 17 G: 17 POWDER, FOR SOLUTION ORAL at 08:35

## 2020-03-11 RX ADMIN — SPIRONOLACTONE 25 MG: 25 TABLET ORAL at 08:36

## 2020-03-11 RX ADMIN — BUMETANIDE 1 MG: 1 TABLET ORAL at 08:36

## 2020-03-11 RX ADMIN — OXYCODONE HYDROCHLORIDE AND ACETAMINOPHEN 1000 MG: 500 TABLET ORAL at 08:36

## 2020-03-11 RX ADMIN — AMIODARONE HYDROCHLORIDE 200 MG: 200 TABLET ORAL at 08:36

## 2020-03-11 RX ADMIN — LEVOTHYROXINE SODIUM 125 MCG: 125 TABLET ORAL at 08:36

## 2020-03-11 ASSESSMENT — PAIN SCALES - GENERAL: PAINLEVEL_OUTOF10: 0

## 2020-03-11 NOTE — CARE COORDINATION
3/11/20, 12:40 PM EDT    DISCHARGE ONGOING EVALUATION:     [facility] day: 2  Location: [address] [place] [address]- Reason for admit: CHF with unknown LVEF ([facility] [I50.9]   Treatment Plan of Care: Hospitalist following. Orders received to discharge pt to home today. Barriers to Discharge: None  PCP: Elmira Cogan, MD  Readmission Risk Score: 24%  Patient Goals/Plan/Treatment Preferences: Pt lives at home with  and daughter. Denies any needs at discharge. States she is agreeable with going home today. 3/11/20, 12:42 PM EDT    Patient goals/plan/ treatment preferences discussed by  and . Patient goals/plan/ treatment preferences reviewed with patient/ family. Patient/ family verbalize understanding of discharge plan and are in agreement with goal/plan/treatment preferences. Understanding was demonstrated using the teach back method. AVS provided by RN at time of discharge, which includes all necessary medical information pertaining to the patients current course of illness, treatment, post-discharge goals of care, and treatment preferences.         IMM Letter  IMM Letter given to Patient/Family/Significant other/Guardian/POA/by[de-identified] Staff  IMM Letter date given[de-identified] 03/09/20  IMM Letter time given[de-identified] [id number]       Electronically signed by Bharati Henley RN on 3/11/2020 at 12:42 PM

## 2020-03-11 NOTE — PLAN OF CARE
Problem: Falls - Risk of:  Goal: Will remain free from falls  Description: Will remain free from falls  3/10/2020 2219 by Aixa Guerra RN  Outcome: Ongoing  Note: Assessment & interventions provided throughout shift. Bed locked & in low position, call light in reach, side-rails up x2, non-slip socks on when ambulating, reminded patient to use call light to call for assistance. Bed alarm on.   3/10/2020 1611 by Shelly Tyler RN  Outcome: Met This Shift  Note: Continue with fall precautions, up with assist.   Goal: Absence of physical injury  Description: Absence of physical injury  3/10/2020 2219 by Aixa Guerra RN  Outcome: Ongoing  3/10/2020 1611 by Shelly Tyler RN  Outcome: Met This Shift     Problem: Pain:  Goal: Pain level will decrease  Description: Pain level will decrease  3/10/2020 2219 by Aixa Guerra RN  Outcome: Ongoing  Note: Ongoing assessment & interventions provided throughout shift. Reminded patient to report any pain, pressure, or shortness of breath to the nurse. 3/10/2020 1611 by Shelly Tyler RN  Outcome: Met This Shift  Note: Denies pain. Goal: Control of acute pain  Description: Control of acute pain  3/10/2020 2219 by Aixa Guerra RN  Outcome: Ongoing  3/10/2020 1611 by Shelly Tyler RN  Note: Denies pain. Goal: Control of chronic pain  Description: Control of chronic pain  3/10/2020 2219 by Aixa Guerra RN  Outcome: Ongoing  3/10/2020 1611 by Shelly Tyler RN  Note: Denies pain. Problem: OXYGENATION/RESPIRATORY FUNCTION  Goal: Patient will maintain patent airway  3/10/2020 2219 by Aixa Guerra RN  Outcome: Ongoing  Note: Patient on RA. No distress noted. 3/10/2020 1611 by Shelly Tyler RN  Outcome: Met This Shift  Note: Continues on room air. Denies SOB.    Goal: Patient will achieve/maintain normal respiratory rate/effort  Description: Respiratory rate and effort will be within normal limits for the patient  3/10/2020 2219 by Aixa Guerra RN  Outcome: Ongoing  3/10/2020 1611 by Paloma Castorena RN  Outcome: Met This Shift     Problem: HEMODYNAMIC STATUS  Goal: Patient has stable vital signs and fluid balance  3/10/2020 2219 by Chip Frazier RN  Outcome: Ongoing  Note: BP and HR monitored routinely and as needed. 3/10/2020 1611 by Paloma Castorena RN  Outcome: Ongoing  Note: BP stable, crackles to lung bases at times. Trace non-pitting ankle edema. Continue to assess lungs, assess for swelling. Problem: FLUID AND ELECTROLYTE IMBALANCE  Goal: Fluid and electrolyte balance are achieved/maintained  3/10/2020 2219 by Chip Frazier RN  Outcome: Ongoing  3/10/2020 1611 by Paloma Castorena RN  Outcome: Ongoing  Note: Continue to assess labs. Problem: Safety:  Goal: Free from accidental physical injury  Description: Free from accidental physical injury  Outcome: Ongoing  Note: Assessment & interventions provided throughout shift. Bed locked & in low position, call light in reach, side-rails up x2, non-slip socks on when ambulating, reminded patient to use call light to call for assistance. Bed alarm on. Problem: Daily Care:  Goal: Daily care needs are met  Description: Daily care needs are met  Outcome: Ongoing  Note: Patient able to complete ADLs. Assistance provided as needed. Care plan reviewed with patient. Patient verbalizes understanding of the care plan and contributed to goal setting. Yes

## 2020-03-11 NOTE — PROGRESS NOTES
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 Weight (3/11) 181#4.8oz, on bumex. · Verbally reviewed information with patient: diet guidelines for CHF  · Written educational materials provided. · Contact name and number provided. · Refer to Patient Education activity for more details.     Electronically signed by Everardo Mendoza RD, LD on 3/11/20 at 9:57 AM EDT    Contact Number: [phone]

## 2020-03-11 NOTE — DISCHARGE SUMMARY
(PROBIOTIC FORMULA PO)  Take 1 tablet by mouth daily. quinapril (ACCUPRIL) 5 MG tablet  Take 1 tablet by mouth daily -- start 3/13/2020             spironolactone (ALDACTONE) 25 MG tablet  Take 1 tablet by mouth every other day             warfarin (COUMADIN) 3 MG tablet  3 mg every Monday and Friday, 1.5 mg the other 5 days                 Patient Instructions:    Discharge lab work: BMP, CBC 3/17/2020  Activity: activity as tolerated  Diet: DIET GENERAL;    Code Status: Full Code    Follow-up visits:   Alfonso Arellano MD  [address] [facility] [address] [facility]          Alfonso Arellano, [address] [facility] [address]  [address], APRN - CNP  [address]  [phone]    On 3/23/2020  Monday [facility]. , CHF       Procedures:   -- Limited echo 3/10/2020 =  Summary   limited echo   Ejection fraction is visually estimated at 25%. There was severe global hypokinesis of the left ventricle. Left Ventricular size is Moderately increased . Severely dilated left atrium. Moderately dilated right ventricle. No evidence of any pericardial effusion.       Signature      ----------------------------------------------------------------   Electronically signed by Dede Hart MD (Interpreting   physician) on 03/11/2020 at 07:22 AM   ----------------------------------------------------------------      Consults:   IP CONSULT TO PHARMACY  IP CONSULT TO HEART FAILURE NURSE/COORDINATOR  IP CONSULT TO DIETITIAN      Examination:  Vitals:  Vitals:    03/10/20 2010 03/10/20 2258 03/11/20 0415 03/11/20 0814   BP: (!) 115/56 112/61 132/63 (!) 145/66   Pulse: 60 61 77 70   Resp: 16 16 16 18   Temp: 97.6 °F (36.4 °C) 97.4 °F (36.3 °C) 97 °F (36.1 °C) 97.7 °F (36.5 °C)   TempSrc: Oral Oral Oral Oral   SpO2: 97% 97% 97% 97%   Weight:   181 lb 4.8 oz (82.2 kg)    Height:         Weight: Weight: 181 lb 4.8 oz (82.2 kg)     24 hour intake/output:    Intake/Output Summary (Last 24 hours) at 3/11/2020 1047  Last data filed at 3/11/2020 0415  Gross per 24 hour   Intake 100 ml   Output 2200 ml   Net -2100 ml       General appearance - alert, well appearing, and in no distress and oriented to person, place, and time  Chest - no tachypnea, retractions or cyanosis  Heart - irregularly irregular rhythm with rate controlled  Abdomen - soft, nontender, nondistended, no masses or organomegaly  bowel sounds normal  Obese: No; Protuberant: No   Neurological - alert, oriented, normal speech, no focal findings or movement disorder noted, cranial nerves II through XII intact  Extremities - peripheral pulses normal, no pedal edema, no clubbing or cyanosis  Skin - normal coloration and turgor, no rashes, no suspicious skin lesions noted    Significant Diagnostics:   Radiology:   CT CHEST WO CONTRAST   Final Result      1. Small right pleural effusion with adjacent atelectasis. Cardiomegaly. There is right infrahilar probable atelectasis versus infiltrate. Correlation and follow-up advised. 2. mild nodular hepatic contour and increased attenuation of the liver correlation with LFTs. Possible underlying metabolic etiology. Further evaluation with nonemergent abdominal MRI can be performed as clinically warranted. **This report has been created using voice recognition software. It may contain minor errors which are inherent in voice recognition technology. **      Final report electronically signed by Dr. Beckie Guaman on 3/9/2020 1:35 PM          Labs:   Recent Results (from the past 72 hour(s))   EKG SOB    Collection Time: 03/09/20 12:09 PM   Result Value Ref Range    Ventricular Rate 68 BPM    Atrial Rate 59 BPM    QRS Duration 154 ms    Q-T Interval 466 ms    QTc Calculation (Bazett) 495 ms    R Axis -70 degrees    T Axis 88 degrees   Protime-INR    Collection Time: 03/09/20 12:15 PM   Result Value Ref Range INR 3.28 (H) 0.85 - 1.13   CBC auto differential    Collection Time: 03/09/20 12:19 PM   Result Value Ref Range    WBC 5.7 4.8 - 10.8 thou/mm3    RBC 3.52 (L) 4.20 - 5.40 mill/mm3    Hemoglobin 9.5 (L) 12.0 - 16.0 gm/dl    Hematocrit 32.3 (L) 37.0 - 47.0 %    MCV 91.8 81.0 - 99.0 fL    MCH 27.0 26.0 - 33.0 pg    MCHC 29.4 (L) 32.2 - 35.5 gm/dl    RDW-CV 16.2 (H) 11.5 - 14.5 %    RDW-SD 53.6 (H) 35.0 - 45.0 fL    Platelets 791 209 - 249 thou/mm3    MPV 10.7 9.4 - 12.4 fL    Seg Neutrophils 65.5 %    Lymphocytes 18.1 %    Monocytes 12.8 %    Eosinophils 2.1 %    Basophils 1.1 %    Immature Granulocytes 0.4 %    Segs Absolute 3.7 1.8 - 7.7 thou/mm3    Lymphocytes Absolute 1.0 1.0 - 4.8 thou/mm3    Monocytes Absolute 0.7 0.4 - 1.3 thou/mm3    Eosinophils Absolute 0.1 0.0 - 0.4 thou/mm3    Basophils Absolute 0.1 0.0 - 0.1 thou/mm3    Immature Grans (Abs) 0.02 0.00 - 0.07 thou/mm3    nRBC 0 /100 wbc   Basic Metabolic Panel    Collection Time: 03/09/20 12:19 PM   Result Value Ref Range    Sodium 142 135 - 145 meq/L    Potassium 4.8 3.5 - 5.2 meq/L    Chloride 105 98 - 111 meq/L    CO2 25 23 - 33 meq/L    Glucose 133 (H) 70 - 108 mg/dL    BUN 24 (H) 7 - 22 mg/dL    CREATININE 1.2 0.4 - 1.2 mg/dL    Calcium 9.2 8.5 - 10.5 mg/dL   Brain Natriuretic Peptide    Collection Time: 03/09/20 12:19 PM   Result Value Ref Range    Pro-BNP 4154.0 (H) 0.0 - 1800.0 pg/mL   Troponin    Collection Time: 03/09/20 12:19 PM   Result Value Ref Range    Troponin T < 0.010 ng/ml   Lipase    Collection Time: 03/09/20 12:19 PM   Result Value Ref Range    Lipase 36.8 5.6 - 51.3 U/L   Anion Gap    Collection Time: 03/09/20 12:19 PM   Result Value Ref Range    Anion Gap 12.0 8.0 - 16.0 meq/L   Glomerular Filtration Rate, Estimated    Collection Time: 03/09/20 12:19 PM   Result Value Ref Range    Est, Glom Filt Rate 44 (A) ml/min/1.73m2   Osmolality    Collection Time: 03/09/20 12:19 PM   Result Value Ref Range    Osmolality Calc 289.1 275.0 - 300 mOsmol/kg   Hepatic Function Panel    Collection Time: 03/09/20 12:19 PM   Result Value Ref Range    Alb 3.9 3.5 - 5.1 g/dL    Total Bilirubin 0.5 0.3 - 1.2 mg/dL    Bilirubin, Direct <0.2 0.0 - 0.3 mg/dL    Alkaline Phosphatase 131 (H) 38 - 126 U/L    AST 45 (H) 5 - 40 U/L    ALT 22 11 - 66 U/L    Total Protein 7.5 6.1 - 8.0 g/dL   Urine reflex to culture    Collection Time: 03/09/20  1:11 PM   Result Value Ref Range    Glucose, Ur NEGATIVE NEGATIVE mg/dl    Bilirubin Urine NEGATIVE NEGATIVE    Ketones, Urine NEGATIVE NEGATIVE    Specific Gravity, Urine 1.008 1.002 - 1.03    Blood, Urine NEGATIVE NEGATIVE    pH, UA 8.0 5.0 - 9.0    Protein, UA NEGATIVE NEGATIVE    Urobilinogen, Urine 0.2 0.0 - 1.0 eu/dl    Nitrite, Urine NEGATIVE NEGATIVE    Leukocyte Esterase, Urine NEGATIVE NEGATIVE    Color, UA YELLOW STRAW-YELL    Character, Urine CLEAR CLEAR-SL C   Troponin    Collection Time: 03/09/20  3:07 PM   Result Value Ref Range    Troponin T < 0.010 ng/ml   T4, Free    Collection Time: 03/09/20  7:40 PM   Result Value Ref Range    T4 Free 2.00 (H) 0.93 - 1.76 ng/dL   Iron and TIBC    Collection Time: 03/09/20  7:40 PM   Result Value Ref Range    Iron 30 (L) 50 - 170 ug/dL    TIBC 370 171 - 450 ug/dL   Troponin    Collection Time: 03/09/20  7:40 PM   Result Value Ref Range    Troponin T < 0.010 ng/ml   Troponin    Collection Time: 03/09/20 11:18 PM   Result Value Ref Range    Troponin T < 0.010 ng/ml   Basic Metabolic Panel    Collection Time: 03/10/20  4:12 AM   Result Value Ref Range    Sodium 140 135 - 145 meq/L    Potassium 3.7 3.5 - 5.2 meq/L    Chloride 104 98 - 111 meq/L    CO2 24 23 - 33 meq/L    Glucose 82 70 - 108 mg/dL    BUN 25 (H) 7 - 22 mg/dL    CREATININE 1.2 0.4 - 1.2 mg/dL    Calcium 8.5 8.5 - 10.5 mg/dL   Magnesium    Collection Time: 03/10/20  4:12 AM   Result Value Ref Range    Magnesium 2.0 1.6 - 2.4 mg/dL   Lipid panel - fasting    Collection Time: 03/10/20  4:12 AM   Result Value Ref Range Cholesterol, Total 221 (H) 100 - 199 mg/dL    Triglycerides 113 0 - 199 mg/dL    HDL 40 mg/dL    LDL Calculated 158 mg/dL   CBC auto differential    Collection Time: 03/10/20  4:12 AM   Result Value Ref Range    WBC 5.7 4.8 - 10.8 thou/mm3    RBC 3.09 (L) 4.20 - 5.40 mill/mm3    Hemoglobin 8.2 (L) 12.0 - 16.0 gm/dl    Hematocrit 27.7 (L) 37.0 - 47.0 %    MCV 89.6 81.0 - 99.0 fL    MCH 26.5 26.0 - 33.0 pg    MCHC 29.6 (L) 32.2 - 35.5 gm/dl    RDW-CV 16.2 (H) 11.5 - 14.5 %    RDW-SD 53.2 (H) 35.0 - 45.0 fL    Platelets 155 292 - 957 thou/mm3    MPV 10.7 9.4 - 12.4 fL    Seg Neutrophils 52.1 %    Lymphocytes 28.2 %    Monocytes 15.6 %    Eosinophils 2.6 %    Basophils 1.1 %    Immature Granulocytes 0.4 %    Segs Absolute 3.0 1.8 - 7.7 thou/mm3    Lymphocytes Absolute 1.6 1.0 - 4.8 thou/mm3    Monocytes Absolute 0.9 0.4 - 1.3 thou/mm3    Eosinophils Absolute 0.1 0.0 - 0.4 thou/mm3    Basophils Absolute 0.1 0.0 - 0.1 thou/mm3    Immature Grans (Abs) 0.02 0.00 - 0.07 thou/mm3    nRBC 0 /100 wbc   Protime-INR    Collection Time: 03/10/20  4:12 AM   Result Value Ref Range    INR 3.61 (H) 0.85 - 1.13   Anion Gap    Collection Time: 03/10/20  4:12 AM   Result Value Ref Range    Anion Gap 12.0 8.0 - 16.0 meq/L   Glomerular Filtration Rate, Estimated    Collection Time: 03/10/20  4:12 AM   Result Value Ref Range    Est, Glom Filt Rate 44 (A) ml/min/1.73m2   Digoxin Level    Collection Time: 03/10/20 10:40 AM   Result Value Ref Range    Digoxin Lvl 0.8 0.5 - 2.0 ng/mL   Basic Metabolic Panel    Collection Time: 03/10/20 10:40 AM   Result Value Ref Range    Sodium 141 135 - 145 meq/L    Potassium 3.9 3.5 - 5.2 meq/L    Chloride 103 98 - 111 meq/L    CO2 26 23 - 33 meq/L    Glucose 115 (H) 70 - 108 mg/dL    BUN 25 (H) 7 - 22 mg/dL    CREATININE 1.3 (H) 0.4 - 1.2 mg/dL    Calcium 8.9 8.5 - 10.5 mg/dL   Anion Gap    Collection Time: 03/10/20 10:40 AM   Result Value Ref Range    Anion Gap 12.0 8.0 - 16.0 meq/L   Glomerular Filtration

## 2020-03-11 NOTE — PROGRESS NOTES
Discharge teaching and instructions for diagnosis/procedure of CHF completed with patient using teachback method. AVS reviewed. Patient voiced understanding regarding prescriptions, follow up appointments, and care of self at home. Discharged in a wheelchair to  home with support per family. Patient stable at discharge.

## 2020-03-12 ENCOUNTER — TELEPHONE (OUTPATIENT)
Dept: FAMILY MEDICINE CLINIC | Age: 80
End: 2020-03-12

## 2020-03-13 ENCOUNTER — HOSPITAL ENCOUNTER (OUTPATIENT)
Dept: PULMONOLOGY | Age: 80
Discharge: HOME | End: 2020-03-13
Payer: MEDICARE

## 2020-03-13 PROCEDURE — 94729 DIFFUSING CAPACITY: CPT

## 2020-03-13 PROCEDURE — 94010 BREATHING CAPACITY TEST: CPT

## 2020-03-16 ENCOUNTER — TELEPHONE (OUTPATIENT)
Dept: FAMILY MEDICINE CLINIC | Age: 80
End: 2020-03-16

## 2020-03-16 NOTE — TELEPHONE ENCOUNTER
Daughter/PINKY Cook for pt Flora Swenson: today's DAVID appt cancelled, but pt wants to know about her INR, which she was going to have rechecked today? Pls advise. Pls call Lupe to reply to [phone].

## 2020-03-16 NOTE — TELEPHONE ENCOUNTER
Called daughter and stated that patient is alternating 3 mg and 1.5 mg, not 4.5 mg. Sorry for the confusion.  How would you like the new dosing

## 2020-03-17 ENCOUNTER — TELEPHONE (OUTPATIENT)
Dept: INTERNAL MEDICINE CLINIC | Age: 80
End: 2020-03-17

## 2020-03-17 RX ORDER — ASCORBIC ACID 500 MG
500 TABLET ORAL EVERY OTHER DAY
COMMUNITY
End: 2020-07-30 | Stop reason: SDUPTHER

## 2020-03-17 RX ORDER — FERROUS SULFATE 325(65) MG
325 TABLET ORAL EVERY OTHER DAY
COMMUNITY

## 2020-03-17 NOTE — TELEPHONE ENCOUNTER
Pt was notified dlco was a little lower but may be affected by anemia and needs to go on iron 325 mg. Qod with vitamin C and repeat H&H in 1 month. Pt verbalizes understanding.

## 2020-03-19 ENCOUNTER — OFFICE VISIT (OUTPATIENT)
Dept: FAMILY MEDICINE CLINIC | Age: 80
End: 2020-03-19
Payer: MEDICARE

## 2020-03-19 ENCOUNTER — TELEPHONE (OUTPATIENT)
Dept: FAMILY MEDICINE CLINIC | Age: 80
End: 2020-03-19

## 2020-03-19 VITALS
DIASTOLIC BLOOD PRESSURE: 60 MMHG | BODY MASS INDEX: 32.3 KG/M2 | TEMPERATURE: 98.2 F | HEART RATE: 60 BPM | RESPIRATION RATE: 16 BRPM | WEIGHT: 176.6 LBS | SYSTOLIC BLOOD PRESSURE: 122 MMHG

## 2020-03-19 PROBLEM — I20.89 OTHER FORMS OF ANGINA PECTORIS: Status: ACTIVE | Noted: 2019-05-13

## 2020-03-19 PROBLEM — I20.8 OTHER FORMS OF ANGINA PECTORIS (HCC): Status: ACTIVE | Noted: 2019-05-13

## 2020-03-19 PROBLEM — D64.9 ANEMIA: Status: ACTIVE | Noted: 2020-03-19

## 2020-03-19 LAB
INTERNATIONAL NORMALIZATION RATIO, POC: 2.1
PROTHROMBIN TIME, POC: NORMAL

## 2020-03-19 PROCEDURE — 85610 PROTHROMBIN TIME: CPT | Performed by: FAMILY MEDICINE

## 2020-03-19 PROCEDURE — 1111F DSCHRG MED/CURRENT MED MERGE: CPT | Performed by: FAMILY MEDICINE

## 2020-03-19 PROCEDURE — 99495 TRANSJ CARE MGMT MOD F2F 14D: CPT | Performed by: FAMILY MEDICINE

## 2020-03-19 ASSESSMENT — ENCOUNTER SYMPTOMS
CONSTIPATION: 0
BACK PAIN: 0
COUGH: 0
WHEEZING: 0
BLOOD IN STOOL: 0
SORE THROAT: 0
DIARRHEA: 0
RHINORRHEA: 0
EYE PAIN: 0
VOMITING: 0
CHEST TIGHTNESS: 0
SHORTNESS OF BREATH: 1
ABDOMINAL PAIN: 0
NAUSEA: 0

## 2020-03-19 NOTE — PROGRESS NOTES
Post-Discharge Transitional Care Management Services or Hospital Follow Up      Cristopher Henry [address]   YOB: 1940    Date of Office Visit:  3/19/2020  Date of Hospital Admission: 3/9/20  Date of Hospital Discharge: 3/11/20  Readmission Risk Score(high >=14%.  Medium >=10%):Readmission Risk Score: 24      Care management risk score Rising risk (score 2-5) and Complex Care (Scores >=6): 3     Non face to face  following discharge, date last encounter closed (first attempt may have been earlier): 3/12/2020 11:01 AM 3/12/2020 11:01 AM    Call initiated 2 business days of discharge: Yes     Patient Active Problem List   Diagnosis    HTN (hypertension)    Hypercholesteremia    Nonischemic cardiomyopathy ([address]    Atrial fibrillation ([address]    Hypothyroidism    Biventricular implantable cardioverter-defibrillator in situ    ICD (implantable cardioverter-defibrillator) battery depletion    Abnormal LFTs    Benign essential HTN    NAFLD (nonalcoholic fatty liver disease)    Left ventricular systolic dysfunction    Ventricular tachycardia (paroxysmal) ([address]    Other forms of angina pectoris ([address]    Unstable angina pectoris ([address]    Anxiety    Arthritis    Ventricular arrhythmia    Chronic kidney disease, stage III (moderate) ([address]    Acute on chronic systolic CHF (congestive heart failure) ([address]    CHF with unknown LVEF (HCC)    Anemia       Allergies   Allergen Reactions    Ativan [Lorazepam]      Gets anxiety    Codeine     Nystatin      Mycostatin powder    Percocet [Oxycodone-Acetaminophen]     Relafen [Nabumetone]      Stomach upset    Tape [Adhesive Tape]     Celebrex [Celecoxib] Rash       Medications listed as ordered at the time of discharge from hospital   Flora Swenson   Home Medication Instructions ANGELES:    Printed on:03/19/20 9161   Medication Information                      acetaminophen (TYLENOL) 325 MG tablet  Take 650 mg by mouth every 6 hours as needed for Pain or sulfate (IRON 325) 325 (65 Fe) MG tablet Take 325 mg by mouth every other day      vitamin C (ASCORBIC ACID) 500 MG tablet Take 500 mg by mouth every other day Takes with iron      bumetanide (BUMEX) 1 MG tablet Take 1 tablet by mouth daily 30 tablet 0    quinapril (ACCUPRIL) 5 MG tablet Take 1 tablet by mouth daily -- start 3/13/2020 30 tablet 0    Alum & Mag Hydroxide-Simeth (MYLANTA PO) Take by mouth 1 hr after meals and at bedtime      digoxin (LANOXIN) 125 MCG tablet Take 125 mcg by mouth every other day      pantoprazole (PROTONIX) 20 MG tablet Take 2 tablets by mouth 2 times daily 360 tablet 3    spironolactone (ALDACTONE) 25 MG tablet Take 1 tablet by mouth every other day 45 tablet 3    levothyroxine (SYNTHROID) 125 MCG tablet TAKE 1 TABLET BY MOUTH DAILY 90 tablet 3    metoprolol succinate (TOPROL XL) 200 MG extended release tablet TAKE 1 TABLET BY MOUTH DAILY 90 tablet 3    amiodarone (CORDARONE) 200 MG tablet TAKE 1 TABLET BY MOUTH DAILY 90 tablet 3    warfarin (COUMADIN) 3 MG tablet 3 mg every Monday and Friday, 1.5 mg the other 5 days 30 tablet 0    acetaminophen (TYLENOL) 325 MG tablet Take 650 mg by mouth every 6 hours as needed for Pain or Fever      Handicap Placard MISC by Does not apply route Request parking placard due to medical conditions. Duration of 5 years. 1 each 0    Probiotic Product (PROBIOTIC FORMULA PO) Take 1 tablet by mouth daily.  Misc. Devices (LUMBAR SUPPORT CUSHION) MISC by Does not apply route. Dx: low back pain, sciatica. 1 each 0    Ascorbic Acid (VITAMIN C) 1000 MG tablet Take 1,000 mg by mouth 2 times daily.  Multiple Vitamin (MULTI-VITAMIN PO) Take  by mouth daily.  Calcium Carbonate (CALCIUM 600 PO) Take  by mouth 2 times daily.  BIOTIN PO Take 5,000 mg by mouth daily.           Medications patient taking as of now reconciled against medications ordered at time of hospital discharge: Yes    Chief Complaint   Patient presents with  Follow-Up from [address] [place] [address] 149 - D/C'ed 3/11/2020. .. Needs inr --taking 1.5mg qd       HPI  Presented to [facility] with SOB and chest pains    Inpatient course: Discharge summary reviewed- see chart. Interval history/Current status: diagnosed with heart failure. Echo with EF of 25%. Overall feeling better. No chest pains, minimal SOB. Good appetite, bowels are good. , nonsmoker, pmh reviewed. .   Reviewed BMi of 32. Encouraged diet, exercise and weight loss. Review of Systems   Constitutional: Negative for chills, fatigue, fever and unexpected weight change. HENT: Negative for congestion, ear pain, rhinorrhea and sore throat. Eyes: Negative for pain and visual disturbance. Respiratory: Positive for shortness of breath. Negative for cough, chest tightness and wheezing. Cardiovascular: Negative for chest pain and palpitations. Gastrointestinal: Negative for abdominal pain, blood in stool, constipation, diarrhea, nausea and vomiting. Genitourinary: Negative for difficulty urinating, frequency, hematuria and urgency. Musculoskeletal: Negative for back pain, joint swelling, myalgias and neck pain. Skin: Negative for rash. Neurological: Negative for dizziness and headaches. Hematological: Negative for adenopathy. Does not bruise/bleed easily. Psychiatric/Behavioral: Negative for behavioral problems and sleep disturbance. The patient is not nervous/anxious. Vitals:    03/19/20 1334   BP: 122/60   Pulse: 60   Resp: 16   Temp: 98.2 °F (36.8 °C)   TempSrc: Oral   Weight: 176 lb 9.6 oz (80.1 kg)     Body mass index is 32.3 kg/m². Wt Readings from Last 3 Encounters:   03/19/20 176 lb 9.6 oz (80.1 kg)   03/11/20 181 lb 4.8 oz (82.2 kg)   03/04/20 189 lb 3.2 oz (85.8 kg)     BP Readings from Last 3 Encounters:   03/19/20 122/60   03/11/20 (!) 110/54   03/04/20 136/70       Physical Exam  Vitals signs and nursing note reviewed.    Constitutional:       Appearance: She is

## 2020-03-19 NOTE — TELEPHONE ENCOUNTER
----- Message from Cyrus Enrique MD sent at 3/19/2020  1:40 PM EDT -----  INR good. Continue same dosage and recheck INR in 1 month.

## 2020-03-19 NOTE — PATIENT INSTRUCTIONS
· You have symptoms of a stroke. These may include:  ? Sudden numbness, tingling, weakness, or loss of movement in your face, arm, or leg, especially on only one side of your body. ? Sudden vision changes. ? Sudden trouble speaking. ? Sudden confusion or trouble understanding simple statements. ? Sudden problems with walking or balance. ? A sudden, severe headache that is different from past headaches.     · You passed out (lost consciousness).    Call your doctor now or seek immediate medical care if:    · You have new or increased shortness of breath.     · You feel dizzy or lightheaded, or you feel like you may faint.     · Your heart rate becomes irregular.     · You can feel your heart flutter in your chest or skip heartbeats. Tell your doctor if these symptoms are new or worse.    Watch closely for changes in your health, and be sure to contact your doctor if you have any problems. Where can you learn more? Go to https://[employer]. org and sign in to your [employer] account. Enter U020 in the [employer] box to learn more about \"Atrial Fibrillation: Care Instructions. \"     If you do not have an account, please click on the \"Sign Up Now\" link. Current as of: December 15, 2019Content Version: 12.4  © 2125-9282 [employer]wise, Incorporated. Care instructions adapted under license by [facility] ([facility]). If you have questions about a medical condition or this instruction, always ask your healthcare professional. [place] [address] any warranty or liability for your use of this information. Patient Education        Heart Failure: Care Instructions  Your Care Instructions    Heart failure occurs when your heart does not pump as much blood as the body needs. Failure does not mean that the heart has stopped pumping but rather that it is not pumping as well as it should. Over time, this causes fluid buildup in your lungs and other parts of your body.  Fluid buildup can have to limit liquids.   Angeline End    · Weigh yourself without clothing at the same time each day. Record your weight. Call your doctor if you have a sudden weight gain, such as more than 2 to 3 pounds in a day or 5 pounds in a week. (Your doctor may suggest a different range of weight gain.) A sudden weight gain may mean that your heart failure is getting worse.    Activity level    · Start light exercise (if your doctor says it is okay). Even if you can only do a small amount, exercise will help you get stronger, have more energy, and manage your weight and your stress. Walking is an easy way to get exercise. Start out by walking a little more than you did before. Bit by bit, increase the amount you walk.     · When you exercise, watch for signs that your heart is working too hard. You are pushing yourself too hard if you cannot talk while you are exercising. If you become short of breath or dizzy or have chest pain, stop, sit down, and rest.     · If you feel \"wiped out\" the day after you exercise, walk slower or for a shorter distance until you can work up to a better pace.     · Get enough rest at night. Sleeping with 1 or 2 pillows under your upper body and head may help you breathe easier.    Lifestyle changes    · Do not smoke. Smoking can make a heart condition worse. If you need help quitting, talk to your doctor about stop-smoking programs and medicines. These can increase your chances of quitting for good. Quitting smoking may be the most important step you can take to protect your heart.     · Limit alcohol to 2 drinks a day for men and 1 drink a day for women. Too much alcohol can cause health problems.     · Avoid getting sick from colds and the flu. Get a pneumococcal vaccine shot. If you have had one before, ask your doctor whether you need another dose. Get a flu shot each year. If you must be around people with colds or the flu, wash your hands often. When should you call for help?   Call 911 if

## 2020-04-01 ENCOUNTER — TELEPHONE (OUTPATIENT)
Dept: FAMILY MEDICINE CLINIC | Age: 80
End: 2020-04-01

## 2020-04-01 ENCOUNTER — NURSE ONLY (OUTPATIENT)
Dept: LAB | Age: 80
End: 2020-04-01

## 2020-04-01 LAB
ANION GAP SERPL CALCULATED.3IONS-SCNC: 15 MEQ/L (ref 8–16)
BASOPHILS # BLD: 1 %
BASOPHILS ABSOLUTE: 0.1 THOU/MM3 (ref 0–0.1)
BUN BLDV-MCNC: 31 MG/DL (ref 7–22)
CALCIUM SERPL-MCNC: 9.2 MG/DL (ref 8.5–10.5)
CHLORIDE BLD-SCNC: 102 MEQ/L (ref 98–111)
CO2: 24 MEQ/L (ref 23–33)
CREAT SERPL-MCNC: 1.2 MG/DL (ref 0.4–1.2)
EOSINOPHIL # BLD: 1.9 %
EOSINOPHILS ABSOLUTE: 0.1 THOU/MM3 (ref 0–0.4)
ERYTHROCYTE [DISTWIDTH] IN BLOOD BY AUTOMATED COUNT: 19.7 % (ref 11.5–14.5)
ERYTHROCYTE [DISTWIDTH] IN BLOOD BY AUTOMATED COUNT: 64.6 FL (ref 35–45)
GFR SERPL CREATININE-BSD FRML MDRD: 44 ML/MIN/1.73M2
GLUCOSE BLD-MCNC: 159 MG/DL (ref 70–108)
HCT VFR BLD CALC: 36.1 % (ref 37–47)
HEMOGLOBIN: 10.3 GM/DL (ref 12–16)
HYPOCHROMIA: PRESENT
IMMATURE GRANS (ABS): 0.03 THOU/MM3 (ref 0–0.07)
IMMATURE GRANULOCYTES: 0.5 %
INR BLD: 2.33 (ref 0.85–1.13)
LYMPHOCYTES # BLD: 16.3 %
LYMPHOCYTES ABSOLUTE: 1 THOU/MM3 (ref 1–4.8)
MCH RBC QN AUTO: 26.8 PG (ref 26–33)
MCHC RBC AUTO-ENTMCNC: 28.5 GM/DL (ref 32.2–35.5)
MCV RBC AUTO: 94 FL (ref 81–99)
MONOCYTES # BLD: 8.5 %
MONOCYTES ABSOLUTE: 0.5 THOU/MM3 (ref 0.4–1.3)
NUCLEATED RED BLOOD CELLS: 0 /100 WBC
PLATELET # BLD: 326 THOU/MM3 (ref 130–400)
PLATELET ESTIMATE: ADEQUATE
PMV BLD AUTO: 11 FL (ref 9.4–12.4)
POTASSIUM SERPL-SCNC: 4 MEQ/L (ref 3.5–5.2)
RBC # BLD: 3.84 MILL/MM3 (ref 4.2–5.4)
SCAN OF BLOOD SMEAR: NORMAL
SEG NEUTROPHILS: 71.8 %
SEGMENTED NEUTROPHILS ABSOLUTE COUNT: 4.5 THOU/MM3 (ref 1.8–7.7)
SODIUM BLD-SCNC: 141 MEQ/L (ref 135–145)
WBC # BLD: 6.2 THOU/MM3 (ref 4.8–10.8)

## 2020-04-01 RX ORDER — BUMETANIDE 1 MG/1
1 TABLET ORAL DAILY
Qty: 90 TABLET | Refills: 3 | Status: SHIPPED | OUTPATIENT
Start: 2020-04-01 | End: 2021-02-02 | Stop reason: SDUPTHER

## 2020-04-01 RX ORDER — QUINAPRIL 5 1/1
5 TABLET ORAL DAILY
Qty: 90 TABLET | Refills: 3 | Status: SHIPPED | OUTPATIENT
Start: 2020-04-01 | End: 2021-02-02 | Stop reason: SDUPTHER

## 2020-04-01 NOTE — TELEPHONE ENCOUNTER
----- Message from Susanna Glass MD sent at 4/1/2020  3:22 PM EDT -----  INR good. Continue same dosage and recheck INR in 1 month. CBC is improved, hgb 8.4 to 10.3. Continue iron. H&h in 1 month. BMP is stable.

## 2020-04-22 ENCOUNTER — TELEPHONE (OUTPATIENT)
Dept: CARDIOLOGY CLINIC | Age: 80
End: 2020-04-22

## 2020-04-22 NOTE — TELEPHONE ENCOUNTER
Medications have been reviewed and are up to date for VV scheduled with Hillary Patton on 4/23 at 10. Patient was asked to obtain a least 3 out of 5 of the listed vital signs: BP, HR, Temp, Ht and/or Wt and have ready for the provider.

## 2020-04-23 ENCOUNTER — TELEMEDICINE (OUTPATIENT)
Dept: CARDIOLOGY CLINIC | Age: 80
End: 2020-04-23
Payer: MEDICARE

## 2020-04-23 VITALS
HEART RATE: 66 BPM | BODY MASS INDEX: 31.64 KG/M2 | OXYGEN SATURATION: 97 % | WEIGHT: 173 LBS | SYSTOLIC BLOOD PRESSURE: 101 MMHG | DIASTOLIC BLOOD PRESSURE: 83 MMHG

## 2020-04-23 PROCEDURE — 1090F PRES/ABSN URINE INCON ASSESS: CPT | Performed by: NURSE PRACTITIONER

## 2020-04-23 PROCEDURE — G8428 CUR MEDS NOT DOCUMENT: HCPCS | Performed by: NURSE PRACTITIONER

## 2020-04-23 PROCEDURE — 1123F ACP DISCUSS/DSCN MKR DOCD: CPT | Performed by: NURSE PRACTITIONER

## 2020-04-23 PROCEDURE — G8400 PT W/DXA NO RESULTS DOC: HCPCS | Performed by: NURSE PRACTITIONER

## 2020-04-23 PROCEDURE — 99214 OFFICE O/P EST MOD 30 MIN: CPT | Performed by: NURSE PRACTITIONER

## 2020-04-23 PROCEDURE — 4040F PNEUMOC VAC/ADMIN/RCVD: CPT | Performed by: NURSE PRACTITIONER

## 2020-04-23 ASSESSMENT — ENCOUNTER SYMPTOMS
ABDOMINAL DISTENTION: 0
SHORTNESS OF BREATH: 0
COUGH: 0

## 2020-04-23 NOTE — PROGRESS NOTES
TELEHEALTH EVALUATION -- Audio/Visual (During ISLJX-85 public health emergency)    [address]        Visit Date: 4/23/2020  Cardiologist:  Dr. Yomaira Fletcher  Primary Care Physician: Dr. David Warren MD    Gin Leon is a 70 y.o. female who presents today for:  No chief complaint on file. HPI:   Gin Leon is a 70 y.o. female who is seen via video virtual MyChart visit for a NEW patient visit in the heart failure clinic. Gin Leon was at home. Provider was present at CHF clinic. CHF clinic nurse assisted. Lives home w/ , 7 dtr    TYPE HF: HFrEF (EF 25%), NICM    Device: BiV ICD (1999, follows w/ [facility])  HX: HTN, HLD, Afib (Coumadin), CAD (nonobstructive), CKD, Anemia    Dry Wt:  170-175#    Hospitalization:  3/9-3/11 = CP, SOB, WINN - BNP 4154. Diuresed. STOPPED Entresto (thought was making her feel worse) - changed back to Accupril. Lasix changed to Bumex. Hgb 8.4  Wt 181# at D/c    Concerns today: No issues w/ SOB since d/c. She felt very tired/fatigued on Entresto - BP was low, under 100. Activity: Can walk room/room, can complete ADLs.    Diet: Does add some salt - fluid < 2L, likely under drinks    Patient has:  Chest Pain: No  SOB: No  Orthopnea/PND: Bed - 1 pillow  LUIS FELIPE: No  Edema: No  Fatigue: No  Abdominal bloating: No  Cough: Yes - chronic \"tickle\"  Appetite: Good  Any extra diuretic use: No  Weight gain: No  Home weight: stable   Home blood pressure: Range 100-120    Past Medical History:   Diagnosis Date    AICD (automatic cardioverter/defibrillator) present 05/1999    Arthritis 5/21/2019    Atrial fibrillation (HCC)     chronic    Biventricular ICD (implantable cardiac defibrillator) in place 10/09    C. difficile diarrhea     CAD (coronary artery disease)     Cancer ([facility]     right hand    CHF (congestive heart failure) ([facility]     Diverticulitis 01/2013    GERD (gastroesophageal reflux disease)     hiatal hernia    Hiatal Topics    Alcohol use: No     Alcohol/week: 0.0 standard drinks     Current Outpatient Medications   Medication Sig Dispense Refill    bumetanide (BUMEX) 1 MG tablet Take 1 tablet by mouth daily 90 tablet 3    quinapril (ACCUPRIL) 5 MG tablet Take 1 tablet by mouth daily 90 tablet 3    ferrous sulfate (IRON 325) 325 (65 Fe) MG tablet Take 325 mg by mouth every other day      vitamin C (ASCORBIC ACID) 500 MG tablet Take 500 mg by mouth every other day Takes with iron      Alum & Mag Hydroxide-Simeth (MYLANTA PO) Take by mouth 1 hr after meals and at bedtime      digoxin (LANOXIN) 125 MCG tablet Take 125 mcg by mouth every other day      pantoprazole (PROTONIX) 20 MG tablet Take 2 tablets by mouth 2 times daily 360 tablet 3    spironolactone (ALDACTONE) 25 MG tablet Take 1 tablet by mouth every other day 45 tablet 3    levothyroxine (SYNTHROID) 125 MCG tablet TAKE 1 TABLET BY MOUTH DAILY 90 tablet 3    metoprolol succinate (TOPROL XL) 200 MG extended release tablet TAKE 1 TABLET BY MOUTH DAILY 90 tablet 3    amiodarone (CORDARONE) 200 MG tablet TAKE 1 TABLET BY MOUTH DAILY 90 tablet 3    warfarin (COUMADIN) 3 MG tablet 3 mg every Monday and Friday, 1.5 mg the other 5 days 30 tablet 0    acetaminophen (TYLENOL) 325 MG tablet Take 650 mg by mouth every 6 hours as needed for Pain or Fever      Handicap Placard MISC by Does not apply route Request parking placard due to medical conditions. Duration of 5 years. 1 each 0    Probiotic Product (PROBIOTIC FORMULA PO) Take 1 tablet by mouth daily.  Misc. Devices (LUMBAR SUPPORT CUSHION) MISC by Does not apply route. Dx: low back pain, sciatica. 1 each 0    Ascorbic Acid (VITAMIN C) 1000 MG tablet Take 1,000 mg by mouth 2 times daily.  Multiple Vitamin (MULTI-VITAMIN PO) Take  by mouth daily.  Calcium Carbonate (CALCIUM 600 PO) Take  by mouth 2 times daily.  BIOTIN PO Take 5,000 mg by mouth daily.        No current facility-administered medications for this visit. Allergies   Allergen Reactions    Ativan [Lorazepam]      Gets anxiety    Codeine     Nystatin      Mycostatin powder    Percocet [Oxycodone-Acetaminophen]     Relafen [Nabumetone]      Stomach upset    Tape Chon Shivers Tape]     Celebrex [Celecoxib] Rash       SUBJECTIVE:   Review of Systems   Constitutional: Negative for activity change, appetite change and fatigue. Respiratory: Negative for cough and shortness of breath. Cardiovascular: Negative for chest pain, palpitations and leg swelling. Gastrointestinal: Negative for abdominal distention. Neurological: Negative for weakness, light-headedness and headaches. Hematological: Negative for adenopathy. Psychiatric/Behavioral: Negative for sleep disturbance. OBJECTIVE:   Today's Vitals:  /83   Pulse 66   Wt 173 lb (78.5 kg)   SpO2 97%   BMI 31.64 kg/m²     Physical Exam  Vitals signs reviewed. Constitutional:       General: She is not in acute distress. Appearance: Normal appearance. HENT:      Head: Normocephalic and atraumatic. Eyes:      Extraocular Movements: Extraocular movements intact. Neck:      Musculoskeletal: Normal range of motion. Pulmonary:      Effort: Pulmonary effort is normal.   Abdominal:      General: Abdomen is flat. There is no distension. Musculoskeletal:      Right lower leg: No edema (none noted). Left lower leg: No edema (none noted). Skin:     General: Skin is warm and dry. Neurological:      Mental Status: She is alert and oriented to person, place, and time.    Psychiatric:         Mood and Affect: Mood normal.         Behavior: Behavior normal.       Wt Readings from Last 3 Encounters:   04/23/20 173 lb (78.5 kg)   03/19/20 176 lb 9.6 oz (80.1 kg)   03/11/20 181 lb 4.8 oz (82.2 kg)     BP Readings from Last 3 Encounters:   04/23/20 101/83   03/19/20 122/60   03/11/20 (!) 110/54     Pulse Readings from Last 3 Encounters:   04/23/20 66   03/19/20 60 03/11/20 61     Body mass index is 31.64 kg/m². ECHO:    Summary   limited echo   Ejection fraction is visually estimated at 25%. There was severe global hypokinesis of the left ventricle. Left Ventricular size is Moderately increased . Severely dilated left atrium. Moderately dilated right ventricle. No evidence of any pericardial effusion. Signature      ----------------------------------------------------------------   Electronically signed by Florence Locktet MD (Interpreting   physician) on 03/11/2020 at 07:22 AM      COMPLETE   Summary   Left Ventricular size is Severely increased . Normal left ventricular wall thickness. There was severe global hypokinesis of the left ventricle. Ejection fraction is visually estimated in the range of 20% to 25%. Left atrial size was dilated      Signature      ----------------------------------------------------------------   Electronically signed by Lizzy Denton MD (Interpreting   physician) on 05/15/2019 at 07:07 AM   ----------------------------------------------------------------      CATH/STRESS:   IMPRESSION:  1. In summary, severe dilated congestive cardiomyopathy with an  ejection fraction about 15% to 20%. 2.  Diastolic dysfunction of the left ventricle. 3.  Nonobstructive disease of the LAD. 4.  Patent left main. 5.  Patent circumflex.   6.  Patent RCA.     The patient had no acute complications from the procedure.  Trini Delgado M.D.     D: 05/15/2019 19:42:30          Results reviewed:  BNP:   Lab Results   Component Value Date    .3 (H) 02/07/2013     CBC:   Lab Results   Component Value Date    WBC 6.2 04/01/2020    RBC 3.84 04/01/2020    RBC 3.06 11/29/2011    HGB 10.3 04/01/2020    HCT 36.1 04/01/2020     04/01/2020     CMP:    Lab Results   Component Value Date     04/01/2020    K 4.0 04/01/2020    K 4.6 10/23/2019     04/01/2020    CO2 24 04/01/2020    BUN 31 04/01/2020    CREATININE 1.2 Patient was instructed to call the [facility] for any changes in symptoms as noted in AVS.      Return in about 3 months (around 7/23/2020). or sooner if needed     We discussed the importance of weighing oneself and recording daily. We also discussed the importance of a low sodium diet, higher sodium foods to avoid and appropriate low sodium food choices. Discussed use, benefit, and side effects of prescribed medications. All patient questions answered. Patient verbalizes understanding of plan of care using teach back method, and is agreeable to the treatment plan. Pursuant to the emergency declaration under the [facility], [facility] waiver authority and the [employer] Act, this Virtual  Visit was conducted, with patient's consent, to reduce the patient's risk of exposure to COVID-19 and provide continuity of care for an established patient. Services were provided through a video synchronous discussion virtually to substitute for in-person clinic visit. Greater then 45 minutes of time was spent reviewing the chart and educating the patient about HF, medications, diet, exercise, and discussing the plan of care. I personally spent more then 50% of the appt time face to face with the patient counseling/coordinating patient's care.     Electronicallysigned by ELIA Santiago CNP on 4/23/2020 at 10:48 AM

## 2020-04-24 ENCOUNTER — TELEPHONE (OUTPATIENT)
Dept: FAMILY MEDICINE CLINIC | Age: 80
End: 2020-04-24

## 2020-04-24 ENCOUNTER — NURSE ONLY (OUTPATIENT)
Dept: LAB | Age: 80
End: 2020-04-24

## 2020-04-24 LAB
HCT VFR BLD CALC: 37.9 % (ref 37–47)
HEMOGLOBIN: 11.4 GM/DL (ref 12–16)
INR BLD: 1.8 (ref 0.85–1.13)

## 2020-04-27 ENCOUNTER — TELEPHONE (OUTPATIENT)
Dept: FAMILY MEDICINE CLINIC | Age: 80
End: 2020-04-27

## 2020-05-04 ENCOUNTER — TELEPHONE (OUTPATIENT)
Dept: FAMILY MEDICINE CLINIC | Age: 80
End: 2020-05-04

## 2020-05-06 ENCOUNTER — HOSPITAL ENCOUNTER (OUTPATIENT)
Dept: PHYSICAL THERAPY | Age: 80
Setting detail: THERAPIES SERIES
Discharge: HOME | End: 2020-05-06
Payer: MEDICARE

## 2020-05-06 PROCEDURE — 97161 PT EVAL LOW COMPLEX 20 MIN: CPT

## 2020-05-06 PROCEDURE — 97110 THERAPEUTIC EXERCISES: CPT

## 2020-05-06 ASSESSMENT — PAIN SCALES - GENERAL: PAINLEVEL_OUTOF10: 9

## 2020-05-06 ASSESSMENT — PAIN DESCRIPTION - PAIN TYPE: TYPE: CHRONIC PAIN

## 2020-05-06 ASSESSMENT — PAIN DESCRIPTION - FREQUENCY: FREQUENCY: INTERMITTENT

## 2020-05-06 ASSESSMENT — PAIN DESCRIPTION - LOCATION: LOCATION: BACK

## 2020-05-06 ASSESSMENT — PAIN DESCRIPTION - DESCRIPTORS: DESCRIPTORS: STABBING

## 2020-05-06 NOTE — FLOWSHEET NOTE
** PLEASE SIGN, DATE AND TIME CERTIFICATION BELOW AND RETURN TO [facility] OUTPATIENT REHABILITATION (FAX #: [phone]). ATTEST/CO-SIGN IF ACCESSING VIA [employer]. THANK YOU.**    I certify that I have examined the patient below and determined that Physical Medicine and Rehabilitation service is necessary and that I approve the established plan of care for up to 90 days or as specifically noted. Attestation, signature or co-signature of physician indicates approval of certification requirements.    ________________________ ____________ __________  Physician Signature   Date   Time       [address]     Time In: 1050  Time Out: 1140  Minutes: 50  Timed Code Treatment Minutes: 15 Minutes                Date: 2020  Patient Name: Asher Deleon,  Gender:  female        CSN: [id number]   : 1940  (70 y.o.)  Referral Date : 20     Referring Practitioner: Marguerite Mercedes MD       Diagnosis: M54.5, G89.29 (ICD-10-CM) - Chronic midline low back pain without sciatica  Treatment Diagnosis: difficulty walking; low back pain  Additional Pertinent Hx: HTN; irregular heart beat; pacemaker; incontinence        General:  PT Visit Information  Onset Date: 20  PT Insurance Information: Sandralee Phlegm has unlimited visits based on medical necessity. Aquatics and modalities are covered except ionto and hot/cold packs. Total # of Visits to Date: 1                        See Medical History Questionnaire for information related to allergies and medications. Subjective:  Chart Reviewed: Yes  Patient assessed for rehabilitation services?: Yes  Response To Previous Treatment: Not applicable  Family / Caregiver Present: Yes  Comments: No scheduled follow up at this time      Subjective: Albina Moreno has struggled with increasing low back pain over the last 3-4 years.  She had been seeing a chiropractor which has helped, but

## 2020-05-08 ENCOUNTER — HOSPITAL ENCOUNTER (OUTPATIENT)
Dept: PHYSICAL THERAPY | Age: 80
Setting detail: THERAPIES SERIES
Discharge: HOME | End: 2020-05-08
Payer: MEDICARE

## 2020-05-08 PROCEDURE — 97110 THERAPEUTIC EXERCISES: CPT

## 2020-05-08 ASSESSMENT — PAIN SCALES - GENERAL: PAINLEVEL_OUTOF10: 3

## 2020-05-08 NOTE — PROGRESS NOTES
[place]     Time In: 6624  Time Out: 1630  Minutes: 40  Timed Code Treatment Minutes: 40 Minutes      Date: 2020  Patient Name: Taniya Arroyo,  Gender:  female        CSN: [id number]   : 1940  (70 y.o.)  Referral Date : 20    Referring Practitioner: Geraldine Bal MD       Diagnosis: M54.5, G89.29 (ICD-10-CM) - Chronic midline low back pain without sciatica  Treatment Diagnosis: difficulty walking; low back pain   Additional Pertinent Hx: HTN; irregular heart beat; pacemaker; incontinence        General:  PT Visit Information  Onset Date: 20  PT Insurance Information: Cassidy Ribeiro has unlimited visits based on medical necessity. Aquatics and modalities are covered except ionto and hot/cold packs. Total # of Visits to Date: 2  Progress Note Counter: 2/10             Subjective:  Chart Reviewed: Yes  Patient assessed for rehabilitation services?: Yes  Response To Previous Treatment: Not applicable  Family / Caregiver Present: Yes  Comments: No scheduled follow up at this time      Subjective: Patient states that in the morning, pain is bad but it usually gets a little better when she gets moving. Reports she is working on the exercises at home and feels the pain is a little better. Pain:  Patient Currently in Pain: Yes  Pain Assessment: 0-10  Pain Level:  3(Lower back)    Objective    Exercises  Exercise 1: Posterior pelvic tilt 10x 5 seconds, marching, straight leg raises 10x bilateral with tilt, bridging 10x - cues to slow down  Exercise 2: Reviewed Pelvic tilt seated and standing   Exercise 3: Seated: abdominal bracing 10x 5 seconds, marching 10x (cues to steady trunk), LAQs 10x 5 seconds bilateral.   Exercise 4: Standing with abdominal bracing: Heel raises, marching, squats, 3-way hip 10x bilateral   Exercise 5: NuStep level 2 x5 minutes       Activity Tolerance:  Activity Tolerance: Patient Tolerated treatment well    Assessment: Body structures, Functions, Activity limitations: Decreased balance, Decreased functional mobility , Decreased endurance, Increased pain, Decreased strength, Decreased posture  Assessment: Patient tolerated activities well without increased pain. Pain remained 3/10 throughout session. Frequent cues needed for posture correction and abdominal bracing with activities. Prognosis: Good  Discharge Recommendations: Continue to assess pending progress    Patient Education:  PT Education: Home Exercise Program  Patient Education: Continue pelvic tilts and work on abdominal bracing throughout the date. Plan:  Times per week: 2x per week   Plan weeks: 8 weeks   Specific instructions for Next Treatment: Progress dynamic lumbar strengthening program; start on Nustep  Current Treatment Recommendations: Strengthening, Endurance Training, Neuromuscular Re-education, Patient/Caregiver Education & Training, ROM, Pain Management, Balance Training, Gait Training, Home Exercise Program, Modalities, Manual Therapy - Joint Manipulation, Manual Therapy - Soft Tissue Mobilization, Stair training  Plan Comment: Continue with current POC    Goals:  Patient goals : Improve strength; resolve low back pain     Short term goals  Time Frame for Short term goals: 4 weeks   Short term goal 1: Flora will demonstrate good abdominal bracing while performing all exercises and transfes   Short term goal 2: Flora will be able to stand for 30 min at a time while maintaining good bracing so she is able to meal prep and do other household chores without being limited by low back pain     Long term goals  Time Frame for Long term goals : 8 weeks   Long term goal 1: Discharge with independent HEP  Long term goal 2: Bes low back pain will improve to 2-3/10 following activities.   Long term goal 3: Flora's bilateral LE strength will improve to 4+ to 5/5 allowing for greater stability with household mobility

## 2020-05-11 ENCOUNTER — HOSPITAL ENCOUNTER (OUTPATIENT)
Dept: PHYSICAL THERAPY | Age: 80
Setting detail: THERAPIES SERIES
Discharge: HOME | End: 2020-05-11
Payer: MEDICARE

## 2020-05-11 PROCEDURE — 97110 THERAPEUTIC EXERCISES: CPT

## 2020-05-11 ASSESSMENT — PAIN SCALES - GENERAL: PAINLEVEL_OUTOF10: 3

## 2020-05-11 NOTE — PROGRESS NOTES
[place]     Time In: 1523  Time Out: 8  Minutes: 38  Timed Code Treatment Minutes: 38 Minutes     Date: 2020  Patient Name: Jada Ward,  Gender:  female        CSN: [id number]   : 1940  (70 y.o.)  Referral Date : 20    Referring Practitioner: Treasure Harada, MD       Diagnosis: M54.5, G89.29 (ICD-10-CM) - Chronic midline low back pain without sciatica  Treatment Diagnosis: difficulty walking; low back pain   Additional Pertinent Hx: HTN; irregular heart beat; pacemaker; incontinence        General:  PT Visit Information  Onset Date: 20  PT Insurance Information: Doyal Spurling has unlimited visits based on medical necessity. Aquatics and modalities are covered except ionto and hot/cold packs. Total # of Visits to Date: 3  Progress Note Counter: 3/10             Subjective:  Chart Reviewed: Yes  Patient assessed for rehabilitation services?: Yes  Response To Previous Treatment: Not applicable  Family / Caregiver Present: Yes  Comments: No scheduled follow up at this time      Subjective: Patient reports she was a little tired after last therapy session. States she is trying to do exercises 2 times per day at home. No pain complaints when sitting. Pain:  Patient Currently in Pain: Yes  Pain Assessment: 0-10  Pain Level: 3(Lower back (with standing and walking))    Objective    Exercises  Exercise 1: Posterior pelvic tilt 10x 5 seconds, marching, straight leg raises 10x bilateral with tilt, bridging 10x 3 seconds, hip abduction with orange band x15, hip adduction ball squeeze 10x 5 seconds. Exercise 3: Seated: abdominal bracing 10x 5 seconds, marching 10x (cues to steady trunk), LAQs 10x 5 seconds bilateral, hip adduction ball squeeze 10x 5 seconds.    Exercise 4: Standing with abdominal bracing: Heel raises, marching, squats, 3-way hip 10x bilateral   Exercise 5: NuStep level 3 x6 minutes  Exercise 6: Balance on blue foam:  narrow stance and bilateral step stance x1 minute each way with abdominal bracing  Exercise 7: Small range lower trunk rotation 10x bilateral       Activity Tolerance:  Activity Tolerance: Patient Tolerated treatment well    Assessment: Body structures, Functions, Activity limitations: Decreased balance, Decreased functional mobility , Decreased endurance, Increased pain, Decreased strength, Decreased posture  Assessment: Progressed strengthening and balance activities today. Patient tolerated well. No pain complaints while in supine position. Pain rated 2-3/10 in lower back at end of session once standing to walk out. Moderate cueing needed for posture and to maintain abdominal bracing. Prognosis: Good  Discharge Recommendations: Continue to assess pending progress    Patient Education:  PT Education: Home Exercise Program  Patient Education: Continue pelvic tilts and work on abdominal bracing throughout the date.      Plan:  Times per week: 2x per week   Plan weeks: 8 weeks   Specific instructions for Next Treatment: Progress dynamic lumbar strengthening program; start on Nustep  Current Treatment Recommendations: Strengthening, Endurance Training, Neuromuscular Re-education, Patient/Caregiver Education & Training, ROM, Pain Management, Balance Training, Gait Training, Home Exercise Program, Modalities, Manual Therapy - Joint Manipulation, Manual Therapy - Soft Tissue Mobilization, Stair training  Plan Comment: Continue with current POC    Goals:  Patient goals : Improve strength; resolve low back pain     Short term goals  Time Frame for Short term goals: 4 weeks   Short term goal 1: Flora will demonstrate good abdominal bracing while performing all exercises and transfes   Short term goal 2: Folra will be able to stand for 30 min at a time while maintaining good bracing so she is able to meal prep and do other household chores without being limited by low back pain Long term goals  Time Frame for Long term goals : 8 weeks   Long term goal 1: Discharge with independent HEP  Long term goal 2: Flora's low back pain will improve to 2-3/10 following activities. Long term goal 3: Flora's bilateral LE strength will improve to 4+ to 5/5 allowing for greater stability with household mobility and transfers       Dayna Glover.  Blanche, [address], 5/11/2020

## 2020-05-12 ENCOUNTER — HOSPITAL ENCOUNTER (OUTPATIENT)
Dept: PHYSICAL THERAPY | Age: 80
Setting detail: THERAPIES SERIES
Discharge: HOME | End: 2020-05-12
Payer: MEDICARE

## 2020-05-12 ENCOUNTER — NURSE ONLY (OUTPATIENT)
Dept: LAB | Age: 80
End: 2020-05-12

## 2020-05-12 LAB
HCT VFR BLD CALC: 37.8 % (ref 37–47)
HEMOGLOBIN: 11.5 GM/DL (ref 12–16)

## 2020-05-12 PROCEDURE — 97110 THERAPEUTIC EXERCISES: CPT

## 2020-05-12 ASSESSMENT — PAIN SCALES - GENERAL: PAINLEVEL_OUTOF10: 4

## 2020-05-12 NOTE — PROGRESS NOTES
will improve to 2-3/10 following activities. Long term goal 3: Flora's bilateral LE strength will improve to 4+ to 5/5 allowing for greater stability with household mobility and transfers       Redmond Prader.  Zoila Reilly, [address], 5/12/2020

## 2020-05-13 ENCOUNTER — TELEPHONE (OUTPATIENT)
Dept: FAMILY MEDICINE CLINIC | Age: 80
End: 2020-05-13

## 2020-05-13 NOTE — TELEPHONE ENCOUNTER
Message left for Pt to call the office, please schedule Medicare AWV.   Emergent [facility] message sent with AWV questionnaire

## 2020-05-13 NOTE — TELEPHONE ENCOUNTER
----- Message from Adrianne Cobos MD sent at 5/12/2020  5:05 PM EDT -----  Hgb is improving, up to 11.5. Continue present, recheck H&H in 1 month.

## 2020-05-18 ENCOUNTER — HOSPITAL ENCOUNTER (OUTPATIENT)
Dept: PHYSICAL THERAPY | Age: 80
Setting detail: THERAPIES SERIES
Discharge: HOME | End: 2020-05-18
Payer: MEDICARE

## 2020-05-18 PROCEDURE — 97110 THERAPEUTIC EXERCISES: CPT

## 2020-05-18 ASSESSMENT — PAIN DESCRIPTION - PAIN TYPE: TYPE: CHRONIC PAIN

## 2020-05-18 ASSESSMENT — PAIN DESCRIPTION - LOCATION: LOCATION: BACK

## 2020-05-18 ASSESSMENT — PAIN SCALES - GENERAL: PAINLEVEL_OUTOF10: 2

## 2020-05-18 NOTE — PROGRESS NOTES
Exercise 5: NuStep level 3 x 8 minutes  Exercise 6: Balance on blue foam:  narrow stance and bilateral step stance x1 minute each way with abdominal bracing  Exercise 7: Small range lower trunk rotation 15x bilateral  Exercise 8: Rockerboard x10, balance 30seconds each direction         Activity Tolerance:  Activity Tolerance: Patient Tolerated treatment well    Assessment: Body structures, Functions, Activity limitations: Decreased balance, Decreased functional mobility , Decreased endurance, Increased pain, Decreased strength, Decreased posture  Assessment: Patient reporting left knee pain when completing 3-way hip and requested to hold. Prognosis: Good  REQUIRES PT FOLLOW UP: Yes  Discharge Recommendations: Continue to assess pending progress    Patient Education:  PT Education: Home Exercise Program  Patient Education: Added rockerboard today.   Cues for posture, and abdominal bracing                      Plan:  Times per week: 2x per week   Plan weeks: 8 weeks   Specific instructions for Next Treatment: Progress dynamic lumbar strengthening program; start on Nustep  Current Treatment Recommendations: Strengthening, Endurance Training, Neuromuscular Re-education, Patient/Caregiver Education & Training, ROM, Pain Management, Balance Training, Gait Training, Home Exercise Program, Modalities, Manual Therapy - Joint Manipulation, Manual Therapy - Soft Tissue Mobilization, Stair training  Plan Comment: Continue with current POC    Goals:  Patient goals : Improve strength; resolve low back pain     Short term goals  Time Frame for Short term goals: 4 weeks   Short term goal 1: Flora will demonstrate good abdominal bracing while performing all exercises and transfes   Short term goal 2: Flora will be able to stand for 30 min at a time while maintaining good bracing so she is able to meal prep and do other household chores without being limited by low back pain     Long term goals  Time Frame for Long term goals : 8 weeks   Long term goal 1: Discharge with independent HEP  Long term goal 2: Flora's low back pain will improve to 2-3/10 following activities.   Long term goal 3: Flora's bilateral LE strength will improve to 4+ to 5/5 allowing for greater stability with household mobility and transfers     Frank Delgado

## 2020-05-20 ENCOUNTER — HOSPITAL ENCOUNTER (OUTPATIENT)
Dept: PHYSICAL THERAPY | Age: 80
Setting detail: THERAPIES SERIES
Discharge: HOME | End: 2020-05-20
Payer: MEDICARE

## 2020-05-20 PROCEDURE — 97530 THERAPEUTIC ACTIVITIES: CPT

## 2020-05-20 PROCEDURE — 97112 NEUROMUSCULAR REEDUCATION: CPT

## 2020-05-20 PROCEDURE — 97110 THERAPEUTIC EXERCISES: CPT

## 2020-05-20 ASSESSMENT — PAIN SCALES - GENERAL: PAINLEVEL_OUTOF10: 3

## 2020-05-27 ENCOUNTER — HOSPITAL ENCOUNTER (OUTPATIENT)
Dept: PHYSICAL THERAPY | Age: 80
Setting detail: THERAPIES SERIES
Discharge: HOME | End: 2020-05-27
Payer: MEDICARE

## 2020-05-27 PROCEDURE — 97110 THERAPEUTIC EXERCISES: CPT

## 2020-05-27 ASSESSMENT — PAIN DESCRIPTION - PAIN TYPE: TYPE: CHRONIC PAIN

## 2020-05-27 ASSESSMENT — PAIN DESCRIPTION - LOCATION: LOCATION: BACK

## 2020-05-27 ASSESSMENT — PAIN SCALES - GENERAL: PAINLEVEL_OUTOF10: 2

## 2020-05-27 NOTE — PROGRESS NOTES
each direction  Exercise 9: // bars: side stepping 4 lengths, heel toe walking with 1 UE support, retro X4 laps  Exercise 10: sit-stands: X5 with Min. A no UE support, cues for technique to increase LE strength         Activity Tolerance:  Activity Tolerance: Patient Tolerated treatment well    Assessment: Body structures, Functions, Activity limitations: Decreased balance, Decreased functional mobility , Decreased endurance, Increased pain, Decreased strength, Decreased posture  Assessment: Pt continues to work on abdominal bracing with activity. Pt demonstrates increased ankle strategies while standing with narrow MK. Progressed pt with sitting on gray disc.  Pt had difficulty maintaining posture and moving LE's  Prognosis: Good  REQUIRES PT FOLLOW UP: Yes  Discharge Recommendations: Continue to assess pending progress    Patient Education:  PT Education: Home Exercise Program  Patient Education: posture, bracing, proper technique with sit to stands                      Plan:  Times per week: 2x per week   Plan weeks: 8 weeks   Specific instructions for Next Treatment: Progress dynamic lumbar strengthening program; start on Nustep  Current Treatment Recommendations: Strengthening, Endurance Training, Neuromuscular Re-education, Patient/Caregiver Education & Training, ROM, Pain Management, Balance Training, Gait Training, Home Exercise Program, Modalities, Manual Therapy - Joint Manipulation, Manual Therapy - Soft Tissue Mobilization, Stair training  Plan Comment: Continue with current POC    Goals:  Patient goals : Improve strength; resolve low back pain     Short term goals  Time Frame for Short term goals: 4 weeks   Short term goal 1: Flora will demonstrate good abdominal bracing while performing all exercises and transfes   Short term goal 2: Flora will be able to stand for 30 min at a time while maintaining good bracing so she is able to meal prep and do other household chores without being limited by low back

## 2020-05-29 ENCOUNTER — NURSE ONLY (OUTPATIENT)
Dept: LAB | Age: 80
End: 2020-05-29

## 2020-05-29 ENCOUNTER — HOSPITAL ENCOUNTER (OUTPATIENT)
Dept: PHYSICAL THERAPY | Age: 80
Setting detail: THERAPIES SERIES
Discharge: HOME | End: 2020-05-29
Payer: MEDICARE

## 2020-05-29 LAB
HCT VFR BLD CALC: 39.4 % (ref 37–47)
HEMOGLOBIN: 11.9 GM/DL (ref 12–16)

## 2020-05-29 PROCEDURE — 97110 THERAPEUTIC EXERCISES: CPT

## 2020-05-29 ASSESSMENT — PAIN SCALES - GENERAL: PAINLEVEL_OUTOF10: 2

## 2020-05-29 NOTE — PROGRESS NOTES
bracing  Exercise 8: Rockerboard forward/back x20, side to side x15, balance x1 minute each direction with 1 UE support - side to side taps started to bother Left knee  Exercise 9: // bars: side stepping 4 lengths, heel toe walking with 1 UE support, retro x4 laps  Exercise 10: Sit-stands: x5 with Min. A no UE support, cues for technique to increase LE strength       Activity Tolerance:  Activity Tolerance: Patient Tolerated treatment well    Assessment: Body structures, Functions, Activity limitations: Decreased balance, Decreased functional mobility , Decreased endurance, Increased pain, Decreased strength, Decreased posture  Assessment: Able to progress repetitions today with patient tolerating well. Patient requires cues to sit with tall posture and not lean back on chair during seated exercises. No complaints of increased pain.    Prognosis: Good  REQUIRES PT FOLLOW UP: Yes  Discharge Recommendations: Continue to assess pending progress    Patient Education:  PT Education: Home Exercise Program  Patient Education: Posture, bracing, proper technique with sit to stands           Plan:  Times per week: 2x per week   Plan weeks: 8 weeks   Specific instructions for Next Treatment: Progress dynamic lumbar strengthening program; start on Nustep  Current Treatment Recommendations: Strengthening, Endurance Training, Neuromuscular Re-education, Patient/Caregiver Education & Training, ROM, Pain Management, Balance Training, Gait Training, Home Exercise Program, Modalities, Manual Therapy - Joint Manipulation, Manual Therapy - Soft Tissue Mobilization, Stair training  Plan Comment: Continue with current POC    Goals:  Patient goals : Improve strength; resolve low back pain     Short term goals  Time Frame for Short term goals: 4 weeks   Short term goal 1: Flora will demonstrate good abdominal bracing while performing all exercises and transfes   Short term goal 2: Flora will be able to stand for 30 min at a time while maintaining good bracing so she is able to meal prep and do other household chores without being limited by low back pain     Long term goals  Time Frame for Long term goals : 8 weeks   Long term goal 1: Discharge with independent HEP  Long term goal 2: Flora's low back pain will improve to 2-3/10 following activities. Long term goal 3: Flora's bilateral LE strength will improve to 4+ to 5/5 allowing for greater stability with household mobility and transfers       Joyice Both.  Blanche, [address] Sean, 5/29/2020

## 2020-06-01 ENCOUNTER — TELEPHONE (OUTPATIENT)
Dept: FAMILY MEDICINE CLINIC | Age: 80
End: 2020-06-01

## 2020-06-01 LAB — INR BLD: 2.34 (ref 0.85–1.13)

## 2020-06-01 NOTE — TELEPHONE ENCOUNTER
Per HIPAA, message left for pt notifying her that the inr was good, to continue same dosage and recheck INR in 1 month

## 2020-06-03 ENCOUNTER — APPOINTMENT (OUTPATIENT)
Dept: PHYSICAL THERAPY | Age: 80
End: 2020-06-03
Payer: MEDICARE

## 2020-06-10 ENCOUNTER — HOSPITAL ENCOUNTER (OUTPATIENT)
Dept: PHYSICAL THERAPY | Age: 80
Setting detail: THERAPIES SERIES
Discharge: HOME | End: 2020-06-10
Payer: MEDICARE

## 2020-06-10 PROCEDURE — 97110 THERAPEUTIC EXERCISES: CPT

## 2020-06-10 PROCEDURE — 97112 NEUROMUSCULAR REEDUCATION: CPT

## 2020-06-10 ASSESSMENT — PAIN SCALES - GENERAL: PAINLEVEL_OUTOF10: 2

## 2020-06-10 NOTE — PROGRESS NOTES
able to meal prep and do other household chores without being limited by low back pain  NOT MET-able to stand for 15-20 min at a time     Long term goals  Time Frame for Long term goals : 8 weeks   Long term goal 1: Discharge with independent HEP ONGOING   Long term goal 2: Bes low back pain will improve to 2-3/10 following activities.  NOT MET-immediately following therapy she feels good, however after the 10 min drive home increases her pain to 4-5/10  Long term goal 3: Bes bilateral LE strength will improve to 4+ to 5/5 allowing for greater stability with household mobility and transfers NOT MET: 90% improvement tested 4/5     Kiko Sesay, PT

## 2020-06-17 ENCOUNTER — HOSPITAL ENCOUNTER (OUTPATIENT)
Dept: PHYSICAL THERAPY | Age: 80
Setting detail: THERAPIES SERIES
Discharge: HOME | End: 2020-06-17
Payer: MEDICARE

## 2020-06-17 PROCEDURE — 97110 THERAPEUTIC EXERCISES: CPT

## 2020-06-17 ASSESSMENT — PAIN SCALES - GENERAL: PAINLEVEL_OUTOF10: 3

## 2020-06-17 NOTE — PROGRESS NOTES
bilateral step stance x1 minute each way with abdominal bracing  Exercise 7: NK table 2.5# bilateral knee flexion and extension 10x each - cues for posture  Exercise 8: Rockerboard forward/back x20, side to side x20, balance x1 minute each direction with 1 UE support - side to side taps started to bother Left knee  Exercise 9: // bars: side stepping 4 lengths, heel toe walking with 1 UE support, retro x4 laps       Activity Tolerance:  Activity Tolerance: Patient Tolerated treatment well  Activity Tolerance: No increased pain     Assessment: Body structures, Functions, Activity limitations: Decreased balance, Decreased functional mobility , Decreased endurance, Increased pain, Decreased strength, Decreased posture  Assessment: Able to progress to NK table for LE strengthening without increased pain today. Occasional reminders needed for abdominal bracing and to stand erect. Discussed increased pain patient has been having recently and advised patient ot try putting memory foam pad back on bed since that is the only thing that has changed since increased pain started. Prognosis: Good  REQUIRES PT FOLLOW UP: Yes  Discharge Recommendations: Continue to assess pending progress    Patient Education:  PT Education: Home Exercise Program  Patient Education: Posture, bracing, proper technique with sit to stands. Try putting memory foam pad back on bed to see if that helps with pain.      Plan:  Times per week: 2x per week   Plan weeks: 8 weeks   Specific instructions for Next Treatment: Progress dynamic lumbar strengthening program; start on Nustep  Current Treatment Recommendations: Strengthening, Endurance Training, Neuromuscular Re-education, Patient/Caregiver Education & Training, ROM, Pain Management, Balance Training, Gait Training, Home Exercise Program, Modalities, Manual Therapy - Joint Manipulation, Manual Therapy - Soft Tissue Mobilization, Stair training  Plan Comment: Continue with current

## 2020-06-19 ENCOUNTER — HOSPITAL ENCOUNTER (OUTPATIENT)
Dept: PHYSICAL THERAPY | Age: 80
Setting detail: THERAPIES SERIES
Discharge: HOME | End: 2020-06-19
Payer: MEDICARE

## 2020-06-19 PROCEDURE — 97110 THERAPEUTIC EXERCISES: CPT

## 2020-06-19 PROCEDURE — 97530 THERAPEUTIC ACTIVITIES: CPT

## 2020-06-19 NOTE — PROGRESS NOTES
[place] [facility]     Time In: 6101  Time Out: 8063  Minutes: 41  Timed Code Treatment Minutes: 41 Minutes                Date: 2020  Patient Name: Kathy Link,  Gender:  female        CSN: [id number]   : 1940  (70 y.o.)  Referral Date : 20    Referring Practitioner: Rian Khan MD       Diagnosis: M54.5, G89.29 (ICD-10-CM) - Chronic midline low back pain without sciatica  Treatment Diagnosis: difficulty walking; low back pain   Additional Pertinent Hx: HTN; irregular heart beat; pacemaker; incontinence                   General:  PT Visit Information  Onset Date: 20  PT Insurance Information: Brennon Soriano has unlimited visits based on medical necessity. Aquatics and modalities are covered except ionto and hot/cold packs. Total # of Visits to Date: 6  Progress Note Counter: 11               Subjective:  Chart Reviewed: Yes  Patient assessed for rehabilitation services?: Yes  Response To Previous Treatment: Not applicable  Family / Caregiver Present: No  Comments: No scheduled follow up at this time      Subjective: Added the memory foam pad and it seemed to have helped some. Pain continues to be 4/10, however it is less since she has put the pad back on her bed. She is scheduled to see the chiropractor this afternoon      Pain:  Patient Currently in Pain: Yes         Objective              Exercises  Exercise 1: Posterior pelvic tilt 10x 5 seconds, marching 15x, straight leg raises 10x bilateral with tilt, bridging 10x 3 seconds, hip abduction with orange band x15, hip adduction ball squeeze 10x 5 seconds-while on moist heat   Exercise 2: Reviewed Pelvic tilt seated and standing   Exercise 5: NuStep level 2 x8 minutes  Exercise 11:  Added seated trunk rotation while maintaining neutral spine: 10x  Exercise 12: Walking with cue to push pelvis forward: 200 ft          Activity Tolerance:  Activity

## 2020-06-24 ENCOUNTER — HOSPITAL ENCOUNTER (OUTPATIENT)
Dept: PHYSICAL THERAPY | Age: 80
Setting detail: THERAPIES SERIES
Discharge: HOME | End: 2020-06-24
Payer: MEDICARE

## 2020-06-24 PROCEDURE — 97110 THERAPEUTIC EXERCISES: CPT

## 2020-06-24 ASSESSMENT — PAIN DESCRIPTION - LOCATION: LOCATION: BACK

## 2020-06-24 ASSESSMENT — PAIN DESCRIPTION - PAIN TYPE: TYPE: CHRONIC PAIN

## 2020-06-24 ASSESSMENT — PAIN SCALES - GENERAL: PAINLEVEL_OUTOF10: 2

## 2020-06-24 NOTE — PROGRESS NOTES
Heel raises, marching, squats x15 eac  Exercise 5: NuStep level 5 [id number]  Exercise 6: Balance on blue foam:  narrow stance and bilateral step stance x1 minute each way with abdominal bracing, cues to tighten through gluts  Exercise 11:  seated trunk rotation while maintaining neutral spine: 10x  Exercise 12: Walking with cue to push pelvis forward: 200 ft with SC cues to tighten gluts         Activity Tolerance:  Activity Tolerance: Patient Tolerated treatment well    Assessment: Body structures, Functions, Activity limitations: Decreased balance, Decreased functional mobility , Decreased endurance, Increased pain, Decreased strength, Decreased posture  Assessment: Pt continues to work on posture with gait and standing actvities. No increase in pain at end of session. Added reps this session with goo tolerance. Prognosis: Good  Discharge Recommendations: Continue to assess pending progress    Patient Education:  PT Education: Home Exercise Program  Patient Education: posture corrections at wall                      Plan:  Times per week: 2x per week   Plan weeks: 8 weeks   Specific instructions for Next Treatment: Progress dynamic lumbar strengthening program; start on Nustep  Plan Comment: Continue with current POC    Goals:  Patient goals : Improve strength; resolve low back pain     Short term goals  Time Frame for Short term goals: 4 weeks   Short term goal 1: Flora will demonstrate good abdominal bracing while performing all exercises and transfes GOAL MET  Short term goal 2: Darrius Vaughn will be able to stand for 30 min at a time while maintaining good bracing so she is able to meal prep and do other household chores without being limited by low back pain      Long term goals  Time Frame for Long term goals : 8 weeks   Long term goal 1: Discharge with independent HEP   Long term goal 2: Bes low back pain will improve to 2-3/10 following activities.    Long term goal 3: Flora's bilateral LE strength will improve

## 2020-06-26 ENCOUNTER — HOSPITAL ENCOUNTER (OUTPATIENT)
Dept: PHYSICAL THERAPY | Age: 80
Setting detail: THERAPIES SERIES
Discharge: HOME | End: 2020-06-26
Payer: MEDICARE

## 2020-06-26 PROCEDURE — 97530 THERAPEUTIC ACTIVITIES: CPT

## 2020-06-26 PROCEDURE — 97110 THERAPEUTIC EXERCISES: CPT

## 2020-06-26 NOTE — PROGRESS NOTES
[place] [facility]     Time In: 1100  Time Out: 3079  Minutes: 28  Timed Code Treatment Minutes: 28 Minutes                Date: 2020  Patient Name: Ang Cerrato,  Gender:  female        CSN: [id number]   : 1940  (70 y.o.)  Referral Date : 20    Referring Practitioner: Moira Cummins MD       Diagnosis: M54.5, G89.29 (ICD-10-CM) - Chronic midline low back pain without sciatica  Treatment Diagnosis: difficulty walking; low back pain   Additional Pertinent Hx: HTN; irregular heart beat; pacemaker; incontinence                   General:  PT Visit Information  Onset Date: 20  PT Insurance Information: Zuleyma Mason has unlimited visits based on medical necessity. Aquatics and modalities are covered except ionto and hot/cold packs. Total # of Visits to Date: 15  Progress Note Counter: 13               Subjective:  Chart Reviewed: Yes  Patient assessed for rehabilitation services?: Yes  Response To Previous Treatment: Not applicable  Family / Caregiver Present: No  Comments: No scheduled follow up at this time            Pain:  Patient Currently in Pain: Yes         Objective         Exercises  Exercise 3: Seated: marching 15x (cues to steady trunk), LAQs 10x 5 seconds bilateral, hip adduction ball squeeze 15x 5 seconds, hip abd with green band: 15x  Exercise 4: Standing on blue foam with abdominal bracing: Heel raises, marching, squats x15 eac  Exercise 5: NuStep level 5 [id number]  Exercise 9: // bars: side stepping 4 lengths, heel toe walking with 1 UE support, retro x4 laps  Exercise 12: Walking with cue to push pelvis forward: 200 ft with SC cues to tighten gluts         Activity Tolerance:  Activity Tolerance: Patient Tolerated treatment well    Assessment:   Body structures, Functions, Activity limitations: Decreased balance, Decreased functional mobility , Decreased endurance, Increased pain, Decreased strength, Decreased posture  Assessment: Increased soreness this AM, however she is feeling better. Focused on posture with both standing and seated exercises to minimize stress on her low back. Prognosis: Good  Discharge Recommendations: Continue to assess pending progress    Patient Education:  PT Education: Home Exercise Program  Patient Education: posture corrections at wall                      Plan:  Times per week: 2x per week   Plan weeks: 8 weeks   Specific instructions for Next Treatment: Progress dynamic lumbar strengthening program; start on Nustep  Current Treatment Recommendations: Strengthening, Endurance Training, Neuromuscular Re-education, Patient/Caregiver Education & Training, ROM, Pain Management, Balance Training, Gait Training, Home Exercise Program, Modalities, Manual Therapy - Joint Manipulation, Manual Therapy - Soft Tissue Mobilization, Stair training  Plan Comment: Continue with current POC    Goals:  Patient goals : Improve strength; resolve low back pain     Short term goals  Time Frame for Short term goals: 4 weeks   Short term goal 1: Flora will demonstrate good abdominal bracing while performing all exercises and transfes GOAL MET  Short term goal 2: Chad Ibarra will be able to stand for 30 min at a time while maintaining good bracing so she is able to meal prep and do other household chores without being limited by low back pain      Long term goals  Time Frame for Long term goals : 8 weeks   Long term goal 1: Discharge with independent HEP   Long term goal 2: Flora's low back pain will improve to 2-3/10 following activities.    Long term goal 3: Flora's bilateral LE strength will improve to 4+ to 5/5 allowing for greater stability with household mobility and transfers     Jl Richey, PT

## 2020-07-01 ENCOUNTER — TELEPHONE (OUTPATIENT)
Dept: FAMILY MEDICINE CLINIC | Age: 80
End: 2020-07-01

## 2020-07-01 ENCOUNTER — HOSPITAL ENCOUNTER (OUTPATIENT)
Dept: PHYSICAL THERAPY | Age: 80
Setting detail: THERAPIES SERIES
Discharge: HOME | End: 2020-07-01
Payer: MEDICARE

## 2020-07-01 ENCOUNTER — NURSE ONLY (OUTPATIENT)
Dept: LAB | Age: 80
End: 2020-07-01

## 2020-07-01 LAB — INR BLD: 1.69 (ref 0.85–1.13)

## 2020-07-01 PROCEDURE — 97110 THERAPEUTIC EXERCISES: CPT

## 2020-07-01 ASSESSMENT — PAIN SCALES - GENERAL: PAINLEVEL_OUTOF10: 1

## 2020-07-01 ASSESSMENT — PAIN DESCRIPTION - PAIN TYPE: TYPE: CHRONIC PAIN

## 2020-07-01 ASSESSMENT — PAIN DESCRIPTION - ORIENTATION: ORIENTATION: LOWER

## 2020-07-01 ASSESSMENT — PAIN DESCRIPTION - LOCATION: LOCATION: BACK

## 2020-07-01 NOTE — PROGRESS NOTES
[place] [facility]     Time In: 0900  Time Out: 0940  Minutes: 40  Timed Code Treatment Minutes: 40 Minutes                Date: 2020  Patient Name: Britt Godwin,  Gender:  female        CSN: [id number]   : 1940  (70 y.o.)  Referral Date : 20    Referring Practitioner: Marita Hart MD       Diagnosis: M54.5, G89.29 (ICD-10-CM) - Chronic midline low back pain without sciatica  Treatment Diagnosis: difficulty walking; low back pain   Additional Pertinent Hx: HTN; irregular heart beat; pacemaker; incontinence                   General:  PT Visit Information  Onset Date: 20  PT Insurance Information: Quang Mckeon has unlimited visits based on medical necessity. Aquatics and modalities are covered except ionto and hot/cold packs. Total # of Visits to Date: 14  Progress Note Counter: 14               Subjective:  Chart Reviewed: Yes  Patient assessed for rehabilitation services?: Yes  Response To Previous Treatment: Not applicable  Family / Caregiver Present: No  Comments: No scheduled follow up at this time      Subjective: Pt stated back pain is some better. She still has stiffness in the morning but improves quicker.       Pain:  Patient Currently in Pain: Yes  Pain Level: 1  Pain Type: Chronic pain  Pain Location: Back  Pain Orientation: Lower      Objective                            Exercises  Exercise 1: HEP-Posterior pelvic tilt 10x 5 seconds, marching 15x, straight leg raises 15x bilateral with tilt, bridging 15x 3 seconds, hip abduction with lime band x15, hip adduction ball squeeze 10x 5 seconds-while on moist heat   Exercise 4: Standing on blue foam with abdominal bracing: Heel raises, marching, squats x15 eac  Exercise 5: NuStep level 5 [id number]  Exercise 6: Balance on blue foam:  narrow stance with ball performing shoulder flexion/UTR X10 and bilateral step stance x1 minute each way with abdominal bracing, cues to tighten through gluts  Exercise 7: NK table 5# bilateral knee flexion and extension 10x each - cues for posture  Exercise 8: Rockerboard forward/back x20, side to side x20, balance x1 minute each direction with 1 UE support - side to side taps started to bother Left knee  Exercise 9: in hallway: side stepping 2 lengths, heel toe walking with 1 UE support, retro x2laps  Exercise 13: sitting with trunk extension over ball held 15 sec. X3         Activity Tolerance:  Activity Tolerance: Patient Tolerated treatment well    Assessment: Body structures, Functions, Activity limitations: Decreased balance, Decreased functional mobility , Decreased endurance, Increased pain, Decreased strength, Decreased posture  Assessment: Pt continues to work on balance, endurance, and core engagement with activity. Pt given supine exercises to add to HEP with green tband.  Pt able to complete entire session with 1 sitting RB  Prognosis: Good  REQUIRES PT FOLLOW UP: Yes  Discharge Recommendations: Continue to assess pending progress    Patient Education:  PT Education: Home Exercise Program  Patient Education: HEP- supine exercises-bridges, posterior pelvic tilt, hip abduction with green tband, adduction with ball                      Plan:  Times per week: 2x per week   Plan weeks: 8 weeks   Specific instructions for Next Treatment: Progress dynamic lumbar strengthening program; start on Nustep  Current Treatment Recommendations: Strengthening, Endurance Training, Neuromuscular Re-education, Patient/Caregiver Education & Training, ROM, Pain Management, Balance Training, Gait Training, Home Exercise Program, Modalities, Manual Therapy - Joint Manipulation, Manual Therapy - Soft Tissue Mobilization, Stair training  Plan Comment: Continue with current POC    Goals:  Patient goals : Improve strength; resolve low back pain     Short term goals  Time Frame for Short term goals: 4 weeks   Short term goal 1: Flora orantes demonstrate good abdominal bracing while performing all exercises and transfes GOAL MET  Short term goal 2: Yamile Weaver will be able to stand for 30 min at a time while maintaining good bracing so she is able to meal prep and do other household chores without being limited by low back pain      Long term goals  Time Frame for Long term goals : 8 weeks   Long term goal 1: Discharge with independent HEP   Long term goal 2: Flora's low back pain will improve to 2-3/10 following activities.    Long term goal 3: Flora's bilateral LE strength will improve to 4+ to 5/5 allowing for greater stability with household mobility and transfers     Tess Quintana, PTA

## 2020-07-01 NOTE — TELEPHONE ENCOUNTER
Yamile Weaver went to [address] and had her INR done. There is no order is epic. Could you please place one. Also, they accidentally deleted the H&H that she is suppose to have done. Can you please put another order in for that also.

## 2020-07-08 ENCOUNTER — HOSPITAL ENCOUNTER (OUTPATIENT)
Dept: PHYSICAL THERAPY | Age: 80
Setting detail: THERAPIES SERIES
Discharge: HOME | End: 2020-07-08
Payer: MEDICARE

## 2020-07-08 PROCEDURE — 97112 NEUROMUSCULAR REEDUCATION: CPT

## 2020-07-08 PROCEDURE — 97530 THERAPEUTIC ACTIVITIES: CPT

## 2020-07-08 PROCEDURE — 97110 THERAPEUTIC EXERCISES: CPT

## 2020-07-08 ASSESSMENT — PAIN SCALES - GENERAL: PAINLEVEL_OUTOF10: 0

## 2020-07-08 NOTE — FLOWSHEET NOTE
; Pepe Child is doing okay; back is bothering her more and she is unable to attribute it to any one thing. For the past week her pain can be all day long, however intermittent. Has noticed that every morning when she wakes up she has a headache, however once she sits up it immediately goes away. Pain:  Patient Currently in Pain: Yes  Pain Assessment: 0-10  Pain Level: 0      Objective         Oswestry Low Back Pain Disability Questionnaire    Instructions: This questionnaire has been designed to give us information as to how your back or leg pain is affecting your ability to manage in everday life. Please answer by checking ONE box in each section for the statement that best applies to you. We realize you may consider that two or more statements in any one section apply but please just check the spot that indicates the statement which most clearly describes your problem. Section 1 - Pain Intensity I have no pain at the moment = 0   Section 2 - Personal Care (Washing, dressing, etc) I can look after myself normally but it causes extra pain = 1   Section 3 - Lifting Pain prevents me from lifting heavy weights, but I can manage light to medium weights if they are conveniently positioned = 3   Section 4 - Walking I can only walk using a stick or crutches = 4     Section 5 - Sitting   I can sit in any chair as long as I like = 0   Section 6 - Standing Pain prevents me from standing for more than 10 minutes = 4   Section 7 - Sleeping My sleep is occasionally disturbed by pain = 1   Section 8 - Sex Life (if applicable) Not applicable   Section 9 - Social Life My social life is normal but increases the degree of pain = 1     Section 10 - Travelling Pain restricts me to journeys of less than one hour = 3   Total Score  17/45     Total score/total possible score   X 100  38%       Score Interpretation:  0%-20%: minimal disability The patient can cope with most living activities.   Usually no treatment is indicated support, cues for technique to increase LE strength  Exercise 14: Yulisa disc: seated for pelvic tilts, marches and LAQ: 15x each while maintaining abdominal bracing         Activity Tolerance:  Activity Tolerance: Patient Tolerated treatment well  Activity Tolerance: Reported less pain overall and is feeling that therapy is helping     Assessment: Body structures, Functions, Activity limitations: Decreased balance, Decreased functional mobility , Decreased endurance, Increased pain, Decreased strength, Decreased posture  Assessment: Candace Samuel continues to demonstrate improvements in LE strength, balance and overall endurance as evidenced by her ability to perform 10 sit-stands without rest or UE support. She continues to report intermittent pain which may be positional and her standing tolerance is limited to <15 min. She would benefit from continued therapy to work on endurance, core strength and to address positional limitation so as to minimize back pain.    Prognosis: Good  REQUIRES PT FOLLOW UP: Yes  Discharge Recommendations: Continue to assess pending progress    Patient Education:  PT Education: Home Exercise Program, PT Role, Plan of Care                      Plan:  Times per week: 2x per week   Plan weeks: 8 weeks   Specific instructions for Next Treatment: Progress dynamic lumbar strengthening program; start on Nustep  Current Treatment Recommendations: Strengthening, Endurance Training, Neuromuscular Re-education, Patient/Caregiver Education & Training, ROM, Pain Management, Balance Training, Gait Training, Home Exercise Program, Modalities, Manual Therapy - Joint Manipulation, Manual Therapy - Soft Tissue Mobilization, Stair training  Plan Comment: Continue with current POC    Goals:  Patient goals : Improve strength; resolve low back pain     Short term goals  Time Frame for Short term goals: 4 weeks   Short term goal 1: Flora will demonstrate good abdominal bracing while performing all exercises and transfes GOAL MET  Short term goal 2: Candace Samuel will be able to stand for 30 min at a time while maintaining good bracing so she is able to meal prep and do other household chores without being limited by low back pain  NOT MET: only tolerates 15 min of standing, however it does vary    Long term goals  Time Frame for Long term goals : 12 weeks   Long term goal 1: Discharge with independent HEP   Long term goal 2: Flora's low back pain will improve to 2-3/10 following activities. NOT MET: pain can get upwards of a 9/10  Long term goal 3: Flora's bilateral LE strength will improve to 4+ to 5/5 allowing for greater stability with household mobility and transfers ONGOING-testing 4 to 4+ throughout left LE  Long term goal 4: NEW GOAL: Flora's Oswestry score will improve to <20% indicating minimal disability related to her low back pain.      Latha Trinidad, PT

## 2020-07-10 ENCOUNTER — HOSPITAL ENCOUNTER (OUTPATIENT)
Dept: PHYSICAL THERAPY | Age: 80
Setting detail: THERAPIES SERIES
Discharge: HOME | End: 2020-07-10
Payer: MEDICARE

## 2020-07-10 PROCEDURE — 97110 THERAPEUTIC EXERCISES: CPT

## 2020-07-10 ASSESSMENT — PAIN DESCRIPTION - PAIN TYPE: TYPE: CHRONIC PAIN

## 2020-07-10 ASSESSMENT — PAIN SCALES - GENERAL: PAINLEVEL_OUTOF10: 2

## 2020-07-10 ASSESSMENT — PAIN DESCRIPTION - LOCATION: LOCATION: BACK

## 2020-07-10 NOTE — PROGRESS NOTES
[place]     Time In: 1100  Time Out: 3791  Minutes: 38  Timed Code Treatment Minutes: 38 Minutes                Date: 7/10/2020  Patient Name: Sana Powell,  Gender:  female        CSN: [id number]   : 1940  (70 y.o.)  Referral Date : 20    Referring Practitioner: Leonardo Bumpers, MD       Diagnosis: M54.5, G89.29 (ICD-10-CM) - Chronic midline low back pain without sciatica  Treatment Diagnosis: difficulty walking; low back pain   Additional Pertinent Hx: HTN; irregular heart beat; pacemaker; incontinence                   General:  PT Visit Information  Onset Date: 20  PT Insurance Information: Susana See has unlimited visits based on medical necessity. Aquatics and modalities are covered except ionto and hot/cold packs. Total # of Visits to Date: 12  Plan of Care/Certification Expiration Date: 20  Progress Note Counter: 16               Subjective:  Chart Reviewed: Yes  Patient assessed for rehabilitation services?: Yes  Response To Previous Treatment: Not applicable  Family / Caregiver Present: No  Comments: Follow up with Dr. Raiza Vuong (cardiologist) end of July      Subjective: Pt stated she feels her endurance is getting alot better since starting therapy. Pt stated back was painful this morning but pt put ice on back and took a couple of tylenol's and now is more tolerable.       Pain:  Patient Currently in Pain: Yes  Pain Level: 2  Pain Type: Chronic pain  Pain Location: Back      Objective                                                                                                                          Exercises  Exercise 4: Standing on blue foam with abdominal bracing: Heel raises, marching, x15 eac  Exercise 5: NuStep level 5 [id number]  Exercise 6: Balance on blue foam:  narrow stance with ball performing shoulder flexion/UTR X10 and bilateral step stance x1 minute each way with abdominal bracing, cues to tighten through gluts  Exercise 7: NK table 5# bilateral knee flexion and extension 10x each - cues for posture  Exercise 8: Rockerboard forward/back x20, balance x1 minute with 1 UE support - side to side taps started to bother Left knee  Exercise 9: in hallway: side stepping 2 lengths, heel toe walking with 1 UE support, retro x2laps  Exercise 10: Sit-stands: x10 with Min. A no UE support, cues for technique to increase LE strength  Exercise 14: Yulisa disc: seated for pelvic tilts, marches and LAQ: 15x each while maintaining abdominal bracing  Exercise 15: CGA-hydrostick forward/CW/CCW X10 (no increase in pain just difficult)         Activity Tolerance:  Activity Tolerance: Patient Tolerated treatment well    Assessment: Body structures, Functions, Activity limitations: Decreased balance, Decreased functional mobility , Decreased endurance, Increased pain, Decreased strength, Decreased posture  Assessment: Pt continues to work on balance during sessions. Cues given during standing to put weight equally mickie HAUSER, pt shifts all weigh to R side in resting stance. Added hydro stick this date for core engagement and balance. CGA required.   Prognosis: Good  REQUIRES PT FOLLOW UP: Yes  Discharge Recommendations: Continue to assess pending progress    Patient Education:  PT Education: Home Exercise Program, PT Role, Plan of Care  Patient Education: cont with HEP                      Plan:  Times per week: 2x per week   Plan weeks: 8 weeks   Specific instructions for Next Treatment: Progress dynamic lumbar strengthening program; start on Nustep  Current Treatment Recommendations: Strengthening, Endurance Training, Neuromuscular Re-education, Patient/Caregiver Education & Training, ROM, Pain Management, Balance Training, Gait Training, Home Exercise Program, Modalities, Manual Therapy - Joint Manipulation, Manual Therapy - Soft Tissue Mobilization, Stair training  Plan Comment: Continue with current POC    Goals:  Patient goals : Improve strength; resolve low back pain     Short term goals  Time Frame for Short term goals: 4 weeks   Short term goal 1: Anant Tomlinson will demonstrate good abdominal bracing while performing all exercises and transfes GOAL MET  Short term goal 2: Anant Tomlinson will be able to stand for 30 min at a time while maintaining good bracing so she is able to meal prep and do other household chores without being limited by low back pain  NOT MET: only tolerates 15 min of standing, however it does vary    Long term goals  Time Frame for Long term goals : 12 weeks   Long term goal 1: Discharge with independent [facility]   Long term goal 2: Bes low back pain will improve to 2-3/10 following activities. NOT MET: pain can get upwards of a 9/10  Long term goal 3: Bes bilateral LE strength will improve to 4+ to 5/5 allowing for greater stability with household mobility and transfers ONGOING-testing 4 to 4+ throughout left LE  Long term goal 4: NEW GOAL: Bes Oswestry score will improve to <20% indicating minimal disability related to her low back pain.      Schechino Shows, PTA

## 2020-07-13 ENCOUNTER — OFFICE VISIT (OUTPATIENT)
Dept: CARDIOLOGY CLINIC | Age: 80
End: 2020-07-13
Payer: MEDICARE

## 2020-07-13 VITALS
OXYGEN SATURATION: 97 % | HEART RATE: 72 BPM | BODY MASS INDEX: 31.64 KG/M2 | WEIGHT: 173 LBS | SYSTOLIC BLOOD PRESSURE: 132 MMHG | DIASTOLIC BLOOD PRESSURE: 78 MMHG

## 2020-07-13 PROCEDURE — G8400 PT W/DXA NO RESULTS DOC: HCPCS | Performed by: NURSE PRACTITIONER

## 2020-07-13 PROCEDURE — 1090F PRES/ABSN URINE INCON ASSESS: CPT | Performed by: NURSE PRACTITIONER

## 2020-07-13 PROCEDURE — 1123F ACP DISCUSS/DSCN MKR DOCD: CPT | Performed by: NURSE PRACTITIONER

## 2020-07-13 PROCEDURE — 4040F PNEUMOC VAC/ADMIN/RCVD: CPT | Performed by: NURSE PRACTITIONER

## 2020-07-13 PROCEDURE — 1036F TOBACCO NON-USER: CPT | Performed by: NURSE PRACTITIONER

## 2020-07-13 PROCEDURE — 99214 OFFICE O/P EST MOD 30 MIN: CPT | Performed by: NURSE PRACTITIONER

## 2020-07-13 PROCEDURE — G8417 CALC BMI ABV UP PARAM F/U: HCPCS | Performed by: NURSE PRACTITIONER

## 2020-07-13 PROCEDURE — G8427 DOCREV CUR MEDS BY ELIG CLIN: HCPCS | Performed by: NURSE PRACTITIONER

## 2020-07-13 ASSESSMENT — ENCOUNTER SYMPTOMS
COUGH: 0
SHORTNESS OF BREATH: 1
ABDOMINAL DISTENTION: 0

## 2020-07-13 NOTE — PATIENT INSTRUCTIONS
You may receive a survey regarding the care you received during your visit. Your input is valuable to us. We encourage you to complete and return your survey. We hope you will choose us in the future for your healthcare needs.     Continue:  · Continue current medications  · Daily weights and record  · Fluid restriction of 2 Liters per day  · Limit sodium in diet to around 5590-6916 mg/day  · Monitor BP  · Activity as tolerated     Call the [facility] Failure Clinic for any of the following symptoms: [phone]   Weight gain of 2-3 pounds in 1 day or 5 pounds in 1 week   Increased shortness of breath   Shortness of breath while laying down   Cough   Chest pain   Swelling in feet, ankles or legs   Tenderness or bloating in the abdomen   Fatigue    Decreased appetite or nausea    Confusion

## 2020-07-13 NOTE — PROGRESS NOTES
[facility]       Visit Date: 7/13/2020  Cardiologist:  Dr. Rueda  Primary Care Physician: Dr. Prashanth Kirk MD    Tita Deleon is a 70 y.o. female who presents today for:  Chief Complaint   Patient presents with    Congestive Heart Failure       HPI:   Tita Deleon is a 70 y.o. female who presents to the office for a follow up patient visit in the heart failure clinic. Accompanied by no one   Lives w/ , 7 dtrs    TYPE HF: HFrEF (EF 25%), NICM    Device: BiV ICD (1999, follows w/ [facility])  HX: HTN, HLD, Afib (Coumadin), CAD (nonobstructive), CKD, Anemia     Dry Wt:  170-175#     Hospitalization:  3/9-3/11 = CP, SOB, WINN - BNP 4154. Diuresed. STOPPED Entresto (thought was making her feel worse) - changed back to Accupril. Lasix changed to Bumex. Hgb 8.4  Wt 181# at D/c    Concerns today: Doing therapy for low back pain - 3 more weeks. Is seeing improvement in activity tolerance. Denies feeling fluid overload. Breathing stable. Occasional SOB at night when awakens to urinate. Good urine output   Activity: Wheelchair today - Can walk room/room at home. Can do light housework. Stairs make winded  Diet: Watches salt, add \"little\".       Patient has:  Chest Pain: No  SOB: occasionally - WINN  Orthopnea/PND: bed w/ 1 pillow  LUIS FELIPE: no  Edema: No  Fatigue: Improving w/ therapy  Abdominal bloating: No  Cough: Yes - \"tickle\", \"years\"  Appetite: Good  Any extra diuretic use: No  Weight gain: No  Home weight: Stable  Home blood pressure: 120-130s    Past Medical History:   Diagnosis Date    AICD (automatic cardioverter/defibrillator) present 05/1999    Arthritis 5/21/2019    Atrial fibrillation (HCC)     chronic    Biventricular ICD (implantable cardiac defibrillator) in place 10/09    C. difficile diarrhea     CAD (coronary artery disease)     Cancer ([facility]     right hand    CHF (congestive heart failure) ([facility]     Diverticulitis 01/2013    GERD (gastroesophageal reflux disease)     hiatal hernia    Hiatal hernia     Hyperlipidemia     Hypertension     Menopause     1994    NAFLD (nonalcoholic fatty liver disease) 1/31/2017    Nonischemic cardiomyopathy ([address]     chronic    Pacemaker     Retroperitoneal hemorrhage 07/99    transfusion x3    Thyroid disease      Past Surgical History:   Procedure Laterality Date    CARDIAC DEFIBRILLATOR PLACEMENT     Mónica Longoria  2008    w/ EGD    COLONOSCOPY N/A 7/16/2019    COLONOSCOPY POLYPECTOMY SNARE/COLD BIOPSY performed by Elian Nolasco MD [facility]    ENDOSCOPY, COLON, DIAGNOSTIC     [address]    replaced 3x    SKIN BIOPSY  2010    Skin CA with graft.     UPPER GASTROINTESTINAL ENDOSCOPY N/A 7/16/2019    EGD DILATION SAVORY performed by Elian Nolasco MD at [address] [facility] ENDOSCOPY Left 7/16/2019    EGD BIOPSY performed by Elian Nolasco MD at [facility]    UPPER GASTROINTESTINAL ENDOSCOPY N/A 12/2/2019    EGD ESOPHAGEAL MANOMETRY AND PH MONITORING 24 HR performed by Elian Nolasco MD at [facility]     Family History   Problem Relation Age of Onset    Heart Disease Mother     Diabetes Mother     High Cholesterol Mother     High Blood Pressure Brother     Cancer Sister     High Blood Pressure Brother     High Blood Pressure Brother     Parkinsonism Brother     Diabetes Brother     Heart Disease Brother     Kidney Disease Brother     High Blood Pressure Brother     High Cholesterol Father     Miscarriages / [place] Daughter     Arthritis Neg Hx     Asthma Neg Hx     Birth Defects Neg Hx     Depression Neg Hx     Early Death Neg Hx     Learning Disabilities Neg Hx     Mental Illness Neg Hx     Mental Retardation Neg Hx     Stroke Neg Hx     Substance Abuse Neg Hx     Vision Loss Neg Hx     Other Neg Hx      Social History     Tobacco Use    Smoking status: Never Smoker    1 each 0     No current facility-administered medications for this visit. Allergies   Allergen Reactions    Ativan [Lorazepam]      Gets anxiety    Codeine     Nystatin      Mycostatin powder    Percocet [Oxycodone-Acetaminophen]     Relafen [Nabumetone]      Stomach upset    Tape Joe Sers Tape]     Celebrex [Celecoxib] Rash       SUBJECTIVE:   Review of Systems   Constitutional: Positive for fatigue (improving). Negative for activity change and appetite change. Respiratory: Positive for shortness of breath (improving). Negative for cough. Cardiovascular: Negative for chest pain, palpitations and leg swelling. Gastrointestinal: Negative for abdominal distention. Neurological: Negative for weakness, light-headedness and headaches. Hematological: Negative for adenopathy. Psychiatric/Behavioral: Negative for sleep disturbance. OBJECTIVE:   Today's Vitals:  /78 (Site: Right Upper Arm)   Pulse 72   Wt 173 lb (78.5 kg)   SpO2 97%   BMI 31.64 kg/m²     Physical Exam  Vitals signs reviewed. Constitutional:       General: She is not in acute distress. Appearance: Normal appearance. She is well-developed. She is not diaphoretic. HENT:      Head: Normocephalic and atraumatic. Eyes:      Conjunctiva/sclera: Conjunctivae normal.   Neck:      Musculoskeletal: Normal range of motion and neck supple. Comments: No JVD  Cardiovascular:      Rate and Rhythm: Normal rate and regular rhythm. Heart sounds: Normal heart sounds. No murmur. Comments: ICD noted  Pulmonary:      Effort: Pulmonary effort is normal. No respiratory distress. Breath sounds: Normal breath sounds. No wheezing or rales. Abdominal:      General: Bowel sounds are normal. There is no distension. Palpations: Abdomen is soft. Tenderness: There is no abdominal tenderness. Musculoskeletal: Normal range of motion. Right lower leg: No edema. Left lower leg: No edema.    Skin: General: Skin is warm and dry. Capillary Refill: Capillary refill takes less than 2 seconds. Neurological:      Mental Status: She is alert and oriented to person, place, and time. Coordination: Coordination normal.   Psychiatric:         Behavior: Behavior normal.         Wt Readings from Last 3 Encounters:   07/13/20 173 lb (78.5 kg)   04/23/20 173 lb (78.5 kg)   03/19/20 176 lb 9.6 oz (80.1 kg)     BP Readings from Last 3 Encounters:   07/13/20 132/78   04/23/20 101/83   03/19/20 122/60     Pulse Readings from Last 3 Encounters:   07/13/20 72   04/23/20 66   03/19/20 60     Body mass index is 31.64 kg/m². ECHO:    Summary   limited echo   Ejection fraction is visually estimated at 25%.   There was severe global hypokinesis of the left ventricle.   Left Ventricular size is Moderately increased .   Severely dilated left atrium.   Moderately dilated right ventricle.   No evidence of any pericardial effusion.      Signature      ----------------------------------------------------------------   Electronically signed by Jack Leone MD (Interpreting   physician) on 03/11/2020 at 07:22 AM        COMPLETE   Summary   Left Ventricular size is Severely increased .   Normal left ventricular wall thickness.   There was severe global hypokinesis of the left ventricle.   Ejection fraction is visually estimated in the range of 20% to 25%.   Left atrial size was dilated      Signature      ----------------------------------------------------------------   Electronically signed by Vinay Garnica MD (Interpreting   physician) on 05/15/2019 at 07:07 AM   ----------------------------------------------------------------        CATH/STRESS:   IMPRESSION:  1.  In summary, severe dilated congestive cardiomyopathy with an  ejection fraction about 15% to 20%. 2.  Diastolic dysfunction of the left ventricle. 3.  Nonobstructive disease of the LAD. 4.  Patent left main. 5.  Patent circumflex.   6.  Patent RCA.     The patient had no acute complications from the procedure.        Yahaira Scherer M.D.     D: 05/15/2019 19:42:30        Results reviewed:  BNP:   Lab Results   Component Value Date    .3 (H) 02/07/2013     CBC:   Lab Results   Component Value Date    WBC 6.2 04/01/2020    RBC 3.84 04/01/2020    RBC 3.06 11/29/2011    HGB 11.9 05/29/2020    HCT 39.4 05/29/2020     04/01/2020     CMP:    Lab Results   Component Value Date     04/01/2020    K 4.0 04/01/2020    K 4.6 10/23/2019     04/01/2020    CO2 24 04/01/2020    BUN 31 04/01/2020    CREATININE 1.2 04/01/2020    LABGLOM 44 04/01/2020    GLUCOSE 159 04/01/2020    GLUCOSE 147 05/09/2012    CALCIUM 9.2 04/01/2020     Hepatic Function Panel:    Lab Results   Component Value Date    ALKPHOS 131 03/09/2020    ALT 22 03/09/2020    AST 45 03/09/2020    PROT 7.5 03/09/2020    BILITOT 0.5 03/09/2020    BILIDIR <0.2 03/09/2020    LABALBU 3.9 03/09/2020    LABALBU 4.1 05/09/2012     Magnesium:    Lab Results   Component Value Date    MG 2.0 03/11/2020     PT/INR:    Lab Results   Component Value Date    PROTIME 25.8 01/26/2015    PROTIME 24.8 06/12/2014    INR 1.69 07/01/2020     Lipids:    Lab Results   Component Value Date    TRIG 113 03/10/2020    HDL 40 03/10/2020    LDLCALC 158 03/10/2020       ASSESSMENT AND PLAN:   The patient's condition/symptoms are Stable: No clinical evidence of fluid overload today. Continue current medical regimen without changes at present time. Diagnosis Orders   1.  CHF (congestive heart failure), NYHA class II/III, chronic, systolic (HCC)  Basic Metabolic Panel    Brain Natriuretic Peptide     Continue:  GDMT:   ACE/ARB/ARNI - Accupril 5/day (not tolerate Entresto)   BB - Toprol 200/day   Diuretic - Bumex 1/day (had been on Lasix long time)   Hydralazine/Isos. - None   Aldosterone- Aldactone 25 QOD  Continue Current medications:  Coumadin, Digoxin 125 QOD, Amiodarone  Stable - appears Euvolemic - some WINN, improving Continue OP therapy  Monitor weights daily   Encouraged diet and fluid adherence  Routine BMP, BNP in 2 weeks w/ INR  On optimal GDMT  F/U w/ [facility] in 2 weeks  F/U in clinic in 3 months or sooner if needed    Greater than 30 minutes has been spent on education of HF symptoms, management, medication, and plan of care; as well as review of chart: labs, ECHO, radiology reports, etc.   I personally spent more then 50% of the appt time face to face with the patient. New HF Pamphlet given and ZONE sheet reviewed, patient voices understanding. Questions answered. · Daily weights  · Fluid restriction of 2 Liters per day  · Limit sodium in diet to around 6861-1619 mg/day  · Monitor BP  · Activity as tolerated     Patient was instructed to call the [facility] for any changes in symptoms as noted in AVS.      Return in about 3 months (around 10/13/2020). or sooner if needed     Patient given educational materials - see patient instructions. We discussed the importance of weighing oneself and recording daily. We also discussed the importance of a low sodium diet, higher sodium foods to avoid and better low sodium food options. Patient verbalizes understanding of plan of care using teach back method, and is agreeable to the treatment plan.        Electronically signed by ELIA Chopra CNP on 7/13/2020 at 11:12 AM

## 2020-07-14 ENCOUNTER — HOSPITAL ENCOUNTER (OUTPATIENT)
Dept: PHYSICAL THERAPY | Age: 80
Setting detail: THERAPIES SERIES
Discharge: HOME | End: 2020-07-14
Payer: MEDICARE

## 2020-07-14 PROCEDURE — 97110 THERAPEUTIC EXERCISES: CPT

## 2020-07-14 ASSESSMENT — PAIN DESCRIPTION - LOCATION: LOCATION: BACK

## 2020-07-14 ASSESSMENT — PAIN SCALES - GENERAL: PAINLEVEL_OUTOF10: 1

## 2020-07-14 NOTE — PROGRESS NOTES
[place]     Time In: 930  Time Out: 1015  Minutes: 45  Timed Code Treatment Minutes: 45 Minutes                Date: 2020  Patient Name: Nruis Kaiser,  Gender:  female        CSN: [id number]   : 1940  (70 y.o.)  Referral Date : 20    Referring Practitioner: Gabriela Miller MD       Diagnosis: M54.5, G89.29 (ICD-10-CM) - Chronic midline low back pain without sciatica  Treatment Diagnosis: difficulty walking; low back pain   Additional Pertinent Hx: HTN; irregular heart beat; pacemaker; incontinence                   General:  PT Visit Information  Onset Date: 20  PT Insurance Information: Monique Waller has unlimited visits based on medical necessity. Aquatics and modalities are covered except ionto and hot/cold packs. Total # of Visits to Date: 16  Plan of Care/Certification Expiration Date: 20  Progress Note Counter: 17               Subjective:  Chart Reviewed: Yes  Patient assessed for rehabilitation services?: Yes  Comments: Follow up with Dr. Omaira Garnica (cardiologist) end of July      Subjective: Reports pain is worse in the mornings upon standing up from bed     Pain:  Patient Currently in Pain: Yes  Pain Assessment: 0-10  Pain Level: 1  Pain Location: Back      Objective                                                                                                                          Exercises  Exercise 4: Standing on blue foam with abdominal bracing: Heel raises, marching, x15 eac  Exercise 5: NuStep level 5 [id number]  Exercise 6: Balance on blue foam:  narrow stance with ball performing shoulder flexion/UTR X10 and bilateral step stance x30 sec each way with abdominal bracing.  Required CGA  Exercise 7: NK table 5# bilateral knee flexion and extension 10x each - cues for posture  Exercise 8: Rockerboard forward/back x20, balance x1 minute with 1 UE support   Exercise 9: in hallway: side stepping 2 lengths, heel toe walking with 1 UE support, retro x2laps  Exercise 10: Sit-stands: x10 with Min. A no UE support, cues for technique to increase LE strength  Exercise 14: Yulisa disc: seated for pelvic tilts, marches and LAQ: 15x each while maintaining abdominal bracing  Exercise 15: SGA-hydrostick forward/CW/CCW X10 (no increase in pain just difficult)         Activity Tolerance:  Activity Tolerance: Patient Tolerated treatment well  Activity Tolerance: Reported less pain overall and is feeling that therapy is helping     Assessment: Body structures, Functions, Activity limitations: Decreased balance, Decreased functional mobility , Decreased endurance, Increased pain, Decreased strength, Decreased posture  Assessment: Pt continues to work on balance during sessions.  CGA required at [facility]  Prognosis: Good  Discharge Recommendations: Continue to assess pending progress    Patient Education:  PT Education: Home Exercise Program, Plan of Care  Patient Education: cont with HEP                      Plan:  Times per week: 2x per week   Plan weeks: 8 weeks   Specific instructions for Next Treatment: Progress dynamic lumbar strengthening program; start on Nustep  Current Treatment Recommendations: Strengthening, Endurance Training, Neuromuscular Re-education, Patient/Caregiver Education & Training, ROM, Pain Management, Balance Training, Gait Training, Home Exercise Program, Modalities, Manual Therapy - Joint Manipulation, Manual Therapy - Soft Tissue Mobilization, Stair training  Plan Comment: Continue with current POC    Goals:  Patient goals : Improve strength; resolve low back pain     Short term goals  Time Frame for Short term goals: 4 weeks   Short term goal 1: Flora will demonstrate good abdominal bracing while performing all exercises and transfes GOAL MET  Short term goal 2: Shirley Tellez will be able to stand for 30 min at a time while maintaining good bracing so she is able to meal prep and do other household chores without being limited by low back pain  NOT MET: only tolerates 15 min of standing, however it does vary    Long term goals  Time Frame for Long term goals : 12 weeks   Long term goal 1: Discharge with independent [facility]   Long term goal 2: Flora's low back pain will improve to 2-3/10 following activities. NOT MET: pain can get upwards of a 9/10  Long term goal 3: Flora's bilateral LE strength will improve to 4+ to 5/5 allowing for greater stability with household mobility and transfers ONGOING-testing 4 to 4+ throughout left LE  Long term goal 4: NEW GOAL: Flora's Oswestry score will improve to <20% indicating minimal disability related to her low back pain.      Radames Aase  [id number]

## 2020-07-16 ENCOUNTER — HOSPITAL ENCOUNTER (OUTPATIENT)
Dept: PHYSICAL THERAPY | Age: 80
Setting detail: THERAPIES SERIES
Discharge: HOME | End: 2020-07-16
Payer: MEDICARE

## 2020-07-16 PROCEDURE — 97110 THERAPEUTIC EXERCISES: CPT

## 2020-07-16 ASSESSMENT — PAIN DESCRIPTION - LOCATION: LOCATION: BACK

## 2020-07-16 ASSESSMENT — PAIN DESCRIPTION - PAIN TYPE: TYPE: CHRONIC PAIN

## 2020-07-16 ASSESSMENT — PAIN SCALES - GENERAL: PAINLEVEL_OUTOF10: 1

## 2020-07-16 NOTE — PROGRESS NOTES
[place]     Time In: 1130  Time Out: 1210  Minutes: 40  Timed Code Treatment Minutes: 40 Minutes                Date: 2020  Patient Name: Britt Godwin,  Gender:  female        CSN: [id number]   : 1940  (70 y.o.)  Referral Date : 20    Referring Practitioner: Marita Hart MD       Diagnosis: M54.5, G89.29 (ICD-10-CM) - Chronic midline low back pain without sciatica   Additional Pertinent Hx: HTN; irregular heart beat; pacemaker; incontinence                   General:  PT Visit Information  Onset Date: 20  PT Insurance Information: [employer] has unlimited visits based on medical necessity. Aquatics and modalities are covered except ionto and hot/cold packs. Total # of Visits to Date: 25  Plan of Care/Certification Expiration Date: 20  Progress Note Counter: 18               Subjective:  Chart Reviewed: Yes  Response To Previous Treatment: Not applicable  Family / Caregiver Present: No  Comments: Follow up with Dr. Sirena Dill (cardiologist) end of July      Subjective: Pt stated she is having more diffulty breathing due to humidity. Mild back pain but pt feels she is getting alot better.  Pt able to do dishes, laundry, and house work with less fatigue     Pain:  Patient Currently in Pain: Yes  Pain Level: 1  Pain Type: Chronic pain  Pain Location: Back      Objective                                                                            Exercises  Exercise 4: Standing on blue foam with abdominal bracing: Heel raises, marching, x15 eac  Exercise 5: NuStep level 5 [id number]  Exercise 6: Balance on blue foam:  narrow stance with ball performing shoulder flexion/UTR X10 and bilateral step stance X 20 secX2 each way with abdominal bracing, cues to tighten through gluts, tandem with head turns X20 sec  Exercise 7: NK table 7.5# bilateral knee flexion and extension 10x each - cues for posture  Exercise 8: Rockerboard forward/back x20, balance x1 minute with 1 UE support - side to side taps started to bother Left knee  Exercise 9: in hallway: side stepping 2 lengths, heel toe walking with 1 UE support, retro, braiding x2laps  Exercise 15: CGA-hydrostick forward/CW/CCW X10 (no increase in pain just difficult)  Exercise 16: hydrohip  level 3 X15 Bilateral, ipsilateral with abdominal bracing         Activity Tolerance:  Activity Tolerance: Patient Tolerated treatment well    Assessment: Body structures, Functions, Activity limitations: Decreased balance, Decreased functional mobility , Decreased endurance, Increased pain, Decreased strength, Decreased posture  Assessment: Pt continues to work on balance and endurance during therapy sessions. Cues occasionally for abdominal bracing with nautilus equipment. pt required 1 sitting RB today.   Prognosis: Good  REQUIRES PT FOLLOW UP: Yes  Discharge Recommendations: Continue to assess pending progress    Patient Education:  PT Education: Home Exercise Program, Plan of Care  Patient Education: cont with HEP, posture with standing                      Plan:  Times per week: 2x per week   Plan weeks: 8 weeks   Specific instructions for Next Treatment: Progress dynamic lumbar strengthening program; start on Nustep  Current Treatment Recommendations: Strengthening, Endurance Training, Neuromuscular Re-education, Patient/Caregiver Education & Training, ROM, Pain Management, Balance Training, Gait Training, Home Exercise Program, Modalities, Manual Therapy - Joint Manipulation, Manual Therapy - Soft Tissue Mobilization, Stair training  Plan Comment: Continue with current POC    Goals:  Patient goals : Improve strength; resolve low back pain     Short term goals  Time Frame for Short term goals: 4 weeks   Short term goal 1: Flora will demonstrate good abdominal bracing while performing all exercises and transfes GOAL MET  Short term goal 2: Crissy Hernández will be able to stand for 30 min at a time while maintaining good bracing so she is able to meal prep and do other household chores without being limited by low back pain  NOT MET: only tolerates 15 min of standing, however it does vary    Long term goals  Time Frame for Long term goals : 12 weeks   Long term goal 1: Discharge with independent HEP   Long term goal 2: Flora's low back pain will improve to 2-3/10 following activities. NOT MET: pain can get upwards of a 9/10  Long term goal 3: Flora's bilateral LE strength will improve to 4+ to 5/5 allowing for greater stability with household mobility and transfers ONGOING-testing 4 to 4+ throughout left LE  Long term goal 4: NEW GOAL: Flora's Oswestry score will improve to <20% indicating minimal disability related to her low back pain.      Kacey Kimball, PTA

## 2020-07-21 ENCOUNTER — TELEPHONE (OUTPATIENT)
Dept: FAMILY MEDICINE CLINIC | Age: 80
End: 2020-07-21

## 2020-07-21 ENCOUNTER — HOSPITAL ENCOUNTER (OUTPATIENT)
Dept: PHYSICAL THERAPY | Age: 80
Setting detail: THERAPIES SERIES
Discharge: HOME | End: 2020-07-21
Payer: MEDICARE

## 2020-07-21 PROCEDURE — 97110 THERAPEUTIC EXERCISES: CPT

## 2020-07-21 PROCEDURE — 97112 NEUROMUSCULAR REEDUCATION: CPT

## 2020-07-21 NOTE — TELEPHONE ENCOUNTER
Asked patient if she has gotten her H&H done, patient states that when she was there to get her INR done, lab said it wasn't due? Instructed her when she gets her INR done in a week or two, please inform the lab to draw H&H    Pt voiced understanding.

## 2020-07-21 NOTE — PROGRESS NOTES
[place]     Time In: 1130  Time Out: 4203  Minutes: 45  Timed Code Treatment Minutes: 45 Minutes     Date: 2020  Patient Name: Hannah Begum,  Gender:  female        CSN: [id number]   : 1940  (70 y.o.)  Referral Date : 20    Referring Practitioner: Dotty Campbell MD       Diagnosis: M54.5, G89.29 (ICD-10-CM) - Chronic midline low back pain without sciatica   Additional Pertinent Hx: HTN; irregular heart beat; pacemaker; incontinence        General:  PT Visit Information  Onset Date: 20  PT Insurance Information: [place] has unlimited visits based on medical necessity. Aquatics and modalities are covered except ionto and hot/cold packs. Total # of Visits to Date: 23  Plan of Care/Certification Expiration Date: 20  Progress Note Counter: 5/10             Subjective:  Chart Reviewed: Yes  Response To Previous Treatment: Not applicable  Family / Caregiver Present: No  Comments: Follow up with Dr. Queenie Ch (cardiologist) end of July      Subjective: Patient states that she is feeling better overall. Reports she has been able to do more at home and and is going to the grocery again. States she can do things longer without breaks. Pain:  Patient Currently in Pain: Yes     Objective    Exercises  Exercise 4: Standing on blue foam with abdominal bracing: Heel raises, marching, x15 eac  Exercise 5: NuStep level 5 x6 minutes  Exercise 6: Balance on blue foam:  narrow stance with ball performing shoulder flexion and trunk rotation holding red weighted ball  x10 each;  Bilateral step stance x1 minute each way with abdominal bracing - cues to tighten through gluts;   Tandem stance with head turns x30 seconds each way  Exercise 7: NK table 7.5# bilateral knee flexion and extension 10x each - cues for posture  Exercise 8: Rockerboard forward/back x20, balance x1 minute with 1 UE support - side to side taps started to bother Left knee  Exercise 9: In hallway: side stepping 2 lengths, heel toe walking with 1 UE support, retro, braiding x2laps  Exercise 11: Narrow stance and step stance bilateral on Blue foam x1 minute each with eyes open and eyes closed  Exercise 15: CGA-hydrostick forward/CW/CCW X10 (no increase in pain just difficult)  Exercise 16: hydrohip  level 3 X15 Bilateral, ipsilateral with abdominal bracing       Activity Tolerance:  Activity Tolerance: Patient Tolerated treatment well  Activity Tolerance: Fatigue noted at end of session. Assessment: Body structures, Functions, Activity limitations: Decreased balance, Decreased functional mobility , Decreased endurance, Increased pain, Decreased strength, Decreased posture  Assessment: Patient tolerated session well. Patient needed 1 seated rest break during session. Intermittent cues needed to maintain abdominal bracing with exercises. Prognosis: Good  REQUIRES PT FOLLOW UP: Yes  Discharge Recommendations: Continue to assess pending progress    Patient Education:  PT Education: Home Exercise Program, Plan of Care  Patient Education: Continue with HEP, posture with standing. Balance exercises and how core affects balance.     Plan:  Times per week: 2x per week   Plan weeks: 8 weeks   Specific instructions for Next Treatment: Progress dynamic lumbar strengthening program; start on Nustep  Current Treatment Recommendations: Strengthening, Endurance Training, Neuromuscular Re-education, Patient/Caregiver Education & Training, ROM, Pain Management, Balance Training, Gait Training, Home Exercise Program, Modalities, Manual Therapy - Joint Manipulation, Manual Therapy - Soft Tissue Mobilization, Stair training  Plan Comment: Continue with current POC    Goals:  Patient goals : Improve strength; resolve low back pain     Short term goals  Time Frame for Short term goals: 4 weeks   Short term goal 2: Flora will be able to stand for 30 min at a time while maintaining good bracing so she is able to meal prep and do other household chores without being limited by low back pain    Long term goals  Time Frame for Long term goals : 12 weeks   Long term goal 1: Discharge with independent HEP   Long term goal 2: Flora's low back pain will improve to 2-3/10 following activities. Long term goal 3: Flora's bilateral LE strength will improve to 4+ to 5/5 allowing for greater stability with household mobility and transfers    Bubba Olivares.  Tan Raygoza, [address], 7/21/2020

## 2020-07-23 ENCOUNTER — HOSPITAL ENCOUNTER (OUTPATIENT)
Dept: PHYSICAL THERAPY | Age: 80
Setting detail: THERAPIES SERIES
Discharge: HOME | End: 2020-07-23
Payer: MEDICARE

## 2020-07-23 PROCEDURE — 97110 THERAPEUTIC EXERCISES: CPT

## 2020-07-23 ASSESSMENT — PAIN SCALES - GENERAL: PAINLEVEL_OUTOF10: 1

## 2020-07-23 ASSESSMENT — PAIN DESCRIPTION - PAIN TYPE: TYPE: CHRONIC PAIN

## 2020-07-23 ASSESSMENT — PAIN DESCRIPTION - LOCATION: LOCATION: BACK

## 2020-07-23 NOTE — PROGRESS NOTES
[facility] [facility]     Time In: 1130  Time Out: 1155  Minutes: 25  Timed Code Treatment Minutes: 25 Minutes                Date: 2020  Patient Name: Dickson Patel,  Gender:  female        CSN: [id number]   : 1940  (70 y.o.)  Referral Date : 20    Referring Practitioner: Becca Saba MD       Diagnosis: M54.5, G89.29 (ICD-10-CM) - Chronic midline low back pain without sciatica  Treatment Diagnosis: difficulty walking; low back pain   Additional Pertinent Hx: HTN; irregular heart beat; pacemaker; incontinence                   General:  PT Visit Information  Onset Date: 20  PT Insurance Information: [employer] [place] has unlimited visits based on medical necessity. Aquatics and modalities are covered except ionto and hot/cold packs. Total # of Visits to Date: 21  Plan of Care/Certification Expiration Date: 20  Progress Note Counter: 6/10               Subjective:  Chart Reviewed: Yes  Patient assessed for rehabilitation services?: Yes  Response To Previous Treatment: Not applicable  Family / Caregiver Present: No  Comments: Follow up with Dr. Norma Saldaña (cardiologist) end of July      Subjective: Pt stated she is having a good day with no new concerns. Pt does get fatigued after sessions but is able to complete home activities. Pain:  Patient Currently in Pain: Yes  Pain Level: 1  Pain Type: Chronic pain  Pain Location: Back      Objective                 Exercises  Exercise 4: Standing on blue foam with abdominal bracing: Heel raises, marching, x15 eac  Exercise 5: NuStep level 5 x5 minutes  Exercise 6: Balance on blue foam:  narrow stance with ball performing shoulder flexion and trunk rotation holding red weighted ball  x10 each;  Bilateral step stance x1 minute each way with abdominal bracing - cues to tighten through gluts;   Tandem stance with head turns x30 seconds each way  Exercise 7: NK table 7.5# bilateral knee flexion and extension 10x with 2 sets each - cues for posture  Exercise 8: Rockerboard forward/back x20, balance x1 minute with 1 UE support - side to side taps started to bother Left knee  Exercise 9: In hallway: side stepping 2 lengths, heel toe walking with 1 UE support, retro, braiding x2laps  Exercise 13: sitting with trunk extension over ball held 15 sec. X3  Exercise 15: CGA-hydrostick forward/CW/CCW X10 (no increase in pain just difficult)  Exercise 16: hydrohip  level 3 X15 Bilateral, ipsilateral with abdominal bracing  Exercise 17: hydro chest level 3 X10 with 2 sets         Activity Tolerance:  Activity Tolerance: Patient Tolerated treatment well    Assessment:  Assessment: Pt continues to perform LE exercises well with addition of nautilus equipement. Minimal cues for posture. Continued practice with balance reactions for safety with mobility.   Prognosis: Good  REQUIRES PT FOLLOW UP: Yes  Discharge Recommendations: Continue to assess pending progress    Patient Education:  PT Education: Home Exercise Program, Plan of Care  Patient Education: Continue with HEP, posture with standing                      Plan:  Times per week: 2x per week   Plan weeks: 8 weeks   Specific instructions for Next Treatment: Progress dynamic lumbar strengthening program; start on Nustep  Current Treatment Recommendations: Strengthening, Endurance Training, Neuromuscular Re-education, Patient/Caregiver Education & Training, ROM, Pain Management, Balance Training, Gait Training, Home Exercise Program, Modalities, Manual Therapy - Joint Manipulation, Manual Therapy - Soft Tissue Mobilization, Stair training  Plan Comment: Continue with current POC    Goals:  Patient goals : Improve strength; resolve low back pain     Short term goals  Time Frame for Short term goals: 4 weeks   Short term goal 1: Flora will demonstrate good abdominal bracing while performing all exercises and transfes GOAL MET  Short term goal 2: Eli Aguayo will be able to stand for 30 min at a time while maintaining good bracing so she is able to meal prep and do other household chores without being limited by low back pain    Long term goals  Time Frame for Long term goals : 12 weeks   Long term goal 1: Discharge with independent HEP   Long term goal 2: Bes low back pain will improve to 2-3/10 following activities. Long term goal 3: Flora's bilateral LE strength will improve to 4+ to 5/5 allowing for greater stability with household mobility and transfers  Long term goal 4: NEW GOAL: Flora's Oswestry score will improve to <20% indicating minimal disability related to her low back pain.      Felice Bolton, PTA

## 2020-07-29 ENCOUNTER — HOSPITAL ENCOUNTER (OUTPATIENT)
Dept: PHYSICAL THERAPY | Age: 80
Setting detail: THERAPIES SERIES
Discharge: HOME | End: 2020-07-29
Payer: MEDICARE

## 2020-07-29 PROCEDURE — 97110 THERAPEUTIC EXERCISES: CPT

## 2020-07-30 ENCOUNTER — OFFICE VISIT (OUTPATIENT)
Dept: INTERNAL MEDICINE CLINIC | Age: 80
End: 2020-07-30
Payer: MEDICARE

## 2020-07-30 ENCOUNTER — NURSE ONLY (OUTPATIENT)
Dept: LAB | Age: 80
End: 2020-07-30

## 2020-07-30 ENCOUNTER — TELEPHONE (OUTPATIENT)
Dept: FAMILY MEDICINE CLINIC | Age: 80
End: 2020-07-30

## 2020-07-30 VITALS
WEIGHT: 175.6 LBS | SYSTOLIC BLOOD PRESSURE: 146 MMHG | BODY MASS INDEX: 32.31 KG/M2 | HEART RATE: 64 BPM | HEIGHT: 62 IN | DIASTOLIC BLOOD PRESSURE: 70 MMHG | TEMPERATURE: 97.1 F

## 2020-07-30 PROBLEM — I50.9 CHF WITH UNKNOWN LVEF (HCC): Status: RESOLVED | Noted: 2020-03-09 | Resolved: 2020-07-30

## 2020-07-30 PROBLEM — I50.23 ACUTE ON CHRONIC SYSTOLIC CHF (CONGESTIVE HEART FAILURE) (HCC): Status: RESOLVED | Noted: 2020-03-09 | Resolved: 2020-07-30

## 2020-07-30 LAB
ANION GAP SERPL CALCULATED.3IONS-SCNC: 11 MEQ/L (ref 8–16)
BUN BLDV-MCNC: 23 MG/DL (ref 7–22)
CALCIUM SERPL-MCNC: 9.1 MG/DL (ref 8.5–10.5)
CHLORIDE BLD-SCNC: 102 MEQ/L (ref 98–111)
CO2: 26 MEQ/L (ref 23–33)
CREAT SERPL-MCNC: 1.3 MG/DL (ref 0.4–1.2)
GFR SERPL CREATININE-BSD FRML MDRD: 40 ML/MIN/1.73M2
GLUCOSE BLD-MCNC: 103 MG/DL (ref 70–108)
HCT VFR BLD CALC: 42.4 % (ref 37–47)
HEMOGLOBIN: 12.9 GM/DL (ref 12–16)
POTASSIUM SERPL-SCNC: 4.3 MEQ/L (ref 3.5–5.2)
PRO-BNP: 2415 PG/ML (ref 0–1800)
SODIUM BLD-SCNC: 139 MEQ/L (ref 135–145)

## 2020-07-30 PROCEDURE — 99213 OFFICE O/P EST LOW 20 MIN: CPT | Performed by: INTERNAL MEDICINE

## 2020-07-30 PROCEDURE — 1036F TOBACCO NON-USER: CPT | Performed by: INTERNAL MEDICINE

## 2020-07-30 PROCEDURE — G8427 DOCREV CUR MEDS BY ELIG CLIN: HCPCS | Performed by: INTERNAL MEDICINE

## 2020-07-30 PROCEDURE — 1123F ACP DISCUSS/DSCN MKR DOCD: CPT | Performed by: INTERNAL MEDICINE

## 2020-07-30 PROCEDURE — 1090F PRES/ABSN URINE INCON ASSESS: CPT | Performed by: INTERNAL MEDICINE

## 2020-07-30 PROCEDURE — 4040F PNEUMOC VAC/ADMIN/RCVD: CPT | Performed by: INTERNAL MEDICINE

## 2020-07-30 PROCEDURE — G8400 PT W/DXA NO RESULTS DOC: HCPCS | Performed by: INTERNAL MEDICINE

## 2020-07-30 PROCEDURE — G8417 CALC BMI ABV UP PARAM F/U: HCPCS | Performed by: INTERNAL MEDICINE

## 2020-07-30 RX ORDER — AMIODARONE HYDROCHLORIDE 200 MG/1
200 TABLET ORAL DAILY
Qty: 90 TABLET | Refills: 3 | Status: SHIPPED | OUTPATIENT
Start: 2020-07-30 | End: 2021-01-13 | Stop reason: SDUPTHER

## 2020-07-30 RX ORDER — METOPROLOL SUCCINATE 200 MG/1
200 TABLET, EXTENDED RELEASE ORAL DAILY
Qty: 90 TABLET | Refills: 3 | Status: SHIPPED | OUTPATIENT
Start: 2020-07-30 | End: 2020-10-26

## 2020-07-30 RX ORDER — LEVOTHYROXINE SODIUM 0.12 MG/1
125 TABLET ORAL DAILY
Qty: 90 TABLET | Refills: 3 | Status: SHIPPED | OUTPATIENT
Start: 2020-07-30 | End: 2021-03-12 | Stop reason: DRUGHIGH

## 2020-07-30 NOTE — PROGRESS NOTES
tablet Take 125 mcg by mouth every other day      pantoprazole (PROTONIX) 20 MG tablet Take 2 tablets by mouth 2 times daily 360 tablet 3    spironolactone (ALDACTONE) 25 MG tablet Take 1 tablet by mouth every other day 45 tablet 3    warfarin (COUMADIN) 3 MG tablet 3 mg every Monday and Friday, 1.5 mg the other 5 days 30 tablet 0    acetaminophen (TYLENOL) 325 MG tablet Take 650 mg by mouth every 6 hours as needed for Pain or Fever      Probiotic Product (PROBIOTIC FORMULA PO) Take 1 tablet by mouth daily.  Ascorbic Acid (VITAMIN C) 1000 MG tablet Take 1,000 mg by mouth 2 times daily.  Multiple Vitamin (MULTI-VITAMIN PO) Take  by mouth daily.  Calcium Carbonate (CALCIUM 600 PO) Take  by mouth 2 times daily.  BIOTIN PO Take 5,000 mg by mouth daily.  Handicap Placard MISC by Does not apply route Request parking placard due to medical conditions. Duration of 5 years. 1 each 0    Misc. Devices (LUMBAR SUPPORT CUSHION) MISC by Does not apply route. Dx: low back pain, sciatica. 1 each 0     No current facility-administered medications for this visit. Allergies   Allergen Reactions    Ativan [Lorazepam]      Gets anxiety    Codeine     Nystatin      Mycostatin powder    Percocet [Oxycodone-Acetaminophen]     Relafen [Nabumetone]      Stomach upset    Tape Rafael Dye Tape]     Celebrex [Celecoxib] Rash       I have reviewed the patient's medications, as well as, their past medical, social, and family history as appropriate. Review of Systems - General ROS: negative  Psychological ROS: negative  Hematological and Lymphatic ROS: No history of blood clots or bleeding disorder.    Respiratory ROS: no cough, shortness of breath, or wheezing  Cardiovascular ROS: no chest pain or dyspnea on exertion; has trivial cad by cath 2018  Gastrointestinal ROS: See above    Genito-Urinary ROS: no dysuria, trouble voiding, or hematuria  Musculoskeletal ROS: negative  Neurological ROS: negative  Dermatological ROS: negative    Blood pressure (!) 146/70, pulse 64, temperature 97.1 °F (36.2 °C), height 5' 2.01\" (1.575 m), weight 175 lb 9.6 oz (79.7 kg). Physical Examination: General appearance - alert, well appearing, and in no distress  Mental status - alert, oriented to person, place, and time  Neck - supple, no significant adenopathy, no JVD, or carotid bruits  Chest - clear to auscultation, no wheezes, rales or rhonchi, symmetric air entry  Heart - normal rate, regular rhythm, normal S1, S2, no murmurs, rubs, clicks or gallops  Abdomen - soft, nontender, nondistended, no masses or organomegaly  Neurological - alert, oriented, normal speech, no focal findings or movement disorder noted  Musculoskeletal - no muscular tenderness noted  Extremities - no pedal edema noted  Skin - normal coloration and turgor, no rashes, no suspicious skin lesions noted          Diagnosis Orders   1. Left ventricular systolic dysfunction     2. Anemia, unspecified type     3. Benign essential HTN     4. Biventricular implantable cardioverter-defibrillator in situ     5. Chronic anticoagulation     6. Chronic kidney disease, stage III (moderate) (HCC)     7. NAFLD (nonalcoholic fatty liver disease)     8. Nonischemic cardiomyopathy ([address]     9. Ventricular tachycardia (paroxysmal) (HCC)  TSH   10. Chronic atrial fibrillation  Hepatic Function Panel    TSH    Carbon Monoxide Diffusing Capacity    Spirometry Without Bronchodilator   11. Adverse effect of drug, sequela  Hepatic Function Panel    TSH    Carbon Monoxide Diffusing Capacity    Spirometry Without Bronchodilator   12.  Hypothyroidism, unspecified type  TSH       Orders Placed This Encounter   Procedures    Hepatic Function Panel     Standing Status:   Future     Standing Expiration Date:   7/30/2021    TSH     Standing Status:   Future     Standing Expiration Date:   7/30/2021   [facility] Carbon Monoxide Diffusing Capacity     Standing Status:   Future     Standing Expiration Date:   7/30/2021     Scheduling Instructions:      PT ON AMIODARONE    Spirometry Without Bronchodilator     Standing Status:   Future     Standing Expiration Date:   7/30/2021     Scheduling Instructions:      PT ON AMIODARONE        IMP/PLAN:    1. Chronic systolic dysfx- on appropriate meds; stable  2. Chronic afib on coumadin  3. Anemia- resolved; had endoscopy        Will schedule follow up appointment in 6m. Continue medications at current doses. Signed By:  Jesus Alberto Villa M.D.

## 2020-07-30 NOTE — TELEPHONE ENCOUNTER
----- Message from Shirley Diop MD sent at 7/30/2020  2:21 PM EDT -----  H&H are back to normal!!  GFR is stable.

## 2020-08-03 ENCOUNTER — TELEPHONE (OUTPATIENT)
Dept: FAMILY MEDICINE CLINIC | Age: 80
End: 2020-08-03

## 2020-08-03 ENCOUNTER — NURSE ONLY (OUTPATIENT)
Dept: LAB | Age: 80
End: 2020-08-03

## 2020-08-03 LAB — INR BLD: 1.87 (ref 0.85–1.13)

## 2020-08-03 NOTE — TELEPHONE ENCOUNTER
----- Message from Cande Cheng MD sent at 8/3/2020 10:54 AM EDT -----  INR good. Continue same dosage and recheck INR in 1 month.

## 2020-08-04 ENCOUNTER — HOSPITAL ENCOUNTER (OUTPATIENT)
Dept: PHYSICAL THERAPY | Age: 80
Setting detail: THERAPIES SERIES
Discharge: HOME | End: 2020-08-04
Payer: MEDICARE

## 2020-08-04 PROCEDURE — 97110 THERAPEUTIC EXERCISES: CPT

## 2020-08-07 ENCOUNTER — HOSPITAL ENCOUNTER (OUTPATIENT)
Dept: PHYSICAL THERAPY | Age: 80
Setting detail: THERAPIES SERIES
Discharge: HOME | End: 2020-08-07
Payer: MEDICARE

## 2020-08-07 PROCEDURE — 97110 THERAPEUTIC EXERCISES: CPT

## 2020-08-07 NOTE — PROGRESS NOTES
retro, braiding x2 laps  Exercise 10: Hydro chest level 3 X10 with 2 sets         Activity Tolerance:  Activity Tolerance: Multiple short standing rest breaks required during dynamic gait activities and 1 brief sitting break after, but no pain. Assessment: Body structures, Functions, Activity limitations: Decreased balance, Decreased functional mobility , Decreased endurance, Increased pain, Decreased strength, Decreased posture  Assessment: Continued with strengthening and balance training with good tolerance and no pain. Pt fatigues easily with dynamic gait activity. Prognosis: Good       Patient Education:  Patient Education: Handout provided of balance exercises to add to HEP. Plan:  Times per week: 2x per week   Plan weeks: 8 weeks   Current Treatment Recommendations: Strengthening, Endurance Training, Neuromuscular Re-education, Patient/Caregiver Education & Training, ROM, Pain Management, Balance Training, Gait Training, Home Exercise Program, Modalities, Manual Therapy - Joint Manipulation, Manual Therapy - Soft Tissue Mobilization, Stair training  Plan Comment: Continue with current POC    Goals:  Patient goals : Improve strength; resolve low back pain     Short term goals  Time Frame for Short term goals: 4 weeks   Short term goal 2: Flora will be able to stand for 30 min at a time while maintaining good bracing so she is able to meal prep and do other household chores without being limited by low back pain    Long term goals  Time Frame for Long term goals : 12 weeks   Long term goal 1: Discharge with independent HEP   Long term goal 2: Flora's low back pain will improve to 2-3/10 following activities. Long term goal 3: Flora's bilateral LE strength will improve to 4+ to 5/5 allowing for greater stability with household mobility and transfers  Long term goal 4: Flora's Oswestry score will improve to <20% indicating minimal disability related to her low back pain.     Andrey Strickland, PT

## 2020-08-11 ENCOUNTER — HOSPITAL ENCOUNTER (OUTPATIENT)
Dept: PHYSICAL THERAPY | Age: 80
Setting detail: THERAPIES SERIES
Discharge: HOME | End: 2020-08-11
Payer: MEDICARE

## 2020-08-11 PROCEDURE — 97110 THERAPEUTIC EXERCISES: CPT

## 2020-08-11 NOTE — PROGRESS NOTES
[place]     Time In: 1030  Time Out: 1103  Minutes: 33  Timed Code Treatment Minutes: 33 Minutes     Date: 2020  Patient Name: Gwendolyn Aguirre,  Gender:  female        CSN: [id number]   : 1940  (70 y.o.)  Referral Date : 20    Referring Practitioner: Cesar Jackson MD       Diagnosis: M54.5, G89.29 (ICD-10-CM) - Chronic midline low back pain without sciatica  Treatment Diagnosis: difficulty walking; low back pain   Additional Pertinent Hx: HTN; irregular heart beat; pacemaker; incontinence        General:  PT Visit Information  Onset Date: 20  PT Insurance Information: Miranda Voss has unlimited visits based on medical necessity. Aquatics and modalities are covered except ionto and hot/cold packs. Total # of Visits to Date: 24  Plan of Care/Certification Expiration Date: 20  Progress Note Counter: 9/10               Subjective:  Chart Reviewed: Yes  Comments: Follow up with Dr. Vidal Diggs (cardiologist) end of July      Subjective: Pt offers no complaints, reports balance HEP is going well. Pt purchased airex and rockerboard for home use. Pain:  Patient Currently in Pain: Denies         Objective         Exercises  Exercise 1: NuStep level 5 x6 minutes  Exercise 3: Balance on blue foam:  narrow stance with red weighted ball performing shoulder flexion, trunk rotation, punches  x15 each; Exercise 6: Bilateral tandem x1 minute each way with abdominal bracing; Modified Tandem stance with head turns x30 seconds each way  Exercise 8: Rockerboard forward/back x20, balance x1 minute with 1 finger support  Exercise 9:  In hallway: side stepping, heel toe walking with 1 UE support, retro, braiding x2 laps, marching with arm swing x2 laps- difficulty coordinating         Activity Tolerance:  Activity Tolerance: Patient Tolerated treatment well  Activity Tolerance: 1 seated rest break required during session    Assessment: Body structures, Functions, Activity limitations: Decreased balance, Decreased functional mobility , Decreased endurance, Increased pain, Decreased strength, Decreased posture  Assessment: Add forward marching with arm swing to dynamic gait with patient having difficulty coordinating, but plans to work on it at home. No additonal progressions d/t 30 min session scheduled this date. Pt progressing well toward goals and demos good understanding of HEP. Plan to reassess next visit. Prognosis: Good       Patient Education:  Patient Education: Continue with HEP. Work on marching with arm swing    Plan:  Times per week: 2x per week   Plan weeks: 8 weeks   Current Treatment Recommendations: Strengthening, Endurance Training, Neuromuscular Re-education, Patient/Caregiver Education & Training, ROM, Pain Management, Balance Training, Gait Training, Home Exercise Program, Modalities, Manual Therapy - Joint Manipulation, Manual Therapy - Soft Tissue Mobilization, Stair training  Plan Comment: Continue with current POC    Goals:  Patient goals : Improve strength; resolve low back pain     Short term goals  Time Frame for Short term goals: 4 weeks   Short term goal 2: Flora will be able to stand for 30 min at a time while maintaining good bracing so she is able to meal prep and do other household chores without being limited by low back pain    Long term goals  Time Frame for Long term goals : 12 weeks   Long term goal 1: Discharge with independent HEP   Long term goal 2: Flora's low back pain will improve to 2-3/10 following activities. Long term goal 3: Flora's bilateral LE strength will improve to 4+ to 5/5 allowing for greater stability with household mobility and transfers  Long term goal 4: Flora's Oswestry score will improve to <20% indicating minimal disability related to her low back pain.     Radha Gong, PT

## 2020-08-14 ENCOUNTER — APPOINTMENT (OUTPATIENT)
Dept: PHYSICAL THERAPY | Age: 80
End: 2020-08-14
Payer: MEDICARE

## 2020-08-18 ENCOUNTER — HOSPITAL ENCOUNTER (OUTPATIENT)
Dept: PHYSICAL THERAPY | Age: 80
Setting detail: THERAPIES SERIES
Discharge: HOME | End: 2020-08-18
Payer: MEDICARE

## 2020-08-18 PROCEDURE — 97110 THERAPEUTIC EXERCISES: CPT

## 2020-08-18 NOTE — DISCHARGE SUMMARY
[address]     Time In: 0845  Time Out: 7455  Minutes: 47  Timed Code Treatment Minutes: 52 Minutes           Oswestry 14/50 = 28%    Date: 2020  Patient Name: Ermelinda Torres,  Gender:  female        CSN: [id number]   : 1940  (70 y.o.)  Referral Date : 20    Referring Practitioner: Sarah Menjivar MD       Diagnosis: M54.5, G89.29 (ICD-10-CM) - Chronic midline low back pain without sciatica  Treatment Diagnosis: difficulty walking; low back pain   Additional Pertinent Hx: HTN; irregular heart beat; pacemaker; incontinence          General:  PT Visit Information  Onset Date: 20  PT Insurance Information: Lena Aviles has unlimited visits based on medical necessity. Aquatics and modalities are covered except ionto and hot/cold packs. Total # of Visits to Date: 25  Plan of Care/Certification Expiration Date: 20  Progress Note Counter: 10/10               Subjective:  Chart Reviewed: Yes  Comments: Follow up with Dr. Jovi Gardner (cardiologist) end of July      Subjective: Pt denies current pain. Pt reports feeling tired today from trip to [place] this past weekend, which put her out of her exercise routine. Pain:  Patient Currently in Pain: Denies         Objective           Exercises  Exercise 1: NuStep level 5 x6 minutes  Exercise 2: Standing on blue foam with abdominal bracing: Heel/toe raises, marching x15 each  Exercise 3: Balance on blue foam:  narrow stance with red weighted ball performing shoulder flexion, trunk rotation, punches  x15 each; Exercise 7: NK table 7.5# bilateral knee flexion and extension 20x - cues for posture  Exercise 8: Rockerboard forward/back x20, balance x1 minute with 1 finger support  Exercise 9:  In hallway: side stepping, heel toe walking with 1 UE support, retro, braiding x2 laps, marching with arm swing x2 laps- difficulty coordinating         Activity Tolerance:  Activity Tolerance: Patient Tolerated treatment well    Assessment: Body structures, Functions, Activity limitations: Decreased balance, Decreased functional mobility , Decreased endurance, Increased pain, Decreased strength, Decreased posture  Assessment: Pt demos good progress toward goals. Pain is controlled and manageable. Oswestry score decreased 10%. Standing tolerance continues to be limited but able to take breaks and resume without pain limitations. Pt has good understanding of HEP. Recommend continuing with HEP to manage pain and prevent flare-ups. Pt agreeable. Prognosis: Good       Patient Education:  Patient Education: Continue with HEP every other day and bike on opposite days. Handout provided of rockerboard and standing exercises. Plan:  Plan Comment: Discharge POC. Goals:  Patient goals : Improve strength; resolve low back pain     Short term goals  Time Frame for Short term goals: 4 weeks   Short term goal 2: Chad Nelson will be able to stand for 30 min at a time while maintaining good bracing so she is able to meal prep and do other household chores without being limited by low back pain. NOT MET- Pt only able to tolerate 10-15 minutes standing to cook or do dishes before needing a break. Long term goals  Time Frame for Long term goals : 12 weeks   Long term goal 1: Discharge with independent HEP. MET- Pt verbalizes compliance with HEP daily, and demos good understanding of program.  Long term goal 2: Bes low back pain will improve to 2-3/10 following activities. NOT MET- Pt reports pain increases to 6/10 from time to time, especially with standing to cook or do dishes. Long term goal 3: Bes bilateral LE strength will improve to 4+ to 5/5 allowing for greater stability with household mobility and transfers. NOT MET- BLE 4+/5 throughout  Long term goal 4: Bes Oswestry score will improve to <20% indicating minimal disability related to her low back pain.  NOT MET-

## 2020-08-31 ENCOUNTER — TELEPHONE (OUTPATIENT)
Dept: FAMILY MEDICINE CLINIC | Age: 80
End: 2020-08-31

## 2020-08-31 ENCOUNTER — NURSE ONLY (OUTPATIENT)
Dept: LAB | Age: 80
End: 2020-08-31

## 2020-08-31 LAB — INR BLD: 1.57 (ref 0.85–1.13)

## 2020-08-31 NOTE — TELEPHONE ENCOUNTER
----- Message from Nguyễn Galarza MD sent at 8/31/2020 12:39 PM EDT -----  INR good.  Continue same dosage and recheck INR in 1 month

## 2020-09-22 RX ORDER — WARFARIN SODIUM 3 MG/1
TABLET ORAL
Qty: 120 TABLET | OUTPATIENT
Start: 2020-09-22

## 2020-09-22 RX ORDER — WARFARIN SODIUM 3 MG/1
TABLET ORAL
Qty: 90 TABLET | Refills: 1 | Status: SHIPPED | OUTPATIENT
Start: 2020-09-22 | End: 2020-11-19 | Stop reason: SDUPTHER

## 2020-09-22 NOTE — TELEPHONE ENCOUNTER
Brent Preethi needs refill of   Requested Prescriptions     Pending Prescriptions Disp Refills    warfarin (COUMADIN) 3 MG tablet [Pharmacy Med Name: WARFARIN SOD 3MG TABLETS (TAN)] 30 tablet 0     Sig: TAKE 1 TABLET(3MG) BY MOUTH EVERY MONDAY AND FRIDAY, TAKE 1/2 TABLET(1.5MG) ON ALL OTHER DAYS       Last Filled on:  8/12/19    Last Visit Date: 3/19/2020 PALMARAUDEL SOLOMONS    Next Visit Date:   Visit date not found      Daughter Griselda Campos reports pt Jono Pablo took her last Warfarin last night. Pls call Lupe at [phone]    Also, family asks if she can have 90-day supply.

## 2020-10-08 ENCOUNTER — NURSE ONLY (OUTPATIENT)
Dept: LAB | Age: 80
End: 2020-10-08

## 2020-10-08 ENCOUNTER — TELEPHONE (OUTPATIENT)
Dept: FAMILY MEDICINE CLINIC | Age: 80
End: 2020-10-08

## 2020-10-08 LAB — INR BLD: 2.41 (ref 0.85–1.13)

## 2020-10-12 ENCOUNTER — TELEPHONE (OUTPATIENT)
Dept: CARDIOLOGY CLINIC | Age: 80
End: 2020-10-12

## 2020-10-12 NOTE — TELEPHONE ENCOUNTER
Patient's caregiver, ARTURO Andrade on CHF line stating she needed to cancel Flora's appointment for tomorrow due to being her only transportation and having arm surgery recently. Attempted to call Lupe back to reschedule, but got VM--LM for Lupe to call CHF clinic back. Appointment canceled.

## 2020-10-21 NOTE — TELEPHONE ENCOUNTER
Spoke with daughter Alejandra Burns  She still has not been cleared to drive yet  Appointment r/s for 11/2  She will call office if we need to set up [address] transport for patient for this appointment

## 2020-10-26 RX ORDER — METOPROLOL SUCCINATE 200 MG/1
200 TABLET, EXTENDED RELEASE ORAL DAILY
Qty: 90 TABLET | Refills: 3 | Status: SHIPPED | OUTPATIENT
Start: 2020-10-26 | End: 2021-09-15 | Stop reason: SDUPTHER

## 2020-10-26 RX ORDER — SPIRONOLACTONE 25 MG/1
25 TABLET ORAL EVERY OTHER DAY
Qty: 45 TABLET | Refills: 3 | Status: SHIPPED | OUTPATIENT
Start: 2020-10-26 | End: 2021-01-13 | Stop reason: SDUPTHER

## 2020-10-26 RX ORDER — DIGOXIN 125 MCG
125 TABLET ORAL EVERY OTHER DAY
Qty: 45 TABLET | Refills: 3 | Status: SHIPPED | OUTPATIENT
Start: 2020-10-26 | End: 2021-09-15 | Stop reason: SDUPTHER

## 2020-11-05 ENCOUNTER — PATIENT MESSAGE (OUTPATIENT)
Dept: INTERNAL MEDICINE CLINIC | Age: 80
End: 2020-11-05

## 2020-11-05 NOTE — TELEPHONE ENCOUNTER
From: Pantera Leal  To: Chepe Germain MD  Sent: 11/5/2020 11:02 AM EST  Subject: Prescription Question    I was told about voltaren for my back pain. But reading on the box it says don't use if you are on a blood thinner, and stroke warning. Was wondering if it is ok to use.

## 2020-11-06 NOTE — TELEPHONE ENCOUNTER
Notified pt EF does not recommend as can cause GI bleeding and kidney issues. She verbalizes understanding.

## 2020-11-11 ENCOUNTER — TELEPHONE (OUTPATIENT)
Dept: FAMILY MEDICINE CLINIC | Age: 80
End: 2020-11-11

## 2020-11-11 ENCOUNTER — NURSE ONLY (OUTPATIENT)
Dept: LAB | Age: 80
End: 2020-11-11

## 2020-11-11 LAB — INR BLD: 1.57 (ref 0.85–1.13)

## 2020-11-18 RX ORDER — WARFARIN SODIUM 3 MG/1
TABLET ORAL
Qty: 90 TABLET | Refills: 1 | Status: CANCELLED | OUTPATIENT
Start: 2020-11-18

## 2020-11-19 RX ORDER — WARFARIN SODIUM 3 MG/1
TABLET ORAL
Qty: 90 TABLET | Refills: 0 | Status: SHIPPED | OUTPATIENT
Start: 2020-11-19 | End: 2021-06-21 | Stop reason: SDUPTHER

## 2020-11-19 NOTE — TELEPHONE ENCOUNTER
[facility] needs refill of   Requested Prescriptions     Pending Prescriptions Disp Refills    warfarin (COUMADIN) 3 MG tablet 90 tablet 1     Sig: TAKE 1 TABLET(3MG) BY MOUTH EVERY MONDAY AND FRIDAY, TAKE 1/2 TABLET(1.5MG) ON ALL OTHER DAYS       Last Filled on:  9/22/20 #90/1    Last Visit Date:  3/19/2020    Next Visit Date:    Visit date not found    Pt has refills available at pharmacy      [employer] message sent to pt

## 2020-11-21 RX ORDER — WARFARIN SODIUM 3 MG/1
TABLET ORAL
Qty: 96 TABLET | OUTPATIENT
Start: 2020-11-21

## 2020-11-21 NOTE — TELEPHONE ENCOUNTER
Mireille Bourgeois needs refill of   Requested Prescriptions     Refused Prescriptions Disp Refills    warfarin (COUMADIN) 3 MG tablet [Pharmacy Med Name: WARFARIN SOD 3MG TABLETS (TAN)] 96 tablet      Sig: TAKE 1 TABLET BY MOUTH EVERY OTHER DAY, ALTERNATING WITH 1/2 TABLET EVERY OTHER DAY OR AS DIRECTED PER INR RESULTS     Refused By: Brodie IRVIN     Reason for Refusal: Refill not appropriate       Last Filled on:  11/19/2020    Last Visit Date:  3/19/2020    Next Visit Date:    Visit date not found    refused

## 2020-12-21 ENCOUNTER — TELEPHONE (OUTPATIENT)
Dept: CARDIOLOGY CLINIC | Age: 80
End: 2020-12-21

## 2020-12-21 NOTE — TELEPHONE ENCOUNTER
Left message for patient to call office back to confirm medications and remind to get at least 3 out of 5 of the listed vital signs: BP, HR, Temp, Ht and/or Wt and have ready for the provider.

## 2020-12-22 ENCOUNTER — TELEPHONE (OUTPATIENT)
Dept: FAMILY MEDICINE CLINIC | Age: 80
End: 2020-12-22

## 2020-12-22 ENCOUNTER — VIRTUAL VISIT (OUTPATIENT)
Dept: CARDIOLOGY CLINIC | Age: 80
End: 2020-12-22
Payer: MEDICARE

## 2020-12-22 ENCOUNTER — NURSE ONLY (OUTPATIENT)
Dept: LAB | Age: 80
End: 2020-12-22

## 2020-12-22 LAB
ANION GAP SERPL CALCULATED.3IONS-SCNC: 12 MEQ/L (ref 8–16)
BUN BLDV-MCNC: 19 MG/DL (ref 7–22)
CALCIUM SERPL-MCNC: 9.4 MG/DL (ref 8.5–10.5)
CHLORIDE BLD-SCNC: 105 MEQ/L (ref 98–111)
CO2: 27 MEQ/L (ref 23–33)
CREAT SERPL-MCNC: 1.3 MG/DL (ref 0.4–1.2)
GFR SERPL CREATININE-BSD FRML MDRD: 40 ML/MIN/1.73M2
GLUCOSE BLD-MCNC: 102 MG/DL (ref 70–108)
INR BLD: 1.59 (ref 0.85–1.13)
POTASSIUM SERPL-SCNC: 4.6 MEQ/L (ref 3.5–5.2)
SODIUM BLD-SCNC: 144 MEQ/L (ref 135–145)

## 2020-12-22 PROCEDURE — G8400 PT W/DXA NO RESULTS DOC: HCPCS | Performed by: NURSE PRACTITIONER

## 2020-12-22 PROCEDURE — 1036F TOBACCO NON-USER: CPT | Performed by: NURSE PRACTITIONER

## 2020-12-22 PROCEDURE — 4040F PNEUMOC VAC/ADMIN/RCVD: CPT | Performed by: NURSE PRACTITIONER

## 2020-12-22 PROCEDURE — 99214 OFFICE O/P EST MOD 30 MIN: CPT | Performed by: NURSE PRACTITIONER

## 2020-12-22 PROCEDURE — G8484 FLU IMMUNIZE NO ADMIN: HCPCS | Performed by: NURSE PRACTITIONER

## 2020-12-22 PROCEDURE — 1090F PRES/ABSN URINE INCON ASSESS: CPT | Performed by: NURSE PRACTITIONER

## 2020-12-22 PROCEDURE — 1123F ACP DISCUSS/DSCN MKR DOCD: CPT | Performed by: NURSE PRACTITIONER

## 2020-12-22 PROCEDURE — G8427 DOCREV CUR MEDS BY ELIG CLIN: HCPCS | Performed by: NURSE PRACTITIONER

## 2020-12-22 PROCEDURE — G8417 CALC BMI ABV UP PARAM F/U: HCPCS | Performed by: NURSE PRACTITIONER

## 2020-12-22 ASSESSMENT — ENCOUNTER SYMPTOMS
COUGH: 0
SHORTNESS OF BREATH: 1
ABDOMINAL DISTENTION: 0

## 2020-12-22 NOTE — PROGRESS NOTES
TELEHEALTH EVALUATION -- Audio/Visual (During VKKND-30 public health emergency)    [address]        Visit Date: 12/22/2020  Cardiologist:  Dr. Lynne Boothe  Primary Care Physician: Dr. Shruthi Yañez MD    Yu Grimes is a 68 y.o. female who presents today for:  No chief complaint on file. HPI:   Yu Grimes is a 68 y.o. female who is seen via video virtual [employer] visit for a follow up patient visit in the heart failure clinic. Yu Grimes was at home. Dtr assisted. Provider was present at CHF clinic. CHF clinic nurse assisted. TYPE HF: Combined (EF 25%), NICM    Device: BiV IWF (4360, NMSLNNP w/ LMH)  HX: HTN, HLD, Afib (Coumadin), CAD (nonobstructive), CKD, Anemia     Dry Wt:  170-175#     Hospitalization:  3/9-3/11 = CP, SOB, WINN - BNP 4154.  Diuresed. Erika Samuel (thought was making her feel worse) - changed back to Accupril.  Lasix changed to Bumex.  Hgb 8.4  Wt 181# at D/c    Concerns today: Feeling great. No concerns voiced. Occasional SOB but no more than normal.    Activity: Can complete ADLs, can walk room/room. Can do light housework. Diet: watching salt and fluid intake    Patient has:  Chest Pain: no  SOB: occasional  Orthopnea/PND: no  LUIS FELIPE: no  Edema: no  Fatigue: fair  Abdominal bloating: no  Cough: no  Appetite: good  Any extra diuretic use: no  Weight gain: no  Home weight: stable  Home blood pressure: stable = little low today, denies dizziness/lightheadedness.      Past Medical History:   Diagnosis Date    AICD (automatic cardioverter/defibrillator) present 05/1999    Arthritis 5/21/2019    Atrial fibrillation (HCC)     chronic    Biventricular ICD (implantable cardiac defibrillator) in place 10/09    C. difficile diarrhea     CAD (coronary artery disease)     Cancer ([facility]     right hand    CHF (congestive heart failure) ([facility]     Diverticulitis 01/2013    GERD (gastroesophageal reflux disease)     hiatal hernia  Hiatal hernia     Hyperlipidemia     Hypertension     Menopause     1994    NAFLD (nonalcoholic fatty liver disease) 1/31/2017    Nonischemic cardiomyopathy ([address]     chronic    Pacemaker     Retroperitoneal hemorrhage 07/99    transfusion x3    Thyroid disease      Past Surgical History:   Procedure Laterality Date    CARDIAC DEFIBRILLATOR PLACEMENT     [facility]  2008    w/ EGD    COLONOSCOPY N/A 7/16/2019    COLONOSCOPY POLYPECTOMY SNARE/COLD BIOPSY performed by Jacinta Cordoba MD [facility]    ENDOSCOPY, COLON, DIAGNOSTIC     [address]    replaced 3x    SKIN BIOPSY  2010    Skin CA with graft.     UPPER GASTROINTESTINAL ENDOSCOPY N/A 7/16/2019    EGD DILATION SAVORY performed by Jacinta Cordoba MD at [facility] ENDOSCOPY Left 7/16/2019    EGD BIOPSY performed by Jacinta Cordoba MD at [facility]    UPPER GASTROINTESTINAL ENDOSCOPY N/A 12/2/2019    EGD ESOPHAGEAL MANOMETRY AND PH MONITORING 24 HR performed by Jacinta Cordoba MD at [facility]     Family History   Problem Relation Age of Onset    Heart Disease Mother     Diabetes Mother     High Cholesterol Mother     High Blood Pressure Brother     Cancer Sister     High Blood Pressure Brother     High Blood Pressure Brother     Parkinsonism Brother     Diabetes Brother     Heart Disease Brother     Kidney Disease Brother     High Blood Pressure Brother     High Cholesterol Father     Miscarriages / [place] Daughter     Arthritis Neg Hx     Asthma Neg Hx     Birth Defects Neg Hx     Depression Neg Hx     Early Death Neg Hx     Learning Disabilities Neg Hx     Mental Illness Neg Hx     Mental Retardation Neg Hx     Stroke Neg Hx     Substance Abuse Neg Hx     Vision Loss Neg Hx     Other Neg Hx      Social History     Tobacco Use    Smoking status: Never Smoker    Smokeless tobacco: Never Used Gets anxiety    Codeine     Nystatin      Mycostatin powder    Percocet [Oxycodone-Acetaminophen]     Relafen [Nabumetone]      Stomach upset    Tape Keerthi Blonder Tape]     Celebrex [Celecoxib] Rash       SUBJECTIVE:   Review of Systems   Constitutional: Negative for activity change, appetite change and fatigue. Respiratory: Positive for shortness of breath (occasional). Negative for cough. Cardiovascular: Negative for chest pain, palpitations and leg swelling. Gastrointestinal: Negative for abdominal distention. Neurological: Negative for weakness, light-headedness and headaches. Hematological: Negative for adenopathy. Psychiatric/Behavioral: Negative for sleep disturbance. OBJECTIVE:   Today's Vitals: There were no vitals taken for this visit. Physical Exam  Vitals signs reviewed. Constitutional:       General: She is not in acute distress. Appearance: Normal appearance. HENT:      Head: Normocephalic and atraumatic. Eyes:      Extraocular Movements: Extraocular movements intact. Neck:      Musculoskeletal: Normal range of motion. Pulmonary:      Effort: Pulmonary effort is normal.   Abdominal:      General: Abdomen is flat. There is no distension. Musculoskeletal:      Right lower leg: No edema (none per patient). Left lower leg: No edema (none per patient). Skin:     General: Skin is warm and dry. Neurological:      Mental Status: She is alert and oriented to person, place, and time. Psychiatric:         Mood and Affect: Mood normal.         Behavior: Behavior normal.         Wt Readings from Last 3 Encounters:   07/30/20 175 lb 9.6 oz (79.7 kg)   07/13/20 173 lb (78.5 kg)   04/23/20 173 lb (78.5 kg)     BP Readings from Last 3 Encounters:   07/30/20 (!) 146/70   07/13/20 132/78   04/23/20 101/83     Pulse Readings from Last 3 Encounters:   07/30/20 64   07/13/20 72   04/23/20 66     There is no height or weight on file to calculate BMI.     ECHO:    Summary  limited echo   Ejection fraction is visually estimated at 25%.   There was severe global hypokinesis of the left ventricle.   Left Ventricular size is Moderately increased .   Severely dilated left atrium.   Moderately dilated right ventricle.   No evidence of any pericardial effusion.      Signature      ----------------------------------------------------------------   Electronically signed by Yolanda Pedraza MD (Interpreting   physician) on 03/11/2020 at 07:22 AM        COMPLETE   Summary   Left Ventricular size is Severely increased .   Normal left ventricular wall thickness.   There was severe global hypokinesis of the left ventricle.   Ejection fraction is visually estimated in the range of 20% to 25%.   Left atrial size was dilated      Signature      ----------------------------------------------------------------   Electronically signed by Gerardo Greene MD (Interpreting   physician) on 05/15/2019 at 07:07 AM   ----------------------------------------------------------------       CATH/STRESS:   IMPRESSION:  1.  In summary, severe dilated congestive cardiomyopathy with an  ejection fraction about 15% to 20%. 2.  Diastolic dysfunction of the left ventricle. 3.  Nonobstructive disease of the LAD. 4.  Patent left main. 5.  Patent circumflex.   6.  Patent RCA.     The patient had no acute complications from the procedure.  Joni Roque M.D.     D: 05/15/2019 19:42:30         Results reviewed:  BNP:   Lab Results   Component Value Date    .3 (H) 02/07/2013     CBC:   Lab Results   Component Value Date    WBC 6.2 04/01/2020    RBC 3.84 04/01/2020    RBC 3.06 11/29/2011    HGB 12.9 07/30/2020    HCT 42.4 07/30/2020     04/01/2020     CMP:    Lab Results   Component Value Date     07/30/2020    K 4.3 07/30/2020    K 4.6 10/23/2019     07/30/2020    CO2 26 07/30/2020    BUN 23 07/30/2020    CREATININE 1.3 07/30/2020    LABGLOM 40 07/30/2020    GLUCOSE 103 07/30/2020 GLUCOSE 147 05/09/2012    CALCIUM 9.1 07/30/2020     Hepatic Function Panel:    Lab Results   Component Value Date    ALKPHOS 131 03/09/2020    ALT 22 03/09/2020    AST 45 03/09/2020    PROT 7.5 03/09/2020    BILITOT 0.5 03/09/2020    BILIDIR <0.2 03/09/2020    LABALBU 3.9 03/09/2020    LABALBU 4.1 05/09/2012     Magnesium:    Lab Results   Component Value Date    MG 2.0 03/11/2020     PT/INR:    Lab Results   Component Value Date    PROTIME 25.8 01/26/2015    PROTIME 24.8 06/12/2014    INR 1.57 11/11/2020     Lipids:    Lab Results   Component Value Date    TRIG 113 03/10/2020    HDL 40 03/10/2020    LDLCALC 158 03/10/2020       ASSESSMENT AND PLAN:   The patient's condition/symptoms are Stable: No clinical evidence of fluid overload today. Continue current medical regimen without changes at present time. Diagnosis Orders   1. CHF (congestive heart failure), NYHA class II, chronic, combined ([facility]  Basic Metabolic Panel   2. Atrial fibrillation, unspecified type ([facility]     3. Essential hypertension     4. NICM (nonischemic cardiomyopathy) ([facility]       Continue:  GDMT:   ACE/ARB/ARNI - Accupril 2.5/day - not tolerate Entresto   BB - Toprol 200/day   Diuretic - Bumex 1/day   Hydralazine/Isos. - none   Aldosterone- Aldactone 25 QOD   SGLT2 - no  Continue Current medications:  Amiodarone, Coumadin  Stable. Appears Euvolemic. BMP today w/ INR  Continue daily wts  Continue BP/HR = call if any dizziness/lightheadedness. F/U w/ [facility] in March   F/U in 1 year in clinic or sooner if needed. · Daily weights  · Fluid restriction of 2 Liters per day  · Limit sodium in diet to around 6928-7125 mg/day  · Monitor BP  · Activity as tolerated     Patient was instructed to call the [address] for any changes in symptoms as noted in AVS.      Return in about 1 year (around 12/22/2021).  or sooner if needed We discussed the importance of weighing oneself and recording daily. We also discussed the importance of a low sodium diet, higher sodium foods to avoid and appropriate low sodium food choices. Discussed use, benefit, and side effects of prescribed medications. All patient questions answered. Patient verbalizes understanding of plan of care using teach back method, and is agreeable to the treatment plan. Pursuant to the emergency declaration under the [address] [place] [address] [facility] authority and the [employer] Act, this Virtual  Visit was conducted, with patient's consent, to reduce the patient's risk of exposure to COVID-19 and provide continuity of care for an established patient. Services were provided through a video synchronous discussion virtually to substitute for in-person clinic visit. Total of 20 minutes of time was spent reviewing the chart and educating the patient about HF, medications, diet, exercise, and discussing the plan of care. I personally spent more then 50% of the appt time face to face with the patient counseling/coordinating patient's care.     Electronicallysigned by ELIA Valerio CNP on 12/22/2020 at 10:04 AM

## 2020-12-22 NOTE — PATIENT INSTRUCTIONS
You may receive a survey regarding the care you received during your visit. Your input is valuable to us. We encourage you to complete and return your survey. We hope you will choose us in the future for your healthcare needs. Continue:  · Continue current medications  · Daily weights and record  · Fluid restriction of 2 Liters per day  · Limit sodium in diet to around 2866-1530 mg/day  · Monitor BP  · Activity as tolerated     Call the Heart Failure Clinic for any of the following symptoms: [phone]  ? Weight gain of 2-3 pounds in 1 day or 5 pounds in 1 week  ? Increased shortness of breath  ? Shortness of breath while laying down  ? Cough  ? Chest pain  ? Swelling in feet, ankles or legs  ? Tenderness or bloating in the abdomen  ? Fatigue   ? Decreased appetite or nausea   ?  Confusion

## 2020-12-22 NOTE — TELEPHONE ENCOUNTER
----- Message from Augie Jolly MD sent at 12/22/2020  4:28 PM EST -----  Staying lower, 1.59. What is current dose?

## 2020-12-23 ENCOUNTER — TELEPHONE (OUTPATIENT)
Dept: FAMILY MEDICINE CLINIC | Age: 80
End: 2020-12-23

## 2021-01-08 ENCOUNTER — NURSE ONLY (OUTPATIENT)
Dept: LAB | Age: 81
End: 2021-01-08

## 2021-01-08 DIAGNOSIS — I48.20 CHRONIC ATRIAL FIBRILLATION (HCC): ICD-10-CM

## 2021-01-08 LAB — INR BLD: 1.51 (ref 0.85–1.13)

## 2021-01-11 ENCOUNTER — TELEPHONE (OUTPATIENT)
Dept: FAMILY MEDICINE CLINIC | Age: 81
End: 2021-01-11

## 2021-01-11 NOTE — TELEPHONE ENCOUNTER
----- Message from Luly Pulido MD sent at 1/9/2021  9:11 AM EST -----  inr still lower at 1.51. What is current dose?

## 2021-01-14 RX ORDER — SPIRONOLACTONE 25 MG/1
25 TABLET ORAL EVERY OTHER DAY
Qty: 45 TABLET | Refills: 3 | Status: SHIPPED | OUTPATIENT
Start: 2021-01-14 | End: 2022-01-17

## 2021-01-14 RX ORDER — PANTOPRAZOLE SODIUM 20 MG/1
40 TABLET, DELAYED RELEASE ORAL 2 TIMES DAILY
Qty: 360 TABLET | Refills: 3 | Status: SHIPPED | OUTPATIENT
Start: 2021-01-14 | End: 2021-01-18

## 2021-01-14 RX ORDER — AMIODARONE HYDROCHLORIDE 200 MG/1
200 TABLET ORAL DAILY
Qty: 90 TABLET | Refills: 3 | Status: SHIPPED | OUTPATIENT
Start: 2021-01-14 | End: 2022-01-17

## 2021-01-18 RX ORDER — PANTOPRAZOLE SODIUM 20 MG/1
TABLET, DELAYED RELEASE ORAL
Qty: 360 TABLET | Refills: 3 | Status: SHIPPED | OUTPATIENT
Start: 2021-01-18 | End: 2022-01-17

## 2021-01-28 ENCOUNTER — NURSE ONLY (OUTPATIENT)
Dept: LAB | Age: 81
End: 2021-01-28

## 2021-01-28 ENCOUNTER — TELEPHONE (OUTPATIENT)
Dept: FAMILY MEDICINE CLINIC | Age: 81
End: 2021-01-28

## 2021-01-28 DIAGNOSIS — I48.20 CHRONIC ATRIAL FIBRILLATION (HCC): ICD-10-CM

## 2021-01-28 LAB — INR BLD: 2.74 (ref 0.85–1.13)

## 2021-01-28 NOTE — TELEPHONE ENCOUNTER
----- Message from Tatiana Hines MD sent at 1/28/2021  4:37 PM EST -----  INR good. Continue same dosage and recheck INR in 1 month.

## 2021-01-28 NOTE — TELEPHONE ENCOUNTER
Per HIPAA, message left for pt notifying her inr is ok, continue current dose of coumadin and to recheck inr in 1 month

## 2021-02-03 RX ORDER — QUINAPRIL 5 1/1
5 TABLET ORAL DAILY
Qty: 90 TABLET | Refills: 3 | Status: SHIPPED | OUTPATIENT
Start: 2021-02-03 | End: 2022-01-17

## 2021-02-03 RX ORDER — BUMETANIDE 1 MG/1
1 TABLET ORAL DAILY
Qty: 90 TABLET | Refills: 3 | Status: SHIPPED | OUTPATIENT
Start: 2021-02-03 | End: 2022-02-22 | Stop reason: SDUPTHER

## 2021-02-25 ENCOUNTER — TELEPHONE (OUTPATIENT)
Dept: FAMILY MEDICINE CLINIC | Age: 81
End: 2021-02-25

## 2021-02-25 ENCOUNTER — NURSE ONLY (OUTPATIENT)
Dept: LAB | Age: 81
End: 2021-02-25

## 2021-02-25 DIAGNOSIS — I48.20 CHRONIC ATRIAL FIBRILLATION (HCC): ICD-10-CM

## 2021-02-25 LAB — INR BLD: 2.88 (ref 0.85–1.13)

## 2021-03-11 ENCOUNTER — NURSE ONLY (OUTPATIENT)
Dept: LAB | Age: 81
End: 2021-03-11

## 2021-03-11 ENCOUNTER — OFFICE VISIT (OUTPATIENT)
Dept: INTERNAL MEDICINE CLINIC | Age: 81
End: 2021-03-11
Payer: MEDICARE

## 2021-03-11 VITALS
TEMPERATURE: 97.4 F | DIASTOLIC BLOOD PRESSURE: 80 MMHG | WEIGHT: 179.4 LBS | HEIGHT: 62 IN | SYSTOLIC BLOOD PRESSURE: 130 MMHG | BODY MASS INDEX: 33.01 KG/M2

## 2021-03-11 DIAGNOSIS — E03.9 HYPOTHYROIDISM, UNSPECIFIED TYPE: ICD-10-CM

## 2021-03-11 DIAGNOSIS — Z11.59 SCREENING FOR VIRAL DISEASE: ICD-10-CM

## 2021-03-11 DIAGNOSIS — I48.20 CHRONIC ATRIAL FIBRILLATION (HCC): ICD-10-CM

## 2021-03-11 DIAGNOSIS — K76.0 NAFLD (NONALCOHOLIC FATTY LIVER DISEASE): ICD-10-CM

## 2021-03-11 DIAGNOSIS — I10 BENIGN ESSENTIAL HTN: ICD-10-CM

## 2021-03-11 DIAGNOSIS — R79.89 ABNORMAL LFTS: ICD-10-CM

## 2021-03-11 DIAGNOSIS — T50.905S ADVERSE EFFECT OF DRUG, SEQUELA: ICD-10-CM

## 2021-03-11 DIAGNOSIS — R79.89 ABNORMAL LFTS: Primary | ICD-10-CM

## 2021-03-11 DIAGNOSIS — Z95.810 BIVENTRICULAR IMPLANTABLE CARDIOVERTER-DEFIBRILLATOR IN SITU: ICD-10-CM

## 2021-03-11 LAB
ALBUMIN SERPL-MCNC: 3.8 G/DL (ref 3.5–5.1)
ALP BLD-CCNC: 142 U/L (ref 38–126)
ALT SERPL-CCNC: 36 U/L (ref 11–66)
AST SERPL-CCNC: 60 U/L (ref 5–40)
BILIRUB SERPL-MCNC: 0.4 MG/DL (ref 0.3–1.2)
BILIRUBIN DIRECT: < 0.2 MG/DL (ref 0–0.3)
TOTAL PROTEIN: 7.7 G/DL (ref 6.1–8)
TSH SERPL DL<=0.05 MIU/L-ACNC: 29.88 UIU/ML (ref 0.4–4.2)

## 2021-03-11 PROCEDURE — 99214 OFFICE O/P EST MOD 30 MIN: CPT | Performed by: INTERNAL MEDICINE

## 2021-03-11 PROCEDURE — G8400 PT W/DXA NO RESULTS DOC: HCPCS | Performed by: INTERNAL MEDICINE

## 2021-03-11 PROCEDURE — 1090F PRES/ABSN URINE INCON ASSESS: CPT | Performed by: INTERNAL MEDICINE

## 2021-03-11 PROCEDURE — 93000 ELECTROCARDIOGRAM COMPLETE: CPT | Performed by: INTERNAL MEDICINE

## 2021-03-11 PROCEDURE — 1123F ACP DISCUSS/DSCN MKR DOCD: CPT | Performed by: INTERNAL MEDICINE

## 2021-03-11 PROCEDURE — 1036F TOBACCO NON-USER: CPT | Performed by: INTERNAL MEDICINE

## 2021-03-11 PROCEDURE — G8427 DOCREV CUR MEDS BY ELIG CLIN: HCPCS | Performed by: INTERNAL MEDICINE

## 2021-03-11 PROCEDURE — 4040F PNEUMOC VAC/ADMIN/RCVD: CPT | Performed by: INTERNAL MEDICINE

## 2021-03-11 PROCEDURE — G8417 CALC BMI ABV UP PARAM F/U: HCPCS | Performed by: INTERNAL MEDICINE

## 2021-03-11 PROCEDURE — G8484 FLU IMMUNIZE NO ADMIN: HCPCS | Performed by: INTERNAL MEDICINE

## 2021-03-11 ASSESSMENT — PATIENT HEALTH QUESTIONNAIRE - PHQ9
SUM OF ALL RESPONSES TO PHQ QUESTIONS 1-9: 0
SUM OF ALL RESPONSES TO PHQ9 QUESTIONS 1 & 2: 0

## 2021-03-11 NOTE — PROGRESS NOTES
Angela Gaffney (:  1940) is a 68 y.o. female,Established patient, here for evaluation of the following chief complaint(s): Other (lv dysfunction), Anemia, Hypertension, Atrial Fibrillation (chronic), and Other (episode while sitting in chair got very hot and sweat dripped off her. Lasted about a minute. Felt a little funny in the head but cleared right away. This was 1-2 months ago)      ASSESSMENT/PLAN:  1. Abnormal LFTs- HX FATTY LIVER. NO SXS  -     Hepatic Function Panel; Future  2. Benign essential HTNcontrolled  -     EKG 12 Lead  3. Biventricular implantable cardioverter-defibrillator in situ- functioning properly  4. Chronic atrial fibrillation (HCC) on anticoagulation  -     EKG 12 Lead  -     Carbon Monoxide Diffusing Capacity; Future  -     Spirometry Without Bronchodilator; Future  -     Hepatic Function Panel; Future  -     TSH; Future  5. NAFLD (nonalcoholic fatty liver disease)  -     Hepatic Function Panel; Future  6. Adverse effect of drug, sequela- amiodarone  -     Carbon Monoxide Diffusing Capacity; Future  -     Spirometry Without Bronchodilator; Future  -     Hepatic Function Panel; Future  -     TSH; Future  -     COVID-19 Ambulatory; Future  7. Screening for viral disease  -     COVID-19 Ambulatory; Future  8. Hypothyroidism, unspecified type  -     TSH; Future      Return in about 6 months (around 2021). SUBJECTIVE/OBJECTIVE:  HPI pt with nonischemic cm, chronic afib, EF 25%; being seen in f/u. She has no progression of sxs. She had one episode of getting sweaty while sitting in chair lasting one minute. She was tried on [place] but lost her apppetite. Review of Systems  Twelve point ROS completed and found to be negative except as noted above.     Physical Exam  General appearance - alert, well appearing, and in no distress    Mental Status - alert, oriented to person, place, and time      Neck - supple, no significant adenopathy, no jvd         Chest - clear to auscultation, no wheezes, rales or rhonchi, symmetric air entry     Heart - normal rate, regular rhythm, normal S1, S2, no murmurs, rubs, clicks or gallops, no gallops noted, no JVD     Abdomen - soft, nontender, nondistended, no masses or organomegaly       Neurological - alert, oriented, normal speech, no focal findings or movement disorder noted     Musculoskeletal - no joint tenderness, deformity or swelling     Extremities - peripheral pulses normal, no pedal edema, no clubbing or cyanosis     Skin - normal coloration and turgor, no rashes, no suspicious skin lesions noted            An electronic signature was used to authenticate this note.     --Rylie Simmons MD

## 2021-03-12 ENCOUNTER — TELEPHONE (OUTPATIENT)
Dept: INTERNAL MEDICINE CLINIC | Age: 81
End: 2021-03-12

## 2021-03-12 DIAGNOSIS — E03.9 HYPOTHYROIDISM, UNSPECIFIED TYPE: Primary | ICD-10-CM

## 2021-03-12 DIAGNOSIS — R79.89 ELEVATED LFTS: ICD-10-CM

## 2021-03-12 RX ORDER — LEVOTHYROXINE SODIUM 0.15 MG/1
150 TABLET ORAL DAILY
Qty: 90 TABLET | Refills: 1 | OUTPATIENT
Start: 2021-03-12 | End: 2021-03-29 | Stop reason: DRUGHIGH

## 2021-03-12 RX ORDER — LEVOTHYROXINE SODIUM 0.15 MG/1
150 TABLET ORAL DAILY
COMMUNITY
End: 2021-03-12 | Stop reason: SDUPTHER

## 2021-03-12 NOTE — TELEPHONE ENCOUNTER
----- Message from Apolonia Pathak MD sent at 3/11/2021  1:51 PM EST -----  Tell her she needs to boost her synthroid; to 150 mcg.   Also lfts a little bit up would like her to get another ultrasound    Get tsh 4 wk   Make sure she is taking synthrhoid on empty stomach

## 2021-03-25 ENCOUNTER — TELEPHONE (OUTPATIENT)
Dept: FAMILY MEDICINE CLINIC | Age: 81
End: 2021-03-25

## 2021-03-25 ENCOUNTER — NURSE ONLY (OUTPATIENT)
Dept: LAB | Age: 81
End: 2021-03-25

## 2021-03-25 ENCOUNTER — HOSPITAL ENCOUNTER (OUTPATIENT)
Age: 81
Discharge: HOME | End: 2021-03-25
Payer: MEDICARE

## 2021-03-25 DIAGNOSIS — I48.20 CHRONIC ATRIAL FIBRILLATION (HCC): ICD-10-CM

## 2021-03-25 DIAGNOSIS — E03.9 HYPOTHYROIDISM, UNSPECIFIED TYPE: ICD-10-CM

## 2021-03-25 LAB
INR BLD: 2.87 (ref 0.85–1.13)
TSH SERPL DL<=0.05 MIU/L-ACNC: 16.7 UIU/ML (ref 0.4–4.2)

## 2021-03-25 PROCEDURE — U0005 INFEC AGEN DETEC AMPLI PROBE: HCPCS

## 2021-03-25 PROCEDURE — U0003 INFECTIOUS AGENT DETECTION BY NUCLEIC ACID (DNA OR RNA); SEVERE ACUTE RESPIRATORY SYNDROME CORONAVIRUS 2 (SARS-COV-2) (CORONAVIRUS DISEASE [COVID-19]), AMPLIFIED PROBE TECHNIQUE, MAKING USE OF HIGH THROUGHPUT TECHNOLOGIES AS DESCRIBED BY CMS-2020-01-R: HCPCS

## 2021-03-25 NOTE — TELEPHONE ENCOUNTER
----- Message from Luis Fernando Calix MD sent at 3/25/2021 11:40 AM EDT -----  INR good. Continue same dosage and recheck INR in 1 month.

## 2021-03-25 NOTE — TELEPHONE ENCOUNTER
Per HIPAA, message left for pt notifying her inr is good, continue same dose and recheck inr in 1 month.

## 2021-03-26 ENCOUNTER — HOSPITAL ENCOUNTER (OUTPATIENT)
Dept: ULTRASOUND IMAGING | Age: 81
Discharge: HOME | End: 2021-03-26
Payer: MEDICARE

## 2021-03-26 DIAGNOSIS — E03.9 HYPOTHYROIDISM, UNSPECIFIED TYPE: ICD-10-CM

## 2021-03-26 DIAGNOSIS — R79.89 ELEVATED LFTS: ICD-10-CM

## 2021-03-26 LAB
SARS-COV-2: NOT DETECTED
SOURCE: NORMAL

## 2021-03-26 PROCEDURE — 76705 ECHO EXAM OF ABDOMEN: CPT

## 2021-03-29 ENCOUNTER — HOSPITAL ENCOUNTER (OUTPATIENT)
Dept: PULMONOLOGY | Age: 81
Discharge: HOME | End: 2021-03-29
Payer: MEDICARE

## 2021-03-29 ENCOUNTER — TELEPHONE (OUTPATIENT)
Dept: INTERNAL MEDICINE CLINIC | Age: 81
End: 2021-03-29

## 2021-03-29 DIAGNOSIS — E03.9 HYPOTHYROIDISM, UNSPECIFIED TYPE: Primary | ICD-10-CM

## 2021-03-29 DIAGNOSIS — T50.905S ADVERSE EFFECT OF DRUG, SEQUELA: ICD-10-CM

## 2021-03-29 DIAGNOSIS — I48.20 CHRONIC ATRIAL FIBRILLATION (HCC): ICD-10-CM

## 2021-03-29 PROCEDURE — 94010 BREATHING CAPACITY TEST: CPT

## 2021-03-29 PROCEDURE — 94729 DIFFUSING CAPACITY: CPT

## 2021-03-29 RX ORDER — LEVOTHYROXINE SODIUM 175 UG/1
175 TABLET ORAL DAILY
Qty: 90 TABLET | Refills: 1 | Status: SHIPPED | OUTPATIENT
Start: 2021-03-29 | End: 2021-09-15 | Stop reason: SDUPTHER

## 2021-03-29 RX ORDER — LEVOTHYROXINE SODIUM 175 UG/1
175 TABLET ORAL DAILY
COMMUNITY
End: 2021-03-29 | Stop reason: SDUPTHER

## 2021-03-29 NOTE — TELEPHONE ENCOUNTER
Message  Received: 3 days ago  MD Yair Estrada MA             TELL HER IT LOOKS OK OTHER THAN IT BEING A LITTLE LARGER THAN NORMAL

## 2021-03-29 NOTE — TELEPHONE ENCOUNTER
I spoke to pt and advised her covid test is normal.  Her liver looks OK other than a little larger than normal.  Her thyroid lab is elevated and will need an increase in Levothyroxine from 150 mcg to 175 mcg daily. Repeat TSH in 4 weeks. She verbalizes understanding. New script sent in and labs mailed to pt.

## 2021-03-29 NOTE — TELEPHONE ENCOUNTER
----- Message from Stephie Dowling MD sent at 3/25/2021  4:15 PM EDT -----  Boost her synthroid to 175 mcg; tsh 4 wk

## 2021-03-30 ENCOUNTER — TELEPHONE (OUTPATIENT)
Dept: INTERNAL MEDICINE CLINIC | Age: 81
End: 2021-03-30

## 2021-04-23 ENCOUNTER — NURSE ONLY (OUTPATIENT)
Dept: LAB | Age: 81
End: 2021-04-23

## 2021-04-23 DIAGNOSIS — I48.20 CHRONIC ATRIAL FIBRILLATION (HCC): ICD-10-CM

## 2021-04-23 LAB — INR BLD: 2.44 (ref 0.85–1.13)

## 2021-04-26 ENCOUNTER — ANTI-COAG VISIT (OUTPATIENT)
Dept: FAMILY MEDICINE CLINIC | Age: 81
End: 2021-04-26
Payer: MEDICARE

## 2021-04-26 ENCOUNTER — TELEPHONE (OUTPATIENT)
Dept: FAMILY MEDICINE CLINIC | Age: 81
End: 2021-04-26

## 2021-04-26 DIAGNOSIS — Z79.01 CHRONIC ANTICOAGULATION: ICD-10-CM

## 2021-04-26 PROCEDURE — 93793 ANTICOAG MGMT PT WARFARIN: CPT | Performed by: FAMILY MEDICINE

## 2021-04-26 NOTE — TELEPHONE ENCOUNTER
----- Message from Unruly Lennon MD sent at 4/24/2021  9:25 AM EDT -----  INR good. Continue same dosage and recheck INR in 1 month.

## 2021-04-27 ENCOUNTER — NURSE ONLY (OUTPATIENT)
Dept: LAB | Age: 81
End: 2021-04-27

## 2021-04-27 DIAGNOSIS — E03.9 HYPOTHYROIDISM, UNSPECIFIED TYPE: ICD-10-CM

## 2021-04-27 LAB — TSH SERPL DL<=0.05 MIU/L-ACNC: 3.11 UIU/ML (ref 0.4–4.2)

## 2021-04-28 ENCOUNTER — TELEPHONE (OUTPATIENT)
Dept: INTERNAL MEDICINE CLINIC | Age: 81
End: 2021-04-28

## 2021-05-24 ENCOUNTER — NURSE ONLY (OUTPATIENT)
Dept: LAB | Age: 81
End: 2021-05-24

## 2021-05-24 ENCOUNTER — TELEPHONE (OUTPATIENT)
Dept: FAMILY MEDICINE CLINIC | Age: 81
End: 2021-05-24

## 2021-05-24 ENCOUNTER — ANTI-COAG VISIT (OUTPATIENT)
Dept: FAMILY MEDICINE CLINIC | Age: 81
End: 2021-05-24
Payer: MEDICARE

## 2021-05-24 DIAGNOSIS — I48.20 CHRONIC ATRIAL FIBRILLATION (HCC): ICD-10-CM

## 2021-05-24 LAB — INR BLD: 2.72 (ref 0.85–1.13)

## 2021-05-24 PROCEDURE — 93793 ANTICOAG MGMT PT WARFARIN: CPT | Performed by: FAMILY MEDICINE

## 2021-05-24 NOTE — TELEPHONE ENCOUNTER
----- Message from [place] MD sent at 5/24/2021 12:23 PM EDT -----  INR good. Continue same dosage and recheck INR in 1 month.

## 2021-06-23 ENCOUNTER — OFFICE VISIT (OUTPATIENT)
Dept: FAMILY MEDICINE CLINIC | Age: 81
End: 2021-06-23
Payer: MEDICARE

## 2021-06-23 VITALS
HEART RATE: 61 BPM | TEMPERATURE: 97.6 F | WEIGHT: 187.8 LBS | HEIGHT: 62 IN | RESPIRATION RATE: 16 BRPM | SYSTOLIC BLOOD PRESSURE: 124 MMHG | BODY MASS INDEX: 34.56 KG/M2 | DIASTOLIC BLOOD PRESSURE: 84 MMHG | OXYGEN SATURATION: 97 %

## 2021-06-23 DIAGNOSIS — I20.8 OTHER FORMS OF ANGINA PECTORIS (HCC): ICD-10-CM

## 2021-06-23 DIAGNOSIS — I48.20 CHRONIC ATRIAL FIBRILLATION (HCC): ICD-10-CM

## 2021-06-23 DIAGNOSIS — I42.8 NICM (NONISCHEMIC CARDIOMYOPATHY) (HCC): ICD-10-CM

## 2021-06-23 DIAGNOSIS — Z00.00 ROUTINE GENERAL MEDICAL EXAMINATION AT A HEALTH CARE FACILITY: Primary | ICD-10-CM

## 2021-06-23 LAB
INTERNATIONAL NORMALIZATION RATIO, POC: 2.9
PROTHROMBIN TIME, POC: NORMAL

## 2021-06-23 PROCEDURE — 85610 PROTHROMBIN TIME: CPT | Performed by: FAMILY MEDICINE

## 2021-06-23 PROCEDURE — G0438 PPPS, INITIAL VISIT: HCPCS | Performed by: FAMILY MEDICINE

## 2021-06-23 PROCEDURE — 4040F PNEUMOC VAC/ADMIN/RCVD: CPT | Performed by: FAMILY MEDICINE

## 2021-06-23 PROCEDURE — 1123F ACP DISCUSS/DSCN MKR DOCD: CPT | Performed by: FAMILY MEDICINE

## 2021-06-23 PROCEDURE — 93793 ANTICOAG MGMT PT WARFARIN: CPT | Performed by: FAMILY MEDICINE

## 2021-06-23 RX ORDER — WARFARIN SODIUM 3 MG/1
TABLET ORAL
Qty: 90 TABLET | Refills: 3 | Status: SHIPPED | OUTPATIENT
Start: 2021-06-23

## 2021-06-23 SDOH — ECONOMIC STABILITY: FOOD INSECURITY: WITHIN THE PAST 12 MONTHS, YOU WORRIED THAT YOUR FOOD WOULD RUN OUT BEFORE YOU GOT MONEY TO BUY MORE.: NEVER TRUE

## 2021-06-23 SDOH — ECONOMIC STABILITY: FOOD INSECURITY: WITHIN THE PAST 12 MONTHS, THE FOOD YOU BOUGHT JUST DIDN'T LAST AND YOU DIDN'T HAVE MONEY TO GET MORE.: NEVER TRUE

## 2021-06-23 ASSESSMENT — PATIENT HEALTH QUESTIONNAIRE - PHQ9
SUM OF ALL RESPONSES TO PHQ9 QUESTIONS 1 & 2: 0
SUM OF ALL RESPONSES TO PHQ QUESTIONS 1-9: 0
SUM OF ALL RESPONSES TO PHQ QUESTIONS 1-9: 0
2. FEELING DOWN, DEPRESSED OR HOPELESS: 0
1. LITTLE INTEREST OR PLEASURE IN DOING THINGS: 0
SUM OF ALL RESPONSES TO PHQ QUESTIONS 1-9: 0

## 2021-06-23 ASSESSMENT — LIFESTYLE VARIABLES: HOW OFTEN DO YOU HAVE A DRINK CONTAINING ALCOHOL: 0

## 2021-06-23 ASSESSMENT — SOCIAL DETERMINANTS OF HEALTH (SDOH): HOW HARD IS IT FOR YOU TO PAY FOR THE VERY BASICS LIKE FOOD, HOUSING, MEDICAL CARE, AND HEATING?: NOT HARD AT ALL

## 2021-06-23 NOTE — PROGRESS NOTES
Pt walked in for POCT Hgb A1C, fingerstick obtained, pt tolerated well. Last INR on 5/24/2021 was 2.72    Patient is taking 3 mg x 6 days a week and 1.5 mg on Sundays.     Patient told in office to stay on the same dose of Warfarin and repeat her INR in 1 month per Dr Tana Garza MD.

## 2021-06-23 NOTE — PATIENT INSTRUCTIONS
Personalized Preventive Plan for Caty Swenson - 6/23/2021  Medicare offers a range of preventive health benefits. Some of the tests and screenings are paid in full while other may be subject to a deductible, co-insurance, and/or copay. Some of these benefits include a comprehensive review of your medical history including lifestyle, illnesses that may run in your family, and various assessments and screenings as appropriate. After reviewing your medical record and screening and assessments performed today your provider may have ordered immunizations, labs, imaging, and/or referrals for you. A list of these orders (if applicable) as well as your Preventive Care list are included within your After Visit Summary for your review. Other Preventive Recommendations:    · A preventive eye exam performed by an eye specialist is recommended every 1-2 years to screen for glaucoma; cataracts, macular degeneration, and other eye disorders. · A preventive dental visit is recommended every 6 months. · Try to get at least 150 minutes of exercise per week or 10,000 steps per day on a pedometer . · Order or download the FREE \"Exercise & Physical Activity: Your Everyday Guide\" from [employer]. Call 2-353.798.4702 or search [employer] online. · You need 2897-8812 mg of calcium and 5384-1304 IU of vitamin D per day. It is possible to meet your calcium requirement with diet alone, but a vitamin D supplement is usually necessary to meet this goal.  · When exposed to the sun, use a sunscreen that protects against both UVA and UVB radiation with an SPF of 30 or greater. Reapply every 2 to 3 hours or after sweating, drying off with a towel, or swimming. · Always wear a seat belt when traveling in a car. Always wear a helmet when riding a bicycle or motorcycle. Patient Education        Well Visit, Over 72: Care Instructions  Overview     Well visits can help you stay healthy.  Your doctor has checked your overall health and may have suggested ways to take good care of yourself. Your doctor also may have recommended tests. At home, you can help prevent illness with healthy eating, regular exercise, and other steps. Follow-up care is a key part of your treatment and safety. Be sure to make and go to all appointments, and call your doctor if you are having problems. It's also a good idea to know your test results and keep a list of the medicines you take. How can you care for yourself at home? Get screening tests that you and your doctor decide on. Screening helps find diseases before any symptoms appear. Eat healthy foods. Choose fruits, vegetables, whole grains, protein, and low-fat dairy foods. Limit fat, especially saturated fat. Reduce salt in your diet. Limit alcohol. If you are a man, have no more than 2 drinks a day or 14 drinks a week. If you are a woman, have no more than 1 drink a day or 7 drinks a week. Since alcohol affects older adults differently, you may want to limit alcohol even more. Or you may not want to drink at all. Get at least 30 minutes of exercise on most days of the week. Walking is a good choice. You also may want to do other activities, such as running, swimming, cycling, or playing tennis or team sports. Reach and stay at a healthy weight. This will lower your risk for many problems, such as obesity, diabetes, heart disease, and high blood pressure. Do not smoke. Smoking can make health problems worse. If you need help quitting, talk to your doctor about stop-smoking programs and medicines. These can increase your chances of quitting for good. Care for your mental health. It is easy to get weighed down by worry and stress. Learn strategies to manage stress, like deep breathing and mindfulness, and stay connected with your family and community. If you find you often feel sad or hopeless, talk with your doctor. Treatment can help.   Talk to your doctor about whether you have any risk factors for sexually transmitted infections (STIs). You can help prevent STIs if you wait to have sex with a new partner (or partners) until you've each been tested for STIs. It also helps if you use condoms (male or female condoms) and if you limit your sex partners to one person who only has sex with you. Vaccines are available for some STIs. If you think you may have a problem with alcohol or drug use, talk to your doctor. This includes prescription medicines (such as amphetamines and opioids) and illegal drugs (such as cocaine and methamphetamine). Your doctor can help you figure out what type of treatment is best for you. Protect your skin from too much sun. When you're outdoors from 10 a.m. to 4 p.m., stay in the shade or cover up with clothing and a hat with a wide brim. Wear sunglasses that block UV rays. Even when it's cloudy, put broad-spectrum sunscreen (SPF 30 or higher) on any exposed skin. See a dentist one or two times a year for checkups and to have your teeth cleaned. Wear a seat belt in the car. When should you call for help? Watch closely for changes in your health, and be sure to contact your doctor if you have any problems or symptoms that concern you. Where can you learn more? Go to https://Health Benefits Directsaraheb.healthSafeBootpartners. org and sign in to your Bundle account. Enter H720 in the [facility] box to learn more about \"Well Visit, Over 65: Care Instructions. \"     If you do not have an account, please click on the \"Sign Up Now\" link. Current as of: May 27, 2020               Content Version: 12.9  © 6897-5334 Healthwise, Incorporated. Care instructions adapted under license by [facility] ([facility]). If you have questions about a medical condition or this instruction, always ask your healthcare professional. [place] [address] any warranty or liability for your use of this information.          Patient Education        Eating Healthy Foods: Care equals 1 ounce of meat. Learn how to read food labels for serving sizes and ingredients. Fast-food and convenience-food meals often contain few or no fruits or vegetables. Make sure you eat some fruits and vegetables to make the meal more nutritious. Look at your food diary. For each food group, add up what you have eaten and then divide the total by the number of days. This will give you an idea of how much you are eating from each food group. See if you can find some ways to change your diet to make it more healthy. Start small  Do not try to make dramatic changes to your diet all at once. You might feel that you are missing out on your favorite foods and then be more likely to fail. Start slowly, and gradually change your habits. Try some of the following:  Use whole wheat bread instead of white bread. Use nonfat or low-fat milk instead of whole milk. Eat brown rice instead of white rice, and eat whole wheat pasta instead of white-flour pasta. Try low-fat cheeses and low-fat yogurt. Add more fruits and vegetables to meals and have them for snacks. Add lettuce, tomato, cucumber, and onion to sandwiches. Add fruit to yogurt and cereal.  Enjoy food  You can still eat your favorite foods. You just may need to eat less of them. If your favorite foods are high in fat, salt, and sugar, limit how often you eat them, but do not cut them out entirely. Eat a wide variety of foods. Make healthy choices when eating out  The type of restaurant you choose can help you make healthy choices. Even fast-food chains are now offering more low-fat or healthier choices on the menu. Choose smaller portions, or take half of your meal home. When eating out, try:  A veggie pizza with a whole wheat crust or grilled chicken (instead of sausage or pepperoni). Pasta with roasted vegetables, grilled chicken, or marinara sauce instead of cream sauce. A vegetable wrap or grilled chicken wrap.   Broiled or poached food instead of fried or breaded items. Make healthy choices easy  Buy packaged, prewashed, ready-to-eat fresh vegetables and fruits, such as baby carrots, salad mixes, and chopped or shredded broccoli and cauliflower. Buy packaged, presliced fruits, such as melon or pineapple. Choose 100% fruit or vegetable juice instead of soda. Limit juice intake to 4 to 6 oz (½ to ¾ cup) a day. Blend low-fat yogurt, fruit juice, and canned or frozen fruit to make a smoothie for breakfast or a snack. Where can you learn more? Go to https://Sparktrendpepiceweb.[employer]. org and sign in to your [employer] account. Enter J849 in the [employer] box to learn more about \"Eating Healthy Foods: Care Instructions. \"     If you do not have an account, please click on the \"Sign Up Now\" link. Current as of: December 17, 2020               Content Version: 12.9  © 2006-2021 Healthwise, Incorporated. Care instructions adapted under license by [facility] ([facility]). If you have questions about a medical condition or this instruction, always ask your healthcare professional. [place] [address] any warranty or liability for your use of this information.

## 2021-06-23 NOTE — PROGRESS NOTES
Medicare Annual Wellness Visit  Name: Jacoby Christy Date: 2021   MRN: [id number] Sex: Female   Age: 68 y.o. Ethnicity: Non-/Non    : 1940 Race: Jean-Paul Swenson is here for Medicare AWV    Screenings for behavioral, psychosocial and functional/safety risks, and cognitive dysfunction are all negative except as indicated below. These results, as well as other patient data from the [address] form, are documented in Flowsheets linked to this Encounter. Allergies   Allergen Reactions    Ativan [Lorazepam]      Gets anxiety    Codeine     Nystatin      Mycostatin powder    Percocet [Oxycodone-Acetaminophen]     Relafen [Nabumetone]      Stomach upset    Tape Eugune Blower Tape]     Celebrex [Celecoxib] Rash       Prior to Visit Medications    Medication Sig Taking? Authorizing Provider   warfarin (COUMADIN) 3 MG tablet Alternating 3 mg/1.5 mg or as directed per INR results. Yes Reji Nesbitt MD   levothyroxine (SYNTHROID) 175 MCG tablet Take 1 tablet by mouth Daily New dose.  Yes Martin Ambrose MD   bumetanide (BUMEX) 1 MG tablet Take 1 tablet by mouth daily Yes Martin Ambrose MD   quinapril (ACCUPRIL) 5 MG tablet Take 1 tablet by mouth daily Yes Martin Ambrose MD   pantoprazole (PROTONIX) 20 MG tablet TAKE 2 TABLETS BY MOUTH TWICE DAILY Yes Martin Ambrose MD   amiodarone (CORDARONE) 200 MG tablet Take 1 tablet by mouth daily Yes Martin Ambrose MD   spironolactone (ALDACTONE) 25 MG tablet Take 1 tablet by mouth every other day Yes Martin Ambrose MD   metoprolol succinate (TOPROL XL) 200 MG extended release tablet TAKE 1 TABLET BY MOUTH DAILY Yes Martin Ambrose MD   ferrous sulfate (IRON 325) 325 (65 Fe) MG tablet Take 325 mg by mouth every other day Yes Historical Provider, MD   Alum & Mag Hydroxide-Simeth (MYLANTA PO) Take by mouth as needed  Yes Historical Provider, MD   acetaminophen (TYLENOL) 325 MG tablet Take 650 mg by mouth every 6 hours as needed for Pain or Fever Yes Historical Provider, MD   Handicap Placard MISC by Does not apply route Request parking placard due to medical conditions. Duration of 5 years. Yes Ana Maria Mazariegos MD   Probiotic Product (PROBIOTIC FORMULA PO) Take 1 tablet by mouth daily. Yes Historical Provider, MD   Misc. Devices (LUMBAR SUPPORT CUSHION) MISC by Does not apply route. Dx: low back pain, sciatica. Yes Ana Maria Mazariegos MD   Ascorbic Acid (VITAMIN C) 1000 MG tablet Take 1,000 mg by mouth 2 times daily. Yes Historical Provider, MD   Multiple Vitamin (MULTI-VITAMIN PO) Take  by mouth daily. Yes Historical Provider, MD   Calcium Carbonate (CALCIUM 600 PO) Take  by mouth 2 times daily. Yes Historical Provider, MD   BIOTIN PO Take 5,000 mg by mouth daily.  Yes Historical Provider, MD   digoxin (LANOXIN) 125 MCG tablet Take 1 tablet by mouth every other day  Jose Onofre MD       Past Medical History:   Diagnosis Date    AICD (automatic cardioverter/defibrillator) present 05/1999    Arthritis 5/21/2019    Atrial fibrillation (HCC)     chronic    Biventricular ICD (implantable cardiac defibrillator) in place 10/09    C. difficile diarrhea     CAD (coronary artery disease)     Cancer ([facility]     right hand    CHF (congestive heart failure) ([address]     Diverticulitis 01/2013    GERD (gastroesophageal reflux disease)     hiatal hernia    Hiatal hernia     Hyperlipidemia     Hypertension     Menopause     1994    NAFLD (nonalcoholic fatty liver disease) 1/31/2017    Nonischemic cardiomyopathy ([facility]     chronic    Pacemaker     Retroperitoneal hemorrhage 07/99    transfusion x3    Thyroid disease        Past Surgical History:   Procedure Laterality Date    CARDIAC DEFIBRILLATOR PLACEMENT     Angela Olivo  2008    w/ EGD    COLONOSCOPY N/A 7/16/2019    COLONOSCOPY POLYPECTOMY SNARE/COLD BIOPSY performed by Anthony Jenkins MD at [address], COLON, DIAGNOSTIC      PACEMAKER PLACEMENT  1999    replaced 3x    SKIN BIOPSY  2010    Skin CA with graft.     UPPER GASTROINTESTINAL ENDOSCOPY N/A 7/16/2019    EGD DILATION SAVORY performed by Napoleon Bryant MD at [facility] ENDOSCOPY Left 7/16/2019    EGD BIOPSY performed by Napoleon Bryant MD at [address] Endoscopy    UPPER GASTROINTESTINAL ENDOSCOPY N/A 12/2/2019    EGD ESOPHAGEAL MANOMETRY AND PH MONITORING 24 HR performed by Napoleon Bryant MD at [address] Endoscopy       Family History   Problem Relation Age of Onset    Heart Disease Mother     Diabetes Mother     High Cholesterol Mother     High Blood Pressure Brother     Cancer Sister     High Blood Pressure Brother     High Blood Pressure Brother     Parkinsonism Brother     Diabetes Brother     Heart Disease Brother     Kidney Disease Brother     High Blood Pressure Brother     High Cholesterol Father     Miscarriages / Stillbirths Daughter     Arthritis Neg Hx     Asthma Neg Hx     Birth Defects Neg Hx     Depression Neg Hx     Early Death Neg Hx     Learning Disabilities Neg Hx     Mental Illness Neg Hx     Mental Retardation Neg Hx     Stroke Neg Hx     Substance Abuse Neg Hx     Vision Loss Neg Hx     Other Neg Hx        CareTeam (Including outside providers/suppliers regularly involved in providing care):   Patient Care Team:  Mayela Villarreal MD as PCP - General (Family Medicine)  Mayela Villarreal MD as PCP - [facility] Empaneled Provider  Geoffrey Marie MD as Consulting Physician (Otolaryngology)    Wt Readings from Last 3 Encounters:   06/23/21 187 lb 12.8 oz (85.2 kg)   03/11/21 179 lb 6.4 oz (81.4 kg)   07/30/20 175 lb 9.6 oz (79.7 kg)     Vitals:    06/23/21 1414   BP: 124/84   Site: Left Upper Arm   Position: Sitting   Cuff Size: Medium Adult   Pulse: 61   Resp: 16   Temp: 97.6 °F (36.4 °C)   TempSrc: Infrared   SpO2: 97%   Weight: 187 lb 12.8 oz (85.2 kg)   Height: 5' 1.5\" (1.562 m)     Body mass index is 34.91 kg/m². Based upon direct observation of the patient, evaluation of cognition reveals recent and remote memory intact. General Appearance: alert and oriented to person, place and time, well developed and well- nourished, in no acute distress  Skin: warm and dry, no rash or erythema  Head: normocephalic and atraumatic  Eyes: pupils equal, round, and reactive to light, extraocular eye movements intact, conjunctivae normal  ENT: tympanic membrane, external ear and ear canal normal bilaterally, nose without deformity, nasal mucosa and turbinates normal without polyps  Neck: supple and non-tender without mass, no thyromegaly or thyroid nodules, no cervical lymphadenopathy  Pulmonary/Chest: clear to auscultation bilaterally- no wheezes, rales or rhonchi, normal air movement, no respiratory distress  Cardiovascular: normal rate, regular rhythm, normal S1 and S2, no murmurs, rubs, clicks, or gallops, distal pulses intact, no carotid bruits  Abdomen: soft, non-tender, non-distended, normal bowel sounds, no masses or organomegaly  Extremities: no cyanosis, clubbing or edema  Musculoskeletal: normal range of motion, no joint swelling, deformity or tenderness  Neurologic: reflexes normal and symmetric, no cranial nerve deficit, gait, coordination and speech normal    Patient's complete Health Risk Assessment and screening values have been reviewed and are found in Flowsheets. The following problems were reviewed today and where indicated follow up appointments were made and/or referrals ordered.     Positive Risk Factor Screenings with Interventions:           Health Habits/Nutrition:  Health Habits/Nutrition  Do you exercise for at least 20 minutes 2-3 times per week?: Yes  Have you lost any weight without trying in the past 3 months?: No  Do you eat only one meal per day?: No  Have you seen the dentist within the past year?: Yes  Body mass index: (!) 34.91  Health Habits/Nutrition Interventions:  · Nutritional issues:  educational materials for healthy, well-balanced diet provided    Hearing/Vision:  No exam data present  Hearing/Vision  Do you or your family notice any trouble with your hearing that hasn't been managed with hearing aids?: (!) Yes  Do you have difficulty driving, watching TV, or doing any of your daily activities because of your eyesight?: No  Have you had an eye exam within the past year?: (!) No  Hearing/Vision Interventions:  · Hearing concerns:  patient declines any further evaluation/treatment for hearing issues  · Vision concerns:  patient encouraged to make appointment with his/her eye specialist      Personalized Preventive Plan   Current Health Maintenance Status  Immunization History   Administered Date(s) Administered    Pneumococcal Conjugate 13-valent ([place]) 05/21/2019        Health Maintenance   Topic Date Due    COVID-19 Vaccine (1) Never done    DTaP/Tdap/Td vaccine (1 - Tdap) Never done    Shingles Vaccine (1 of 2) Never done   [employer] Visit (AWV)  Never done    Pneumococcal 65+ years Vaccine (2 of 2 - PPSV23) 05/21/2020    Flu vaccine (Season Ended) 09/01/2021    Potassium monitoring  12/22/2021    Creatinine monitoring  12/22/2021    TSH testing  04/27/2022    Hepatitis C screen  Completed    DEXA (modify frequency per FRAX score)  Addressed    Hepatitis A vaccine  Aged Out    Hepatitis B vaccine  Aged Out    Hib vaccine  Aged Out    Meningococcal (ACWY) vaccine  Aged Out     Recommendations for [place] Due: see orders and patient instructions/AVS.  . Recommended screening schedule for the next 5-10 years is provided to the patient in written form: see Patient Alfredo Araujo was seen today for medicare awv. Diagnoses and all orders for this visit:    Chronic atrial fibrillation (HCC)  -     warfarin (COUMADIN) 3 MG tablet; Alternating 3 mg/1.5 mg or as directed per INR results.   -  Results for orders placed or performed in visit on 06/23/21   POCT INR   Result Value Ref Range    INR 2.9     Protime       INR good. Continue same dosage and recheck INR in 1 month.       NICM (nonischemic cardiomyopathy) ([facility]   -STABLE, sees cardiology    Other forms of angina pectoris ([facility]   -stable, sees cardiology

## 2021-07-29 ENCOUNTER — TELEPHONE (OUTPATIENT)
Dept: FAMILY MEDICINE CLINIC | Age: 81
End: 2021-07-29

## 2021-07-29 NOTE — TELEPHONE ENCOUNTER
----- Message from ELIA Valles CNP sent at 7/29/2021  9:35 AM EDT -----  INR good. Continue current dose. Recheck in one month.

## 2021-09-08 ENCOUNTER — TELEPHONE (OUTPATIENT)
Dept: FAMILY MEDICINE CLINIC | Age: 81
End: 2021-09-08

## 2021-09-08 NOTE — TELEPHONE ENCOUNTER
----- Message from Kamala Suero MD sent at 9/8/2021  1:28 PM EDT -----  INR good. Continue same dosage and recheck INR in 1 month.

## 2021-09-08 NOTE — TELEPHONE ENCOUNTER
[facility] called to request a new standing order for her INR checks.  They taty her today, but need a new order

## 2021-09-15 ENCOUNTER — OFFICE VISIT (OUTPATIENT)
Dept: INTERNAL MEDICINE CLINIC | Age: 81
End: 2021-09-15
Payer: MEDICARE

## 2021-09-15 ENCOUNTER — NURSE ONLY (OUTPATIENT)
Dept: LAB | Age: 81
End: 2021-09-15

## 2021-09-15 ENCOUNTER — TELEPHONE (OUTPATIENT)
Dept: INTERNAL MEDICINE CLINIC | Age: 81
End: 2021-09-15

## 2021-09-15 VITALS
SYSTOLIC BLOOD PRESSURE: 130 MMHG | DIASTOLIC BLOOD PRESSURE: 68 MMHG | OXYGEN SATURATION: 92 % | WEIGHT: 179.4 LBS | HEART RATE: 60 BPM | BODY MASS INDEX: 33.87 KG/M2 | HEIGHT: 61 IN | TEMPERATURE: 97.5 F

## 2021-09-15 DIAGNOSIS — I48.20 CHRONIC ATRIAL FIBRILLATION (HCC): ICD-10-CM

## 2021-09-15 DIAGNOSIS — E03.9 HYPOTHYROIDISM, UNSPECIFIED TYPE: ICD-10-CM

## 2021-09-15 DIAGNOSIS — I42.8 NONISCHEMIC CARDIOMYOPATHY (HCC): ICD-10-CM

## 2021-09-15 DIAGNOSIS — T50.905S ADVERSE EFFECT OF DRUG, SEQUELA: ICD-10-CM

## 2021-09-15 DIAGNOSIS — I51.9 LEFT VENTRICULAR SYSTOLIC DYSFUNCTION: Primary | ICD-10-CM

## 2021-09-15 DIAGNOSIS — K76.0 NAFLD (NONALCOHOLIC FATTY LIVER DISEASE): ICD-10-CM

## 2021-09-15 DIAGNOSIS — M15.9 GENERALIZED OA: ICD-10-CM

## 2021-09-15 DIAGNOSIS — I10 BENIGN ESSENTIAL HTN: ICD-10-CM

## 2021-09-15 DIAGNOSIS — I51.9 LEFT VENTRICULAR SYSTOLIC DYSFUNCTION: ICD-10-CM

## 2021-09-15 DIAGNOSIS — Z95.810 BIVENTRICULAR IMPLANTABLE CARDIOVERTER-DEFIBRILLATOR IN SITU: ICD-10-CM

## 2021-09-15 LAB
ALBUMIN SERPL-MCNC: 3.5 G/DL (ref 3.5–5.1)
ALP BLD-CCNC: 133 U/L (ref 38–126)
ALT SERPL-CCNC: 25 U/L (ref 11–66)
ANION GAP SERPL CALCULATED.3IONS-SCNC: 10 MEQ/L (ref 8–16)
AST SERPL-CCNC: 45 U/L (ref 5–40)
BILIRUB SERPL-MCNC: 0.4 MG/DL (ref 0.3–1.2)
BILIRUBIN DIRECT: < 0.2 MG/DL (ref 0–0.3)
BUN BLDV-MCNC: 27 MG/DL (ref 7–22)
CALCIUM SERPL-MCNC: 9.2 MG/DL (ref 8.5–10.5)
CHLORIDE BLD-SCNC: 104 MEQ/L (ref 98–111)
CO2: 26 MEQ/L (ref 23–33)
CREAT SERPL-MCNC: 1.2 MG/DL (ref 0.4–1.2)
GFR SERPL CREATININE-BSD FRML MDRD: 43 ML/MIN/1.73M2
GLUCOSE BLD-MCNC: 125 MG/DL (ref 70–108)
POTASSIUM SERPL-SCNC: 4 MEQ/L (ref 3.5–5.2)
SODIUM BLD-SCNC: 140 MEQ/L (ref 135–145)
TOTAL PROTEIN: 7 G/DL (ref 6.1–8)
TSH SERPL DL<=0.05 MIU/L-ACNC: 3.47 UIU/ML (ref 0.4–4.2)

## 2021-09-15 PROCEDURE — G8417 CALC BMI ABV UP PARAM F/U: HCPCS | Performed by: INTERNAL MEDICINE

## 2021-09-15 PROCEDURE — 99213 OFFICE O/P EST LOW 20 MIN: CPT | Performed by: INTERNAL MEDICINE

## 2021-09-15 PROCEDURE — 1036F TOBACCO NON-USER: CPT | Performed by: INTERNAL MEDICINE

## 2021-09-15 PROCEDURE — G8400 PT W/DXA NO RESULTS DOC: HCPCS | Performed by: INTERNAL MEDICINE

## 2021-09-15 PROCEDURE — G8427 DOCREV CUR MEDS BY ELIG CLIN: HCPCS | Performed by: INTERNAL MEDICINE

## 2021-09-15 PROCEDURE — 1123F ACP DISCUSS/DSCN MKR DOCD: CPT | Performed by: INTERNAL MEDICINE

## 2021-09-15 PROCEDURE — 4040F PNEUMOC VAC/ADMIN/RCVD: CPT | Performed by: INTERNAL MEDICINE

## 2021-09-15 PROCEDURE — 1090F PRES/ABSN URINE INCON ASSESS: CPT | Performed by: INTERNAL MEDICINE

## 2021-09-15 RX ORDER — LEVOTHYROXINE SODIUM 175 UG/1
175 TABLET ORAL DAILY
Qty: 90 TABLET | Refills: 3 | Status: SHIPPED | OUTPATIENT
Start: 2021-09-15

## 2021-09-15 RX ORDER — DIGOXIN 125 MCG
125 TABLET ORAL EVERY OTHER DAY
Qty: 45 TABLET | Refills: 3 | Status: SHIPPED | OUTPATIENT
Start: 2021-09-15 | End: 2022-02-22 | Stop reason: SDUPTHER

## 2021-09-15 RX ORDER — METOPROLOL SUCCINATE 200 MG/1
200 TABLET, EXTENDED RELEASE ORAL DAILY
Qty: 90 TABLET | Refills: 3 | Status: SHIPPED | OUTPATIENT
Start: 2021-09-15 | End: 2022-05-04 | Stop reason: DRUGHIGH

## 2021-09-15 NOTE — PROGRESS NOTES
Shelly Mack (:  1940) is a 68 y.o. female,Established patient, here for evaluation of the following chief complaint(s):  Atrial Fibrillation (chronic-6 month follow up`), Hypertension, and Hypothyroidism         ASSESSMENT/PLAN:  1. Left ventricular systolic dysfunction- denies sob; no edema. We tried entresto in past; lost appetite and taken off  -     Hepatic Function Panel; Future  2. Benign essential HTN- controlled- bp good today  -     Basic Metabolic Panel; Future  -     Hepatic Function Panel; Future  3. Biventricular implantable cardioverter-defibrillator in situ- functioning properly  4. Chronic atrial fibrillation ([facility] [address]- on coumadin  -     Spirometry W/ Diffusion Capacity; Future  -     Hepatic Function Panel; Future  5. Nonischemic cardiomyopathy ([facility] [address] See above    -     Hepatic Function Panel; Future  6. Adverse effect of drug, sequela  -     Spirometry W/ Diffusion Capacity; Future  -     Basic Metabolic Panel; Future  -     TSH; Future  -     Hepatic Function Panel; Future  7. Hypothyroidism, unspecified type  -     TSH; Future  -     Hepatic Function Panel; Future  8. NAFLD (nonalcoholic fatty liver disease)  -     Hepatic Function Panel; Future      Return in about 6 months (around 3/15/2022). Subjective   SUBJECTIVE/OBJECTIVE:  HPI pt with dilated cm, obesity, chronic afib seen in f/u. Weight has been stable. She denies sob. She had us liver recently, no unusual findings    Review of Systems    Twelve point ROS completed and found to be negative except as noted above.       Objective   Physical Exam    /68 (Site: Left Upper Arm, Cuff Size: Large Adult)   Pulse 60   Temp 97.5 °F (36.4 °C)   Ht 5' 1.5\" (1.562 m)   Wt 179 lb 6.4 oz (81.4 kg)   SpO2 92% Comment: at rest on room air  BMI 33.35 kg/m²     General appearance - alert, well appearing, and in no distress    Mental Status - alert, oriented to person, place, and time          Neck - supple, no significant adenopathy        Chest - clear to auscultation, no wheezes, rales or rhonchi, symmetric air entry     Heart - normal rate, regular rhythm, normal S1, S2, no murmurs, rubs, clicks or gallops     Abdomen - soft, nontender, nondistended, no masses or organomegaly         Neurological - alert, oriented, normal speech, no focal findings or movement disorder noted     Musculoskeletal -   No muscular tenderness  Extremities - peripheral pulses normal, no pedal edema, no clubbing or cyanosis     Skin - normal coloration and turgor, no rashes, no suspicious skin lesions noted            An electronic signature was used to authenticate this note.     --Brook Sepulveda MD

## 2021-10-06 ENCOUNTER — HOSPITAL ENCOUNTER (OUTPATIENT)
Dept: PULMONOLOGY | Age: 81
Discharge: HOME | End: 2021-10-06
Payer: MEDICARE

## 2021-10-06 DIAGNOSIS — T50.905S ADVERSE EFFECT OF DRUG, SEQUELA: ICD-10-CM

## 2021-10-06 DIAGNOSIS — I48.20 CHRONIC ATRIAL FIBRILLATION (HCC): ICD-10-CM

## 2021-10-06 PROCEDURE — 94010 BREATHING CAPACITY TEST: CPT

## 2021-10-06 PROCEDURE — 94729 DIFFUSING CAPACITY: CPT

## 2021-10-06 NOTE — PROGRESS NOTES
Prescreening performed prior to testing. The following symptoms may indicate COVID-19 infection:        One of the following criteria:   Temperature taken, patient temperature was 97.7 F. Fever greater 100.0 F -no  New onset cough -  no  New onset shortness of breath -no  New onset difficulty breathing -no        And/or   Two or more of the following criteria:  New onset muscle aches -no  New onset headache -no  New onset sore throat -no  New onset loss of smell/taste -no  New onset diarrhea -no    Patient's screening was negative. PFT will be performed.

## 2021-10-11 ENCOUNTER — TELEPHONE (OUTPATIENT)
Dept: FAMILY MEDICINE CLINIC | Age: 81
End: 2021-10-11

## 2021-10-11 NOTE — TELEPHONE ENCOUNTER
----- Message from Kalin Carcamo MD sent at 10/11/2021  3:23 PM EDT -----  Inr higher at 3.5.  hold today. Keep same dose, recheck in 1 week. If still high will lower.

## 2021-10-22 ENCOUNTER — NURSE ONLY (OUTPATIENT)
Dept: LAB | Age: 81
End: 2021-10-22

## 2021-10-22 DIAGNOSIS — I48.20 CHRONIC ATRIAL FIBRILLATION (HCC): ICD-10-CM

## 2021-10-22 DIAGNOSIS — Z79.01 CHRONIC ANTICOAGULATION: ICD-10-CM

## 2021-10-22 LAB — INR BLD: 2.65 (ref 0.85–1.13)

## 2021-10-25 ENCOUNTER — TELEPHONE (OUTPATIENT)
Dept: FAMILY MEDICINE CLINIC | Age: 81
End: 2021-10-25

## 2021-10-25 NOTE — TELEPHONE ENCOUNTER
----- Message from Derrick Bennett MD sent at 10/23/2021  9:02 AM EDT -----  INR good. Continue same dosage and recheck INR in 1 month.

## 2021-11-02 DIAGNOSIS — M15.9 GENERALIZED OA: ICD-10-CM

## 2021-11-27 ENCOUNTER — HOSPITAL ENCOUNTER (EMERGENCY)
Age: 81
Discharge: HOME | End: 2021-11-28
Attending: EMERGENCY MEDICINE
Payer: MEDICARE

## 2021-11-27 ENCOUNTER — APPOINTMENT (OUTPATIENT)
Dept: GENERAL RADIOLOGY | Age: 81
End: 2021-11-27
Payer: MEDICARE

## 2021-11-27 DIAGNOSIS — R07.89 ATYPICAL CHEST PAIN: Primary | ICD-10-CM

## 2021-11-27 DIAGNOSIS — K21.9 GASTROESOPHAGEAL REFLUX DISEASE WITHOUT ESOPHAGITIS: ICD-10-CM

## 2021-11-27 LAB
ALBUMIN SERPL-MCNC: 3.7 G/DL (ref 3.5–5.1)
ALP BLD-CCNC: 154 U/L (ref 38–126)
ALT SERPL-CCNC: 30 U/L (ref 11–66)
ANION GAP SERPL CALCULATED.3IONS-SCNC: 12 MEQ/L (ref 8–16)
AST SERPL-CCNC: 56 U/L (ref 5–40)
BASOPHILS # BLD: 1.4 %
BASOPHILS ABSOLUTE: 0.1 THOU/MM3 (ref 0–0.1)
BILIRUB SERPL-MCNC: 0.3 MG/DL (ref 0.3–1.2)
BILIRUBIN DIRECT: < 0.2 MG/DL (ref 0–0.3)
BUN BLDV-MCNC: 27 MG/DL (ref 7–22)
CALCIUM SERPL-MCNC: 8.7 MG/DL (ref 8.5–10.5)
CHLORIDE BLD-SCNC: 104 MEQ/L (ref 98–111)
CO2: 25 MEQ/L (ref 23–33)
CREAT SERPL-MCNC: 1.1 MG/DL (ref 0.4–1.2)
DIGOXIN LEVEL: 0.7 NG/ML (ref 0.5–2)
EOSINOPHIL # BLD: 2 %
EOSINOPHILS ABSOLUTE: 0.1 THOU/MM3 (ref 0–0.4)
ERYTHROCYTE [DISTWIDTH] IN BLOOD BY AUTOMATED COUNT: 15.9 % (ref 11.5–14.5)
ERYTHROCYTE [DISTWIDTH] IN BLOOD BY AUTOMATED COUNT: 56.6 FL (ref 35–45)
GFR SERPL CREATININE-BSD FRML MDRD: 48 ML/MIN/1.73M2
GLUCOSE BLD-MCNC: 95 MG/DL (ref 70–108)
HCT VFR BLD CALC: 36.6 % (ref 37–47)
HEMOGLOBIN: 10.7 GM/DL (ref 12–16)
IMMATURE GRANS (ABS): 0.02 THOU/MM3 (ref 0–0.07)
IMMATURE GRANULOCYTES: 0.3 %
LIPASE: 54.2 U/L (ref 5.6–51.3)
LYMPHOCYTES # BLD: 20.5 %
LYMPHOCYTES ABSOLUTE: 1.3 THOU/MM3 (ref 1–4.8)
MAGNESIUM: 2.4 MG/DL (ref 1.6–2.4)
MCH RBC QN AUTO: 28.3 PG (ref 26–33)
MCHC RBC AUTO-ENTMCNC: 29.2 GM/DL (ref 32.2–35.5)
MCV RBC AUTO: 96.8 FL (ref 81–99)
MONOCYTES # BLD: 14.9 %
MONOCYTES ABSOLUTE: 1 THOU/MM3 (ref 0.4–1.3)
NUCLEATED RED BLOOD CELLS: 0 /100 WBC
OSMOLALITY CALCULATION: 286.2 MOSMOL/KG (ref 275–300)
PLATELET # BLD: 260 THOU/MM3 (ref 130–400)
PMV BLD AUTO: 10.7 FL (ref 9.4–12.4)
POTASSIUM SERPL-SCNC: 4.2 MEQ/L (ref 3.5–5.2)
PRO-BNP: 1877 PG/ML (ref 0–1800)
RBC # BLD: 3.78 MILL/MM3 (ref 4.2–5.4)
SARS-COV-2, NAAT: NOT  DETECTED
SEG NEUTROPHILS: 60.9 %
SEGMENTED NEUTROPHILS ABSOLUTE COUNT: 3.9 THOU/MM3 (ref 1.8–7.7)
SODIUM BLD-SCNC: 141 MEQ/L (ref 135–145)
TOTAL PROTEIN: 7.1 G/DL (ref 6.1–8)
TROPONIN T: < 0.01 NG/ML
WBC # BLD: 6.4 THOU/MM3 (ref 4.8–10.8)

## 2021-11-27 PROCEDURE — 80162 ASSAY OF DIGOXIN TOTAL: CPT

## 2021-11-27 PROCEDURE — 87635 SARS-COV-2 COVID-19 AMP PRB: CPT

## 2021-11-27 PROCEDURE — 84484 ASSAY OF TROPONIN QUANT: CPT

## 2021-11-27 PROCEDURE — 36415 COLL VENOUS BLD VENIPUNCTURE: CPT

## 2021-11-27 PROCEDURE — 83880 ASSAY OF NATRIURETIC PEPTIDE: CPT

## 2021-11-27 PROCEDURE — 71045 X-RAY EXAM CHEST 1 VIEW: CPT

## 2021-11-27 PROCEDURE — 83735 ASSAY OF MAGNESIUM: CPT

## 2021-11-27 PROCEDURE — 80053 COMPREHEN METABOLIC PANEL: CPT

## 2021-11-27 PROCEDURE — 93005 ELECTROCARDIOGRAM TRACING: CPT | Performed by: EMERGENCY MEDICINE

## 2021-11-27 PROCEDURE — 85610 PROTHROMBIN TIME: CPT

## 2021-11-27 PROCEDURE — 99285 EMERGENCY DEPT VISIT HI MDM: CPT

## 2021-11-27 PROCEDURE — 82248 BILIRUBIN DIRECT: CPT

## 2021-11-27 PROCEDURE — 83690 ASSAY OF LIPASE: CPT

## 2021-11-27 PROCEDURE — 85025 COMPLETE CBC W/AUTO DIFF WBC: CPT

## 2021-11-27 PROCEDURE — 6370000000 HC RX 637 (ALT 250 FOR IP): Performed by: EMERGENCY MEDICINE

## 2021-11-27 PROCEDURE — 85730 THROMBOPLASTIN TIME PARTIAL: CPT

## 2021-11-27 RX ORDER — ONDANSETRON 4 MG/1
4 TABLET, ORALLY DISINTEGRATING ORAL ONCE
Status: COMPLETED | OUTPATIENT
Start: 2021-11-27 | End: 2021-11-27

## 2021-11-27 RX ORDER — PANTOPRAZOLE SODIUM 40 MG/1
40 TABLET, DELAYED RELEASE ORAL ONCE
Status: COMPLETED | OUTPATIENT
Start: 2021-11-27 | End: 2021-11-27

## 2021-11-27 RX ADMIN — Medication: at 22:22

## 2021-11-27 RX ADMIN — PANTOPRAZOLE SODIUM 40 MG: 40 TABLET, DELAYED RELEASE ORAL at 22:22

## 2021-11-27 RX ADMIN — ONDANSETRON 4 MG: 4 TABLET, ORALLY DISINTEGRATING ORAL at 22:22

## 2021-11-27 ASSESSMENT — ENCOUNTER SYMPTOMS
BACK PAIN: 0
EYE DISCHARGE: 0
ABDOMINAL DISTENTION: 0
VOICE CHANGE: 0
EYE REDNESS: 0
ABDOMINAL PAIN: 0
BLOOD IN STOOL: 0
EYE ITCHING: 0
CHEST TIGHTNESS: 0
EYE PAIN: 0
VOMITING: 0
COUGH: 0
SHORTNESS OF BREATH: 0
DIARRHEA: 0
RHINORRHEA: 0
CHOKING: 0
WHEEZING: 0
SORE THROAT: 0
CONSTIPATION: 0
NAUSEA: 0
PHOTOPHOBIA: 0
TROUBLE SWALLOWING: 0
SINUS PRESSURE: 0

## 2021-11-27 ASSESSMENT — PAIN DESCRIPTION - PAIN TYPE: TYPE: ACUTE PAIN

## 2021-11-27 ASSESSMENT — PAIN SCALES - GENERAL: PAINLEVEL_OUTOF10: 3

## 2021-11-27 ASSESSMENT — PAIN DESCRIPTION - LOCATION: LOCATION: CHEST

## 2021-11-27 ASSESSMENT — PAIN DESCRIPTION - ORIENTATION: ORIENTATION: MID

## 2021-11-28 ENCOUNTER — HOSPITAL ENCOUNTER (INPATIENT)
Age: 81
LOS: 1 days | Discharge: HOME | DRG: 392 | End: 2021-12-01
Attending: EMERGENCY MEDICINE | Admitting: HOSPITALIST
Payer: MEDICARE

## 2021-11-28 ENCOUNTER — APPOINTMENT (OUTPATIENT)
Dept: GENERAL RADIOLOGY | Age: 81
DRG: 392 | End: 2021-11-28
Payer: MEDICARE

## 2021-11-28 VITALS
OXYGEN SATURATION: 97 % | HEART RATE: 60 BPM | WEIGHT: 180 LBS | DIASTOLIC BLOOD PRESSURE: 66 MMHG | BODY MASS INDEX: 33.13 KG/M2 | HEIGHT: 62 IN | SYSTOLIC BLOOD PRESSURE: 143 MMHG | RESPIRATION RATE: 20 BRPM | TEMPERATURE: 98.9 F

## 2021-11-28 DIAGNOSIS — R07.9 CHEST PAIN, UNSPECIFIED TYPE: Primary | ICD-10-CM

## 2021-11-28 LAB
ANION GAP SERPL CALCULATED.3IONS-SCNC: 11 MEQ/L (ref 8–16)
APTT: 46.5 SECONDS (ref 22–38)
BASOPHILS # BLD: 1.2 %
BASOPHILS ABSOLUTE: 0.1 THOU/MM3 (ref 0–0.1)
BUN BLDV-MCNC: 27 MG/DL (ref 7–22)
CALCIUM SERPL-MCNC: 8.7 MG/DL (ref 8.5–10.5)
CHLORIDE BLD-SCNC: 102 MEQ/L (ref 98–111)
CO2: 24 MEQ/L (ref 23–33)
CREAT SERPL-MCNC: 1.3 MG/DL (ref 0.4–1.2)
DIGOXIN LEVEL: 0.4 NG/ML (ref 0.5–2)
EKG ATRIAL RATE: 69 BPM
EKG ATRIAL RATE: 72 BPM
EKG Q-T INTERVAL: 466 MS
EKG Q-T INTERVAL: 496 MS
EKG QRS DURATION: 148 MS
EKG QRS DURATION: 150 MS
EKG QTC CALCULATION (BAZETT): 520 MS
EKG QTC CALCULATION (BAZETT): 543 MS
EKG R AXIS: -60 DEGREES
EKG R AXIS: -77 DEGREES
EKG T AXIS: 83 DEGREES
EKG T AXIS: 94 DEGREES
EKG VENTRICULAR RATE: 72 BPM
EKG VENTRICULAR RATE: 75 BPM
EOSINOPHIL # BLD: 1.7 %
EOSINOPHILS ABSOLUTE: 0.1 THOU/MM3 (ref 0–0.4)
ERYTHROCYTE [DISTWIDTH] IN BLOOD BY AUTOMATED COUNT: 15.9 % (ref 11.5–14.5)
ERYTHROCYTE [DISTWIDTH] IN BLOOD BY AUTOMATED COUNT: 57 FL (ref 35–45)
GFR SERPL CREATININE-BSD FRML MDRD: 39 ML/MIN/1.73M2
GLUCOSE BLD-MCNC: 194 MG/DL (ref 70–108)
HCT VFR BLD CALC: 35.5 % (ref 37–47)
HEMOGLOBIN: 10.4 GM/DL (ref 12–16)
IMMATURE GRANS (ABS): 0.04 THOU/MM3 (ref 0–0.07)
IMMATURE GRANULOCYTES: 0.6 %
INR BLD: 3.82 (ref 0.85–1.13)
INR BLD: 3.86 (ref 0.85–1.13)
LYMPHOCYTES # BLD: 13.3 %
LYMPHOCYTES ABSOLUTE: 0.9 THOU/MM3 (ref 1–4.8)
MAGNESIUM: 2.3 MG/DL (ref 1.6–2.4)
MCH RBC QN AUTO: 28.6 PG (ref 26–33)
MCHC RBC AUTO-ENTMCNC: 29.3 GM/DL (ref 32.2–35.5)
MCV RBC AUTO: 97.5 FL (ref 81–99)
MONOCYTES # BLD: 14.7 %
MONOCYTES ABSOLUTE: 1 THOU/MM3 (ref 0.4–1.3)
NUCLEATED RED BLOOD CELLS: 0 /100 WBC
PLATELET # BLD: 256 THOU/MM3 (ref 130–400)
PMV BLD AUTO: 10.6 FL (ref 9.4–12.4)
POTASSIUM REFLEX MAGNESIUM: 4.4 MEQ/L (ref 3.5–5.2)
PRO-BNP: 1671 PG/ML (ref 0–1800)
RBC # BLD: 3.64 MILL/MM3 (ref 4.2–5.4)
SEG NEUTROPHILS: 68.5 %
SEGMENTED NEUTROPHILS ABSOLUTE COUNT: 4.5 THOU/MM3 (ref 1.8–7.7)
SODIUM BLD-SCNC: 137 MEQ/L (ref 135–145)
TROPONIN T: < 0.01 NG/ML
WBC # BLD: 6.6 THOU/MM3 (ref 4.8–10.8)

## 2021-11-28 PROCEDURE — 84484 ASSAY OF TROPONIN QUANT: CPT

## 2021-11-28 PROCEDURE — G0378 HOSPITAL OBSERVATION PER HR: HCPCS

## 2021-11-28 PROCEDURE — 85610 PROTHROMBIN TIME: CPT

## 2021-11-28 PROCEDURE — 83735 ASSAY OF MAGNESIUM: CPT

## 2021-11-28 PROCEDURE — 6370000000 HC RX 637 (ALT 250 FOR IP): Performed by: EMERGENCY MEDICINE

## 2021-11-28 PROCEDURE — 6370000000 HC RX 637 (ALT 250 FOR IP)

## 2021-11-28 PROCEDURE — 83880 ASSAY OF NATRIURETIC PEPTIDE: CPT

## 2021-11-28 PROCEDURE — 99285 EMERGENCY DEPT VISIT HI MDM: CPT

## 2021-11-28 PROCEDURE — 6370000000 HC RX 637 (ALT 250 FOR IP): Performed by: HOSPITALIST

## 2021-11-28 PROCEDURE — 80048 BASIC METABOLIC PNL TOTAL CA: CPT

## 2021-11-28 PROCEDURE — 36415 COLL VENOUS BLD VENIPUNCTURE: CPT

## 2021-11-28 PROCEDURE — 71046 X-RAY EXAM CHEST 2 VIEWS: CPT

## 2021-11-28 PROCEDURE — 93005 ELECTROCARDIOGRAM TRACING: CPT

## 2021-11-28 PROCEDURE — 85025 COMPLETE CBC W/AUTO DIFF WBC: CPT

## 2021-11-28 PROCEDURE — 80162 ASSAY OF DIGOXIN TOTAL: CPT

## 2021-11-28 PROCEDURE — 99233 SBSQ HOSP IP/OBS HIGH 50: CPT | Performed by: HOSPITALIST

## 2021-11-28 RX ORDER — MULTIVITAMIN WITH IRON
1 TABLET ORAL DAILY
Status: DISCONTINUED | OUTPATIENT
Start: 2021-11-28 | End: 2021-12-01 | Stop reason: HOSPADM

## 2021-11-28 RX ORDER — LISINOPRIL 2.5 MG/1
2.5 TABLET ORAL DAILY
Status: DISCONTINUED | OUTPATIENT
Start: 2021-11-28 | End: 2021-11-29

## 2021-11-28 RX ORDER — POTASSIUM CHLORIDE 20 MEQ/1
40 TABLET, EXTENDED RELEASE ORAL PRN
Status: DISCONTINUED | OUTPATIENT
Start: 2021-11-28 | End: 2021-12-01 | Stop reason: HOSPADM

## 2021-11-28 RX ORDER — MAGNESIUM SULFATE IN WATER 40 MG/ML
2000 INJECTION, SOLUTION INTRAVENOUS PRN
Status: DISCONTINUED | OUTPATIENT
Start: 2021-11-28 | End: 2021-12-01 | Stop reason: HOSPADM

## 2021-11-28 RX ORDER — DIGOXIN 250 MCG
125 TABLET ORAL EVERY OTHER DAY
Status: DISCONTINUED | OUTPATIENT
Start: 2021-11-29 | End: 2021-12-01 | Stop reason: HOSPADM

## 2021-11-28 RX ORDER — ACETAMINOPHEN 650 MG/1
650 SUPPOSITORY RECTAL EVERY 6 HOURS PRN
Status: DISCONTINUED | OUTPATIENT
Start: 2021-11-28 | End: 2021-12-01 | Stop reason: HOSPADM

## 2021-11-28 RX ORDER — PANTOPRAZOLE SODIUM 40 MG/1
40 TABLET, DELAYED RELEASE ORAL
Status: DISCONTINUED | OUTPATIENT
Start: 2021-11-29 | End: 2021-12-01 | Stop reason: HOSPADM

## 2021-11-28 RX ORDER — ONDANSETRON 2 MG/ML
4 INJECTION INTRAMUSCULAR; INTRAVENOUS EVERY 6 HOURS PRN
Status: DISCONTINUED | OUTPATIENT
Start: 2021-11-28 | End: 2021-12-01 | Stop reason: HOSPADM

## 2021-11-28 RX ORDER — METOPROLOL SUCCINATE 100 MG/1
200 TABLET, EXTENDED RELEASE ORAL DAILY
Status: DISCONTINUED | OUTPATIENT
Start: 2021-11-28 | End: 2021-12-01 | Stop reason: HOSPADM

## 2021-11-28 RX ORDER — DIGOXIN 250 MCG
125 TABLET ORAL EVERY OTHER DAY
Status: DISCONTINUED | OUTPATIENT
Start: 2021-11-28 | End: 2021-11-28

## 2021-11-28 RX ORDER — FERROUS SULFATE 325(65) MG
325 TABLET ORAL EVERY OTHER DAY
Status: DISCONTINUED | OUTPATIENT
Start: 2021-11-29 | End: 2021-12-01 | Stop reason: HOSPADM

## 2021-11-28 RX ORDER — AMIODARONE HYDROCHLORIDE 200 MG/1
200 TABLET ORAL DAILY
Status: DISCONTINUED | OUTPATIENT
Start: 2021-11-28 | End: 2021-12-01 | Stop reason: HOSPADM

## 2021-11-28 RX ORDER — FERROUS SULFATE 325(65) MG
325 TABLET ORAL EVERY OTHER DAY
Status: DISCONTINUED | OUTPATIENT
Start: 2021-11-28 | End: 2021-11-28

## 2021-11-28 RX ORDER — ACETAMINOPHEN 325 MG/1
650 TABLET ORAL EVERY 6 HOURS PRN
Status: DISCONTINUED | OUTPATIENT
Start: 2021-11-28 | End: 2021-12-01 | Stop reason: HOSPADM

## 2021-11-28 RX ORDER — SODIUM CHLORIDE 0.9 % (FLUSH) 0.9 %
10 SYRINGE (ML) INJECTION PRN
Status: DISCONTINUED | OUTPATIENT
Start: 2021-11-28 | End: 2021-12-01 | Stop reason: HOSPADM

## 2021-11-28 RX ORDER — SODIUM CHLORIDE 9 MG/ML
25 INJECTION, SOLUTION INTRAVENOUS PRN
Status: DISCONTINUED | OUTPATIENT
Start: 2021-11-28 | End: 2021-12-01 | Stop reason: HOSPADM

## 2021-11-28 RX ORDER — SPIRONOLACTONE 25 MG/1
25 TABLET ORAL EVERY OTHER DAY
Status: DISCONTINUED | OUTPATIENT
Start: 2021-11-28 | End: 2021-12-01 | Stop reason: HOSPADM

## 2021-11-28 RX ORDER — ALPRAZOLAM 0.5 MG/1
0.5 TABLET ORAL EVERY 4 HOURS PRN
Status: DISCONTINUED | OUTPATIENT
Start: 2021-11-28 | End: 2021-12-01 | Stop reason: HOSPADM

## 2021-11-28 RX ORDER — ONDANSETRON 4 MG/1
4 TABLET, ORALLY DISINTEGRATING ORAL EVERY 8 HOURS PRN
Status: DISCONTINUED | OUTPATIENT
Start: 2021-11-28 | End: 2021-12-01 | Stop reason: HOSPADM

## 2021-11-28 RX ORDER — NITROGLYCERIN 0.4 MG/1
0.4 TABLET SUBLINGUAL EVERY 5 MIN PRN
Status: DISCONTINUED | OUTPATIENT
Start: 2021-11-28 | End: 2021-12-01 | Stop reason: HOSPADM

## 2021-11-28 RX ORDER — MAGNESIUM HYDROXIDE/ALUMINUM HYDROXICE/SIMETHICONE 120; 1200; 1200 MG/30ML; MG/30ML; MG/30ML
30 SUSPENSION ORAL EVERY 6 HOURS PRN
Status: DISCONTINUED | OUTPATIENT
Start: 2021-11-28 | End: 2021-12-01 | Stop reason: HOSPADM

## 2021-11-28 RX ORDER — LACTOBACILLUS RHAMNOSUS GG 10B CELL
1 CAPSULE ORAL DAILY
Status: DISCONTINUED | OUTPATIENT
Start: 2021-11-29 | End: 2021-12-01 | Stop reason: HOSPADM

## 2021-11-28 RX ORDER — POTASSIUM CHLORIDE 7.45 MG/ML
10 INJECTION INTRAVENOUS PRN
Status: DISCONTINUED | OUTPATIENT
Start: 2021-11-28 | End: 2021-12-01 | Stop reason: HOSPADM

## 2021-11-28 RX ORDER — ACETAMINOPHEN 325 MG/1
650 TABLET ORAL ONCE
Status: COMPLETED | OUTPATIENT
Start: 2021-11-28 | End: 2021-11-28

## 2021-11-28 RX ORDER — SODIUM CHLORIDE 0.9 % (FLUSH) 0.9 %
10 SYRINGE (ML) INJECTION EVERY 12 HOURS SCHEDULED
Status: DISCONTINUED | OUTPATIENT
Start: 2021-11-28 | End: 2021-12-01 | Stop reason: HOSPADM

## 2021-11-28 RX ORDER — POLYETHYLENE GLYCOL 3350 17 G/17G
17 POWDER, FOR SOLUTION ORAL DAILY PRN
Status: DISCONTINUED | OUTPATIENT
Start: 2021-11-28 | End: 2021-12-01 | Stop reason: HOSPADM

## 2021-11-28 RX ADMIN — PANTOPRAZOLE SODIUM 40 MG: 40 TABLET, DELAYED RELEASE ORAL at 23:26

## 2021-11-28 RX ADMIN — NITROGLYCERIN 0.4 MG: 0.4 TABLET, ORALLY DISINTEGRATING SUBLINGUAL at 16:03

## 2021-11-28 RX ADMIN — ACETAMINOPHEN 650 MG: 325 TABLET ORAL at 16:34

## 2021-11-28 RX ADMIN — Medication: at 00:39

## 2021-11-28 ASSESSMENT — PAIN DESCRIPTION - DESCRIPTORS
DESCRIPTORS: DISCOMFORT
DESCRIPTORS: ACHING

## 2021-11-28 ASSESSMENT — ENCOUNTER SYMPTOMS
NAUSEA: 0
BLOOD IN STOOL: 0
CHEST TIGHTNESS: 0
BACK PAIN: 0
EYE PAIN: 0
VOMITING: 0
PHOTOPHOBIA: 0
CONSTIPATION: 0
SHORTNESS OF BREATH: 0
DIARRHEA: 1

## 2021-11-28 ASSESSMENT — PAIN DESCRIPTION - PAIN TYPE
TYPE: ACUTE PAIN
TYPE: ACUTE PAIN

## 2021-11-28 ASSESSMENT — PAIN SCALES - GENERAL
PAINLEVEL_OUTOF10: 4
PAINLEVEL_OUTOF10: 4
PAINLEVEL_OUTOF10: 2

## 2021-11-28 ASSESSMENT — PAIN DESCRIPTION - LOCATION
LOCATION: HEAD
LOCATION: CHEST

## 2021-11-28 ASSESSMENT — PAIN DESCRIPTION - ORIENTATION
ORIENTATION: LEFT
ORIENTATION: LEFT

## 2021-11-28 ASSESSMENT — PAIN DESCRIPTION - FREQUENCY
FREQUENCY: CONTINUOUS
FREQUENCY: CONTINUOUS

## 2021-11-28 NOTE — ED NOTES
ED nurse-to-nurse bedside report    Chief Complaint   Patient presents with    Chest Pain      LOC: alert and orientated to name, place, date  Vital signs   Vitals:    11/28/21 1528 11/28/21 1635   BP: 137/64 (!) 116/59   Pulse: 72 59   Resp: 16 16   Temp:  98.4 °F (36.9 °C)   TempSrc: Oral Oral   SpO2: 100% 98%   Weight: 180 lb (81.6 kg)       Pain:    Pain Interventions: Nitro x1, Tylenol  Pain Goal: 0  Oxygen: No    Current needs required Room Air   Telemetry: Yes  LDAs:   Peripheral IV 11/28/21 Left Antecubital (Active)   Site Assessment Clean; Dry; Intact 11/28/21 1654   Line Status Normal saline locked 11/28/21 1654   Dressing Status Clean; Dry; Intact 11/28/21 1654   Dressing Intervention New 11/28/21 1554     Continuous Infusions:   Mobility: Independent  [place] Fall Risk Score:    Fall Risk 6/23/2021 5/13/2020 3/4/2020 2/25/2019 1/18/2018 12/21/2016 10/14/2015   2 or more falls in past year? no no no no no no no   Fall with injury in past year? no no no no no no no     Fall Interventions: Call light within reach  Report given to: Shawn Lerma RN  11/28/21 8002

## 2021-11-28 NOTE — ED TRIAGE NOTES
Pt to ED via private with c/o chest pain that started earlier this morning. Pt took mylanta and tums hoping it was GERD and that was not the case. Pt states she has a pacemaker and defib. VSS, pt rating substernal \"discomfort\" 3/10 at this time. Daughter bedside. EKG complete.  Peripheral IV inserted

## 2021-11-28 NOTE — ED NOTES
Pt appears to be resting comfortably on cot. Pt denies any needs or concerns at this time. Family at bedside.  NAHOMY Gaona RN  11/28/21 0956

## 2021-11-28 NOTE — ED NOTES
Bed: 036A  Expected date:   Expected time:   Means of arrival:   Comments:  Adam Adams RN  11/28/21 8417

## 2021-11-28 NOTE — ED NOTES
Report given to [facility], [facility].       Tyshawn MELLO RN  11/28/21 1770

## 2021-11-28 NOTE — ED NOTES
In for hourly rounding. Pt resting on cot in position of comfort. Pt remains A&Ox4, resps easy and unlabored. IV shows no s/s of infection or infiltration. Pt chest pain is \"not so bad\" at this time. Pt reports headache after Nitro administration. Pt medicated per MAR. Family at bedside states pt had and \"episode of pain\" after Nitro administration. Monitor remains in place. Updated pt on POC. Will monitor.        Hesham Aragon RN  11/28/21 6109

## 2021-11-28 NOTE — ED PROVIDER NOTES
[facility]  eMERGENCY dEPARTMENT eNCOUnter          CHIEF COMPLAINT       Chief Complaint   Patient presents with    Chest Pain       Nurses Notes reviewed and I agree except as noted in the HPI. 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 Started earlier this morning. Has been lasting all day. Patient does not have a history of coronary artery disease. She does have a history of atrial fibrillation. Patient is on amiodarone digoxin and Coumadin. Patient had a cardiac catheterization in May 2019 which showed clean coronary arteries. Patient is taking Tums and Maalox at home did not seem to help. Patient states there has been no referral of the pain to the neck or to the arm however she does have some radiation across her lower chest.  This comes and goes. Patient denies any worsening of chest pain or shortness of breath with exertion. Patient decided that she should come in for evaluation and treatment. Currently the patient is resting comfortably on the cot no apparent distress no other physical complaints at this time. REVIEW OF SYSTEMS     Review of Systems   Constitutional: Negative for activity change, appetite change, diaphoresis, fatigue and unexpected weight change. HENT: Negative for congestion, ear discharge, ear pain, hearing loss, rhinorrhea, sinus pressure, sore throat, trouble swallowing and voice change. Eyes: Negative for photophobia, pain, discharge, redness and itching. Respiratory: Negative for cough, choking, chest tightness, shortness of breath and wheezing. Cardiovascular: Positive for chest pain. Negative for palpitations and leg swelling. Gastrointestinal: Negative for abdominal distention, abdominal pain, blood in stool, constipation, diarrhea, nausea and vomiting. Endocrine: Negative for polydipsia, polyphagia and polyuria.    Genitourinary: Negative for decreased urine volume, difficulty urinating, dysuria, enuresis, frequency, hematuria and urgency. Musculoskeletal: Negative for arthralgias, back pain, gait problem, myalgias, neck pain and neck stiffness. Skin: Negative for pallor and rash. Allergic/Immunologic: Negative for immunocompromised state. Neurological: Negative for dizziness, tremors, seizures, syncope, facial asymmetry, weakness, light-headedness, numbness and headaches. Hematological: Negative for adenopathy. Does not bruise/bleed easily. Psychiatric/Behavioral: Negative for agitation, hallucinations and suicidal ideas. The patient is not nervous/anxious. PAST MEDICAL HISTORY    has a past medical history of AICD (automatic cardioverter/defibrillator) present, Arthritis, Atrial fibrillation ([facility] [address] Biventricular ICD (implantable cardiac defibrillator) in place, C. difficile diarrhea, CAD (coronary artery disease), Cancer ([facility] [address] CHF (congestive heart failure) ([facility] [address] Diverticulitis, GERD (gastroesophageal reflux disease), Hiatal hernia, Hyperlipidemia, Hypertension, Menopause, NAFLD (nonalcoholic fatty liver disease), Nonischemic cardiomyopathy ([facility] [address] Pacemaker, Retroperitoneal hemorrhage, and Thyroid disease. SURGICAL HISTORY      has a past surgical history that includes Cholecystectomy, laparoscopic; Cardiac defibrillator placement; Colonoscopy (2008); skin biopsy (2010); Endoscopy, colon, diagnostic; pacemaker placement (1999); Colonoscopy (N/A, 7/16/2019); Upper gastrointestinal endoscopy (N/A, 7/16/2019); Upper gastrointestinal endoscopy (Left, 7/16/2019); and Upper gastrointestinal endoscopy (N/A, 12/2/2019). CURRENT MEDICATIONS       Discharge Medication List as of 11/28/2021  2:00 AM      CONTINUE these medications which have NOT CHANGED    Details   Handicap Placard MISC Starting Tue 11/2/2021, Disp-1 each, R-0, PrintRequest parking placard due to medical conditions. Duration of 5 years.       digoxin (LANOXIN) 125 MCG tablet Take 1 tablet by mouth every other day, Disp-45 tablet, R-3Normal      metoprolol succinate (TOPROL XL) 200 MG extended release tablet Take 1 tablet by mouth daily, Disp-90 tablet, R-3Normal      levothyroxine (SYNTHROID) 175 MCG tablet Take 1 tablet by mouth Daily New dose., Disp-90 tablet, R-3Normal      warfarin (COUMADIN) 3 MG tablet Alternating 3 mg/1.5 mg or as directed per INR results. , Disp-90 tablet, R-3Normal      bumetanide (BUMEX) 1 MG tablet Take 1 tablet by mouth daily, Disp-90 tablet, R-3Normal      quinapril (ACCUPRIL) 5 MG tablet Take 1 tablet by mouth daily, Disp-90 tablet, R-3Normal      pantoprazole (PROTONIX) 20 MG tablet TAKE 2 TABLETS BY MOUTH TWICE DAILY, Disp-360 tablet, R-3Normal      amiodarone (CORDARONE) 200 MG tablet Take 1 tablet by mouth daily, Disp-90 tablet, R-3**Patient requests 90 days supply**Normal      spironolactone (ALDACTONE) 25 MG tablet Take 1 tablet by mouth every other day, Disp-45 tablet, R-3Normal      ferrous sulfate (IRON 325) 325 (65 Fe) MG tablet Take 325 mg by mouth every other dayHistorical Med      Alum & Mag Hydroxide-Simeth (MYLANTA PO) Take by mouth as needed Historical Med      acetaminophen (TYLENOL) 325 MG tablet Take 650 mg by mouth every 6 hours as needed for Pain or FeverHistorical Med      Misc. Devices (LUMBAR SUPPORT CUSHION) MISC Starting 4/5/2013, Until Discontinued, Disp-1 each, R-0, PrintDx: low back pain, sciatica. Ascorbic Acid (VITAMIN C) 1000 MG tablet Take 1,000 mg by mouth 2 times daily. Multiple Vitamin (MULTI-VITAMIN PO) Take  by mouth daily. Calcium Carbonate (CALCIUM 600 PO) Take  by mouth 2 times daily. BIOTIN PO Take 5,000 mg by mouth daily. Probiotic Product (PROBIOTIC FORMULA PO) Take 1 tablet by mouth daily. ALLERGIES     is allergic to ativan [lorazepam], codeine, nystatin, percocet [oxycodone-acetaminophen], relafen [nabumetone], tape [adhesive tape], and celebrex [celecoxib].     FAMILY HISTORY She indicated that her mother is . She indicated that her father is . She indicated that three of her four sisters are alive. She indicated that seven of her nine brothers are alive. She indicated that the status of her daughter is unknown. She indicated that the status of her neg hx is unknown.   family history includes Cancer in her sister; Diabetes in her brother and mother; Heart Disease in her brother and mother; High Blood Pressure in her brother, brother, brother, and brother; High Cholesterol in her father and mother; Kidney Disease in her brother; Allyssa Baldy / [place] in her daughter; Parkinsonism in her brother. SOCIAL HISTORY      reports that she has never smoked. She has never used smokeless tobacco. She reports that she does not drink alcohol and does not use drugs. PHYSICAL EXAM     INITIAL VITALS:  height is 5' 2\" (1.575 m) and weight is 180 lb (81.6 kg). Her oral temperature is 98.9 °F (37.2 °C). Her blood pressure is 143/66 (abnormal) and her pulse is 60. Her respiration is 20 and oxygen saturation is 97%. Physical Exam  Vitals and nursing note reviewed. Constitutional:       General: She is not in acute distress. Appearance: She is well-developed. She is not diaphoretic. HENT:      Head: Normocephalic and atraumatic. Right Ear: External ear normal.      Left Ear: External ear normal.      Nose: Nose normal.      Mouth/Throat:      Pharynx: No oropharyngeal exudate. Eyes:      General: No scleral icterus. Right eye: No discharge. Left eye: No discharge. Conjunctiva/sclera: Conjunctivae normal.      Pupils: Pupils are equal, round, and reactive to light. Neck:      Thyroid: No thyromegaly. Vascular: No JVD. Trachea: No tracheal deviation. Cardiovascular:      Rate and Rhythm: Normal rate and regular rhythm. Pulses:           Carotid pulses are 2+ on the right side and 2+ on the left side.        Radial pulses are 2+ on the right side and 2+ on the left side. Femoral pulses are 2+ on the right side and 2+ on the left side. Popliteal pulses are 2+ on the right side and 2+ on the left side. Dorsalis pedis pulses are 2+ on the right side and 2+ on the left side. Posterior tibial pulses are 2+ on the right side and 2+ on the left side. Heart sounds: Normal heart sounds, S1 normal and S2 normal. No murmur heard. No friction rub. No gallop. Pulmonary:      Effort: Pulmonary effort is normal.      Breath sounds: Normal breath sounds. No stridor. No decreased breath sounds, wheezing, rhonchi or rales. Chest:      Chest wall: No tenderness. Abdominal:      General: Bowel sounds are normal. There is no distension. Palpations: Abdomen is soft. There is no mass. Tenderness: There is no abdominal tenderness. There is no guarding or rebound. Hernia: No hernia is present. Musculoskeletal:         General: No tenderness. Normal range of motion. Cervical back: Normal range of motion and neck supple. Right lower leg: No edema. Left lower leg: No edema. Lymphadenopathy:      Cervical: No cervical adenopathy. Skin:     General: Skin is warm and dry. Findings: No bruising, ecchymosis, lesion or rash. Neurological:      Mental Status: She is alert and oriented to person, place, and time. Cranial Nerves: No cranial nerve deficit. Coordination: Coordination normal.      Deep Tendon Reflexes: Reflexes are normal and symmetric. Psychiatric:         Speech: Speech normal.         Behavior: Behavior normal.         Thought Content:  Thought content normal.         Judgment: Judgment normal.           DIFFERENTIAL DIAGNOSIS:   GERD gastritis gastroenteritis ACS    DIAGNOSTIC RESULTS     EKG: All EKG's are interpreted by the Emergency Department Physician who either signs or Co-signs this chart in the absence of a cardiologist.  EKG reveals ventricularly paced rhythm left axis deviation ventricular rate of 75 GA interval indeterminate QRS duration 150 QT interval 466 QTC of 520. RADIOLOGY: non-plain film images(s) such as CT, Ultrasound and MRI are read by the radiologist.  XR CHEST PORTABLE   Final Result   No acute findings. **This report has been created using voice recognition software. It may contain minor errors which are inherent in voice recognition technology. **      Final report electronically signed by Dr. Virginia Reina on 11/27/2021 10:23 PM            LABS:   Labs Reviewed   CBC WITH AUTO DIFFERENTIAL - Abnormal; Notable for the following components:       Result Value    RBC 3.78 (*)     Hemoglobin 10.7 (*)     Hematocrit 36.6 (*)     MCHC 29.2 (*)     RDW-CV 15.9 (*)     RDW-SD 56.6 (*)     All other components within normal limits   BRAIN NATRIURETIC PEPTIDE - Abnormal; Notable for the following components:    Pro-BNP 1877.0 (*)     All other components within normal limits   BASIC METABOLIC PANEL - Abnormal; Notable for the following components:    BUN 27 (*)     All other components within normal limits   HEPATIC FUNCTION PANEL - Abnormal; Notable for the following components:    Alkaline Phosphatase 154 (*)     AST 56 (*)     All other components within normal limits   LIPASE - Abnormal; Notable for the following components:    Lipase 54.2 (*)     All other components within normal limits   PROTIME-INR - Abnormal; Notable for the following components:    INR 3.82 (*)     All other components within normal limits   APTT - Abnormal; Notable for the following components:    aPTT 46.5 (*)     All other components within normal limits   GLOMERULAR FILTRATION RATE, ESTIMATED - Abnormal; Notable for the following components:    Est, Glom Filt Rate 48 (*)     All other components within normal limits   COVID-19, RAPID   TROPONIN   MAGNESIUM   DIGOXIN LEVEL   ANION GAP   OSMOLALITY   TROPONIN       EMERGENCY DEPARTMENT COURSE:   Vitals:    Vitals: 11/27/21 2300 11/27/21 2307 11/27/21 2350 11/28/21 0050   BP:  (!) 147/67 (!) 125/57 (!) 143/66   Pulse: 60 61 61 60   Resp: 19 19 12 20   Temp:       TempSrc:       SpO2: 100% 99% 97% 97%   Weight:       Height:         Patient was assessed at bedside appropriate labs and imaging were ordered. Patient was given Zofran Protonix and GI cocktail. Here today I reviewed the patient's labs and imaging. All within normal limits. Patient had a coronary artery catheterization in 2019 which was completely normal.  At this point I feel the patient be safely discharged home. She is scheduled to follow-up with [facility] here on the second floor on 11/29/2021 at 9:30 AM.  Patient is given refills on her reflux medication. She is instructed to take those as prescribed. She is instructed to follow-up with her GI doctor as well as her primary care physician. Patient understood and agreed with the plan. Patient is subsequently discharged home in stable condition. Patient has what appears to be atypical chest pain and GERD. Patient has been scheduled to follow-up with [facility] here on the second floor on 11/29/2021 at 9:30 AM she is instructed to arrive 20 minutes early for paperwork. Patient is also instructed to follow-up with her gastroenterologist and call for an appointment within the next 1 to 2 days. Patient is instructed return to the nearest emergency room immediately for any new or worsening complaints. CRITICAL CARE:   None    CONSULTS:  None    PROCEDURES:  None    FINAL IMPRESSION      1. Atypical chest pain    2.  Gastroesophageal reflux disease without esophagitis          DISPOSITION/PLAN   Discharge    PATIENT REFERRED TO:  [facility] [address]  [facility]  Go in 1 day  Keep scheduled appointment on 11/29/2021 at 9:30 [address]  [address] [address]    Call in 1 day        DISCHARGE MEDICATIONS:  Discharge Medication List as of 11/28/2021  2:00 AM          (Please note that portions of this note were completed with a voice recognition program.  Efforts were made to edit the dictations but occasionally words are mis-transcribed.)    Gerri Murray, [address] [place] [address],   11/29/21 4855

## 2021-11-28 NOTE — ED NOTES
Report received from Lena Dawson [facility] at this time. Pt resting on cot with unlabored respirations.       Mason DAVIS RN  11/27/21 7062

## 2021-11-28 NOTE — ED PROVIDER NOTES
[address] [address]          Pt Name: Jodi Ambrocio  MRN: [id number]  [place] 1940  Date of evaluation: 11/28/2021  Treating Resident Physician: Saravanan Garay MD  Supervising Physician: Dr. Maxime Mora       Chief Complaint   Patient presents with    Chest Pain     History obtained from the patient. HISTORY OF PRESENT ILLNESS    HPI  Jodi Ambrocio is a 80 y.o. female who presents to the emergency department for evaluation of chest pain    Patient is presenting to the emergency department for chest pain. Patient says she was seen last night for the exact same complaint and was discharged with a diagnosis of GERD instructed to take antacids. Patient's ENT has not helped. Patient says the pain is located in center chest radiating into the left breast.  Patient denies any associated nausea, vomiting, shortness of breath, cough, headedness, dizziness, numbness, weakness. Patient does endorse having diarrhea since last night described as brown in color and loose not watery. Patient also endorses a headache since being Covid swab described as a mild thumping pain behind her eyes rated 2 out of 10 in intensity and she says is more of an agitation than real pain. Patient says the chest pain at its worse is 5 out of 10 she is currently sitting is 2 out of 10 in intensity. Patient does not notice anything that exacerbates or relieves her symptoms and. Patient's over-the-counter antacids have not relieved her symptoms either. Patient has a complex cardiac history occluding and echocardiogram done 2019 demonstrated ejection fraction of 20 to 25%. Patient is on digoxin and amiodarone for arrhythmias as well as a number of cardiac meds including spironolactone and metoprolol. Patient is on warfarin.   Patient's INR was checked last night and was 3.4 so her friend instructed her not to take her warfarin so she has not taken her warfarin. Patient was swabbed for Covid yesterday and was negative. Patient denies any tobacco alcohol recreational drug use. The patient has no other acute complaints at this time. REVIEW OF SYSTEMS   Review of Systems   Constitutional: Negative for chills, fatigue, fever and unexpected weight change. HENT: Negative for ear pain and hearing loss. Eyes: Negative for photophobia, pain and visual disturbance. Respiratory: Negative for chest tightness and shortness of breath. Cardiovascular: Positive for chest pain. Negative for leg swelling. Gastrointestinal: Positive for diarrhea. Negative for blood in stool, constipation, nausea and vomiting. Endocrine: Negative for cold intolerance and heat intolerance. Genitourinary: Negative for difficulty urinating, dysuria, hematuria and vaginal bleeding. Musculoskeletal: Negative for arthralgias and back pain. Neurological: Positive for headaches. Negative for dizziness, light-headedness and numbness. Psychiatric/Behavioral: Negative for agitation, confusion and sleep disturbance.          PAST MEDICAL AND SURGICAL HISTORY     Past Medical History:   Diagnosis Date    AICD (automatic cardioverter/defibrillator) present 05/1999    Arthritis 5/21/2019    Atrial fibrillation (HCC)     chronic    Biventricular ICD (implantable cardiac defibrillator) in place 10/09    C. difficile diarrhea     CAD (coronary artery disease)     Cancer ([address]     right hand    CHF (congestive heart failure) ([address]     Diverticulitis 01/2013    GERD (gastroesophageal reflux disease)     hiatal hernia    Hiatal hernia     Hyperlipidemia     Hypertension     Menopause     1994    NAFLD (nonalcoholic fatty liver disease) 1/31/2017    Nonischemic cardiomyopathy ([address]     chronic    Pacemaker     Retroperitoneal hemorrhage 07/99    transfusion x3    Thyroid disease      Past Surgical History:   Procedure Laterality Date    CARDIAC DEFIBRILLATOR PLACEMENT Richard Mallorying      Dr Reji Yi    COLONOSCOPY  2008    w/ EGD    COLONOSCOPY N/A 7/16/2019    COLONOSCOPY POLYPECTOMY SNARE/COLD BIOPSY performed by Blue Guerra MD at [address], COLON, DIAGNOSTIC     [address]    replaced 3x    SKIN BIOPSY  2010    Skin CA with graft.  UPPER GASTROINTESTINAL ENDOSCOPY N/A 7/16/2019    EGD DILATION SAVORY performed by Blue Guerra MD at [address] [place] [address] Left 7/16/2019    EGD BIOPSY performed by Blue Guerra MD at [address] [place] [address] N/A 12/2/2019    EGD ESOPHAGEAL MANOMETRY AND PH MONITORING 24 HR performed by Blue Guerra MD at [facility]         MEDICATIONS     Current Facility-Administered Medications:     nitroGLYCERIN (NITROSTAT) SL tablet 0.4 mg, 0.4 mg, SubLINGual, Q5 Min PRN, Kike Richardson MD, 0.4 mg at 11/28/21 1603    ALPRAZolam (XANAX) tablet 0.5 mg, 0.5 mg, Oral, Q4H PRN, Bettina Menard DO    Current Outpatient Medications:     Handicap Placard MISC, by Does not apply route Request parking placard due to medical conditions. Duration of 5 years. , Disp: 1 each, Rfl: 0    digoxin (LANOXIN) 125 MCG tablet, Take 1 tablet by mouth every other day, Disp: 45 tablet, Rfl: 3    metoprolol succinate (TOPROL XL) 200 MG extended release tablet, Take 1 tablet by mouth daily, Disp: 90 tablet, Rfl: 3    levothyroxine (SYNTHROID) 175 MCG tablet, Take 1 tablet by mouth Daily New dose., Disp: 90 tablet, Rfl: 3    warfarin (COUMADIN) 3 MG tablet, Alternating 3 mg/1.5 mg or as directed per INR results. , Disp: 90 tablet, Rfl: 3    bumetanide (BUMEX) 1 MG tablet, Take 1 tablet by mouth daily, Disp: 90 tablet, Rfl: 3    quinapril (ACCUPRIL) 5 MG tablet, Take 1 tablet by mouth daily, Disp: 90 tablet, Rfl: 3    pantoprazole (PROTONIX) 20 MG tablet, TAKE 2 TABLETS BY MOUTH TWICE DAILY, Disp: 360 tablet, Rfl: 3    amiodarone (CORDARONE) 200 MG tablet, Take 1 tablet by mouth daily, Disp: 90 tablet, Rfl: 3    spironolactone (ALDACTONE) 25 MG tablet, Take 1 tablet by mouth every other day, Disp: 45 tablet, Rfl: 3    ferrous sulfate (IRON 325) 325 (65 Fe) MG tablet, Take 325 mg by mouth every other day, Disp: , Rfl:     Alum & Mag Hydroxide-Simeth (MYLANTA PO), Take by mouth as needed , Disp: , Rfl:     acetaminophen (TYLENOL) 325 MG tablet, Take 650 mg by mouth every 6 hours as needed for Pain or Fever, Disp: , Rfl:     Probiotic Product (PROBIOTIC FORMULA PO), Take 1 tablet by mouth daily. , Disp: , Rfl:     Misc. Devices (LUMBAR SUPPORT CUSHION) MISC, by Does not apply route. Dx: low back pain, sciatica., Disp: 1 each, Rfl: 0    Ascorbic Acid (VITAMIN C) 1000 MG tablet, Take 1,000 mg by mouth 2 times daily. , Disp: , Rfl:     Multiple Vitamin (MULTI-VITAMIN PO), Take  by mouth daily. , Disp: , Rfl:     Calcium Carbonate (CALCIUM 600 PO), Take  by mouth 2 times daily. , Disp: , Rfl:     BIOTIN PO, Take 5,000 mg by mouth daily. , Disp: , Rfl:       SOCIAL HISTORY     Social History     Social History Narrative    Not on file     Social History     Tobacco Use    Smoking status: Never Smoker    Smokeless tobacco: Never Used   Vaping Use    Vaping Use: Never used   Substance Use Topics    Alcohol use: No     Alcohol/week: 0.0 standard drinks    Drug use: No         ALLERGIES     Allergies   Allergen Reactions    Ativan [Lorazepam]      Gets anxiety    Codeine     Nystatin      Mycostatin powder    Percocet [Oxycodone-Acetaminophen]     Relafen [Nabumetone]      Stomach upset    Tape [place] Ric[place] Tape]     Celebrex [Celecoxib] Rash         FAMILY HISTORY     Family History   Problem Relation Age of Onset    Heart Disease Mother     Diabetes Mother     High Cholesterol Mother     High Blood Pressure Brother     Cancer Sister     High Blood Pressure Brother     High Blood Pressure Brother     Parkinsonism Brother     Diabetes Brother     Heart Disease Brother     Kidney Disease Brother     High Blood Pressure Brother     High Cholesterol Father     Miscarriages / Stillbirths Daughter     Arthritis Neg Hx     Asthma Neg Hx     Birth Defects Neg Hx     Depression Neg Hx     Early Death Neg Hx     Learning Disabilities Neg Hx     Mental Illness Neg Hx     Mental Retardation Neg Hx     Stroke Neg Hx     Substance Abuse Neg Hx     Vision Loss Neg Hx     Other Neg Hx          PREVIOUS RECORDS   Previous records reviewed: Patient was last seen by department yesterday for similar symptoms. .        PHYSICAL EXAM     ED Triage Vitals [21 1528]   BP Temp Temp Source Pulse Resp SpO2 Height Weight   137/64 -- Oral 72 16 100 % -- 180 lb (81.6 kg)     Initial vital signs and nursing assessment reviewed and normal. Body mass index is 32.92 kg/m². Pulsoximetry is normal per my interpretation. Additional Vital Signs:  Vitals:    21 1838   BP: 113/63   Pulse: 61   Resp: 16   Temp:    SpO2: 96%       Physical Exam  Vitals and nursing note reviewed. Constitutional:       Appearance: She is obese. HENT:      Head: Normocephalic and atraumatic. Mouth/Throat:      Mouth: Mucous membranes are moist.      Pharynx: Oropharynx is clear. Eyes:      Extraocular Movements: Extraocular movements intact. Pupils: Pupils are equal, round, and reactive to light. Cardiovascular:      Rate and Rhythm: Normal rate and regular rhythm. Pulses: Normal pulses. Heart sounds: Normal heart sounds. Pulmonary:      Effort: Pulmonary effort is normal.      Breath sounds: Normal breath sounds. Abdominal:      General: Bowel sounds are normal.      Palpations: Abdomen is soft. Musculoskeletal:         General: No swelling or tenderness. Normal range of motion. Cervical back: Normal range of motion and neck supple. No tenderness. Right lower le+ Edema present. Left lower le+ Edema present.    Skin: General: Skin is warm and dry. Neurological:      General: No focal deficit present. Mental Status: She is alert and oriented to person, place, and time. MEDICAL DECISION MAKING   Initial Assessment:   1. Patient is a 79-year-old female presenting with chest pain. Pain is currently 2 out of 10 intensity 5 out of 10 at its worst located left-sided chest radiating to the left breast.  Patient's other symptoms include diarrhea and headache. Physical exam was unremarkable. Patient was seen yesterday for the same symptoms. Yesterday she was discharged with diagnosis of GERD and was told to start acid reflux medication without any relief. Patient does have complicated cardiac history including internal cardiac defibrillator on digoxin, amiodarone, spironolactone, metoprolol with a known EF of 20 to 25% 2 years ago. To rule out acute heart failure versus ACS. Plan:    CBC, BMP, dig level, troponin, BNP, chest x-ray, EKG. .        ED RESULTS   Laboratory results:  Labs Reviewed   DIGOXIN LEVEL - Abnormal; Notable for the following components:       Result Value    Digoxin Lvl 0.4 (*)     All other components within normal limits   CBC WITH AUTO DIFFERENTIAL - Abnormal; Notable for the following components:    RBC 3.64 (*)     Hemoglobin 10.4 (*)     Hematocrit 35.5 (*)     MCHC 29.3 (*)     RDW-CV 15.9 (*)     RDW-SD 57.0 (*)     Lymphocytes Absolute 0.9 (*)     All other components within normal limits   BASIC METABOLIC PANEL W/ REFLEX TO MG FOR LOW K - Abnormal; Notable for the following components:    Glucose 194 (*)     BUN 27 (*)     CREATININE 1.3 (*)     All other components within normal limits   GLOMERULAR FILTRATION RATE, ESTIMATED - Abnormal; Notable for the following components:    Est, Glom Filt Rate 39 (*)     All other components within normal limits   TROPONIN   BRAIN NATRIURETIC PEPTIDE   MAGNESIUM   ANION GAP       Radiologic studies results:  XR CHEST (2 VW)   Final Result   1. Minimal reticular linear opacities at the right lung base are seen which may be related to minimal scarring versus atelectasis. **This report has been created using voice recognition software. It may contain minor errors which are inherent in voice recognition technology. **      Final report electronically signed by Dr. Damaso Gardner on 11/28/2021 4:03 PM          ED Medications administered this visit:   Medications   nitroGLYCERIN (NITROSTAT) SL tablet 0.4 mg (0.4 mg SubLINGual Given 11/28/21 1603)   ALPRAZolam (XANAX) tablet 0.5 mg (has no administration in time range)   acetaminophen (TYLENOL) tablet 650 mg (650 mg Oral Given 11/28/21 1634)         ED COURSE     ED Course as of 11/28/21 1841   Sun Nov 28, 2021   1607 EKG 12 Lead  EKG is demonstrating ventricular paced rhythm with a rate of 72 bpm and a QRS duration of 148 corrected QTC of 543 unchanged from prior EKGs. [FERNANDA]   1629 XR CHEST (2 VW) [FERNANDA]   1630 CBC Auto Differential(!):    WBC 6.6   RBC 3.64(!)   Hemoglobin Quant 10.4(!)   Hematocrit 35.5(!)   MCV 97.5   MCH 28.6   MCHC 29.3(!)   RDW-CV 15.9(!)   RDW-SD 57. 0(!)   Platelet Count 565   MPV 10.6   Seg Neutrophils 68.5   Lymphocytes 13.3   Monocytes 14.7   Eosinophils 1.7   Basophils 1.2   Immature Granulocytes 0.6   Segs Absolute 4.5   Lymphocytes Absolute 0.9(!)   Monocytes Absolute 1.0   Eosinophils Absolute 0.1   Basophils Absolute 0.1   Immature Grans (Abs) 0.04   Nucleated Red Blood Cells 0 [FERNANDA]   1630 Digoxin Level(!):    Digoxin Lvl 0.4(!) [FERNANDA]   1655 Brain Natriuretic Peptide:    Pro-BNP 1671.0 [FERNANDA]   1655 Magnesium:    Magnesium 2.3 [FERNANDA]   9106 Basic Metabolic Panel w/ Reflex to MG(!):    Sodium 137   Potassium 4.4   Chloride 102   CO2 24   Glucose 194(!)   BUN 27(!)   Creatinine 1.3(!)   Calcium 8.7 [FERNANDA]   1655 Glomerular Filtration Rate, Estimated(!):    Est, Glom Filt Rate 39(!) [FERNANDA]   1734 Troponin:    Troponin T < 0.010 [FERNANDA]   1734 Brain Natriuretic Peptide:    Pro-BNP 1671.0 [FERNANDA]   4319 Patient's heart score is a 5 [FERNANDA]   1746 Given the patient's repeat visit and lack of improvement with therapy initiated last night as well as the further prolonging QTc interval it is my impression that she should be admitted for overnight observation evaluation by cardiology in the morning. Hospitalist was contacted and Dr. Angel Rojas accepted admission. [FERNANDA]      ED Course User Index  [FERNANDA] Blu Bolden MD           MEDICATION CHANGES     New Prescriptions    No medications on file         FINAL DISPOSITION     Final diagnoses:   Chest pain, unspecified type     Condition: condition: stable  Dispo: Admit to telemetry      This transcription was electronically signed. Parts of this transcriptions may have been dictated by use of voice recognition software and electronically transcribed, and parts may have been transcribed with the assistance of an ED scribe. The transcription may contain errors not detected in proofreading. Please refer to my supervising physician's documentation if my documentation differs.     Electronically Signed: Alanna Parry MD, 11/28/21, 6:41 PM         Blu Bolden MD  Resident  11/28/21 5593

## 2021-11-28 NOTE — ED NOTES
Pt medicated per MAR. Pt tolerated well. Respirations unlabored. Updated on poc.       Jean-Paul Nelson RN  11/28/21 3288

## 2021-11-28 NOTE — H&P
Hospitalist - History & Physical      Patient: Venita Watson    Unit/Bed:36/036A  YOB: 1940  MRN: [id number]   Acct: [de-identified]   PCP: Cassi Miller MD    Date of Service: Pt seen/examined on 11/28/21  and Admitted to Observation with expected LOS less than two midnights due to medical therapy. Chief Complaint:  Chest pain    Assessment and Plan:-  1. Atypical chest pain -cardiac work-up yesterday and today are both negative. Will follow troponin. Cardiology consult tomorrow for evaluation. Patient does have extensive cardiac history. Will observe overnight. 2. Nonischemic cardiomyopathy -patient has reduced ejection fraction -on prior cardiac cath on 2019 patient had a ejection fraction measured at 15 to 20%. Stable. Continue home medications. On spironolactone and Bumex. 3. Chronic atrial fibrillation -patient is in paced rhythm. Patient is anticoagulated with Coumadin. Also on rhythm control with amiodarone. 4. Essential hypertension -stable. Continue medications  5. Hypothyroidism -stable. 6. Hyperlipidemia  7. CKD 3 -renal function is slightly decreased compared to baseline. Will hold Bumex for 1 day. Resume Bumex tomorrow. History Of Present Illness: This is a 79-year-old female with extensive cardiac history presenting with chest discomfort. Patient was here in the emergency room yesterday and had basic cardiac work-up and was discharged to home with a diagnosis of GERD. However, patient's chest pain came back today and decided to come to emergency room again for evaluation. Yesterday's cardiac work-up with today's cardiac work-up are both negative. EKG did not show any significant change compared to her prior EKGs. Patient does have prolonged QTC in EKG which is also appears to be chronic. Patient is in paced rhythm. Patient denies that this is actually a chest pain. Patient describes it as more of a discomfort.   States that this is not sharp or ESOPHAGEAL MANOMETRY AND PH MONITORING 24 HR performed by Jacinto Gamez MD at [address] [facility]       Home Medications:   No current facility-administered medications on file prior to encounter. Current Outpatient Medications on File Prior to Encounter   Medication Sig Dispense Refill    Handicap Placard MISC by Does not apply route Request parking placard due to medical conditions. Duration of 5 years. 1 each 0    digoxin (LANOXIN) 125 MCG tablet Take 1 tablet by mouth every other day 45 tablet 3    metoprolol succinate (TOPROL XL) 200 MG extended release tablet Take 1 tablet by mouth daily 90 tablet 3    levothyroxine (SYNTHROID) 175 MCG tablet Take 1 tablet by mouth Daily New dose. 90 tablet 3    warfarin (COUMADIN) 3 MG tablet Alternating 3 mg/1.5 mg or as directed per INR results. 90 tablet 3    bumetanide (BUMEX) 1 MG tablet Take 1 tablet by mouth daily 90 tablet 3    quinapril (ACCUPRIL) 5 MG tablet Take 1 tablet by mouth daily 90 tablet 3    pantoprazole (PROTONIX) 20 MG tablet TAKE 2 TABLETS BY MOUTH TWICE DAILY 360 tablet 3    amiodarone (CORDARONE) 200 MG tablet Take 1 tablet by mouth daily 90 tablet 3    spironolactone (ALDACTONE) 25 MG tablet Take 1 tablet by mouth every other day 45 tablet 3    ferrous sulfate (IRON 325) 325 (65 Fe) MG tablet Take 325 mg by mouth every other day      Alum & Mag Hydroxide-Simeth (MYLANTA PO) Take by mouth as needed       acetaminophen (TYLENOL) 325 MG tablet Take 650 mg by mouth every 6 hours as needed for Pain or Fever      Probiotic Product (PROBIOTIC FORMULA PO) Take 1 tablet by mouth daily.  Misc. Devices (LUMBAR SUPPORT CUSHION) MISC by Does not apply route. Dx: low back pain, sciatica. 1 each 0    Ascorbic Acid (VITAMIN C) 1000 MG tablet Take 1,000 mg by mouth 2 times daily.  Multiple Vitamin (MULTI-VITAMIN PO) Take  by mouth daily.  Calcium Carbonate (CALCIUM 600 PO) Take  by mouth 2 times daily.         BIOTIN PO Take 5,000 mg by mouth daily. Allergies:    Ativan [lorazepam], Codeine, Nystatin, Percocet [oxycodone-acetaminophen], Relafen [nabumetone], Tape [adhesive tape], and Celebrex [celecoxib]    Social History:    reports that she has never smoked. She has never used smokeless tobacco. She reports that she does not drink alcohol and does not use drugs. Family History:       Problem Relation Age of Onset    Heart Disease Mother     Diabetes Mother     High Cholesterol Mother     High Blood Pressure Brother     Cancer Sister     High Blood Pressure Brother     High Blood Pressure Brother     Parkinsonism Brother     Diabetes Brother     Heart Disease Brother     Kidney Disease Brother     High Blood Pressure Brother     High Cholesterol Father     [de-identified] / [place] Daughter     Arthritis Neg Hx     Asthma Neg Hx     Birth Defects Neg Hx     Depression Neg Hx     Early Death Neg Hx     Learning Disabilities Neg Hx     Mental Illness Neg Hx     Mental Retardation Neg Hx     Stroke Neg Hx     Substance Abuse Neg Hx     Vision Loss Neg Hx     Other Neg Hx        Diet:  No diet orders on file    Review of systems:   Pertinent positives as noted in the HPI. All other systems reviewed and negative. PHYSICAL EXAM:  BP (!) 116/59   Pulse 59   Temp 98.4 °F (36.9 °C) (Oral)   Resp 16   Wt 180 lb (81.6 kg)   SpO2 98%   BMI 32.92 kg/m²   General appearance: No apparent distress, appears stated age and cooperative. HEENT: Normal cephalic, atraumatic without obvious deformity. Pupils equal, round, and reactive to light. Extra ocular muscles intact. Conjunctivae/corneas clear. Neck: Supple, with full range of motion. No jugular venous distention. Trachea midline. Respiratory:  Normal respiratory effort. Clear to auscultation, bilaterally without Rales/Wheezes/Rhonchi. Cardiovascular: Regular rate and rhythm with normal S1/S2.   Abdomen: Soft, non-tender, non-distended with normal bowel sounds. Musculoskeletal:  No clubbing or cyanosis. +1 bilateral lower extremity edema  Skin: Skin color, texture, turgor normal.  No rashes or lesions. Neurologic:  Cranial nerves: II-XII intact, grossly non-focal.  Psychiatric: Alert and oriented, thought content appropriate, normal insight  Capillary Refill: Brisk,< 3 seconds   Peripheral Pulses: +2 palpable, equal bilaterally     Labs:   Recent Labs     11/27/21 2112 11/28/21  1540   WBC 6.4 6.6   HGB 10.7* 10.4*   HCT 36.6* 35.5*    256     Recent Labs     11/27/21 2112 11/28/21  1540    137   K 4.2 4.4    102   CO2 25 24   BUN 27* 27*   CREATININE 1.1 1.3*   CALCIUM 8.7 8.7     Recent Labs     11/27/21 2112   AST 56*   ALT 30   BILIDIR <0.2   BILITOT 0.3   ALKPHOS 154*     Recent Labs     11/27/21  2221   INR 3.82*     No results for input(s): Damien Horne in the last 72 hours. Urinalysis:    Lab Results   Component Value Date    NITRU NEGATIVE 03/09/2020    BLOODU NEGATIVE 03/09/2020    SPECGRAV 1.008 01/09/2013    GLUCOSEU NEGATIVE 03/09/2020       Radiology:   XR CHEST (2 VW)   Final Result   1. Minimal reticular linear opacities at the right lung base are seen which may be related to minimal scarring versus atelectasis. **This report has been created using voice recognition software. It may contain minor errors which are inherent in voice recognition technology. **      Final report electronically signed by Dr. Damaso Gardner on 11/28/2021 4:03 PM        XR CHEST (2 VW)    Result Date: 11/28/2021  PROCEDURE: XR CHEST (2 VW) CLINICAL INFORMATION: chest pain COMPARISON: 11/27/2021 TECHNIQUE:  PA and lateral chest x-ray  FINDINGS: There is mild enlargement of the cardiomediastinal silhouette. There is a left-sided AICD. No focal consolidation, pleural effusion or pneumothorax is seen. Minimal reticular linear opacities at the right lung base are seen which may be related to minimal scarring versus atelectasis.  No acute osseous findings are seen. However there is diffuse osteopenia noted. 1. Minimal reticular linear opacities at the right lung base are seen which may be related to minimal scarring versus atelectasis. **This report has been created using voice recognition software. It may contain minor errors which are inherent in voice recognition technology. ** Final report electronically signed by Dr. Dianne Quinteros on 11/28/2021 4:03 PM        EKG: Paced rhythm. No acute ischemic changes.     Electronically signed by Brion Bamberger, DO on 11/28/2021 at 6:15 PM

## 2021-11-28 NOTE — ED NOTES
Pt presents to ED via triage for c/o L sided chest pain. Family at bedside states pt was seen last evening for same symptoms and sent home with GERD. Pt states pain never went completely away. Pt reports having intermittent \"episodes\" when the pain will get worse. Upon initial assessment, pt is A&Ox4, resps easy and unlabored. EKG completed upon arrival. IV established with blood drawn and sent to lab. Monitor applied and VS as noted. Will monitor.      Noreen Paiz RN  11/28/21 2423

## 2021-11-29 LAB
ALBUMIN SERPL-MCNC: 3.1 G/DL (ref 3.5–5.1)
ALP BLD-CCNC: 122 U/L (ref 38–126)
ALT SERPL-CCNC: 25 U/L (ref 11–66)
ANION GAP SERPL CALCULATED.3IONS-SCNC: 7 MEQ/L (ref 8–16)
AST SERPL-CCNC: 44 U/L (ref 5–40)
BILIRUB SERPL-MCNC: 0.2 MG/DL (ref 0.3–1.2)
BUN BLDV-MCNC: 30 MG/DL (ref 7–22)
CALCIUM SERPL-MCNC: 8.4 MG/DL (ref 8.5–10.5)
CHLORIDE BLD-SCNC: 105 MEQ/L (ref 98–111)
CO2: 27 MEQ/L (ref 23–33)
CREAT SERPL-MCNC: 1.4 MG/DL (ref 0.4–1.2)
EKG ATRIAL RATE: 51 BPM
EKG Q-T INTERVAL: 506 MS
EKG QRS DURATION: 160 MS
EKG QTC CALCULATION (BAZETT): 517 MS
EKG R AXIS: -81 DEGREES
EKG T AXIS: 87 DEGREES
EKG VENTRICULAR RATE: 63 BPM
ERYTHROCYTE [DISTWIDTH] IN BLOOD BY AUTOMATED COUNT: 15.9 % (ref 11.5–14.5)
ERYTHROCYTE [DISTWIDTH] IN BLOOD BY AUTOMATED COUNT: 56.8 FL (ref 35–45)
GFR SERPL CREATININE-BSD FRML MDRD: 36 ML/MIN/1.73M2
GLUCOSE BLD-MCNC: 108 MG/DL (ref 70–108)
HCT VFR BLD CALC: 32.8 % (ref 37–47)
HEMOGLOBIN: 9.5 GM/DL (ref 12–16)
INR BLD: 3.42 (ref 0.85–1.13)
MCH RBC QN AUTO: 28.4 PG (ref 26–33)
MCHC RBC AUTO-ENTMCNC: 29 GM/DL (ref 32.2–35.5)
MCV RBC AUTO: 98.2 FL (ref 81–99)
OSMOLALITY CALCULATION: 284.3 MOSMOL/KG (ref 275–300)
PLATELET # BLD: 202 THOU/MM3 (ref 130–400)
PMV BLD AUTO: 10.1 FL (ref 9.4–12.4)
POTASSIUM REFLEX MAGNESIUM: 4.6 MEQ/L (ref 3.5–5.2)
RBC # BLD: 3.34 MILL/MM3 (ref 4.2–5.4)
SODIUM BLD-SCNC: 139 MEQ/L (ref 135–145)
TOTAL PROTEIN: 6.1 G/DL (ref 6.1–8)
WBC # BLD: 5.4 THOU/MM3 (ref 4.8–10.8)

## 2021-11-29 PROCEDURE — 2580000003 HC RX 258: Performed by: INTERNAL MEDICINE

## 2021-11-29 PROCEDURE — 2580000003 HC RX 258: Performed by: HOSPITALIST

## 2021-11-29 PROCEDURE — 6370000000 HC RX 637 (ALT 250 FOR IP): Performed by: HOSPITALIST

## 2021-11-29 PROCEDURE — 85610 PROTHROMBIN TIME: CPT

## 2021-11-29 PROCEDURE — 85027 COMPLETE CBC AUTOMATED: CPT

## 2021-11-29 PROCEDURE — G0378 HOSPITAL OBSERVATION PER HR: HCPCS

## 2021-11-29 PROCEDURE — 99225 PR SBSQ OBSERVATION CARE/DAY 25 MINUTES: CPT | Performed by: INTERNAL MEDICINE

## 2021-11-29 PROCEDURE — 93010 ELECTROCARDIOGRAM REPORT: CPT | Performed by: INTERNAL MEDICINE

## 2021-11-29 PROCEDURE — 80053 COMPREHEN METABOLIC PANEL: CPT

## 2021-11-29 PROCEDURE — 36415 COLL VENOUS BLD VENIPUNCTURE: CPT

## 2021-11-29 PROCEDURE — 93005 ELECTROCARDIOGRAM TRACING: CPT | Performed by: INTERNAL MEDICINE

## 2021-11-29 RX ORDER — LOPERAMIDE HYDROCHLORIDE 2 MG/1
4 CAPSULE ORAL 4 TIMES DAILY PRN
Status: DISCONTINUED | OUTPATIENT
Start: 2021-11-29 | End: 2021-12-01 | Stop reason: HOSPADM

## 2021-11-29 RX ORDER — SODIUM CHLORIDE, SODIUM LACTATE, POTASSIUM CHLORIDE, CALCIUM CHLORIDE 600; 310; 30; 20 MG/100ML; MG/100ML; MG/100ML; MG/100ML
INJECTION, SOLUTION INTRAVENOUS CONTINUOUS
Status: DISCONTINUED | OUTPATIENT
Start: 2021-11-29 | End: 2021-11-30

## 2021-11-29 RX ORDER — QUINAPRIL 5 1/1
5 TABLET ORAL DAILY
Status: DISCONTINUED | OUTPATIENT
Start: 2021-11-29 | End: 2021-12-01 | Stop reason: HOSPADM

## 2021-11-29 RX ADMIN — NITROGLYCERIN 0.4 MG: 0.4 TABLET, ORALLY DISINTEGRATING SUBLINGUAL at 20:32

## 2021-11-29 RX ADMIN — METOPROLOL SUCCINATE 200 MG: 100 TABLET, EXTENDED RELEASE ORAL at 09:29

## 2021-11-29 RX ADMIN — NITROGLYCERIN 0.4 MG: 0.4 TABLET, ORALLY DISINTEGRATING SUBLINGUAL at 12:11

## 2021-11-29 RX ADMIN — QUINAPRIL 5 MG: 5 TABLET ORAL at 09:30

## 2021-11-29 RX ADMIN — AMIODARONE HYDROCHLORIDE 200 MG: 200 TABLET ORAL at 09:32

## 2021-11-29 RX ADMIN — PANTOPRAZOLE SODIUM 40 MG: 40 TABLET, DELAYED RELEASE ORAL at 09:31

## 2021-11-29 RX ADMIN — SODIUM CHLORIDE, PRESERVATIVE FREE 10 ML: 5 INJECTION INTRAVENOUS at 12:10

## 2021-11-29 RX ADMIN — SODIUM CHLORIDE, POTASSIUM CHLORIDE, SODIUM LACTATE AND CALCIUM CHLORIDE: 600; 310; 30; 20 INJECTION, SOLUTION INTRAVENOUS at 18:38

## 2021-11-29 RX ADMIN — Medication 125 MCG: at 21:28

## 2021-11-29 ASSESSMENT — PAIN DESCRIPTION - ONSET: ONSET: ON-GOING

## 2021-11-29 ASSESSMENT — PAIN DESCRIPTION - PAIN TYPE
TYPE: ACUTE PAIN

## 2021-11-29 ASSESSMENT — PAIN DESCRIPTION - DESCRIPTORS: DESCRIPTORS: DISCOMFORT;PRESSURE

## 2021-11-29 ASSESSMENT — PAIN SCALES - GENERAL
PAINLEVEL_OUTOF10: 6
PAINLEVEL_OUTOF10: 3
PAINLEVEL_OUTOF10: 2

## 2021-11-29 ASSESSMENT — PAIN DESCRIPTION - FREQUENCY: FREQUENCY: INTERMITTENT

## 2021-11-29 ASSESSMENT — PAIN DESCRIPTION - PROGRESSION
CLINICAL_PROGRESSION: NOT CHANGED
CLINICAL_PROGRESSION: NOT CHANGED

## 2021-11-29 ASSESSMENT — PAIN DESCRIPTION - LOCATION
LOCATION: CHEST

## 2021-11-29 ASSESSMENT — PAIN DESCRIPTION - ORIENTATION: ORIENTATION: MID

## 2021-11-29 ASSESSMENT — PAIN - FUNCTIONAL ASSESSMENT: PAIN_FUNCTIONAL_ASSESSMENT: ACTIVITIES ARE NOT PREVENTED

## 2021-11-29 NOTE — ED NOTES
Upon first contact with patient this RN receives bedside shift report. Pt resting in bed and denies any needs. Pt hemodynamically stable.      Amanuel Flood RN  11/28/21 1922

## 2021-11-29 NOTE — ED NOTES
ED nurse-to-nurse bedside report    Chief Complaint   Patient presents with    Chest Pain      LOC: alert and orientated to name, place, date  Vital signs   Vitals:    11/28/21 1528 11/28/21 1635 11/28/21 1838   BP: 137/64 (!) 116/59 113/63   Pulse: 72 59 61   Resp: 16 16 16   Temp:  98.4 °F (36.9 °C)    TempSrc: Oral Oral    SpO2: 100% 98% 96%   Weight: 180 lb (81.6 kg)        Pain:    Pain Interventions: see MAR  Pain Goal: 0  Oxygen: No    Current needs required RA   Telemetry: Yes  LDAs:   Peripheral IV 11/28/21 Left Antecubital (Active)   Site Assessment Clean; Dry; Intact 11/28/21 1838   Line Status Flushed 11/28/21 1838   Dressing Status Clean; Dry; Intact 11/28/21 1838   Dressing Intervention New 11/28/21 1554     Continuous Infusions:   Mobility: Requires assistance * 1  [place] Fall Risk Score:    Fall Risk 6/23/2021 5/13/2020 3/4/2020 2/25/2019 1/18/2018 12/21/2016 10/14/2015   2 or more falls in past year? no no no no no no no   Fall with injury in past year? no no no no no no no     Fall Interventions: side rails up x2, call light in reach  Report given to: Tiara Almaguer 38, RN  11/28/21 1920

## 2021-11-29 NOTE — PROGRESS NOTES
Clinical Pharmacy Note    Gemma Almanza is a 80 y.o. female for whom pharmacy has been asked to manage warfarin therapy. Reason for Admission: atypical chest pain     Consulting Physician: Dr. Higinio Jauregui  Warfarin dose prior to admission: unknown - will confirm in AM.   Warfarin indication: AF  Target INR range: 2.0-3.0   Outpatient warfarin provider: Dr. Fara Kelly    Past Medical History:   Diagnosis Date    AICD (automatic cardioverter/defibrillator) present 05/1999    Arthritis 5/21/2019    Atrial fibrillation ([facility] [address]     chronic    Biventricular ICD (implantable cardiac defibrillator) in place 10/09    C. difficile diarrhea     CAD (coronary artery disease)     Cancer ([facility] [address]     right hand    CHF (congestive heart failure) ([facility] [address]     Diverticulitis 01/2013    GERD (gastroesophageal reflux disease)     hiatal hernia    Hiatal hernia     Hyperlipidemia     Hypertension     Menopause     1994    NAFLD (nonalcoholic fatty liver disease) 1/31/2017    Nonischemic cardiomyopathy ([facility] [address]     chronic    Pacemaker     Retroperitoneal hemorrhage 07/99    transfusion x3    Thyroid disease               Recent Labs     11/28/21 2008   INR 3.86*     Recent Labs     11/27/21  2112 11/28/21  1540   HGB 10.7* 10.4*   HCT 36.6* 35.5*    256     Current warfarin drug-drug interactions: amiodarone (home med); levothyroxine (home med)    Date INR Warfarin Dose   11/28/21 3.86 No coumadin                                   PT/INR or POC-INR will be monitored routinely until therapeutic INR is achieved    Thank you for the consult.       Lillie Hinojosa, PharmD  8:50 PM  11/28/2021

## 2021-11-29 NOTE — PROGRESS NOTES
Clinical Pharmacy Note    Warfarin consult follow-up    Recent Labs     11/29/21  0801   INR 3.42*     Recent Labs     11/27/21  2112 11/28/21  1540 11/29/21  0801   HGB 10.7* 10.4* 9.5*   HCT 36.6* 35.5* 32.8*    256 202     Significant Drug-Drug Interactions:  New warfarin drug-drug interactions: none  Discontinued drug-drug interactions: none  Current warfarin drug-drug interactions: amiodarone (home med); levothyroxine (home med)     Date INR Warfarin Dose   11/28/21 3.86 No warfarin    11/29/2021 3.42  No warfarin                                              Notes:                   PT/INR or POC-INR will be monitored routinely until therapeutic INR is achieved    Jani Alfaro PharmD 11/29/2021 2:21 PM

## 2021-11-30 LAB
ANION GAP SERPL CALCULATED.3IONS-SCNC: 7 MEQ/L (ref 8–16)
BUN BLDV-MCNC: 30 MG/DL (ref 7–22)
CALCIUM SERPL-MCNC: 8.2 MG/DL (ref 8.5–10.5)
CHLORIDE BLD-SCNC: 104 MEQ/L (ref 98–111)
CO2: 26 MEQ/L (ref 23–33)
CREAT SERPL-MCNC: 1.1 MG/DL (ref 0.4–1.2)
ERYTHROCYTE [DISTWIDTH] IN BLOOD BY AUTOMATED COUNT: 15.7 % (ref 11.5–14.5)
ERYTHROCYTE [DISTWIDTH] IN BLOOD BY AUTOMATED COUNT: 55.3 FL (ref 35–45)
GFR SERPL CREATININE-BSD FRML MDRD: 48 ML/MIN/1.73M2
GLUCOSE BLD-MCNC: 80 MG/DL (ref 70–108)
HCT VFR BLD CALC: 30.7 % (ref 37–47)
HEMOGLOBIN: 8.9 GM/DL (ref 12–16)
INR BLD: 2.67 (ref 0.85–1.13)
MCH RBC QN AUTO: 28.3 PG (ref 26–33)
MCHC RBC AUTO-ENTMCNC: 29 GM/DL (ref 32.2–35.5)
MCV RBC AUTO: 97.5 FL (ref 81–99)
PLATELET # BLD: 193 THOU/MM3 (ref 130–400)
PMV BLD AUTO: 10.7 FL (ref 9.4–12.4)
POTASSIUM SERPL-SCNC: 4.7 MEQ/L (ref 3.5–5.2)
RBC # BLD: 3.15 MILL/MM3 (ref 4.2–5.4)
SODIUM BLD-SCNC: 137 MEQ/L (ref 135–145)
WBC # BLD: 6.7 THOU/MM3 (ref 4.8–10.8)

## 2021-11-30 PROCEDURE — 99223 1ST HOSP IP/OBS HIGH 75: CPT | Performed by: INTERNAL MEDICINE

## 2021-11-30 PROCEDURE — 36415 COLL VENOUS BLD VENIPUNCTURE: CPT

## 2021-11-30 PROCEDURE — 99225 PR SBSQ OBSERVATION CARE/DAY 25 MINUTES: CPT | Performed by: INTERNAL MEDICINE

## 2021-11-30 PROCEDURE — 6370000000 HC RX 637 (ALT 250 FOR IP): Performed by: HOSPITALIST

## 2021-11-30 PROCEDURE — 80048 BASIC METABOLIC PNL TOTAL CA: CPT

## 2021-11-30 PROCEDURE — 85027 COMPLETE CBC AUTOMATED: CPT

## 2021-11-30 PROCEDURE — G0378 HOSPITAL OBSERVATION PER HR: HCPCS

## 2021-11-30 PROCEDURE — 2580000003 HC RX 258: Performed by: HOSPITALIST

## 2021-11-30 PROCEDURE — 6370000000 HC RX 637 (ALT 250 FOR IP): Performed by: INTERNAL MEDICINE

## 2021-11-30 PROCEDURE — 85610 PROTHROMBIN TIME: CPT

## 2021-11-30 RX ORDER — WARFARIN SODIUM 3 MG/1
3 TABLET ORAL ONCE
Status: COMPLETED | OUTPATIENT
Start: 2021-11-30 | End: 2021-11-30

## 2021-11-30 RX ADMIN — PANTOPRAZOLE SODIUM 40 MG: 40 TABLET, DELAYED RELEASE ORAL at 06:11

## 2021-11-30 RX ADMIN — AMIODARONE HYDROCHLORIDE 200 MG: 200 TABLET ORAL at 08:07

## 2021-11-30 RX ADMIN — WARFARIN SODIUM 3 MG: 3 TABLET ORAL at 17:08

## 2021-11-30 RX ADMIN — LOPERAMIDE HYDROCHLORIDE 4 MG: 2 CAPSULE ORAL at 08:09

## 2021-11-30 RX ADMIN — METOPROLOL SUCCINATE 200 MG: 100 TABLET, EXTENDED RELEASE ORAL at 08:08

## 2021-11-30 RX ADMIN — LOPERAMIDE HYDROCHLORIDE 4 MG: 2 CAPSULE ORAL at 21:16

## 2021-11-30 RX ADMIN — SODIUM CHLORIDE, PRESERVATIVE FREE 10 ML: 5 INJECTION INTRAVENOUS at 21:18

## 2021-11-30 RX ADMIN — SPIRONOLACTONE 25 MG: 25 TABLET ORAL at 08:11

## 2021-11-30 NOTE — PROGRESS NOTES
whether this is associated with certain movement or deep breathing. At the time of my exam patient stated that her chest pain is gone. No documented fever. Denies palpitation or shortness of breath. No abdominal issue. No urinary issue. No acute neurological changes. No sensation changes. No vision changes or headache. Subjective:  Patient seen at bedside. No new issues. Did have discomfort swallowing a grape today. Otherwise feeling ok. No SOB, WINN, light headedness    Past Medical History:        Diagnosis Date    AICD (automatic cardioverter/defibrillator) present 05/1999    Arthritis 5/21/2019    Atrial fibrillation (HCC)     chronic    Biventricular ICD (implantable cardiac defibrillator) in place 10/09    C. difficile diarrhea     CAD (coronary artery disease)     Cancer ([facility] [address]     right hand    CHF (congestive heart failure) ([facility] [address]     Diverticulitis 01/2013    GERD (gastroesophageal reflux disease)     hiatal hernia    Hiatal hernia     Hyperlipidemia     Hypertension     Menopause     1994    NAFLD (nonalcoholic fatty liver disease) 1/31/2017    Nonischemic cardiomyopathy ([facility] [address]     chronic    Pacemaker     Retroperitoneal hemorrhage 07/99    transfusion x3    Thyroid disease        Past Surgical History:        Procedure Laterality Date    CARDIAC DEFIBRILLATOR PLACEMENT     Kat Brito  2008    w/ EGD    COLONOSCOPY N/A 7/16/2019    COLONOSCOPY POLYPECTOMY SNARE/COLD BIOPSY performed by Jacinto Gamez MD at [address] [place], COLON, DIAGNOSTIC     [address] [facility] [address]    replaced 3x    SKIN BIOPSY  2010    Skin CA with graft.     UPPER GASTROINTESTINAL ENDOSCOPY N/A 7/16/2019    EGD DILATION SAVORY performed by Jacinto Gamez MD at [address] [facility] 7/16/2019    EGD BIOPSY performed by Jacinto Gamez MD at [address] [facility] ENDOSCOPY N/A 12/2/2019    EGD ESOPHAGEAL MANOMETRY AND PH MONITORING 24 HR performed by Hugo Hurley MD at [facility]       Home Medications:   No current facility-administered medications on file prior to encounter. Current Outpatient Medications on File Prior to Encounter   Medication Sig Dispense Refill    Handicap Placard MISC by Does not apply route Request parking placard due to medical conditions. Duration of 5 years. 1 each 0    digoxin (LANOXIN) 125 MCG tablet Take 1 tablet by mouth every other day 45 tablet 3    metoprolol succinate (TOPROL XL) 200 MG extended release tablet Take 1 tablet by mouth daily 90 tablet 3    levothyroxine (SYNTHROID) 175 MCG tablet Take 1 tablet by mouth Daily New dose. 90 tablet 3    warfarin (COUMADIN) 3 MG tablet Alternating 3 mg/1.5 mg or as directed per INR results. 90 tablet 3    bumetanide (BUMEX) 1 MG tablet Take 1 tablet by mouth daily 90 tablet 3    quinapril (ACCUPRIL) 5 MG tablet Take 1 tablet by mouth daily 90 tablet 3    pantoprazole (PROTONIX) 20 MG tablet TAKE 2 TABLETS BY MOUTH TWICE DAILY 360 tablet 3    amiodarone (CORDARONE) 200 MG tablet Take 1 tablet by mouth daily 90 tablet 3    spironolactone (ALDACTONE) 25 MG tablet Take 1 tablet by mouth every other day 45 tablet 3    ferrous sulfate (IRON 325) 325 (65 Fe) MG tablet Take 325 mg by mouth every other day      Alum & Mag Hydroxide-Simeth (MYLANTA PO) Take by mouth as needed       acetaminophen (TYLENOL) 325 MG tablet Take 650 mg by mouth every 6 hours as needed for Pain or Fever      Misc. Devices (LUMBAR SUPPORT CUSHION) MISC by Does not apply route. Dx: low back pain, sciatica. 1 each 0    Ascorbic Acid (VITAMIN C) 1000 MG tablet Take 1,000 mg by mouth 2 times daily.  Multiple Vitamin (MULTI-VITAMIN PO) Take  by mouth daily.  Calcium Carbonate (CALCIUM 600 PO) Take  by mouth 2 times daily.  BIOTIN PO Take 5,000 mg by mouth daily.       Probiotic Product (PROBIOTIC FORMULA PO) Take 1 tablet by mouth daily. Allergies:    Ativan [lorazepam], Codeine, Nystatin, Percocet [oxycodone-acetaminophen], Relafen [nabumetone], Tape [adhesive tape], and Celebrex [celecoxib]    Social History:    reports that she has never smoked. She has never used smokeless tobacco. She reports that she does not drink alcohol and does not use drugs. Family History:       Problem Relation Age of Onset    Heart Disease Mother     Diabetes Mother     High Cholesterol Mother     High Blood Pressure Brother     Cancer Sister     High Blood Pressure Brother     High Blood Pressure Brother     Parkinsonism Brother     Diabetes Brother     Heart Disease Brother     Kidney Disease Brother     High Blood Pressure Brother     High Cholesterol Father     [de-identified] / Stillbirths Daughter     Arthritis Neg Hx     Asthma Neg Hx     Birth Defects Neg Hx     Depression Neg Hx     Early Death Neg Hx     Learning Disabilities Neg Hx     Mental Illness Neg Hx     Mental Retardation Neg Hx     Stroke Neg Hx     Substance Abuse Neg Hx     Vision Loss Neg Hx     Other Neg Hx        Diet:  ADULT DIET; Regular; Low Fat/Low Chol/High Fiber/ORLIN    Review of systems:   Pertinent positives as noted in the HPI. All other systems reviewed and negative. PHYSICAL EXAM:  BP (!) 127/54   Pulse 61   Temp 98.2 °F (36.8 °C) (Oral)   Resp 18   Wt 183 lb (83 kg)   SpO2 98%   BMI 33.47 kg/m²   General appearance: No apparent distress, appears stated age and cooperative. HEENT: Normal cephalic, atraumatic without obvious deformity. Pupils equal, round, and reactive to light. Extra ocular muscles intact. Conjunctivae/corneas clear. Neck: Supple, with full range of motion. No jugular venous distention. Trachea midline. Respiratory:  Normal respiratory effort. Clear to auscultation, bilaterally without Rales/Wheezes/Rhonchi. Cardiovascular: Regular rate and rhythm with normal S1/S2.   Abdomen: Soft, non-tender, non-distended with normal bowel sounds. Musculoskeletal:  No clubbing or cyanosis. +1 bilateral lower extremity edema  Skin: Skin color, texture, turgor normal.  No rashes or lesions. Neurologic:  Cranial nerves: II-XII intact, grossly non-focal.  Psychiatric: Alert and oriented, thought content appropriate, normal insight  Capillary Refill: Brisk,< 3 seconds   Peripheral Pulses: +2 palpable, equal bilaterally     Labs:   Recent Labs     11/28/21  1540 11/29/21  0801 11/30/21  0439   WBC 6.6 5.4 6.7   HGB 10.4* 9.5* 8.9*   HCT 35.5* 32.8* 30.7*    202 193     Recent Labs     11/28/21  1540 11/29/21  0801 11/30/21  0439    139 137   K 4.4 4.6 4.7    105 104   CO2 24 27 26   BUN 27* 30* 30*   CREATININE 1.3* 1.4* 1.1   CALCIUM 8.7 8.4* 8.2*     Recent Labs     11/27/21  2112 11/29/21  0801   AST 56* 44*   ALT 30 25   BILIDIR <0.2  --    BILITOT 0.3 0.2*   ALKPHOS 154* 122     Recent Labs     11/28/21 2008 11/29/21  0801 11/30/21  0439   INR 3.86* 3.42* 2.67*     No results for input(s): Damien Horne in the last 72 hours. Urinalysis:    Lab Results   Component Value Date    NITRU NEGATIVE 03/09/2020    BLOODU NEGATIVE 03/09/2020    SPECGRAV 1.008 01/09/2013    GLUCOSEU NEGATIVE 03/09/2020       Radiology:   XR CHEST (2 VW)   Final Result   1. Minimal reticular linear opacities at the right lung base are seen which may be related to minimal scarring versus atelectasis. **This report has been created using voice recognition software. It may contain minor errors which are inherent in voice recognition technology. **      Final report electronically signed by Dr. Damaso Gardner on 11/28/2021 4:03 PM      FL ESOPHAGRAM    (Results Pending)   FL UGI    (Results Pending)     XR CHEST (2 VW)    Result Date: 11/28/2021  PROCEDURE: XR CHEST (2 VW) CLINICAL INFORMATION: chest pain COMPARISON: 11/27/2021 TECHNIQUE:  PA and lateral chest x-ray  FINDINGS: There is mild enlargement of the cardiomediastinal silhouette. There is a left-sided AICD. No focal consolidation, pleural effusion or pneumothorax is seen. Minimal reticular linear opacities at the right lung base are seen which may be related to minimal scarring versus atelectasis. No acute osseous findings are seen. However there is diffuse osteopenia noted. 1. Minimal reticular linear opacities at the right lung base are seen which may be related to minimal scarring versus atelectasis. **This report has been created using voice recognition software. It may contain minor errors which are inherent in voice recognition technology. ** Final report electronically signed by Dr. Roshan Bowens on 11/28/2021 4:03 PM        EKG: Paced rhythm. No acute ischemic changes.     Electronically signed by Giovanna Roque MD on 11/30/2021 at 6:09 PM

## 2021-11-30 NOTE — CARE COORDINATION
11/30/21, 10:30 AM EST  DISCHARGE PLANNING EVALUATION:    C[id number]       Admitted: 11/28/2021/ [facility] day: 0   Location: -05/005-A Reason for admit: Atypical chest pain [R07.89]  Chest pain, unspecified type [R07.9]   PMH:  has a past medical history of AICD (automatic cardioverter/defibrillator) present, Arthritis, Atrial fibrillation ([facility] [address] Biventricular ICD (implantable cardiac defibrillator) in place, C. difficile diarrhea, CAD (coronary artery disease), Cancer ([facility] [address] CHF (congestive heart failure) ([facility] [address] Diverticulitis, GERD (gastroesophageal reflux disease), Hiatal hernia, Hyperlipidemia, Hypertension, Menopause, NAFLD (nonalcoholic fatty liver disease), Nonischemic cardiomyopathy ([facility] [address] Pacemaker, Retroperitoneal hemorrhage, and Thyroid disease. Procedure: none  Barriers to Discharge:  Trops neg, INR 2.67. IVF. GI has evaluated, await cardio plan but state if EGD is needed that INR must be 1.5 or less. PCP: Alexx Leslie MD   %    Patient Goals/Plan/Treatment Preferences: Spoke with Shayy Ricardo, she plans to return home with her  at discharge. She does not use [facility] or [facility] services, and denies needs for both. She confirms all basic needs are met. Transportation/Food Security/Housekeeping Addressed:  No issues identified.

## 2021-11-30 NOTE — CONSULTS
[facility] [place]  Cardiology Consult        Patient:  Sunny Calloway  YOB: 1940  MRN: [id number]     Acct: [de-identified]    PCP: Shmuel Packer MD    Date of Admission: 11/28/2021      Reason for Consultation:  Chest pain      History Of Present Illness:    80 y.o. pleasant female who presented to the ER with complaints of chest pain on 11/27. She was discharged that night, but came back with the same complaint on 11/28. She states her pain had moved from being substernal to her left breast. She received nitroglycerin which she says helped with her pain. She was then admitted for observation. The symptoms are sometimes worsened with eating solid foods. Recently it happened with eating grapes. She underwent EGD in the past with dilation. On 11/29, she admits to an episode of dizziness says it resolved on its own. Does not admit to symptoms aligning with orthostatic hypotension. She also had an episode of chest pressure once again and says discomfort was relieved with nitroglycerin. Currently, she denies symptoms of chest pain, palpitations, SOB, wheezing, lightheadedness, dizziness, and pedal edema. Hx is significant for hypertension, LV systolic dysfunction, chronic Afib, and CKD stage III. She is currently taking accupril, coumadin, amiodarone, and digoxin and has a biventricular pacemaker/defibrillator implant. She denies a history of CAD. Most recent EKG as of 11/28 shows a ventricular-paced rhythm. INR this AM was 2.67, Hg decreased to 8.9, and GFR is 48. Coumadin has been held for the last two days with plans to restart it this evening due to normalizing INR. This morning, her accupril was held due to her blood pressure being 112/42. Plans to restart this after further monitoring blood pressure at a later time today. Last cardiac cath showed patent coronaries with non-ischemic dilated CMP.         Past Medical History:          Diagnosis Date    [facility]    Arthritis 5/21/2019    Atrial fibrillation (HCC)     chronic    Biventricular ICD (implantable cardiac defibrillator) in place 10/09    C. difficile diarrhea     CAD (coronary artery disease)     Cancer ([facility] [address]     right hand    CHF (congestive heart failure) ([facility] [address]     Diverticulitis 01/2013    GERD (gastroesophageal reflux disease)     hiatal hernia    Hiatal hernia     Hyperlipidemia     Hypertension     Menopause     1994    NAFLD (nonalcoholic fatty liver disease) 1/31/2017    Nonischemic cardiomyopathy ([facility] [address]     chronic    Pacemaker     Retroperitoneal hemorrhage 07/99    transfusion x3    Thyroid disease        Past Surgical History:          Procedure Laterality Date    CARDIAC DEFIBRILLATOR PLACEMENT     Beth Baker  2008    w/ EGD    COLONOSCOPY N/A 7/16/2019    COLONOSCOPY POLYPECTOMY SNARE/COLD BIOPSY performed by Rea Rosado MD at [facility], COLON, DIAGNOSTIC     [address]    replaced 3x    SKIN BIOPSY  2010    Skin CA with graft.  UPPER GASTROINTESTINAL ENDOSCOPY N/A 7/16/2019    EGD DILATION SAVORY performed by Rea Rosado MD at [address] [place] [address] Rd Left 7/16/2019    EGD BIOPSY performed by Rea Rosado MD at [address] [place] [address] Rd N/A 12/2/2019    EGD ESOPHAGEAL MANOMETRY AND PH MONITORING 24 HR performed by Rea Rosado MD at [facility] End[facility]       Medications Prior to Admission:      Prior to Admission medications    Medication Sig Start Date End Date Taking? Authorizing Provider   Handicap Placard MISC by Does not apply route Request parking placard due to medical conditions. Duration of 5 years.  11/2/21  Yes Justyna Ba MD   digoxin (LANOXIN) 125 MCG tablet Take 1 tablet by mouth every other day 9/15/21 12/14/21 Yes Gene Fernando MD   metoprolol succinate (TOPROL XL) 200 MG extended release tablet Take 1 tablet by mouth daily 9/15/21  Yes Kate Ruth MD   levothyroxine (SYNTHROID) 175 MCG tablet Take 1 tablet by mouth Daily New dose. 9/15/21  Yes Kate Ruth MD   warfarin (COUMADIN) 3 MG tablet Alternating 3 mg/1.5 mg or as directed per INR results. 6/23/21  Yes Kalin Carcamo MD   bumetanide (BUMEX) 1 MG tablet Take 1 tablet by mouth daily 2/3/21  Yes Kate Ruth MD   quinapril (ACCUPRIL) 5 MG tablet Take 1 tablet by mouth daily 2/3/21 2/3/22 Yes Kate Ruth MD   pantoprazole (PROTONIX) 20 MG tablet TAKE 2 TABLETS BY MOUTH TWICE DAILY 1/18/21  Yes Kate Ruth MD   amiodarone (CORDARONE) 200 MG tablet Take 1 tablet by mouth daily 1/14/21  Yes Kate Ruth MD   spironolactone (ALDACTONE) 25 MG tablet Take 1 tablet by mouth every other day 1/14/21  Yes Kate Ruth MD   ferrous sulfate (IRON 325) 325 (65 Fe) MG tablet Take 325 mg by mouth every other day   Yes Historical Provider, MD   Alum & Mag Hydroxide-Simeth (MYLANTA PO) Take by mouth as needed    Yes Historical Provider, MD   acetaminophen (TYLENOL) 325 MG tablet Take 650 mg by mouth every 6 hours as needed for Pain or Fever   Yes Historical Provider, MD   Misc. Devices (LUMBAR SUPPORT CUSHION) MISC by Does not apply route. Dx: low back pain, sciatica. 4/5/13  Yes Kalin Carcamo MD   Ascorbic Acid (VITAMIN C) 1000 MG tablet Take 1,000 mg by mouth 2 times daily. Yes Historical Provider, MD   Multiple Vitamin (MULTI-VITAMIN PO) Take  by mouth daily. Yes Historical Provider, MD   Calcium Carbonate (CALCIUM 600 PO) Take  by mouth 2 times daily. Yes Historical Provider, MD   BIOTIN PO Take 5,000 mg by mouth daily. Yes Historical Provider, MD   Probiotic Product (PROBIOTIC FORMULA PO) Take 1 tablet by mouth daily.     Historical Provider, MD       Current Facility-Administered Medications   Medication Dose Route Frequency Provider Last Rate Last Admin    warfarin (COUMADIN) tablet 3 mg  3 mg Oral Once [facility] Shoe, DO        quinapril (ACCUPRIL) tablet 5 mg  5 mg Oral Daily [facility] Shoe, DO   5 mg at 11/29/21 0930    loperamide (IMODIUM) capsule 4 mg  4 mg Oral 4x Daily PRN Tereas Buitrago MD   4 mg at 11/30/21 0809    lactated ringers infusion   IntraVENous Continuous Teresa Buitrago MD 50 mL/hr at 11/29/21 1838 New Bag at 11/29/21 1838    nitroGLYCERIN (NITROSTAT) SL tablet 0.4 mg  0.4 mg SubLINGual Q5 Min PRN [facility], [facility]   0.4 mg at 11/29/21 2032    aluminum & magnesium hydroxide-simethicone (MAALOX) 200-200-20 MG/5ML suspension 30 mL  30 mL Oral Q6H PRN [facility], DO        amiodarone (CORDARONE) tablet 200 mg  200 mg Oral Daily [facility] Shoe, DO   200 mg at 11/30/21 5785    levothyroxine (SYNTHROID) tablet 175 mcg  175 mcg Oral Daily [facility] Shoe, DO   175 mcg at 11/30/21 0724    metoprolol succinate (TOPROL XL) extended release tablet 200 mg  200 mg Oral Daily [facility] Shoe, DO   200 mg at 11/30/21 7424    multivitamin 1 tablet  1 tablet Oral Daily [facility], DO        pantoprazole (PROTONIX) tablet 40 mg  40 mg Oral QAM AC [facility], DO   40 mg at 11/30/21 4698    lactobacillus (CULTURELLE) capsule 1 capsule  1 capsule Oral Daily [facility], DO        spironolactone (ALDACTONE) tablet 25 mg  25 mg Oral Every Other Day [facility] Shoe, DO   25 mg at 11/30/21 6534    sodium chloride flush 0.9 % injection 10 mL  10 mL IntraVENous 2 times per day [facility], DO   10 mL at 11/29/21 1210    sodium chloride flush 0.9 % injection 10 mL  10 mL IntraVENous PRN [facility], DO        0.9 % sodium chloride infusion  25 mL IntraVENous PRN [facility], DO        ondansetron (ZOFRAN-ODT) disintegrating tablet 4 mg  4 mg Oral Q8H PRN [facility], DO        Or    ondansetron [facility]) injection 4 mg  4 mg IntraVENous Q6H PRN Elvin Aguiare, DO        polyethylene glycol (GLYCOLAX) packet 17 g  17 g Oral Daily PRN Elvin Bajwa,         acetaminophen (TYLENOL) tablet 650 mg  650 mg Oral Q6H PRN Stanley Sam, DO        Or    acetaminophen (TYLENOL) suppository 650 mg  650 mg Rectal Q6H PRN Stanley Sam, DO        potassium chloride (KLOR-CON M) extended release tablet 40 mEq  40 mEq Oral PRN Stanley Sam, DO        Or    potassium bicarb-citric acid (EFFER-K) effervescent tablet 40 mEq  40 mEq Oral PRN Stanley Sam, DO        Or    potassium chloride 10 mEq/100 mL IVPB (Peripheral Line)  10 mEq IntraVENous PRN Stanley Sam, DO        magnesium sulfate 2000 mg in 50 mL IVPB premix  2,000 mg IntraVENous PRN Stanley Sam, DO        ALPRAZolam Jennett Dubin) tablet 0.5 mg  0.5 mg Oral Q4H PRN Stanley Sam, DO        warfarin (COUMADIN) daily dosing (placeholder)   Other RX Placeholder Stanley Sam, DO        digoxin (LANOXIN) tablet 125 mcg  125 mcg Oral Every Other Day Stanley Sam, DO   125 mcg at 11/29/21 2128    ferrous sulfate (IRON 325) tablet 325 mg  325 mg Oral Every Other Day Stanley Sam, DO           Allergies:  Ativan [lorazepam], Codeine, Nystatin, Percocet [oxycodone-acetaminophen], Relafen [nabumetone], Tape [adhesive tape], and Celebrex [celecoxib]    Social History:    TOBACCO:   reports that she has never smoked. She has never used smokeless tobacco.  ETOH:   reports no history of alcohol use.       Family History:        Problem Relation Age of Onset    Heart Disease Mother     Diabetes Mother     High Cholesterol Mother     High Blood Pressure Brother     Cancer Sister     High Blood Pressure Brother     High Blood Pressure Brother     Parkinsonism Brother     Diabetes Brother     Heart Disease Brother     Kidney Disease Brother     High Blood Pressure Brother     High Cholesterol Father     [de-identified] / Stillbirths Daughter     Arthritis Neg Hx     Asthma Neg Hx     Birth Defects Neg Hx     Depression Neg Hx     Early Death Neg Hx     Learning Disabilities Neg Hx     Mental Illness Neg Hx     Mental Retardation Neg Hx     Stroke Neg Hx     Substance Abuse Neg Hx     Vision Loss Neg Hx     Other Neg Hx          Review of Systems -   General ROS: negative  Psychological ROS: negative  Hematological and Lymphatic ROS: No history of blood clots or bleeding disorder. Respiratory ROS: no cough, shortness of breath, or wheezing  Cardiovascular ROS: As per HPI  Gastrointestinal ROS: negative  Genito-Urinary ROS: no dysuria, trouble voiding, or hematuria  Musculoskeletal ROS: negative  Neurological ROS: no TIA or stroke symptoms  Dermatological ROS: negative    All others reviewed and are negative.        BP (!) 112/42   Pulse 61   Temp 97.8 °F (36.6 °C) (Oral)   Resp 18   Wt 183 lb (83 kg)   SpO2 96%   BMI 33.47 kg/m²       Physical Examination:   General appearance - alert, in no distress  Mental status - alert, oriented to person, place, and time  Neck - supple, no significant adenopathy, no JVD, or carotid bruits  Chest - clear to auscultation, no wheezes, rales or rhonchi, symmetric air entry  Heart - normal rate, regular rhythm, normal S1, S2, no murmurs, rubs, clicks or gallops  Abdomen - soft, nontender, nondistended, no masses or organomegaly  Neurological - alert, oriented, normal speech, no focal findings or movement disorder noted  Musculoskeletal - no joint tenderness, deformity or swelling  Extremities - peripheral pulses normal, no pedal edema, no clubbing or cyanosis  Skin - normal coloration and turgor, no rashes, no suspicious skin lesions noted      LABS:    Recent Labs     11/28/21  1540 11/28/21  1949 11/28/21  2312   TROPONINT < 0.010 < 0.010 < 0.010     CBC:   Lab Results   Component Value Date    WBC 6.7 11/30/2021    RBC 3.15 11/30/2021    RBC 3.06 11/29/2011    HGB 8.9 11/30/2021    HCT 30.7 11/30/2021    MCV 97.5 11/30/2021    MCH 28.3 11/30/2021    MCHC 29.0 11/30/2021    RDW 15.1 02/25/2016     11/30/2021    MPV 10.7 11/30/2021     BMP:    Lab Results   Component Value Date     11/30/2021    K 4.7 11/30/2021 K 4.6 11/29/2021     11/30/2021    CO2 26 11/30/2021    BUN 30 11/30/2021    LABALBU 3.1 11/29/2021    LABALBU 4.1 05/09/2012    CREATININE 1.1 11/30/2021    CALCIUM 8.2 11/30/2021    LABGLOM 48 11/30/2021    GLUCOSE 80 11/30/2021    GLUCOSE 147 05/09/2012     Hepatic Function Panel:    Lab Results   Component Value Date    ALKPHOS 122 11/29/2021    ALT 25 11/29/2021    AST 44 11/29/2021    PROT 6.1 11/29/2021    BILITOT 0.2 11/29/2021    BILIDIR <0.2 11/27/2021    LABALBU 3.1 11/29/2021    LABALBU 4.1 05/09/2012     Magnesium:    Lab Results   Component Value Date    MG 2.3 11/28/2021     Warfarin PT/INR:  No components found for: PTPATWAR, PTINRWAR  HgBA1c:    Lab Results   Component Value Date    LABA1C 6.6 02/16/2018     FLP:    Lab Results   Component Value Date    TRIG 113 03/10/2020    HDL 40 03/10/2020    LDLCALC 158 03/10/2020     TSH:    Lab Results   Component Value Date    TSH 3.470 09/15/2021     BNP: No components found for: PRO-BNP      Assessment/Plan:    Patient Active Problem List   Diagnosis    Hyperlipidemia    [facility]ischemic cardiomyopathy [facility] [address]    Chronic atrial fibrillation (HCC)    Hypothyroidism    Biventricular implantable cardioverter-defibrillator in situ    Abnormal LFTs    Benign essential HTN    NAFLD (nonalcoholic fatty liver disease)    Left ventricular systolic dysfunction    Ventricular tachycardia (paroxysmal) (HCC)    Other forms of angina pectoris (HCC)    Unstable angina pectoris (HCC)    Anxiety    Arthritis    Ventricular arrhythmia    Chronic kidney disease, stage III (moderate) (HCC)    Anemia    Atypical chest pain    Dyspnea    Chronic anticoagulation    Iron deficiency    Obesity (BMI 30-39. 9)     Atypical chest pain  Nonischemic cardiomyopathy EF 25%  Patent coronaries on last cath   Hypertension  Hyperlipidemia    ACS ruled out with serial enzymes.    Consider non-cardiac cause of chest pain   Last cath showed no CAD with patent coronaries  Symptoms are consistent with angina  Consider GI workup  Elevated INR of 2.6  Can consider Vitamin K if GI workup is needed  Clinically euvolemic. Continue cardiac management   Follow up in outpatient clinic     Please do note hesitate to contact me for any further questions. Thank you for the opportunity to be involved in this patient's care.     Code Status: Full Code

## 2021-11-30 NOTE — CONSULTS
[address] CONSULT      Patient: Na Agarwal  : 1940  Acct#: [de-identified]  Consult seen for:   Na Agarwal is a 80 y.o. , female with chest pain      ASSESSMENT:     1. Chest pain with significant cardiac history  2. Dysphagia with drier foods such as bread and hard-boiled eggs  3. Elevated INR 2.67; on Coumadin  4. Chronic iron deficient anemia; H&H 8.9/30.7  5. Other comorbidities include chronic atrial fibrillation for which she is on Coumadin, hypertension, hypothyroidism, congestive heart failure with previous EF of 15 to 20%   6. Previous EGD 2019, antral gastritis, Schatzki ring and was dilated with 54 and 60 Western Beatriz dilators   7. History of fatty liver  8. Colonoscopy 2018 showed 8 polyps and she had a history of adenomatous polyps  9. Patient has pacemaker/defibrillator so any procedures would be done in the hospital setting  10. Patient is full code      PLAN:   1. Recommend cardiac evaluation prior to any endoscopy procedures  2. Previous EGD and colonoscopy both done on 2019. At that time, the EGD was done for the same symptoms she is having at this time. Substernal chest pain and some dysphagia  3. If endoscopy would be recommended, then Coumadin would need to be held. Current INR is 2.67 and for endoscopy that would need to be 1.7 or less especially if dilatation would be needed. Then it may need to be closer to 1.5  4. Continue Protonix 40 mg daily  5. Patient is on diuretics with history of heart failure  6. Continue probiotic  7. Currently on iron 325 mg every other day  8. We will follow. Cardiology would need to evaluate and if endoscopy would be warranted then they would need to address if the Coumadin can continue to be held and INR corrected. HISTORY OF PRESENT ILLNESS    Patient sitting up in a chair in her room. She states that she has had a heart catheterization years ago with Dr. Raquel Tristan.   Cardiology has been consulted at this time to further assess her complaints. She has describing issue of pressure in the lower substernal area and into the epigastric area. No nausea or vomiting. States that she is having more troubles with bread and hard-boiled eggs/dry foods. Feels that they are sticking a little bit. We reviewed that in 2019 she also had some chest pain that was substernal and some dysphagia. Discussed that her INR currently is too high for endoscopy to be safely done but if cardiology states that they do not feel it is her heart and they do want an EGD done then we would ask them to address the INR and get it to a safe level for an EGD to be done with possible dilatation. Patient was agreeable to this plan      Pain location distal substernal area pressure  Quality pressure  Severity not rated on 1-10 scale  Duration intermittent  Radiation none  Timing several days  Associated s/s: None  Aggravating factors: None  Alleviating factors: None    EGD: July 2019. Schatzki ring and was dilated with 54 in 60 [employer] Beatriz dilators. Also had mild antral gastritis  Colonoscopy: 8 colon polyps with history of adenomatous polyps  Family history of GI malignancy:  None       PROBLEM LIST    does not have any pertinent problems on file. PAST MEDICAL HISTORY     has a past medical history of AICD (automatic cardioverter/defibrillator) present, Arthritis, Atrial fibrillation ([address] Biventricular ICD (implantable cardiac defibrillator) in place, C. difficile diarrhea, CAD (coronary artery disease), Cancer ([address] CHF (congestive heart failure) ([address] Diverticulitis, GERD (gastroesophageal reflux disease), Hiatal hernia, Hyperlipidemia, Hypertension, Menopause, NAFLD (nonalcoholic fatty liver disease), Nonischemic cardiomyopathy ([address] Pacemaker, Retroperitoneal hemorrhage, and Thyroid disease.       PAST SURGICAL HISTORY      has a past surgical history that includes Cholecystectomy, laparoscopic; Cardiac defibrillator placement; Colonoscopy (2008); skin biopsy (2010); Endoscopy, colon, diagnostic; pacemaker placement (1999); Colonoscopy (N/A, 7/16/2019); Upper gastrointestinal endoscopy (N/A, 7/16/2019); Upper gastrointestinal endoscopy (Left, 7/16/2019); and Upper gastrointestinal endoscopy (N/A, 12/2/2019). LABS:    CBC: Recent Labs     11/28/21 1540 11/29/21 0801 11/30/21 0439   WBC 6.6 5.4 6.7   HGB 10.4* 9.5* 8.9*    202 193     BMP:    Recent Labs     11/28/21 1540 11/29/21 0801 11/30/21 0439    139 137   K 4.4 4.6 4.7    105 104   CO2 24 27 26   BUN 27* 30* 30*   CREATININE 1.3* 1.4* 1.1   GLUCOSE 194* 108 80     Hepatic:   Recent Labs     11/27/21 2112 11/29/21  0801   ALKPHOS 154* 122   ALT 30 25   AST 56* 44*   PROT 7.1 6.1   BILITOT 0.3 0.2*   BILIDIR <0.2  --    LABALBU 3.7 3.1*     Amylase and Lipase:  Recent Labs     11/27/21 2112   LIPASE 54.2*     Lactic Acid: No results for input(s): LACTA in the last 72 hours. Calcium:  Recent Labs     11/30/21 0439   CALCIUM 8.2*     Ionized Calcium:No results for input(s): IONCA in the last 72 hours. Magnesium:  Recent Labs     11/28/21  1540   MG 2.3     Phosphorus:No results for input(s): PHOS in the last 72 hours. Troponin: No results for input(s): CKTOTAL, CKMB, TROPONINI in the last 72 hours. PT/INR:     Recent Labs     11/28/21 2008 11/29/21 0801 11/30/21 0439   INR 3.86* 3.42* 2.67*         REVIEW OF SYSTEMS:    GENERAL:  No fever, chills or weight loss. EYES:  No  blurred vision, double vision  CARDIOVASCULAR:  No chest pain or palpitations. RESPIRATORY:  No dyspnea or cough. GI: Slight dysphagia with drier foods and also substernal chest pressure  MUSCULOSKELETAL:  No new painful or swollen joints or myalgias. :   No dysuria or hematuria. SKIN:  No rashes or jaundice. NEUROLOGIC:   No headaches or  Seizures  ENDOCRINE:   No polyuria or polydipsia.     BLOOD: + Iron deficient anemia, no bleeding disorderod or blood product transfusion. PHYSICAL EXAMINATION:      Vitals:    11/30/21 0755   BP: (!) 112/42   Pulse: 61   Resp: 18   Temp: 97.8 °F (36.6 °C)   SpO2: 96%     GEN: Well nourished, well developed. LUNGS:    Chest rises equally on inspiration. CARDIOVASCULAR:  On tele  ABDOMEN:  Soft  EYES: LEN. ENT:  Ears symmetric, Neck supple, trachea midline, moist mucous membranes     EXTREMITIES:  No cyanosis, clubbing, + lower leg edema. DERM:  No rash or jaundice. NEURO:  Alert and oriented x4. Patient moves all extremities and has gross sensation in all extremities. PSYCHIATRIC: calm, pleasant    HOME MEDICATIONS      Prior to Admission medications    Medication Sig Start Date End Date Taking? Authorizing Provider   [facility] Does not apply route Request parking placard due to medical conditions. Duration of 5 years. 11/2/21  Yes Conchis Hutchinson MD   digoxin (LANOXIN) 125 MCG tablet Take 1 tablet by mouth every other day 9/15/21 12/14/21 Yes Shefali Paulino MD   metoprolol succinate (TOPROL XL) 200 MG extended release tablet Take 1 tablet by mouth daily 9/15/21  Yes Shefali Paulino MD   levothyroxine (SYNTHROID) 175 MCG tablet Take 1 tablet by mouth Daily New dose. 9/15/21  Yes Shefali Paulino MD   warfarin (COUMADIN) 3 MG tablet Alternating 3 mg/1.5 mg or as directed per INR results.  6/23/21  Yes Conchis Hutchinson MD   bumetanide (BUMEX) 1 MG tablet Take 1 tablet by mouth daily 2/3/21  Yes Shefali Paulino MD   quinapril (ACCUPRIL) 5 MG tablet Take 1 tablet by mouth daily 2/3/21 2/3/22 Yes Shefali Paulino MD   pantoprazole (PROTONIX) 20 MG tablet TAKE 2 TABLETS BY MOUTH TWICE DAILY 1/18/21  Yes Shefali Paulino MD   amiodarone (CORDARONE) 200 MG tablet Take 1 tablet by mouth daily 1/14/21  Yes Shefali Paulino MD   spironolactone (ALDACTONE) 25 MG tablet Take 1 tablet by mouth every other day 1/14/21  Yes Shefali Paulino MD   ferrous sulfate (IRON 325) 325 (65 Fe) MG tablet Take 325 mg by mouth every other day   Yes Historical Provider, MD   Alum & Mag Hydroxide-Simeth (MYLANTA PO) Take by mouth as needed    Yes Historical Provider, MD   acetaminophen (TYLENOL) 325 MG tablet Take 650 mg by mouth every 6 hours as needed for Pain or Fever   Yes Historical Provider, MD   Misc. Devices (LUMBAR SUPPORT CUSHION) MISC by Does not apply route. Dx: low back pain, sciatica. 4/5/13  Yes Lorin Andrade MD   Ascorbic Acid (VITAMIN C) 1000 MG tablet Take 1,000 mg by mouth 2 times daily. Yes Historical Provider, MD   Multiple Vitamin (MULTI-VITAMIN PO) Take  by mouth daily. Yes Historical Provider, MD   Calcium Carbonate (CALCIUM 600 PO) Take  by mouth 2 times daily. Yes Historical Provider, MD   BIOTIN PO Take 5,000 mg by mouth daily. Yes Historical Provider, MD   Probiotic Product (PROBIOTIC FORMULA PO) Take 1 tablet by mouth daily. Historical Provider, MD       MEDICATIONS    Scheduled Meds:   quinapril  5 mg Oral Daily    amiodarone  200 mg Oral Daily    levothyroxine  175 mcg Oral Daily    metoprolol succinate  200 mg Oral Daily    multivitamin  1 tablet Oral Daily    pantoprazole  40 mg Oral QAM AC    lactobacillus  1 capsule Oral Daily    spironolactone  25 mg Oral Every Other Day    sodium chloride flush  10 mL IntraVENous 2 times per day    warfarin (COUMADIN) daily dosing (placeholder)   Other RX Placeholder    digoxin  125 mcg Oral Every Other Day    ferrous sulfate  325 mg Oral Every Other Day     Continuous Infusions:   lactated ringers 50 mL/hr at 11/29/21 1838    sodium chloride       PRN Meds:. loperamide, nitroGLYCERIN, aluminum & magnesium hydroxide-simethicone, sodium chloride flush, sodium chloride, ondansetron **OR** ondansetron, polyethylene glycol, acetaminophen **OR** acetaminophen, potassium chloride **OR** potassium alternative oral replacement **OR** potassium chloride, magnesium sulfate, ALPRAZolam    ALLERGIES   is allergic to ativan [lorazepam], codeine, nystatin, in the Last Year: Not on file    Unstable Housing in the Last Year: Not on file       FAMILY HISTORY    family history includes Cancer in her sister; Diabetes in her brother and mother; Heart Disease in her brother and mother; High Blood Pressure in her brother, brother, brother, and brother; High Cholesterol in her father and mother; Kidney Disease in her brother; [place] in her daughter; Parkinsonism in her brother. REVIEW OF DIAGNOSTIC TESTING AND LABS:        Hospitalist/Attending provider notes, consulting physician notes, laboratory results and procedure notes reviewed prior to seeing the patient. Note done in collaboration with DR Jeremías Leiva.    Electronically signed by ELIA Su CNP on 11/30/21 at 9:25 AM EST

## 2021-11-30 NOTE — PROGRESS NOTES
Clinical Pharmacy Note    Warfarin consult follow-up    Recent Labs     11/30/21  0439   INR 2.67*     Recent Labs     11/28/21  1540 11/29/21  0801 11/30/21  0439   HGB 10.4* 9.5* 8.9*   HCT 35.5* 32.8* 30.7*    202 193       Significant Drug-Drug Interactions:  New warfarin drug-drug interactions: none  Discontinued drug-drug interactions: none  Current warfarin drug-drug interactions: amiodarone (home med); levothyroxine (home med)     Date INR Warfarin Dose   11/28/21 3.86 No warfarin    11/29/2021 3.42  No warfarin    11/30/2021  2.67  3 mg                                         Notes:                   PT/INR or POC-INR will be monitored routinely until therapeutic INR is achieved    Francesca BurnsD, BCPS   11/30/2021  10:01 AM

## 2021-11-30 NOTE — PROGRESS NOTES
Hospitalist - progress note      Patient: Shante Perez    Unit/Bed:6K-05/005-A  YOB: 1940  MRN: [id number]   Acct: [de-identified]   PCP: Renetta Bowles MD    Date of Service: 11/29/2021      Chief Complaint:  Chest pain    Assessment and Plan:-  1. Atypical chest pain -cardiac work-up yesterday and today are both negative. Will follow troponin. Cardiology consult tomorrow for evaluation. Patient does have extensive cardiac history. Will observe overnight. Patient also with issues while eating food, prior history of dilation. Follows Dr. Rebekah Garay. Will consult to consider EGD  2. Nonischemic cardiomyopathy -patient has reduced ejection fraction -on prior cardiac cath on 2019 patient had a ejection fraction measured at 15 to 20%. Stable. Continue home medications. On spironolactone and Bumex. 3. Chronic atrial fibrillation -patient is in paced rhythm. Patient is anticoagulated with Coumadin. Also on rhythm control with amiodarone. 4. Essential hypertension -stable. Continue medications  5. Hypothyroidism -stable. 6. Hyperlipidemia  7. Mild MARILYN on CKD 3 -renal function is slightly decreased compared to baseline. Will hold Bumex and give gentle IVFs overnight. HPI:    This is a 68-year-old female with extensive cardiac history presenting with chest discomfort. Patient was here in the emergency room yesterday and had basic cardiac work-up and was discharged to home with a diagnosis of GERD. However, patient's chest pain came back today and decided to come to emergency room again for evaluation. Yesterday's cardiac work-up with today's cardiac work-up are both negative. EKG did not show any significant change compared to her prior EKGs. Patient does have prolonged QTC in EKG which is also appears to be chronic. Patient is in paced rhythm. Patient denies that this is actually a chest pain. Patient describes it as more of a discomfort.   States that this is not sharp or stabbing pain. It is more like pressure-like discomfort which was relieved by nitro in the emergency room. Patient is not sure whether this is associated with certain movement or deep breathing. At the time of my exam patient stated that her chest pain is gone. No documented fever. Denies palpitation or shortness of breath. No abdominal issue. No urinary issue. No acute neurological changes. No sensation changes. No vision changes or headache. Subjective:  Patient doing well, but did have a chest pain episode with exertion today with light headedness and dizziness. Denies SOB. States foods sometimes also cause symptoms. Past Medical History:        Diagnosis Date    AICD (automatic cardioverter/defibrillator) present 05/1999    Arthritis 5/21/2019    Atrial fibrillation (HCC)     chronic    Biventricular ICD (implantable cardiac defibrillator) in place 10/09    C. difficile diarrhea     CAD (coronary artery disease)     [facility]     right hand    CHF (congestive heart failure) ([facility]     Diverticulitis 01/2013    GERD (gastroesophageal reflux disease)     hiatal hernia    Hiatal hernia     Hyperlipidemia     Hypertension     Menopause     1994    NAFLD (nonalcoholic fatty liver disease) 1/31/2017    Nonischemic cardiomyopathy ([facility]     chronic    Pacemaker     Retroperitoneal hemorrhage 07/99    transfusion x3    Thyroid disease        Past Surgical History:        Procedure Laterality Date    CARDIAC DEFIBRILLATOR PLACEMENT     Perry Carbo      Dr Clement Harrison  2008    w/ EGD    COLONOSCOPY N/A 7/16/2019    COLONOSCOPY POLYPECTOMY SNARE/COLD BIOPSY performed by Kodi Govea MD at [address] [place] [address], DIAGNOSTIC     [address]    replaced 3x    SKIN BIOPSY  2010    Skin CA with graft.     UPPER GASTROINTESTINAL ENDOSCOPY N/A 7/16/2019    EGD DILATION SAVORY performed by Kodi Govea MD at [facility]  UPPER GASTROINTESTINAL ENDOSCOPY Left 7/16/2019    EGD BIOPSY performed by Rubi Reyna MD at [address] [facility] N/A 12/2/2019    EGD ESOPHAGEAL MANOMETRY AND PH MONITORING 24 HR performed by Rubi Reyna MD at [facility]       Home Medications:   No current facility-administered medications on file prior to encounter. Current Outpatient Medications on File Prior to Encounter   Medication Sig Dispense Refill    Handicap Placard MISC by Does not apply route Request parking placard due to medical conditions. Duration of 5 years. 1 each 0    digoxin (LANOXIN) 125 MCG tablet Take 1 tablet by mouth every other day 45 tablet 3    metoprolol succinate (TOPROL XL) 200 MG extended release tablet Take 1 tablet by mouth daily 90 tablet 3    levothyroxine (SYNTHROID) 175 MCG tablet Take 1 tablet by mouth Daily New dose. 90 tablet 3    warfarin (COUMADIN) 3 MG tablet Alternating 3 mg/1.5 mg or as directed per INR results. 90 tablet 3    bumetanide (BUMEX) 1 MG tablet Take 1 tablet by mouth daily 90 tablet 3    quinapril (ACCUPRIL) 5 MG tablet Take 1 tablet by mouth daily 90 tablet 3    pantoprazole (PROTONIX) 20 MG tablet TAKE 2 TABLETS BY MOUTH TWICE DAILY 360 tablet 3    amiodarone (CORDARONE) 200 MG tablet Take 1 tablet by mouth daily 90 tablet 3    spironolactone (ALDACTONE) 25 MG tablet Take 1 tablet by mouth every other day 45 tablet 3    ferrous sulfate (IRON 325) 325 (65 Fe) MG tablet Take 325 mg by mouth every other day      Alum & Mag Hydroxide-Simeth (MYLANTA PO) Take by mouth as needed       acetaminophen (TYLENOL) 325 MG tablet Take 650 mg by mouth every 6 hours as needed for Pain or Fever      Misc. Devices (LUMBAR SUPPORT CUSHION) MISC by Does not apply route. Dx: low back pain, sciatica. 1 each 0    Ascorbic Acid (VITAMIN C) 1000 MG tablet Take 1,000 mg by mouth 2 times daily.  Multiple Vitamin (MULTI-VITAMIN PO) Take  by mouth daily.  Calcium Carbonate (CALCIUM 600 PO) Take  by mouth 2 times daily.  BIOTIN PO Take 5,000 mg by mouth daily.  Probiotic Product (PROBIOTIC FORMULA PO) Take 1 tablet by mouth daily. Allergies:    Ativan [lorazepam], Codeine, Nystatin, Percocet [oxycodone-acetaminophen], Relafen [nabumetone], Tape [adhesive tape], and Celebrex [celecoxib]    Social History:    reports that she has never smoked. She has never used smokeless tobacco. She reports that she does not drink alcohol and does not use drugs. Family History:       Problem Relation Age of Onset    Heart Disease Mother     Diabetes Mother     High Cholesterol Mother     High Blood Pressure Brother     Cancer Sister     High Blood Pressure Brother     High Blood Pressure Brother     Parkinsonism Brother     Diabetes Brother     Heart Disease Brother     Kidney Disease Brother     High Blood Pressure Brother     High Cholesterol Father     [de-identified] / Stillbirths Daughter     Arthritis Neg Hx     Asthma Neg Hx     Birth Defects Neg Hx     Depression Neg Hx     Early Death Neg Hx     Learning Disabilities Neg Hx     Mental Illness Neg Hx     Mental Retardation Neg Hx     Stroke Neg Hx     Substance Abuse Neg Hx     Vision Loss Neg Hx     Other Neg Hx        Diet:  ADULT DIET; Regular; Low Fat/Low Chol/High Fiber/ORLIN    Review of systems:   Pertinent positives as noted in the HPI. All other systems reviewed and negative. PHYSICAL EXAM:  /61   Pulse 74   Temp 98.3 °F (36.8 °C) (Oral)   Resp 16   Wt 180 lb (81.6 kg)   SpO2 97%   BMI 32.92 kg/m²   General appearance: No apparent distress, appears stated age and cooperative. HEENT: Normal cephalic, atraumatic without obvious deformity. Pupils equal, round, and reactive to light. Extra ocular muscles intact. Conjunctivae/corneas clear. Neck: Supple, with full range of motion. No jugular venous distention. Trachea midline.   Respiratory:  Normal respiratory effort. Clear to auscultation, bilaterally without Rales/Wheezes/Rhonchi. Cardiovascular: Regular rate and rhythm with normal S1/S2. Abdomen: Soft, non-tender, non-distended with normal bowel sounds. Musculoskeletal:  No clubbing or cyanosis. +1 bilateral lower extremity edema  Skin: Skin color, texture, turgor normal.  No rashes or lesions. Neurologic:  Cranial nerves: II-XII intact, grossly non-focal.  Psychiatric: Alert and oriented, thought content appropriate, normal insight  Capillary Refill: Brisk,< 3 seconds   Peripheral Pulses: +2 palpable, equal bilaterally     Labs:   Recent Labs     11/27/21 2112 11/28/21  1540 11/29/21  0801   WBC 6.4 6.6 5.4   HGB 10.7* 10.4* 9.5*   HCT 36.6* 35.5* 32.8*    256 202     Recent Labs     11/27/21 2112 11/28/21  1540 11/29/21  0801    137 139   K 4.2 4.4 4.6    102 105   CO2 25 24 27   BUN 27* 27* 30*   CREATININE 1.1 1.3* 1.4*   CALCIUM 8.7 8.7 8.4*     Recent Labs     11/27/21 2112 11/29/21  0801   AST 56* 44*   ALT 30 25   BILIDIR <0.2  --    BILITOT 0.3 0.2*   ALKPHOS 154* 122     Recent Labs     11/27/21  2221 11/28/21  2008 11/29/21  0801   INR 3.82* 3.86* 3.42*     No results for input(s): Patrisha Meigs in the last 72 hours. Urinalysis:    Lab Results   Component Value Date    NITRU NEGATIVE 03/09/2020    BLOODU NEGATIVE 03/09/2020    SPECGRAV 1.008 01/09/2013    GLUCOSEU NEGATIVE 03/09/2020       Radiology:   XR CHEST (2 VW)   Final Result   1. Minimal reticular linear opacities at the right lung base are seen which may be related to minimal scarring versus atelectasis. **This report has been created using voice recognition software. It may contain minor errors which are inherent in voice recognition technology. **      Final report electronically signed by Dr. Clementine Isaac on 11/28/2021 4:03 PM        XR CHEST (2 VW)    Result Date: 11/28/2021  PROCEDURE: XR CHEST (2 VW) CLINICAL INFORMATION: chest pain COMPARISON: 11/27/2021 TECHNIQUE:  PA and lateral chest x-ray  FINDINGS: There is mild enlargement of the cardiomediastinal silhouette. There is a left-sided AICD. No focal consolidation, pleural effusion or pneumothorax is seen. Minimal reticular linear opacities at the right lung base are seen which may be related to minimal scarring versus atelectasis. No acute osseous findings are seen. However there is diffuse osteopenia noted. 1. Minimal reticular linear opacities at the right lung base are seen which may be related to minimal scarring versus atelectasis. **This report has been created using voice recognition software. It may contain minor errors which are inherent in voice recognition technology. ** Final report electronically signed by Dr. Corinne Alosa on 11/28/2021 4:03 PM        EKG: Paced rhythm. No acute ischemic changes.     Electronically signed by Michele Rey MD on 11/29/2021 at 7:18 PM

## 2021-12-01 ENCOUNTER — APPOINTMENT (OUTPATIENT)
Dept: GENERAL RADIOLOGY | Age: 81
DRG: 392 | End: 2021-12-01
Payer: MEDICARE

## 2021-12-01 VITALS
SYSTOLIC BLOOD PRESSURE: 109 MMHG | DIASTOLIC BLOOD PRESSURE: 55 MMHG | HEART RATE: 60 BPM | WEIGHT: 184.6 LBS | BODY MASS INDEX: 33.76 KG/M2 | TEMPERATURE: 98 F | RESPIRATION RATE: 14 BRPM | OXYGEN SATURATION: 99 %

## 2021-12-01 PROBLEM — R07.9 CHEST PAIN: Status: ACTIVE | Noted: 2021-12-01

## 2021-12-01 LAB
ANION GAP SERPL CALCULATED.3IONS-SCNC: 6 MEQ/L (ref 8–16)
BUN BLDV-MCNC: 27 MG/DL (ref 7–22)
CALCIUM SERPL-MCNC: 8.1 MG/DL (ref 8.5–10.5)
CHLORIDE BLD-SCNC: 103 MEQ/L (ref 98–111)
CO2: 27 MEQ/L (ref 23–33)
CREAT SERPL-MCNC: 1.1 MG/DL (ref 0.4–1.2)
ERYTHROCYTE [DISTWIDTH] IN BLOOD BY AUTOMATED COUNT: 15.7 % (ref 11.5–14.5)
ERYTHROCYTE [DISTWIDTH] IN BLOOD BY AUTOMATED COUNT: 56.5 FL (ref 35–45)
GFR SERPL CREATININE-BSD FRML MDRD: 48 ML/MIN/1.73M2
GLUCOSE BLD-MCNC: 76 MG/DL (ref 70–108)
HCT VFR BLD CALC: 28.8 % (ref 37–47)
HEMOGLOBIN: 8.3 GM/DL (ref 12–16)
INR BLD: 2.15 (ref 0.85–1.13)
MCH RBC QN AUTO: 28.6 PG (ref 26–33)
MCHC RBC AUTO-ENTMCNC: 28.8 GM/DL (ref 32.2–35.5)
MCV RBC AUTO: 99.3 FL (ref 81–99)
PLATELET # BLD: 193 THOU/MM3 (ref 130–400)
PMV BLD AUTO: 10.5 FL (ref 9.4–12.4)
POTASSIUM SERPL-SCNC: 5.2 MEQ/L (ref 3.5–5.2)
RBC # BLD: 2.9 MILL/MM3 (ref 4.2–5.4)
SODIUM BLD-SCNC: 136 MEQ/L (ref 135–145)
WBC # BLD: 6.4 THOU/MM3 (ref 4.8–10.8)

## 2021-12-01 PROCEDURE — 74240 X-RAY XM UPR GI TRC 1CNTRST: CPT

## 2021-12-01 PROCEDURE — G0378 HOSPITAL OBSERVATION PER HR: HCPCS

## 2021-12-01 PROCEDURE — 1200000003 HC TELEMETRY R&B

## 2021-12-01 PROCEDURE — 99239 HOSP IP/OBS DSCHRG MGMT >30: CPT | Performed by: INTERNAL MEDICINE

## 2021-12-01 PROCEDURE — 85610 PROTHROMBIN TIME: CPT

## 2021-12-01 PROCEDURE — 6370000000 HC RX 637 (ALT 250 FOR IP): Performed by: NURSE PRACTITIONER

## 2021-12-01 PROCEDURE — 85027 COMPLETE CBC AUTOMATED: CPT

## 2021-12-01 PROCEDURE — 36415 COLL VENOUS BLD VENIPUNCTURE: CPT

## 2021-12-01 PROCEDURE — A4641 RADIOPHARM DX AGENT NOC: HCPCS | Performed by: NURSE PRACTITIONER

## 2021-12-01 PROCEDURE — 80048 BASIC METABOLIC PNL TOTAL CA: CPT

## 2021-12-01 PROCEDURE — 6370000000 HC RX 637 (ALT 250 FOR IP): Performed by: HOSPITALIST

## 2021-12-01 PROCEDURE — 6370000000 HC RX 637 (ALT 250 FOR IP)

## 2021-12-01 PROCEDURE — 2500000003 HC RX 250 WO HCPCS: Performed by: NURSE PRACTITIONER

## 2021-12-01 PROCEDURE — 6360000004 HC RX CONTRAST MEDICATION: Performed by: NURSE PRACTITIONER

## 2021-12-01 RX ORDER — WARFARIN SODIUM 3 MG/1
3 TABLET ORAL
Status: COMPLETED | OUTPATIENT
Start: 2021-12-01 | End: 2021-12-01

## 2021-12-01 RX ADMIN — WARFARIN SODIUM 3 MG: 3 TABLET ORAL at 18:13

## 2021-12-01 RX ADMIN — Medication 325 MG: at 09:37

## 2021-12-01 RX ADMIN — BARIUM SULFATE 140 ML: 980 POWDER, FOR SUSPENSION ORAL at 08:42

## 2021-12-01 RX ADMIN — ANTACID/ANTIFLATULENT 1 EACH: 380; 550; 10; 10 GRANULE, EFFERVESCENT ORAL at 08:41

## 2021-12-01 RX ADMIN — AMIODARONE HYDROCHLORIDE 200 MG: 200 TABLET ORAL at 09:36

## 2021-12-01 RX ADMIN — QUINAPRIL 5 MG: 5 TABLET ORAL at 09:37

## 2021-12-01 RX ADMIN — BARIUM SULFATE 100 ML: 0.6 SUSPENSION ORAL at 08:41

## 2021-12-01 RX ADMIN — METOPROLOL SUCCINATE 200 MG: 100 TABLET, EXTENDED RELEASE ORAL at 09:37

## 2021-12-01 ASSESSMENT — PAIN SCALES - GENERAL: PAINLEVEL_OUTOF10: 0

## 2021-12-01 NOTE — CARE COORDINATION
Discharge Planning Update: Following for CP. Cardiac workup neg. [place] UGI today. 12/1/21, 5:06 PM EST    Patient goals/plan/ treatment preferences discussed by  and . Patient goals/plan/ treatment preferences reviewed with patient/ family. Patient/ family verbalize understanding of discharge plan and are in agreement with goal/plan/treatment preferences. Understanding was demonstrated using the teach back method. AVS provided by RN at time of discharge, which includes all necessary medical information pertaining to the patients current course of illness, treatment, post-discharge goals of care, and treatment preferences. IMM Letter  IMM Letter given to Patient/Family/Significant other/Guardian/POA/by[de-identified] Francia ARENAS Case Manager. IMM Letter date given[de-identified] 12/01/21  IMM Letter time given[de-identified] 9571       [id number] discharge this evening. From home with spouse. No discharge needs voiced.

## 2021-12-01 NOTE — PROGRESS NOTES
Clinical Pharmacy Note    Warfarin consult follow-up    Recent Labs     12/01/21  0417   INR 2.15*     Recent Labs     11/29/21  0801 11/30/21  0439 12/01/21  0417   HGB 9.5* 8.9* 8.3*   HCT 32.8* 30.7* 28.8*    193 193     Significant Drug-Drug Interactions:  New warfarin drug-drug interactions: none  Discontinued drug-drug interactions: none  Current warfarin drug-drug interactions: amiodarone (home med); levothyroxine (home med)     Date INR Warfarin Dose   11/28/21 3.86 No warfarin    11/29/2021 3.42  No warfarin    11/30/2021  2.67  3 mg    12/1/2021   2.15 3 mg                             Notes:                   PT/INR or POC-INR will be monitored routinely until therapeutic INR is achieved.     Matt Rider, PharmD  10:55 AM  12/1/2021

## 2021-12-01 NOTE — DISCHARGE INSTR - MEDS
Recommendations for discharge:   Date Warfarin Dose   12/2/21 3 mg   12/3/21 3 mg   12/4/21 3 mg   12/5/21 1.5 mg   12/6/21 3 mg   12/7/21 3 mg     Provider dosing warfarin: Dr. Alejandra Awan INR:  Wednesday, 12/8/21 at Dr. Christiano Thomas office at 1:30 PM.

## 2021-12-01 NOTE — PROGRESS NOTES
Clinical Pharmacy Note                                               Warfarin Discharge Recommendations      Pt discharged from [facility] today after admission for atypical chest pain. INR today:  Recent Labs     12/01/21  0417   INR 2.15*       Coumadin 3 mg tabs    Interacting medications at discharge: amiodarone, levothyroxine    INR goal during admission: 2.0-3.0    Recommendations for discharge:   Date Warfarin Dose   12/1/21 3 mg   (give prior to discharge)   12/2/21 3 mg   12/3/21 3 mg   12/4/21 3 mg   12/5/21 1.5 mg   12/6/21 3 mg   12/7/21 3 mg     Provider dosing warfarin: Dr. Violet Hughes INR:  Wednesday, 12/8/21 at Dr. Carmen Mariano office.     Francesca McmanusD  5:26 PM  12/1/2021 I will START or STAY ON the medications listed below when I get home from the hospital:    Chest xray (2 view)- PA and lateral   -- s/p EBUS, mediastinoscopy 3/4/19  -- Indication: For Post op    oxyCODONE 5 mg oral tablet  -- 1 tab(s) by mouth every 4 hours, As Needed -for moderate pain MDD:6 tabs   -- Caution federal law prohibits the transfer of this drug to any person other  than the person for whom it was prescribed.  It is very important that you take or use this exactly as directed.  Do not skip doses or discontinue unless directed by your doctor.  May cause drowsiness.  Alcohol may intensify this effect.  Use care when operating dangerous machinery.  This prescription cannot be refilled.  Using more of this medication than prescribed may cause serious breathing problems.    -- Indication: For Pain    aspirin 81 mg oral tablet  -- 1 tab(s) by mouth once a day  -- Indication: For Prevent ischemia    Tylenol 325 mg oral tablet  -- 2 tab(s) by mouth every 4 hours for pain  -- Indication: For Pain    lisinopril 40 mg oral tablet  -- 1 tab(s) by mouth once a day  -- Indication: For Hypertension    nitroglycerin 0.4 mg sublingual tablet  -- 1 tab(s) sublingually every 5 minutes for total 3 tabs, if you have chest pain.   -- It is very important that you take or use this exactly as directed.  Do not skip doses or discontinue unless directed by your doctor.    -- Indication: For Chest pain    DULoxetine 60 mg oral delayed release capsule  -- 1 cap(s) by mouth once a day  -- Indication: For Depression    HumaLOG 100 units/mL injectable solution  -- sliding scale 5-10 units sq  -- Indication: For Diabetes mellitus type 1    Lantus Solostar Pen 100 units/mL subcutaneous solution  -- 5 unit(s) subcutaneous once a day (at bedtime)  -- Indication: For Diabetes mellitus type 1    atorvastatin 20 mg oral tablet  -- 1 tab(s) by mouth once a day (at bedtime)  -- Indication: For Hyperlipidemia    Plavix 75 mg oral tablet  -- 1 tab(s) by mouth once a day  -- Indication: For CAD stent    Metoprolol Succinate ER 50 mg oral tablet, extended release  -- 1 tab(s) by mouth once a day  -- Indication: For Hypertension    Sensipar 60 mg oral tablet  -- 1 tab(s) by mouth once a day  -- Indication: For ESRD (end stage renal disease) on dialysis    Lasix 40 mg oral tablet  -- 1 tab(s) by mouth 3 times a week Monday Friday Sunday  -- Indication: For Hypertension    Colace 100 mg oral capsule  -- 1 cap(s) by mouth 3 times a day   -- Medication should be taken with plenty of water.    -- Indication: For Constipation    senna oral tablet  -- 2 tab(s) by mouth once a day (at bedtime), As Needed -for constipation   -- Indication: For Constipation    Renvela 800 mg oral tablet  -- 3 tab(s) by mouth  (with meals)  -- Indication: For ESRD (end stage renal disease) on dialysis    hydrALAZINE 25 mg oral tablet  -- 4 tab(s) by mouth every 8 hours  -- Indication: For Hypertension    Multiple Vitamins oral tablet  -- 1 tab(s) by mouth once a day; pt dc d > 1 month ago  -- Indication: For Nutrition

## 2021-12-02 ENCOUNTER — CARE COORDINATION (OUTPATIENT)
Dept: CASE MANAGEMENT | Age: 81
End: 2021-12-02

## 2021-12-02 NOTE — CARE COORDINATION
Care Transitions Outreach Attempt    Call within 2 business days of discharge: Yes   Attempted to reach patient for transitions of care follow up. Unable to reach patient. Needs f/u appts : Follow up with Dr. Clau Domínguez MD  Specialty: Gastroenterology  please call office tomorrow to see when  they want to see y ou again OP    Follow up with Dr. Spring Nava MD  Specialty: Interventional Cardiology  please call office tomorrow for follow up appoitntment  [address] [facility]  [address] [place] [address] [address]  [phone]    Patient: Sharath Arrieta Patient : 1940 MRN: [id number]    Last Discharge [facility]       Complaint Diagnosis Description Type Department Provider    21 Chest Pain Chest pain, unspecified type ED to Hosp-Admission (Discharged) (ADMITTED) Fidel Gunn MD; Katelyn Davies. .. Was this an external facility discharge?  No Discharge Facility: [facility]    Noted following upcoming appointments from discharge chart review:   [facility] follow up appointment(s):   Future Appointments   Date Time Provider Deacon Perez   2021  1:30 PM Luann Torres  [facility]   3/21/2022  8:30 AM Vanessa Dutton MD [facility] - [address] [facility] follow up appointment(s):

## 2021-12-03 ENCOUNTER — CARE COORDINATION (OUTPATIENT)
Dept: CASE MANAGEMENT | Age: 81
End: 2021-12-03

## 2021-12-03 NOTE — DISCHARGE SUMMARY
Hospital Medicine Discharge Summary      Patient Identification:   Sharath Arrieta   : 1940  MRN: [id number]   Account: [de-identified]      Patient's PCP: Luann Torres MD    Admit Date: 2021     Discharge Date: 2021      Admitting Physician: Dana Marcano DO     Discharge Physician: Amisha Alvarez MD       Hospital Course: This is a 72-year-old female with extensive cardiac history presenting with chest discomfort. Patient was here in the emergency room yesterday and had basic cardiac work-up and was discharged to home with a diagnosis of GERD. However, patient's chest pain came back today and decided to come to emergency room again for evaluation. Yesterday's cardiac work-up with today's cardiac work-up are both negative. EKG did not show any significant change compared to her prior EKGs. Patient does have prolonged QTC in EKG which is also appears to be chronic. Patient is in paced rhythm. Patient denies that this is actually a chest pain. Patient describes it as more of a discomfort. States that this is not sharp or stabbing pain. It is more like pressure-like discomfort which was relieved by nitro in the emergency room. Patient is not sure whether this is associated with certain movement or deep breathing. At the time of my exam patient stated that her chest pain is gone. No documented fever. Denies palpitation or shortness of breath. No abdominal issue. No urinary issue. No acute neurological changes. No sensation changes. No vision changes or headache. During her stay she was seen by cardiology and it was determined that her chest pain was unlikely to be cardiac in origin. Her troponins were negative and she never had any acute changes on EKG. She had multiple episodes while admitted where pain was reproducible with eating food. GI was consulted and an esophagram was obtained which demonstrated presbyesophagus.   She will follow-up with GI outpatient for consideration of Botox injection. She otherwise felt well on the day of discharge and returned home    Discharge Diagnoses:  Presbyesophagus    The patient was seen and examined on day of discharge and this discharge summary is in conjunction with any daily progress note from day of discharge. The patient is discharged in stable condition. Exam:     Vitals:  Vitals:    12/01/21 0321 12/01/21 0546 12/01/21 0800 12/01/21 1214   BP: (!) 109/40  (!) 116/51 (!) 109/55   Pulse: 61  61 60   Resp: 18  16 14   Temp: 97.8 °F (36.6 °C)  97.9 °F (36.6 °C) 98 °F (36.7 °C)   TempSrc: Oral  Oral Oral   SpO2: 93%  97% 99%   Weight:  184 lb 9.6 oz (83.7 kg)       Weight: Weight: 184 lb 9.6 oz (83.7 kg)     24 hour intake/output:No intake or output data in the 24 hours ending 12/03/21 1733    General appearance: No apparent distress, well developed, appears stated age. Eyes:  Pupils equal, round, and reactive to light. Conjunctivae/corneas clear. HENT: Head normal in appearance. External nares normal.  Oral mucosa moist without lesions. Hearing grossly intact. Neck: Supple, with full range of motion. Trachea midline. No gross JVD appreciated. Respiratory:  Normal respiratory effort. Clear to auscultation, bilaterally without rales or wheezes or rhonchi. Cardiovascular: Normal rate, regular rhythm with normal S1/S2 without murmurs. No lower extremity edema. Abdomen: Soft, non-tender, non-distended with normal bowel sounds. Musculoskeletal: There is no joint swelling or tenderness. Normal tone. No abnormal movements. Skin: Warm and dry. No rashes or lesions. Neurologic:  No focal sensory/motor deficits in the upper and lower extremities. Cranial nerves:  grossly non-focal 2-12. Psychiatric: Alert and oriented, normal insight and thought content. Capillary Refill: Brisk,< 3 seconds. Peripheral Pulses: +2 palpable, equal bilaterally. Labs:  For convenience and continuity at follow-up the following most BIOTIN PO     bumetanide 1 MG tablet  Commonly known as: BUMEX  Take 1 tablet by mouth daily     CALCIUM 600 PO     digoxin 125 MCG tablet  Commonly known as: LANOXIN  Take 1 tablet by mouth every other day     ferrous sulfate 325 (65 Fe) MG tablet  Commonly known as: IRON 325     Handicap Placard Misc  by Does not apply route Request parking placard due to medical conditions. Duration of 5 years. levothyroxine 175 MCG tablet  Commonly known as: SYNTHROID  Take 1 tablet by mouth Daily New dose. Lumbar Support Cushion Misc  by Does not apply route. Dx: low back pain, sciatica. metoprolol succinate 200 MG extended release tablet  Commonly known as: TOPROL XL  Take 1 tablet by mouth daily     MULTI-VITAMIN PO     MYLANTA PO     pantoprazole 20 MG tablet  Commonly known as: PROTONIX  TAKE 2 TABLETS BY MOUTH TWICE DAILY     PROBIOTIC FORMULA PO     quinapril 5 MG tablet  Commonly known as: Accupril  Take 1 tablet by mouth daily     spironolactone 25 MG tablet  Commonly known as: ALDACTONE  Take 1 tablet by mouth every other day     Tylenol 325 MG tablet  Generic drug: acetaminophen     vitamin C 1000 MG tablet     warfarin 3 MG tablet  Commonly known as: COUMADIN  Take as directed. If you are unsure how to take this medication, talk to your nurse or doctor. Original instructions: Alternating 3 mg/1.5 mg or as directed per INR results. Notes to patient: Already received dose today before you were discharged. Medication Instructions:    Recommendations for discharge:   Date Warfarin Dose   12/2/21 3 mg   12/3/21 3 mg   12/4/21 3 mg   12/5/21 1.5 mg   12/6/21 3 mg   12/7/21 3 mg     Provider dosing warfarin: Dr. Patrick Soliman INR:  Wednesday, 12/8/21 at Dr. Rafael Pedraza office at 1:30 PM.                Time Spent on discharge is 30 minutes in the examination, evaluation, counseling and review of medications and discharge plan.     Thank you North Abernathy MD for the opportunity to be involved

## 2021-12-08 ENCOUNTER — TELEPHONE (OUTPATIENT)
Dept: FAMILY MEDICINE CLINIC | Age: 81
End: 2021-12-08

## 2021-12-08 ENCOUNTER — OFFICE VISIT (OUTPATIENT)
Dept: FAMILY MEDICINE CLINIC | Age: 81
End: 2021-12-08
Payer: MEDICARE

## 2021-12-08 VITALS
HEART RATE: 72 BPM | WEIGHT: 182.6 LBS | SYSTOLIC BLOOD PRESSURE: 126 MMHG | RESPIRATION RATE: 18 BRPM | BODY MASS INDEX: 33.4 KG/M2 | DIASTOLIC BLOOD PRESSURE: 74 MMHG | TEMPERATURE: 97 F

## 2021-12-08 DIAGNOSIS — K22.89 PRESBYESOPHAGUS: ICD-10-CM

## 2021-12-08 DIAGNOSIS — H00.022 HORDEOLUM INTERNUM OF RIGHT LOWER EYELID: ICD-10-CM

## 2021-12-08 DIAGNOSIS — R07.89 ATYPICAL CHEST PAIN: ICD-10-CM

## 2021-12-08 DIAGNOSIS — I48.20 CHRONIC ATRIAL FIBRILLATION (HCC): Primary | ICD-10-CM

## 2021-12-08 LAB
INTERNATIONAL NORMALIZATION RATIO, POC: 2.6
PROTHROMBIN TIME, POC: NORMAL

## 2021-12-08 PROCEDURE — 85610 PROTHROMBIN TIME: CPT | Performed by: FAMILY MEDICINE

## 2021-12-08 PROCEDURE — 99495 TRANSJ CARE MGMT MOD F2F 14D: CPT | Performed by: FAMILY MEDICINE

## 2021-12-08 PROCEDURE — 1111F DSCHRG MED/CURRENT MED MERGE: CPT | Performed by: FAMILY MEDICINE

## 2021-12-08 ASSESSMENT — ENCOUNTER SYMPTOMS
CONSTIPATION: 0
VOMITING: 0
EYE PAIN: 0
CHEST TIGHTNESS: 0
BACK PAIN: 0
WHEEZING: 0
RHINORRHEA: 0
COUGH: 0
ABDOMINAL PAIN: 0
DIARRHEA: 0
SORE THROAT: 0
NAUSEA: 0
BLOOD IN STOOL: 0
SHORTNESS OF BREATH: 0

## 2021-12-08 NOTE — PROGRESS NOTES
Post-Discharge Transitional Care Management Services or Hospital Follow Up      Cristopher Henry [address]   YOB: 1940    Date of Office Visit:  12/8/2021  Date of Hospital Admission: 11/28/21  Date of Hospital Discharge: 12/1/21  Readmission Risk Score(high >=14%. Medium >=10%):Readmission Risk Score: 16.7 ( )      Care management risk score Rising risk (score 2-5) and Complex Care (Scores >=6): 3     Non face to face  following discharge, date last encounter closed (first attempt may have been earlier): 12/3/2021 11:19 AM 12/3/2021 11:19 AM    Call initiated 2 business days of discharge: Yes     Patient Active Problem List   Diagnosis    Hyperlipidemia    Nonischemic cardiomyopathy ([address]    Chronic atrial fibrillation ([address]    Hypothyroidism    Biventricular implantable cardioverter-defibrillator in situ    Abnormal LFTs    Benign essential HTN    NAFLD (nonalcoholic fatty liver disease)    Left ventricular systolic dysfunction    Ventricular tachycardia (paroxysmal) ([address]    Other forms of angina pectoris ([address]    Unstable angina pectoris ([address]    Anxiety    Arthritis    Ventricular arrhythmia    Chronic kidney disease, stage III (moderate) (HCC)    Anemia    Atypical chest pain    Dyspnea    Chronic anticoagulation    Iron deficiency    Obesity (BMI 30-39. 9)    Chest pain       Allergies   Allergen Reactions    Ativan [Lorazepam]      Gets anxiety    Codeine     Nystatin      Mycostatin powder    Percocet [Oxycodone-Acetaminophen]     Relafen [Nabumetone]      Stomach upset    Tape Clemetine [place] Tape]     Celebrex [Celecoxib] Rash       Medications listed as ordered at the time of discharge from hospital  @DISCHARGEMEDSLIST(<NOROUTINE> error)@      Medications marked \"taking\" at this time  Outpatient Medications Marked as Taking for the 12/8/21 encounter (Office Visit) with Paige Zaragoza MD   Medication Sig Dispense Refill    Handicap Placard MISC by Does not apply route Request parking placard due to medical conditions. Duration of 5 years. 1 each 0    digoxin (LANOXIN) 125 MCG tablet Take 1 tablet by mouth every other day 45 tablet 3    metoprolol succinate (TOPROL XL) 200 MG extended release tablet Take 1 tablet by mouth daily 90 tablet 3    levothyroxine (SYNTHROID) 175 MCG tablet Take 1 tablet by mouth Daily New dose. 90 tablet 3    warfarin (COUMADIN) 3 MG tablet Alternating 3 mg/1.5 mg or as directed per INR results. 90 tablet 3    bumetanide (BUMEX) 1 MG tablet Take 1 tablet by mouth daily 90 tablet 3    quinapril (ACCUPRIL) 5 MG tablet Take 1 tablet by mouth daily 90 tablet 3    pantoprazole (PROTONIX) 20 MG tablet TAKE 2 TABLETS BY MOUTH TWICE DAILY 360 tablet 3    amiodarone (CORDARONE) 200 MG tablet Take 1 tablet by mouth daily 90 tablet 3    spironolactone (ALDACTONE) 25 MG tablet Take 1 tablet by mouth every other day 45 tablet 3    ferrous sulfate (IRON 325) 325 (65 Fe) MG tablet Take 325 mg by mouth every other day      Alum & Mag Hydroxide-Simeth (MYLANTA PO) Take by mouth as needed       acetaminophen (TYLENOL) 325 MG tablet Take 650 mg by mouth every 6 hours as needed for Pain or Fever      Probiotic Product (PROBIOTIC FORMULA PO) Take 1 tablet by mouth daily.  Misc. Devices (LUMBAR SUPPORT CUSHION) MISC by Does not apply route. Dx: low back pain, sciatica. 1 each 0    Ascorbic Acid (VITAMIN C) 1000 MG tablet Take 1,000 mg by mouth 2 times daily.  Multiple Vitamin (MULTI-VITAMIN PO) Take  by mouth daily.  Calcium Carbonate (CALCIUM 600 PO) Take  by mouth 2 times daily.  BIOTIN PO Take 5,000 mg by mouth daily.           Medications patient taking as of now reconciled against medications ordered at time of hospital discharge: Yes    Chief Complaint   Patient presents with   [address] from [facility], D/C 12/1/2021, chest pain, DAVID done       HPI  Presented to [facility] with chest pains    Inpatient course: Discharge summary reviewed- see chart. Interval history/Current status: Was ruled ou for cardiac causes. Saw GI. Found to have presbyesophagus. To follow up Dr. Vazquez Mention next week. No episodes of chest pains. Is eating smaller portions, chewing well. ,   Non smoker, pmh reviewed. Review of Systems   Constitutional: Negative for chills, fatigue, fever and unexpected weight change. HENT: Negative for congestion, ear pain, rhinorrhea and sore throat. Eyes: Negative for pain and visual disturbance. Respiratory: Negative for cough, chest tightness, shortness of breath and wheezing. Cardiovascular: Positive for chest pain. Negative for palpitations. Gastrointestinal: Negative for abdominal pain, blood in stool, constipation, diarrhea, nausea and vomiting. Genitourinary: Negative for difficulty urinating, frequency, hematuria and urgency. Musculoskeletal: Negative for back pain, joint swelling, myalgias and neck pain. Skin: Negative for rash. Neurological: Negative for dizziness and headaches. Hematological: Negative for adenopathy. Does not bruise/bleed easily. Psychiatric/Behavioral: Negative for behavioral problems and sleep disturbance. The patient is not nervous/anxious. Vitals:    12/08/21 1338   BP: 126/74   Pulse: 72   Resp: 18   Temp: 97 °F (36.1 °C)   TempSrc: Infrared   Weight: 182 lb 9.6 oz (82.8 kg)     Body mass index is 33.4 kg/m². Wt Readings from Last 3 Encounters:   12/08/21 182 lb 9.6 oz (82.8 kg)   12/01/21 184 lb 9.6 oz (83.7 kg)   11/27/21 180 lb (81.6 kg)     BP Readings from Last 3 Encounters:   12/08/21 126/74   12/01/21 (!) 109/55   11/28/21 (!) 143/66       Physical Exam  Vitals and nursing note reviewed. Constitutional:       Appearance: She is well-developed. HENT:      Head: Normocephalic and atraumatic.       Right Ear: External ear normal.      Left Ear: External ear normal.      Nose: Nose normal.      Mouth/Throat:      Mouth: Mucous membranes are moist.   Eyes:      General:         Right eye: Hordeolum present. Pupils: Pupils are equal, round, and reactive to light. Neck:      Thyroid: No thyromegaly. Cardiovascular:      Rate and Rhythm: Normal rate and regular rhythm. Heart sounds: Normal heart sounds. Pulmonary:      Breath sounds: Normal breath sounds. No wheezing or rales. Abdominal:      General: Bowel sounds are normal.      Palpations: Abdomen is soft. Tenderness: There is no abdominal tenderness. There is no guarding or rebound. Musculoskeletal:         General: Normal range of motion. Cervical back: Neck supple. Lymphadenopathy:      Cervical: No cervical adenopathy. Skin:     General: Skin is warm and dry. Findings: No rash. Neurological:      Mental Status: She is alert and oriented to person, place, and time. Cranial Nerves: No cranial nerve deficit. Deep Tendon Reflexes: Reflexes are normal and symmetric. Assessment/Plan:  1. Chronic atrial fibrillation (HCC)  -stable on coumadin,   Results for orders placed or performed in visit on 12/08/21   POCT INR   Result Value Ref Range    INR 2.6     Protime     '    - POCT INR    2. Atypical chest pain  -non cardiac, following up with GI    3. Presbyesophagus  -following with Joshua, possible Botox    4.  Hordeolum internum of right lower eyelid  -broke, continue warm compresses        Medical Decision Making: moderate complexity

## 2021-12-08 NOTE — PATIENT INSTRUCTIONS

## 2022-01-17 RX ORDER — SPIRONOLACTONE 25 MG/1
25 TABLET ORAL EVERY OTHER DAY
Qty: 45 TABLET | Refills: 3 | Status: SHIPPED | OUTPATIENT
Start: 2022-01-17 | End: 2022-10-27

## 2022-01-17 RX ORDER — AMIODARONE HYDROCHLORIDE 200 MG/1
TABLET ORAL
Qty: 90 TABLET | Refills: 3 | Status: SHIPPED | OUTPATIENT
Start: 2022-01-17 | End: 2022-10-27

## 2022-01-17 RX ORDER — PANTOPRAZOLE SODIUM 20 MG/1
TABLET, DELAYED RELEASE ORAL
Qty: 360 TABLET | Refills: 3 | Status: SHIPPED | OUTPATIENT
Start: 2022-01-17 | End: 2022-02-23

## 2022-01-17 RX ORDER — QUINAPRIL 5 1/1
TABLET ORAL
Qty: 90 TABLET | Refills: 3 | Status: SHIPPED | OUTPATIENT
Start: 2022-01-17 | End: 2022-08-03 | Stop reason: ALTCHOICE

## 2022-02-22 ENCOUNTER — OFFICE VISIT (OUTPATIENT)
Dept: INTERNAL MEDICINE CLINIC | Age: 82
End: 2022-02-22
Payer: MEDICARE

## 2022-02-22 VITALS
HEIGHT: 62 IN | DIASTOLIC BLOOD PRESSURE: 82 MMHG | TEMPERATURE: 97.2 F | OXYGEN SATURATION: 99 % | WEIGHT: 183.2 LBS | HEART RATE: 86 BPM | SYSTOLIC BLOOD PRESSURE: 148 MMHG | BODY MASS INDEX: 33.71 KG/M2

## 2022-02-22 DIAGNOSIS — I42.8 NONISCHEMIC CARDIOMYOPATHY (HCC): ICD-10-CM

## 2022-02-22 DIAGNOSIS — D64.9 ANEMIA, UNSPECIFIED TYPE: ICD-10-CM

## 2022-02-22 DIAGNOSIS — I51.9 LEFT VENTRICULAR SYSTOLIC DYSFUNCTION: ICD-10-CM

## 2022-02-22 DIAGNOSIS — I48.20 CHRONIC ATRIAL FIBRILLATION (HCC): Primary | ICD-10-CM

## 2022-02-22 DIAGNOSIS — N18.31 STAGE 3A CHRONIC KIDNEY DISEASE (HCC): ICD-10-CM

## 2022-02-22 PROCEDURE — G8400 PT W/DXA NO RESULTS DOC: HCPCS | Performed by: INTERNAL MEDICINE

## 2022-02-22 PROCEDURE — 1036F TOBACCO NON-USER: CPT | Performed by: INTERNAL MEDICINE

## 2022-02-22 PROCEDURE — 4040F PNEUMOC VAC/ADMIN/RCVD: CPT | Performed by: INTERNAL MEDICINE

## 2022-02-22 PROCEDURE — 99213 OFFICE O/P EST LOW 20 MIN: CPT | Performed by: INTERNAL MEDICINE

## 2022-02-22 PROCEDURE — G8427 DOCREV CUR MEDS BY ELIG CLIN: HCPCS | Performed by: INTERNAL MEDICINE

## 2022-02-22 PROCEDURE — 1090F PRES/ABSN URINE INCON ASSESS: CPT | Performed by: INTERNAL MEDICINE

## 2022-02-22 PROCEDURE — G8484 FLU IMMUNIZE NO ADMIN: HCPCS | Performed by: INTERNAL MEDICINE

## 2022-02-22 PROCEDURE — G8417 CALC BMI ABV UP PARAM F/U: HCPCS | Performed by: INTERNAL MEDICINE

## 2022-02-22 PROCEDURE — 1123F ACP DISCUSS/DSCN MKR DOCD: CPT | Performed by: INTERNAL MEDICINE

## 2022-02-22 RX ORDER — DIGOXIN 125 MCG
125 TABLET ORAL EVERY OTHER DAY
COMMUNITY
End: 2022-03-22 | Stop reason: SDUPTHER

## 2022-02-22 RX ORDER — DIGOXIN 125 MCG
125 TABLET ORAL EVERY OTHER DAY
Qty: 45 TABLET | Refills: 3 | Status: SHIPPED | OUTPATIENT
Start: 2022-02-22 | End: 2022-10-11

## 2022-02-22 RX ORDER — METOLAZONE 2.5 MG/1
TABLET ORAL
Qty: 15 TABLET | Refills: 3 | Status: SHIPPED | OUTPATIENT
Start: 2022-02-22

## 2022-02-22 RX ORDER — BUMETANIDE 1 MG/1
1 TABLET ORAL DAILY
Qty: 90 TABLET | Refills: 3 | Status: SHIPPED | OUTPATIENT
Start: 2022-02-22

## 2022-02-22 NOTE — PROGRESS NOTES
Sukh Torrez (:  1940) is a 80 y.o. female,Established patient, here for evaluation of the following chief complaint(s): Foot Swelling ( feet and ankle  on and off last 2 weeks more right foot more then left )         ASSESSMENT/PLAN:  1. Chronic atrial fibrillation ([facility]- rate controlled  2. Nonischemic cardiomyopathy ([facility]- has noted ankle swelling recently, off and on. Some sob. No orthopnea; does not feel her weight fluctuates much. Plan; boost accupril to 10 mg daily; take bp. Prn zaroxolyn 2.5 mg  3. Stage 3a chronic kidney disease ([facility]- monitor  4. Anemia, unspecified type- on iron. Had colonoscopy several yr ago; to have egd soon. On iron  5. Left ventricular systolic dysfunction- See above          Return in about 6 weeks (around 2022). Subjective   SUBJECTIVE/OBJECTIVE:  HPI pt with nonischemic cm, chronic afib, crt/d; seen for ankle swelling; does not get severe, intermittent. usu sob. Review of Systems    Twelve point ROS completed and found to be negative except as noted above.       Objective   Physical Exam     BP (!) 148/82 (Site: Left Upper Arm, Position: Sitting, Cuff Size: Medium Adult)   Pulse 86   Temp 97.2 °F (36.2 °C)   Ht 5' 1.5\" (1.562 m)   Wt 183 lb 3.2 oz (83.1 kg)   SpO2 99%   BMI 34.05 kg/m²      General appearance - alert, well appearing, and in no distress    Mental Status - alert, oriented to person, place, and time          Neck - supple, no significant adenopathy        Chest - clear to auscultation, no wheezes, rales or rhonchi, symmetric air entry     Heart - normal rate, regular rhythm, normal S1, S2, no murmurs, rubs, clicks or gallops     Abdomen - soft, nontender, nondistended, no masses or organomegaly         Neurological - alert, oriented, normal speech, no focal findings or movement disorder noted     Musculoskeletal -   msk [id number]}     Extremities - no pedal edema noted     Skin - normal coloration and turgor, no rashes, no suspicious skin lesions noted            An electronic signature was used to authenticate this note.     --Giuliana Martell MD

## 2022-02-23 ENCOUNTER — PATIENT MESSAGE (OUTPATIENT)
Dept: INTERNAL MEDICINE CLINIC | Age: 82
End: 2022-02-23

## 2022-02-23 RX ORDER — PANTOPRAZOLE SODIUM 40 MG/1
40 TABLET, DELAYED RELEASE ORAL
Qty: 60 TABLET | Refills: 5 | Status: SHIPPED | OUTPATIENT
Start: 2022-02-23 | End: 2022-09-06 | Stop reason: SDUPTHER

## 2022-02-23 NOTE — TELEPHONE ENCOUNTER
From: Lizet Swenson  To: Dr. Mercy Vega: 2022 10:58 AM EST  Subject: Clarify     Please let me know what you mean by letting you know when I take the medication. Do I notify you how it's working or just when I take the pill. Please explain.

## 2022-02-23 NOTE — TELEPHONE ENCOUNTER
Nela Ware MD 47 minutes ago (10:56 AM)           Alonsoe Socks one tablet yesterday evening and one tablet this morning. Not really seeing a change. Ankles still swollen.

## 2022-02-23 NOTE — TELEPHONE ENCOUNTER
I spoke to pt and advised her to let us know the next day or so her  weight  and response. She took one yesterday and this am  And does not really have an increase in urination. I told her to not take any tomorrow am and call in with update and will go from there. I advised her sometimes pts have a little bit of a delayed response.

## 2022-02-23 NOTE — TELEPHONE ENCOUNTER
Pharmacy would not cover the 20 mg. Size at 2 twice a day and so had to change to 40 mg.  Size at 1 twice a day

## 2022-02-24 ENCOUNTER — TELEPHONE (OUTPATIENT)
Dept: INTERNAL MEDICINE CLINIC | Age: 82
End: 2022-02-24

## 2022-02-24 DIAGNOSIS — I42.8 NONISCHEMIC CARDIOMYOPATHY (HCC): Primary | ICD-10-CM

## 2022-02-24 DIAGNOSIS — T50.905S ADVERSE EFFECT OF DRUG, SEQUELA: ICD-10-CM

## 2022-02-24 NOTE — TELEPHONE ENCOUNTER
Pt notified to get BMP whenever she can. She will monitor weights and get back to us if swelling recurs.

## 2022-02-24 NOTE — TELEPHONE ENCOUNTER
Pt called in a report:   She took Metolazone 2.5 mg. X 2 days. She really did not notice much way or urination until last night. She was up and down all night long. Swelling is a lot better. She states her baseline wt is 180-183 lbs. Notices improvement in her breathing. Today weight is 178 lbs.   Last night at hs was 185 lbs  Day before in office was 183 lbs    Pt not sure she will be able to get labs today d/t weather

## 2022-02-25 ENCOUNTER — NURSE ONLY (OUTPATIENT)
Dept: LAB | Age: 82
End: 2022-02-25

## 2022-02-25 DIAGNOSIS — I42.8 NONISCHEMIC CARDIOMYOPATHY (HCC): ICD-10-CM

## 2022-02-25 LAB
ANION GAP SERPL CALCULATED.3IONS-SCNC: 14 MEQ/L (ref 8–16)
BUN BLDV-MCNC: 24 MG/DL (ref 7–22)
CALCIUM SERPL-MCNC: 8.6 MG/DL (ref 8.5–10.5)
CHLORIDE BLD-SCNC: 103 MEQ/L (ref 98–111)
CO2: 22 MEQ/L (ref 23–33)
CREAT SERPL-MCNC: 1.1 MG/DL (ref 0.4–1.2)
GFR SERPL CREATININE-BSD FRML MDRD: 48 ML/MIN/1.73M2
GLUCOSE BLD-MCNC: 123 MG/DL (ref 70–108)
POTASSIUM SERPL-SCNC: 4.3 MEQ/L (ref 3.5–5.2)
SODIUM BLD-SCNC: 139 MEQ/L (ref 135–145)

## 2022-03-01 ENCOUNTER — TELEPHONE (OUTPATIENT)
Dept: INTERNAL MEDICINE CLINIC | Age: 82
End: 2022-03-01

## 2022-03-01 NOTE — TELEPHONE ENCOUNTER
I called pt and advised her labs are OK and can use metolazone prn and notify us if takes it and what her response it.   I asked her if she had gotten her pantoprazole and she said she received a letter that it went through

## 2022-03-01 NOTE — TELEPHONE ENCOUNTER
----- Message from Jose David Mims MD sent at 2/25/2022 12:57 PM EST -----  Tell pt labs ok  Use zaroxolyn prn for significant weight gain or leg swelling and notify office

## 2022-03-03 ENCOUNTER — NURSE ONLY (OUTPATIENT)
Dept: LAB | Age: 82
End: 2022-03-03

## 2022-03-03 ENCOUNTER — TELEPHONE (OUTPATIENT)
Dept: FAMILY MEDICINE CLINIC | Age: 82
End: 2022-03-03

## 2022-03-03 DIAGNOSIS — I48.20 CHRONIC ATRIAL FIBRILLATION (HCC): Primary | ICD-10-CM

## 2022-03-03 DIAGNOSIS — I48.20 CHRONIC ATRIAL FIBRILLATION (HCC): ICD-10-CM

## 2022-03-03 LAB — INR BLD: 3.26 (ref 0.85–1.13)

## 2022-03-03 NOTE — TELEPHONE ENCOUNTER
----- Message from Eve Liao MD sent at 3/3/2022  1:17 PM EST -----  INR good. Continue same dosage and recheck INR in 1 month.

## 2022-03-14 ENCOUNTER — TELEPHONE (OUTPATIENT)
Dept: INTERNAL MEDICINE CLINIC | Age: 82
End: 2022-03-14

## 2022-03-14 NOTE — TELEPHONE ENCOUNTER
Reduce her quinapril to 5 mg; her leg swelling is mainly lymphedema so best to work with the leg pumps

## 2022-03-14 NOTE — TELEPHONE ENCOUNTER
Pt called in to let us know that she took a zaroxolyn this am.  Her feet and ankles are down when she gets up in the am but after up a couple of hours she develops welling in feet and ankles. Does not seem to be traveling up her leg but feet get tight and they almost hurt athough her weight has not changed. Last time she took some she had a delayed affect. Also her BP has been low  Today was 94/82. Other pressures are 99/80  105/76  106/77  Her quinapril was increased to 10 mg. Daily on 2/22/22. She feels very tired and knees ache.   Feels like her legs are weak

## 2022-03-15 ENCOUNTER — TELEPHONE (OUTPATIENT)
Dept: INTERNAL MEDICINE CLINIC | Age: 82
End: 2022-03-15

## 2022-03-15 NOTE — TELEPHONE ENCOUNTER
Pt called in an update. She took Zaroxolyn yesterday and had low BP. We reduced her quinapril to 5 mg. BP 98/81. Swelling is down in her feet. Weight is 177.5 lbs. Weight yesterday was 178 lbs.

## 2022-03-16 NOTE — TELEPHONE ENCOUNTER
I spoke with pt and she says she will try to cut the quinapril but it is very tiny. She will try a pill cutter. She just got up and her BP was 111/75.   She will  Let me know if can not get them to cut well

## 2022-03-22 ENCOUNTER — NURSE ONLY (OUTPATIENT)
Dept: LAB | Age: 82
End: 2022-03-22

## 2022-03-22 ENCOUNTER — OFFICE VISIT (OUTPATIENT)
Dept: INTERNAL MEDICINE CLINIC | Age: 82
End: 2022-03-22
Payer: MEDICARE

## 2022-03-22 VITALS
SYSTOLIC BLOOD PRESSURE: 96 MMHG | WEIGHT: 177.2 LBS | DIASTOLIC BLOOD PRESSURE: 70 MMHG | HEART RATE: 74 BPM | BODY MASS INDEX: 33.46 KG/M2 | OXYGEN SATURATION: 98 % | HEIGHT: 61 IN | TEMPERATURE: 97.3 F

## 2022-03-22 DIAGNOSIS — T50.905A ADVERSE EFFECT OF DRUG, INITIAL ENCOUNTER: ICD-10-CM

## 2022-03-22 DIAGNOSIS — I51.9 LEFT VENTRICULAR SYSTOLIC DYSFUNCTION: ICD-10-CM

## 2022-03-22 DIAGNOSIS — D64.9 ANEMIA, UNSPECIFIED TYPE: Primary | ICD-10-CM

## 2022-03-22 DIAGNOSIS — E03.9 HYPOTHYROIDISM, UNSPECIFIED TYPE: ICD-10-CM

## 2022-03-22 DIAGNOSIS — D64.9 ANEMIA, UNSPECIFIED TYPE: ICD-10-CM

## 2022-03-22 LAB
ANION GAP SERPL CALCULATED.3IONS-SCNC: 11 MEQ/L (ref 8–16)
BASOPHILS # BLD: 1.4 %
BASOPHILS ABSOLUTE: 0.1 THOU/MM3 (ref 0–0.1)
BUN BLDV-MCNC: 35 MG/DL (ref 7–22)
CALCIUM SERPL-MCNC: 8.8 MG/DL (ref 8.5–10.5)
CHLORIDE BLD-SCNC: 104 MEQ/L (ref 98–111)
CO2: 24 MEQ/L (ref 23–33)
CREAT SERPL-MCNC: 1.6 MG/DL (ref 0.4–1.2)
DIGOXIN LEVEL: 0.9 NG/ML (ref 0.5–2)
EOSINOPHIL # BLD: 1.9 %
EOSINOPHILS ABSOLUTE: 0.2 THOU/MM3 (ref 0–0.4)
ERYTHROCYTE [DISTWIDTH] IN BLOOD BY AUTOMATED COUNT: 15.5 % (ref 11.5–14.5)
ERYTHROCYTE [DISTWIDTH] IN BLOOD BY AUTOMATED COUNT: 56.8 FL (ref 35–45)
GFR SERPL CREATININE-BSD FRML MDRD: 31 ML/MIN/1.73M2
GLUCOSE BLD-MCNC: 113 MG/DL (ref 70–108)
HCT VFR BLD CALC: 33.4 % (ref 37–47)
HEMOGLOBIN: 9.6 GM/DL (ref 12–16)
HYPOCHROMIA: PRESENT
IMMATURE GRANS (ABS): 0.03 THOU/MM3 (ref 0–0.07)
IMMATURE GRANULOCYTES: 0.4 %
LYMPHOCYTES # BLD: 17.3 %
LYMPHOCYTES ABSOLUTE: 1.4 THOU/MM3 (ref 1–4.8)
MCH RBC QN AUTO: 28.6 PG (ref 26–33)
MCHC RBC AUTO-ENTMCNC: 28.7 GM/DL (ref 32.2–35.5)
MCV RBC AUTO: 99.4 FL (ref 81–99)
MONOCYTES # BLD: 12.4 %
MONOCYTES ABSOLUTE: 1 THOU/MM3 (ref 0.4–1.3)
NUCLEATED RED BLOOD CELLS: 0 /100 WBC
PLATELET # BLD: 323 THOU/MM3 (ref 130–400)
PLATELET ESTIMATE: ADEQUATE
PMV BLD AUTO: 10.8 FL (ref 9.4–12.4)
POTASSIUM SERPL-SCNC: 4.6 MEQ/L (ref 3.5–5.2)
RBC # BLD: 3.36 MILL/MM3 (ref 4.2–5.4)
SCAN OF BLOOD SMEAR: NORMAL
SEG NEUTROPHILS: 66.6 %
SEGMENTED NEUTROPHILS ABSOLUTE COUNT: 5.3 THOU/MM3 (ref 1.8–7.7)
SODIUM BLD-SCNC: 139 MEQ/L (ref 135–145)
T4 FREE: 2.18 NG/DL (ref 0.93–1.76)
TSH SERPL DL<=0.05 MIU/L-ACNC: 6.08 UIU/ML (ref 0.4–4.2)
WBC # BLD: 7.9 THOU/MM3 (ref 4.8–10.8)

## 2022-03-22 PROCEDURE — G8400 PT W/DXA NO RESULTS DOC: HCPCS | Performed by: INTERNAL MEDICINE

## 2022-03-22 PROCEDURE — 4040F PNEUMOC VAC/ADMIN/RCVD: CPT | Performed by: INTERNAL MEDICINE

## 2022-03-22 PROCEDURE — G8417 CALC BMI ABV UP PARAM F/U: HCPCS | Performed by: INTERNAL MEDICINE

## 2022-03-22 PROCEDURE — G8427 DOCREV CUR MEDS BY ELIG CLIN: HCPCS | Performed by: INTERNAL MEDICINE

## 2022-03-22 PROCEDURE — 99213 OFFICE O/P EST LOW 20 MIN: CPT | Performed by: INTERNAL MEDICINE

## 2022-03-22 PROCEDURE — G8484 FLU IMMUNIZE NO ADMIN: HCPCS | Performed by: INTERNAL MEDICINE

## 2022-03-22 PROCEDURE — 1036F TOBACCO NON-USER: CPT | Performed by: INTERNAL MEDICINE

## 2022-03-22 PROCEDURE — 1090F PRES/ABSN URINE INCON ASSESS: CPT | Performed by: INTERNAL MEDICINE

## 2022-03-22 PROCEDURE — 1123F ACP DISCUSS/DSCN MKR DOCD: CPT | Performed by: INTERNAL MEDICINE

## 2022-03-22 RX ORDER — METOPROLOL SUCCINATE 100 MG/1
150 TABLET, EXTENDED RELEASE ORAL DAILY
Qty: 60 TABLET | Refills: 5 | Status: SHIPPED | OUTPATIENT
Start: 2022-03-22 | End: 2022-10-24 | Stop reason: DRUGHIGH

## 2022-03-22 NOTE — PROGRESS NOTES
Ethan Bermudez (:  1940) is a 80 y.o. female,Established patient, here for evaluation of the following chief complaint(s):  Leg Swelling (no more swelling sincs last zaroxolyn), Atrial Fibrillation (chronic), Cardiomyopathy (nonischemic), Anemia, Chronic Kidney Disease, and Fatigue (exhausted-has no appetite and no energy-fogets to eat-does not get hungry)         ASSESSMENT/PLAN:  1. Anemia, unspecified type - low iron in past; colonoscopy 2018; polyps; egd 3.23  -     CBC with Auto Differential; Future  2. Hypothyroidism, unspecified type  -     TSH with Reflex; Future  3. Left ventricular systolic dysfunction- no edema; breathing fairly good  -     Basic Metabolic Panel; Future  4. Adverse effect of drug, initial encounter  -     Digoxin Level; Future      Return in about 6 weeks (around 5/3/2022). Subjective   SUBJECTIVE/OBJECTIVE:  HPI pt with nonisch cm, afib, biv icd seen in f/u. She is chronically exhausted; sleeps till 0100 then can't get back to sleep. Not hungry, forgets to eat. Lost weight. Notes bp low; we cut her ACEI back to 2.5 mg. She notes a chronic cough, tickle in throat; worse in recent weeks. Her knees ache when up. Review of Systems    Twelve point ROS completed and found to be negative except as noted above.       Objective   Physical Exam    BP 96/70 (Site: Left Lower Arm)   Pulse 74   Temp 97.3 °F (36.3 °C)   Ht 5' 1.5\" (1.562 m)   Wt 177 lb 3.2 oz (80.4 kg)   SpO2 98% Comment: at rest on room air  BMI 32.94 kg/m²     General appearance - chronically ill appearing    Mental Status - alert, oriented to person, place, and time          Neck - supple, no significant adenopathy        Chest - clear to auscultation, no wheezes, rales or rhonchi, symmetric air entry     Heart - normal rate, regular rhythm, normal S1, S2, no murmurs, rubs, clicks or gallops     Abdomen - soft, nontender, nondistended, no masses or organomegaly         Neurological - alert, oriented, normal speech, no focal findings or movement disorder noted         Extremities - no pedal edema noted     Skin - normal coloration and turgor, no rashes, no suspicious skin lesions noted          An electronic signature was used to authenticate this note.     --Higinio Palacios MD

## 2022-03-23 ENCOUNTER — TELEPHONE (OUTPATIENT)
Dept: INTERNAL MEDICINE CLINIC | Age: 82
End: 2022-03-23

## 2022-03-23 ENCOUNTER — PATIENT MESSAGE (OUTPATIENT)
Dept: INTERNAL MEDICINE CLINIC | Age: 82
End: 2022-03-23

## 2022-03-23 DIAGNOSIS — E03.9 HYPOTHYROIDISM, UNSPECIFIED TYPE: Primary | ICD-10-CM

## 2022-03-23 NOTE — TELEPHONE ENCOUNTER
----- Message from Molly Munoz MD sent at 3/23/2022  8:27 AM EDT -----  Tell her cbc stable, diglevel ok;unable to explain both elev T4 and tsh; had she taken her thyroid prior to test?

## 2022-03-23 NOTE — TELEPHONE ENCOUNTER
Pt having a procedure done per dtr Pearl. Pearl is on her hippa list.  I advised her of labs and she will let us know regarding thyroid medication in relation to lab draw.

## 2022-03-23 NOTE — TELEPHONE ENCOUNTER
pts dtr states pt took the thyroid medication in the morning and then had the labs drawn after her appt in the late afternoon.

## 2022-03-28 ENCOUNTER — NURSE ONLY (OUTPATIENT)
Dept: LAB | Age: 82
End: 2022-03-28

## 2022-03-28 ENCOUNTER — TELEPHONE (OUTPATIENT)
Dept: INTERNAL MEDICINE CLINIC | Age: 82
End: 2022-03-28

## 2022-03-28 DIAGNOSIS — E03.9 HYPOTHYROIDISM, UNSPECIFIED TYPE: Primary | ICD-10-CM

## 2022-03-28 DIAGNOSIS — E03.9 HYPOTHYROIDISM, UNSPECIFIED TYPE: ICD-10-CM

## 2022-03-28 DIAGNOSIS — R53.83 FATIGUE, UNSPECIFIED TYPE: ICD-10-CM

## 2022-03-28 LAB — T4 FREE: 1.91 NG/DL (ref 0.93–1.76)

## 2022-03-28 NOTE — TELEPHONE ENCOUNTER
I notified pt thyroid labs are off however Dr. Annika Haque noticed she is on biotin and would like her off the biotin for at least 1 week prior to having repeat thyroid labs. She verbalizes understanding and [facility].

## 2022-03-28 NOTE — TELEPHONE ENCOUNTER
----- Message from Esmer Jauregui MD sent at 3/28/2022  3:25 PM EDT -----  Stop biotin for one week then repeat tsh/reflex

## 2022-03-29 NOTE — TELEPHONE ENCOUNTER
From: Shelly Mack  Sent: 3/29/2022 12:13 PM EDT  To: [facility]. Clinical Support Pool  Subject: Thyroid medicine     Laith Lopes was talking to mom and I handle filling their medications and she hasn't been taking biotin for awhile. She was taking it for hair growth and it didn't seem to work do we stopped it.

## 2022-03-30 ENCOUNTER — TELEPHONE (OUTPATIENT)
Dept: INTERNAL MEDICINE CLINIC | Age: 82
End: 2022-03-30

## 2022-03-30 NOTE — TELEPHONE ENCOUNTER
Pt checked her multiple vitamin and it has 30 mcg of biotin in it. I advised her to hold this 1 week prior to having thyroid labs rechecked and will need to do that anytime her thyroid labs are done.

## 2022-03-30 NOTE — TELEPHONE ENCOUNTER
----- Message from Samira Linder MD sent at 3/29/2022  4:48 PM EDT -----  How about her multivitamin?

## 2022-04-04 ENCOUNTER — NURSE ONLY (OUTPATIENT)
Dept: LAB | Age: 82
End: 2022-04-04

## 2022-04-04 ENCOUNTER — TELEPHONE (OUTPATIENT)
Dept: FAMILY MEDICINE CLINIC | Age: 82
End: 2022-04-04

## 2022-04-04 DIAGNOSIS — R53.83 FATIGUE, UNSPECIFIED TYPE: ICD-10-CM

## 2022-04-04 DIAGNOSIS — E03.9 HYPOTHYROIDISM, UNSPECIFIED TYPE: ICD-10-CM

## 2022-04-04 DIAGNOSIS — I48.20 CHRONIC ATRIAL FIBRILLATION (HCC): ICD-10-CM

## 2022-04-04 LAB — INR BLD: 2.09 (ref 0.85–1.13)

## 2022-04-04 NOTE — TELEPHONE ENCOUNTER
----- Message from Shruthi Yañez MD sent at 4/4/2022 12:46 PM EDT -----  INR good. Continue same dosage and recheck INR in 1 month.

## 2022-04-05 LAB
T4 FREE: 1.99 NG/DL (ref 0.93–1.76)
TSH SERPL DL<=0.05 MIU/L-ACNC: 9.9 UIU/ML (ref 0.4–4.2)

## 2022-04-06 ENCOUNTER — TELEPHONE (OUTPATIENT)
Dept: INTERNAL MEDICINE CLINIC | Age: 82
End: 2022-04-06

## 2022-04-06 DIAGNOSIS — R79.89 ELEVATED TSH: Primary | ICD-10-CM

## 2022-04-06 DIAGNOSIS — R79.89 ELEVATED SERUM FREE T4 LEVEL: ICD-10-CM

## 2022-04-06 NOTE — TELEPHONE ENCOUNTER
Pt was notified both TSH and T4 are still elevated despite holding multi vitamin. Needs to see endocrinologist, Dr. Jhoan Hernández for further evaluation, could be suggestive of a pituitary tumor.     She is in agreement with referral

## 2022-04-06 NOTE — TELEPHONE ENCOUNTER
----- Message from Pato Salcedo MD sent at 4/6/2022  6:56 AM EDT -----  Tell her both are still elevated; this suggests the possibility of a pituitary tumor; for further eval I would like her to see an endocrinologist Dr Mohr People

## 2022-04-07 NOTE — TELEPHONE ENCOUNTER
I notified pt to hold her thryoid medication for now until she sees Dr. Irene Clark for further recommendations. She verbalizes understanding.

## 2022-04-25 ENCOUNTER — NURSE ONLY (OUTPATIENT)
Dept: LAB | Age: 82
End: 2022-04-25

## 2022-04-25 DIAGNOSIS — E03.9 ACQUIRED HYPOTHYROIDISM: ICD-10-CM

## 2022-04-25 LAB
T3 FREE: 1.89 PG/ML (ref 2.02–4.43)
T4 FREE: 1.16 NG/DL (ref 0.93–1.76)
TSH SERPL DL<=0.05 MIU/L-ACNC: 32.43 UIU/ML (ref 0.4–4.2)

## 2022-04-27 LAB
THYROGLOBULIN ANTIBODY: 14 IU/ML (ref 0–40)
THYROID PEROXIDASE ANTIBODY: < 4 IU/ML (ref 0–25)

## 2022-05-04 ENCOUNTER — OFFICE VISIT (OUTPATIENT)
Dept: INTERNAL MEDICINE CLINIC | Age: 82
End: 2022-05-04
Payer: MEDICARE

## 2022-05-04 ENCOUNTER — NURSE ONLY (OUTPATIENT)
Dept: LAB | Age: 82
End: 2022-05-04

## 2022-05-04 ENCOUNTER — CLINICAL DOCUMENTATION (OUTPATIENT)
Dept: INTERNAL MEDICINE CLINIC | Age: 82
End: 2022-05-04

## 2022-05-04 ENCOUNTER — TELEPHONE (OUTPATIENT)
Dept: INTERNAL MEDICINE CLINIC | Age: 82
End: 2022-05-04

## 2022-05-04 ENCOUNTER — TELEPHONE (OUTPATIENT)
Dept: FAMILY MEDICINE CLINIC | Age: 82
End: 2022-05-04

## 2022-05-04 VITALS
HEIGHT: 61 IN | SYSTOLIC BLOOD PRESSURE: 140 MMHG | TEMPERATURE: 97 F | BODY MASS INDEX: 33.23 KG/M2 | WEIGHT: 176 LBS | DIASTOLIC BLOOD PRESSURE: 74 MMHG | HEART RATE: 60 BPM

## 2022-05-04 DIAGNOSIS — I48.20 CHRONIC ATRIAL FIBRILLATION (HCC): ICD-10-CM

## 2022-05-04 DIAGNOSIS — I51.9 LEFT VENTRICULAR SYSTOLIC DYSFUNCTION: ICD-10-CM

## 2022-05-04 DIAGNOSIS — D64.9 ANEMIA, UNSPECIFIED TYPE: ICD-10-CM

## 2022-05-04 DIAGNOSIS — E03.9 HYPOTHYROIDISM, UNSPECIFIED TYPE: Primary | ICD-10-CM

## 2022-05-04 PROBLEM — I20.0 UNSTABLE ANGINA PECTORIS (HCC): Status: RESOLVED | Noted: 2019-05-14 | Resolved: 2022-05-04

## 2022-05-04 PROBLEM — I20.8 OTHER FORMS OF ANGINA PECTORIS (HCC): Status: RESOLVED | Noted: 2019-05-13 | Resolved: 2022-05-04

## 2022-05-04 PROBLEM — R07.9 CHEST PAIN: Status: RESOLVED | Noted: 2021-12-01 | Resolved: 2022-05-04

## 2022-05-04 PROBLEM — I20.89 OTHER FORMS OF ANGINA PECTORIS: Status: RESOLVED | Noted: 2019-05-13 | Resolved: 2022-05-04

## 2022-05-04 LAB
INR BLD: 2.42 (ref 0.85–1.13)
IRON: 90 UG/DL (ref 50–170)

## 2022-05-04 PROCEDURE — G8427 DOCREV CUR MEDS BY ELIG CLIN: HCPCS | Performed by: INTERNAL MEDICINE

## 2022-05-04 PROCEDURE — 1036F TOBACCO NON-USER: CPT | Performed by: INTERNAL MEDICINE

## 2022-05-04 PROCEDURE — 99213 OFFICE O/P EST LOW 20 MIN: CPT | Performed by: INTERNAL MEDICINE

## 2022-05-04 PROCEDURE — G8400 PT W/DXA NO RESULTS DOC: HCPCS | Performed by: INTERNAL MEDICINE

## 2022-05-04 PROCEDURE — 4040F PNEUMOC VAC/ADMIN/RCVD: CPT | Performed by: INTERNAL MEDICINE

## 2022-05-04 PROCEDURE — G8417 CALC BMI ABV UP PARAM F/U: HCPCS | Performed by: INTERNAL MEDICINE

## 2022-05-04 PROCEDURE — 1123F ACP DISCUSS/DSCN MKR DOCD: CPT | Performed by: INTERNAL MEDICINE

## 2022-05-04 PROCEDURE — 1090F PRES/ABSN URINE INCON ASSESS: CPT | Performed by: INTERNAL MEDICINE

## 2022-05-04 RX ORDER — LISINOPRIL 2.5 MG/1
2.5 TABLET ORAL DAILY
Qty: 30 TABLET | Refills: 3 | Status: SHIPPED | OUTPATIENT
Start: 2022-05-04 | End: 2022-07-26 | Stop reason: SDUPTHER

## 2022-05-04 NOTE — TELEPHONE ENCOUNTER
----- Message from Merna Melgar MD sent at 5/4/2022 12:18 PM EDT -----  INR good. Continue same dosage and recheck INR in 1 month.

## 2022-05-04 NOTE — PROGRESS NOTES
Leonel Garcia (:  1940) is a 80 y.o. female,Established patient, here for evaluation of the following chief complaint(s):  Cardiomyopathy (nonischemic-6 week follow up), Other (LV dysfunction), and Hypothyroidism         ASSESSMENT/PLAN:  1. Hypothyroidism, unspecified type- recent tests showed elev tsh AND T4; sent to Dr Karla Bello for analysis. TSH now rising ( she is off her thyroid) although T4 still normal.  Will contact Dr Ashley Alanis. Advised to stay off her vitamins ( biotin). 2. Anemia, unspecified type- labs ordered  -     Iron; Future  -     Soluble Transferrin Receptor; Future  -     Electrophoresis Protein, Serum; Future  3. Left ventricular systolic dysfunction- denies sob      Return in about 3 months (around 2022). Subjective   SUBJECTIVE/OBJECTIVE:  HPI pt with nonischemic cm, chronic afib, recent thyroid tests showed incongruent tests; she is on amio. She is to see Dr Karla Bello for further discussion. She is feeling much better. No cardiac sxs. Denies edema. She had a cough after last visit, now resolved    She is switching from quinapril to lisinopril; she will call in bps    She has an ongoing anemia, had a colonoscopy in recent years; labs ordered. Review of Systems    Twelve point ROS completed and found to be negative except as noted above.       Objective   Physical Exam    BP (!) 140/74 (Site: Left Upper Arm)   Pulse 60   Temp 97 °F (36.1 °C)   Ht 5' 0.98\" (1.549 m)   Wt 176 lb (79.8 kg)   BMI 33.27 kg/m²   General appearance - alert, well appearing, and in no distress and normal appearing weight    Mental Status - alert, oriented to person, place, and time          Neck - supple, no significant adenopathy        Chest - clear to auscultation, no wheezes, rales or rhonchi, symmetric air entry     Heart - no murmurs noted, no gallops noted, irregularly irregular rhythm with rate 70     Abdomen - soft, nontender, nondistended, no masses or organomegaly Neurological - alert, oriented, normal speech, no focal findings or movement disorder noted       Extremities - no pedal edema noted     Skin - normal coloration and turgor, no rashes, no suspicious skin lesions noted          An electronic signature was used to authenticate this note.     --Debra Tilley MD

## 2022-05-04 NOTE — PROGRESS NOTES
I spoke with patient regarding labs. Resume synthroid 1 tablet daily. She has appointment on 5/25/22 and she was encouraged to keep it.

## 2022-05-04 NOTE — TELEPHONE ENCOUNTER
----- Message from George Gonzales MD sent at 5/4/2022  2:30 PM EDT -----  Tell her iron level normal

## 2022-05-06 LAB — SOLUBLE TRANSFERRIN RECEPT: 6.4 MG/L (ref 1.9–4.4)

## 2022-05-07 LAB — PROTEIN ELECTROPHORESIS, SERUM: NORMAL

## 2022-05-09 ENCOUNTER — TELEPHONE (OUTPATIENT)
Dept: INTERNAL MEDICINE CLINIC | Age: 82
End: 2022-05-09

## 2022-05-09 NOTE — TELEPHONE ENCOUNTER
Claudene Stabs, MD Minette Fleet, MA  This implies iron deficiency; take her iron with vitamin C 500 mg to improve absorption

## 2022-05-09 NOTE — TELEPHONE ENCOUNTER
I spoke with pt and advised her the serum protein electrophoresis and is normal and is a test to look for proteins in the blood linked to anemia. I advised her soluble transferrin receptor implies it is an iron deficiency  And Dr. Kel Patino is recommending pt take her iron with 500 mg. Of vitamin C to improve absorption. Pt verbalizes understanding. Pt is wondering if she needs to increase her iron. ?   She is currently taking it every other day

## 2022-05-09 NOTE — TELEPHONE ENCOUNTER
I notified pt to stay on iron qod with vitamin c as that is the best way to take it. She verbalizes understanding.

## 2022-05-09 NOTE — TELEPHONE ENCOUNTER
----- Message from Reynold Morris MD sent at 5/8/2022  9:34 AM EDT -----  Tell her this is normal; a test looking for abnormal proteins in blood linked to anemia

## 2022-05-12 NOTE — TELEPHONE ENCOUNTER
----- Message from Caty Briscoe MD sent at 7/1/2020  2:48 PM EDT -----  INR good. Continue same dosage and recheck INR in 1 month. Double O-Z Flap Text: The defect edges were debeveled with a #15 scalpel blade.  Given the location of the defect, shape of the defect and the proximity to free margins a Double O-Z flap was deemed most appropriate.  Using a sterile surgical marker, an appropriate transposition flap was drawn incorporating the defect and placing the expected incisions within the relaxed skin tension lines where possible. The area thus outlined was incised deep to adipose tissue with a #15 scalpel blade.  The skin margins were undermined to an appropriate distance in all directions utilizing iris scissors.

## 2022-06-08 ENCOUNTER — NURSE ONLY (OUTPATIENT)
Dept: LAB | Age: 82
End: 2022-06-08

## 2022-06-08 DIAGNOSIS — I48.20 CHRONIC ATRIAL FIBRILLATION (HCC): ICD-10-CM

## 2022-06-08 LAB — INR BLD: 2.66 (ref 0.85–1.13)

## 2022-06-09 ENCOUNTER — ANTI-COAG VISIT (OUTPATIENT)
Dept: FAMILY MEDICINE CLINIC | Age: 82
End: 2022-06-09
Payer: MEDICARE

## 2022-06-09 ENCOUNTER — TELEPHONE (OUTPATIENT)
Dept: FAMILY MEDICINE CLINIC | Age: 82
End: 2022-06-09

## 2022-06-09 DIAGNOSIS — Z79.01 CHRONIC ANTICOAGULATION: ICD-10-CM

## 2022-06-09 DIAGNOSIS — I48.20 CHRONIC ATRIAL FIBRILLATION (HCC): ICD-10-CM

## 2022-06-09 PROCEDURE — 93793 ANTICOAG MGMT PT WARFARIN: CPT | Performed by: FAMILY MEDICINE

## 2022-06-09 NOTE — TELEPHONE ENCOUNTER
----- Message from Hakan Langley MD sent at 6/9/2022  6:47 AM EDT -----  INR good. Continue same dosage and recheck INR in 1 month.

## 2022-06-09 NOTE — TELEPHONE ENCOUNTER
----- Message from Vaishali Anguiano MD sent at 6/9/2022  6:47 AM EDT -----  INR good. Continue same dosage and recheck INR in 1 month.

## 2022-06-16 ENCOUNTER — HOSPITAL ENCOUNTER (OUTPATIENT)
Dept: WOMENS IMAGING | Age: 82
Discharge: HOME | End: 2022-06-16
Payer: MEDICARE

## 2022-06-16 DIAGNOSIS — Z78.0 POSTMENOPAUSAL: ICD-10-CM

## 2022-06-16 PROCEDURE — 77080 DXA BONE DENSITY AXIAL: CPT

## 2022-06-28 NOTE — PROGRESS NOTES
Educated patient regarding heart failure management by explaining the importance of obtaining daily weights at the same time every day with approximately the same amount of clothing, how to recognize symptoms, low sodium diet and taking prescribed medications as ordered. Patient was given our heart failure booklet, a weight chart and a business card with the appointment time and date. Patient was receptive to the education given and verbalized understanding. Family at bedside. No

## 2022-07-22 ENCOUNTER — APPOINTMENT (OUTPATIENT)
Dept: GENERAL RADIOLOGY | Age: 82
End: 2022-07-22
Payer: MEDICARE

## 2022-07-22 ENCOUNTER — HOSPITAL ENCOUNTER (EMERGENCY)
Age: 82
Discharge: HOME | End: 2022-07-22
Payer: MEDICARE

## 2022-07-22 VITALS
TEMPERATURE: 97.7 F | SYSTOLIC BLOOD PRESSURE: 116 MMHG | RESPIRATION RATE: 18 BRPM | BODY MASS INDEX: 32.12 KG/M2 | DIASTOLIC BLOOD PRESSURE: 57 MMHG | WEIGHT: 170 LBS | HEART RATE: 76 BPM | OXYGEN SATURATION: 100 %

## 2022-07-22 DIAGNOSIS — R79.1 ELEVATED INR: ICD-10-CM

## 2022-07-22 DIAGNOSIS — U07.1 COVID-19: Primary | ICD-10-CM

## 2022-07-22 DIAGNOSIS — E86.0 MILD DEHYDRATION: ICD-10-CM

## 2022-07-22 LAB
ANION GAP SERPL CALCULATED.3IONS-SCNC: 12 MEQ/L (ref 8–16)
BACTERIA: ABNORMAL /HPF
BASOPHILS # BLD: 1.1 %
BASOPHILS ABSOLUTE: 0.1 THOU/MM3 (ref 0–0.1)
BILIRUBIN URINE: NEGATIVE
BLOOD, URINE: NEGATIVE
BUN BLDV-MCNC: 36 MG/DL (ref 7–22)
C-REACTIVE PROTEIN: 0.92 MG/DL (ref 0–1)
CALCIUM SERPL-MCNC: 8.4 MG/DL (ref 8.5–10.5)
CASTS 2: ABNORMAL /LPF
CASTS UA: ABNORMAL /LPF
CHARACTER, URINE: CLEAR
CHLORIDE BLD-SCNC: 105 MEQ/L (ref 98–111)
CO2: 21 MEQ/L (ref 23–33)
COLOR: YELLOW
CREAT SERPL-MCNC: 1.1 MG/DL (ref 0.4–1.2)
CRYSTALS, UA: ABNORMAL
EKG ATRIAL RATE: 58 BPM
EKG Q-T INTERVAL: 578 MS
EKG QRS DURATION: 252 MS
EKG QTC CALCULATION (BAZETT): 567 MS
EKG R AXIS: 162 DEGREES
EKG T AXIS: -34 DEGREES
EKG VENTRICULAR RATE: 58 BPM
EOSINOPHIL # BLD: 1.5 %
EOSINOPHILS ABSOLUTE: 0.1 THOU/MM3 (ref 0–0.4)
EPITHELIAL CELLS, UA: ABNORMAL /HPF
ERYTHROCYTE [DISTWIDTH] IN BLOOD BY AUTOMATED COUNT: 17.1 % (ref 11.5–14.5)
ERYTHROCYTE [DISTWIDTH] IN BLOOD BY AUTOMATED COUNT: 60.6 FL (ref 35–45)
FLU A ANTIGEN: NEGATIVE
FLU B ANTIGEN: NEGATIVE
GFR SERPL CREATININE-BSD FRML MDRD: 48 ML/MIN/1.73M2
GLUCOSE BLD-MCNC: 106 MG/DL (ref 70–108)
GLUCOSE URINE: NEGATIVE MG/DL
HCT VFR BLD CALC: 28.1 % (ref 37–47)
HEMOGLOBIN: 8.3 GM/DL (ref 12–16)
IMMATURE GRANS (ABS): 0.07 THOU/MM3 (ref 0–0.07)
IMMATURE GRANULOCYTES: 0.8 %
INR BLD: 8.7 (ref 0.85–1.13)
KETONES, URINE: NEGATIVE
LD: 244 U/L (ref 100–190)
LEUKOCYTE ESTERASE, URINE: NEGATIVE
LYMPHOCYTES # BLD: 13.9 %
LYMPHOCYTES ABSOLUTE: 1.2 THOU/MM3 (ref 1–4.8)
MAGNESIUM: 2.3 MG/DL (ref 1.6–2.4)
MCH RBC QN AUTO: 29.1 PG (ref 26–33)
MCHC RBC AUTO-ENTMCNC: 29.5 GM/DL (ref 32.2–35.5)
MCV RBC AUTO: 98.6 FL (ref 81–99)
MISCELLANEOUS 2: ABNORMAL
MONOCYTES # BLD: 10.1 %
MONOCYTES ABSOLUTE: 0.9 THOU/MM3 (ref 0.4–1.3)
NITRITE, URINE: NEGATIVE
NUCLEATED RED BLOOD CELLS: 0 /100 WBC
OSMOLALITY CALCULATION: 284.4 MOSMOL/KG (ref 275–300)
PH UA: 5.5 (ref 5–9)
PLATELET # BLD: 384 THOU/MM3 (ref 130–400)
PMV BLD AUTO: 10 FL (ref 9.4–12.4)
POTASSIUM SERPL-SCNC: 5 MEQ/L (ref 3.5–5.2)
PRO-BNP: 1121 PG/ML (ref 0–1800)
PROTEIN UA: ABNORMAL
RBC # BLD: 2.85 MILL/MM3 (ref 4.2–5.4)
RBC URINE: ABNORMAL /HPF
RENAL EPITHELIAL, UA: ABNORMAL
SARS-COV-2, NAAT: DETECTED
SEG NEUTROPHILS: 72.6 %
SEGMENTED NEUTROPHILS ABSOLUTE COUNT: 6.2 THOU/MM3 (ref 1.8–7.7)
SODIUM BLD-SCNC: 138 MEQ/L (ref 135–145)
SPECIFIC GRAVITY, URINE: > 1.03 (ref 1–1.03)
TROPONIN T: < 0.01 NG/ML
UROBILINOGEN, URINE: 0.2 EU/DL (ref 0–1)
WBC # BLD: 8.5 THOU/MM3 (ref 4.8–10.8)
WBC UA: ABNORMAL /HPF
YEAST: ABNORMAL

## 2022-07-22 PROCEDURE — 87635 SARS-COV-2 COVID-19 AMP PRB: CPT

## 2022-07-22 PROCEDURE — 85025 COMPLETE CBC W/AUTO DIFF WBC: CPT

## 2022-07-22 PROCEDURE — 99285 EMERGENCY DEPT VISIT HI MDM: CPT

## 2022-07-22 PROCEDURE — 6370000000 HC RX 637 (ALT 250 FOR IP): Performed by: PHYSICIAN ASSISTANT

## 2022-07-22 PROCEDURE — 2580000003 HC RX 258: Performed by: PHYSICIAN ASSISTANT

## 2022-07-22 PROCEDURE — 83735 ASSAY OF MAGNESIUM: CPT

## 2022-07-22 PROCEDURE — 87804 INFLUENZA ASSAY W/OPTIC: CPT

## 2022-07-22 PROCEDURE — 96360 HYDRATION IV INFUSION INIT: CPT

## 2022-07-22 PROCEDURE — 86140 C-REACTIVE PROTEIN: CPT

## 2022-07-22 PROCEDURE — 84484 ASSAY OF TROPONIN QUANT: CPT

## 2022-07-22 PROCEDURE — 83880 ASSAY OF NATRIURETIC PEPTIDE: CPT

## 2022-07-22 PROCEDURE — 84466 ASSAY OF TRANSFERRIN: CPT

## 2022-07-22 PROCEDURE — 81001 URINALYSIS AUTO W/SCOPE: CPT

## 2022-07-22 PROCEDURE — 93010 ELECTROCARDIOGRAM REPORT: CPT | Performed by: INTERNAL MEDICINE

## 2022-07-22 PROCEDURE — 80048 BASIC METABOLIC PNL TOTAL CA: CPT

## 2022-07-22 PROCEDURE — 83615 LACTATE (LD) (LDH) ENZYME: CPT

## 2022-07-22 PROCEDURE — 93005 ELECTROCARDIOGRAM TRACING: CPT | Performed by: PHYSICIAN ASSISTANT

## 2022-07-22 PROCEDURE — 71045 X-RAY EXAM CHEST 1 VIEW: CPT

## 2022-07-22 PROCEDURE — 85610 PROTHROMBIN TIME: CPT

## 2022-07-22 RX ORDER — ACETAMINOPHEN 500 MG
1000 TABLET ORAL ONCE
Status: COMPLETED | OUTPATIENT
Start: 2022-07-22 | End: 2022-07-22

## 2022-07-22 RX ORDER — 0.9 % SODIUM CHLORIDE 0.9 %
500 INTRAVENOUS SOLUTION INTRAVENOUS ONCE
Status: COMPLETED | OUTPATIENT
Start: 2022-07-22 | End: 2022-07-22

## 2022-07-22 RX ADMIN — ACETAMINOPHEN 1000 MG: 500 TABLET ORAL at 10:13

## 2022-07-22 RX ADMIN — SODIUM CHLORIDE 500 ML: 9 INJECTION, SOLUTION INTRAVENOUS at 10:13

## 2022-07-22 ASSESSMENT — ENCOUNTER SYMPTOMS
COUGH: 1
PHOTOPHOBIA: 0
SHORTNESS OF BREATH: 1
DIARRHEA: 0
VOMITING: 0

## 2022-07-22 ASSESSMENT — PAIN - FUNCTIONAL ASSESSMENT: PAIN_FUNCTIONAL_ASSESSMENT: 0-10

## 2022-07-22 NOTE — ED PROVIDER NOTES
[place] [id number] COMPLAINT       Chief Complaint   Patient presents with    Fatigue       Nurses Notes reviewed and I agree except as notedin the HPI. HISTORY OF PRESENT ILLNESS    Noland Klinefelter is a 80 y.o. female who presents with a chief complaint of fatigue. She states she has felt tired and weak for 4 days and it seems to be getting progressively worse. She states the weakness is generalized and more noticeable upon standing. She reports feeling lightheaded when standing and swaying back and forth. She also admits to shortness of breath when standing. She notes bilateral knee pain that is present when standing for about 1 month. She also notes her head feels \"fuzzy\" and \"just not quite right,\" but not painful. She states this has all made her activities of daily living very difficult. She reports 3-4 weeks ago she tested positive for COVID with a home test and had a fairly mild course that consisted of mostly a cough, which she reports is still lingering. She denies recent medication changes and chest pain. REVIEW OF SYSTEMS     Review of Systems   Constitutional:  Positive for fatigue. Negative for activity change. Eyes:  Negative for photophobia. Respiratory:  Positive for cough and shortness of breath. Cardiovascular:  Negative for chest pain. Gastrointestinal:  Negative for diarrhea and vomiting. Genitourinary:  Negative for dysuria. Musculoskeletal:  Positive for arthralgias (bilateral knees). Negative for neck pain. Neurological:  Positive for light-headedness. Negative for dizziness.       PAST MEDICAL HISTORY    has a past medical history of AICD (automatic cardioverter/defibrillator) present, Arthritis, Atrial fibrillation ([address] Biventricular ICD (implantable cardiac defibrillator) in place, C. difficile diarrhea, CAD (coronary artery disease), Cancer ([facility] [address] CHF (congestive heart failure) ([address] Diverticulitis, GERD (gastroesophageal reflux disease), Hiatal hernia, Hyperlipidemia, Hypertension, Menopause, NAFLD (nonalcoholic fatty liver disease), Nonischemic cardiomyopathy ([facility] [address] Pacemaker, Retroperitoneal hemorrhage, and Thyroid disease. SURGICAL HISTORY      has a past surgical history that includes Cholecystectomy, laparoscopic; Cardiac defibrillator placement; Colonoscopy (2008); skin biopsy (2010); Endoscopy, colon, diagnostic; pacemaker placement (1999); Colonoscopy (N/A, 7/16/2019); Upper gastrointestinal endoscopy (N/A, 7/16/2019); Upper gastrointestinal endoscopy (Left, 7/16/2019); and Upper gastrointestinal endoscopy (N/A, 12/2/2019). CURRENT MEDICATIONS       Discharge Medication List as of 7/22/2022 12:54 PM        CONTINUE these medications which have NOT CHANGED    Details   lisinopril (ZESTRIL) 2.5 MG tablet Take 1 tablet by mouth daily, Disp-30 tablet, R-3Normal      metoprolol succinate (TOPROL XL) 100 MG extended release tablet Take 1.5 tablets by mouth daily, Disp-60 tablet, R-5Normal      pantoprazole (PROTONIX) 40 MG tablet Take 1 tablet by mouth 2 times daily (before meals), Disp-60 tablet, R-5Normal      digoxin (LANOXIN) 125 MCG tablet Take 1 tablet by mouth every other day, Disp-45 tablet, R-3Normal      bumetanide (BUMEX) 1 MG tablet Take 1 tablet by mouth daily, Disp-90 tablet, R-3Normal      metOLazone (ZAROXOLYN) 2.5 MG tablet Take as needed for ankle swelling; notify office when you take it, Disp-15 tablet, R-3Normal      quinapril (ACCUPRIL) 5 MG tablet Take 1 tablet by mouth once daily, Disp-90 tablet, R-3Normal      spironolactone (ALDACTONE) 25 MG tablet TAKE 1 TABLET BY MOUTH EVERY OTHER DAY, Disp-45 tablet, R-3Normal      amiodarone (CORDARONE) 200 MG tablet Take 1 tablet by mouth once daily, Disp-90 tablet, R-3Normal      Handicap Placard MISC Starting Tue 11/2/2021, Disp-1 each, R-0, PrintRequest parking placard due to medical conditions. Duration of 5 years.       levothyroxine (SYNTHROID) 175 MCG tablet Take 1 tablet by mouth Daily New dose., Disp-90 tablet, R-3Normal      warfarin (COUMADIN) 3 MG tablet Alternating 3 mg/1.5 mg or as directed per INR results. , Disp-90 tablet, R-3Normal      ferrous sulfate (IRON 325) 325 (65 Fe) MG tablet Take 325 mg by mouth every other dayHistorical Med      Alum & Mag Hydroxide-Simeth (MYLANTA PO) Take by mouth as needed Historical Med      acetaminophen (TYLENOL) 325 MG tablet Take 650 mg by mouth every 6 hours as needed for Pain or FeverHistorical Med      Probiotic Product (PROBIOTIC FORMULA PO) Take 1 tablet by mouth daily. Misc. Devices (LUMBAR SUPPORT CUSHION) MISC Starting 2013, Until Discontinued, Disp-1 each, R-0, PrintDx: low back pain, sciatica. Ascorbic Acid (VITAMIN C) 1000 MG tablet Take 1,000 mg by mouth 2 times daily. Calcium Carbonate (CALCIUM 600 PO) Take  by mouth 2 times daily. ALLERGIES     is allergic to ativan [lorazepam], codeine, nystatin, nystatin, percocet [oxycodone-acetaminophen], relafen [nabumetone], tape [adhesive tape], and celebrex [celecoxib]. HISTORY     She indicated that her mother is . She indicated that her father is . She indicated that three of her four sisters are alive. She indicated that seven of her nine brothers are alive. She indicated that the status of her daughter is unknown. She indicated that the status of her neg hx is unknown.   family history includes Cancer in her sister; Diabetes in her brother and mother; Heart Disease in her brother and mother; High Blood Pressure in her brother, brother, brother, and brother; High Cholesterol in her father and mother; Kidney Disease in her brother; Catheryn Ganong / [place] in her daughter; Parkinsonism in her brother. SOCIALHISTORY      reports that she has never smoked. She has never used smokeless tobacco. She reports that she does not drink alcohol and does not use drugs.     PHYSICAL EXAM INITIAL VITALS:  weight is 170 lb (77.1 kg). Her temperature is 97.7 °F (36.5 °C). Her blood pressure is 116/57 (abnormal) and her pulse is 76. Her respiration is 18 and oxygen saturation is 100%. Physical Exam  Vitals and nursing note reviewed. Constitutional:       General: She is not in acute distress. Appearance: Normal appearance. She is not ill-appearing or toxic-appearing. HENT:      Head: Normocephalic and atraumatic. Eyes:      Conjunctiva/sclera: Conjunctivae normal.      Pupils: Pupils are equal, round, and reactive to light. Cardiovascular:      Rate and Rhythm: Tachycardia present. Pulmonary:      Effort: Pulmonary effort is normal.      Breath sounds: Normal breath sounds. Musculoskeletal:      Cervical back: Normal range of motion. Right knee: Normal.      Left knee: Normal.   Skin:     General: Skin is warm and dry. Capillary Refill: Capillary refill takes less than 2 seconds. Neurological:      Mental Status: She is alert. Sensory: Sensation is intact. Motor: No weakness. Coordination: Romberg sign negative. Gait: Gait is intact. Deep Tendon Reflexes: Reflexes normal.      Comments: 5/5 strength    Psychiatric:         Mood and Affect: Mood normal.       DIFFERENTIAL DIAGNOSIS:   COVID 19    DIAGNOSTIC RESULTS     EKG: All EKG's are interpreted by the Emergency Department Physician who either signs or Co-signs this chart in the absence of a cardiologist.      RADIOLOGY: non-plain film images(s) such as CT, Ultrasound and MRI are read by the radiologist.  XR CHEST PORTABLE   Final Result   1. Mild Ale. Permanent pacemaker/defibrillator. 2. No acute findings. No infiltrates or effusions are seen. **This report has been created using voice recognition software. It may contain minor errors which are inherent in voice recognition technology. **      Final report electronically signed by Dr. Sinan Carcamo on 7/22/2022 10:20 AM LABS:   Labs Reviewed   COVID-19, RAPID - Abnormal; Notable for the following components:       Result Value    SARS-CoV-2, NAAT DETECTED (*)     All other components within normal limits   PROTIME-INR - Abnormal; Notable for the following components:    INR 8.70 (*)     All other components within normal limits   CBC WITH AUTO DIFFERENTIAL - Abnormal; Notable for the following components:    RBC 2.85 (*)     Hemoglobin 8.3 (*)     Hematocrit 28.1 (*)     MCHC 29.5 (*)     RDW-CV 17.1 (*)     RDW-SD 60.6 (*)     All other components within normal limits   BASIC METABOLIC PANEL - Abnormal; Notable for the following components:    CO2 21 (*)     BUN 36 (*)     Calcium 8.4 (*)     All other components within normal limits   LACTATE DEHYDROGENASE - Abnormal; Notable for the following components:     (*)     All other components within normal limits   GLOMERULAR FILTRATION RATE, ESTIMATED - Abnormal; Notable for the following components:    Est, Glom Filt Rate 48 (*)     All other components within normal limits   URINE WITH REFLEXED MICRO - Abnormal; Notable for the following components:    Specific Gravity, Urine > 1.030 (*)     Protein, UA TRACE (*)     All other components within normal limits   RAPID INFLUENZA A/B ANTIGENS   TROPONIN   MAGNESIUM   BRAIN NATRIURETIC PEPTIDE   C-REACTIVE PROTEIN   ANION GAP   OSMOLALITY   TRANSFERRIN       EMERGENCY DEPARTMENT COURSE:   :    Vitals:    07/22/22 0925 07/22/22 1042 07/22/22 1147   BP: 108/60 (!) 106/42 (!) 116/57   Pulse: (!) 114 75 76   Resp: 18 16 18   Temp: 97.7 °F (36.5 °C)     SpO2: 98% 100% 100%   Weight: 170 lb (77.1 kg)       Patient was seen history physical exam was performed. See disposition below    CRITICAL CARE:  None    CONSULTS:  None    PROCEDURES:  None    FINAL IMPRESSION      1. COVID-19    2. Elevated INR    3.  Mild dehydration          DISPOSITION/PLAN   Discharge    PATIENT REFERRED TO:  Vandana Cantu MD  [address] [place] [address],[address]

## 2022-07-22 NOTE — ED TRIAGE NOTES
Pt to ED due to increased fatigue. Pt states that she tested positive for covid about 3 weeks ago from a home test. Pt states that starting about the past week she finds it harder to walk. Pt states that she feels she cannot function. EKG done. VSS.

## 2022-07-23 LAB — TRANSFERRIN: 279 MG/DL (ref 200–360)

## 2022-07-25 ENCOUNTER — NURSE ONLY (OUTPATIENT)
Dept: LAB | Age: 82
End: 2022-07-25

## 2022-07-25 ENCOUNTER — CARE COORDINATION (OUTPATIENT)
Dept: CARE COORDINATION | Age: 82
End: 2022-07-25

## 2022-07-25 ENCOUNTER — TELEPHONE (OUTPATIENT)
Dept: FAMILY MEDICINE CLINIC | Age: 82
End: 2022-07-25

## 2022-07-25 DIAGNOSIS — I48.20 CHRONIC ATRIAL FIBRILLATION (HCC): ICD-10-CM

## 2022-07-25 LAB — INR BLD: 3.82 (ref 0.85–1.13)

## 2022-07-25 RX ORDER — LISINOPRIL 2.5 MG/1
2.5 TABLET ORAL DAILY
Qty: 30 TABLET | Refills: 3 | Status: CANCELLED | OUTPATIENT
Start: 2022-07-25

## 2022-07-25 NOTE — TELEPHONE ENCOUNTER
The pt had an ED visit on 7/22/22. The pt is COVID-positive with high INR reading of 8.7 (according to pt, labs in [facility]). She is wondering what to do regarding the INR. I was unsure if Dr. Shama Thompson would want to do an in-office appt being that she is COVID-positive.   Would you like a virtual?

## 2022-07-25 NOTE — TELEPHONE ENCOUNTER
Assuming she held coumadin since the 22nd  . I see she had inr today but is still pending. Need to see the result. Continue to hold.

## 2022-07-25 NOTE — CARE COORDINATION
Attempted outreach for [id number] monitoring  ED for fatigue  COVID positive 3 weeks ago  Left message to return phone call to ambulatory care manager at [phone]. Patient contacted regarding COVID-19 diagnosis. Discussed COVID-19 related testing which was available at this time. Test results were positive. Patient informed of results, if available? Yes. Ambulatory Care Manager contacted the patient by telephone to perform post discharge assessment. Call within 2 business days of discharge: Yes. Verified name and  with patient as identifiers. Provided introduction to self, and explanation of the CTN/ACM role, and reason for call due to risk factors for infection and/or exposure to COVID-19. Symptoms reviewed with patient who verbalized the following symptoms: fatigue  no new symptoms  no worsening symptoms. Due to no new or worsening symptoms encounter was not routed to provider for escalation. Discussed follow-up appointments. If no appointment was previously scheduled, appointment scheduling offered: Yes. declined  [facility] follow up appointment(s):   Future Appointments   Date Time Provider Deacon Perez   2022 10:30 AM Yolande Mcardle, MD [facility]   2022 10:00 AM Lexa Lopez MD [facility]     Non-[facility] follow up appointment(s): NA    Non-face-to-face services provided:  Obtained and reviewed discharge summary and/or continuity of care documents     Advance Care Planning:   Does patient have an Advance Directive:  reviewed and current. Educated patient about risk for severe COVID-19 due to risk factors according to CDC guidelines. ACM reviewed discharge instructions, medical action plan and red flag symptoms with the patient who verbalized understanding. Discussed COVID vaccination status: Yes. Education provided on COVID-19 vaccination as appropriate. Discussed exposure protocols and quarantine with CDC Guidelines.  Patient was given an opportunity to verbalize any questions and concerns and agrees to contact ACM or health care provider for questions related to their healthcare. Reviewed and educated patient on any new and changed medications related to discharge diagnosis     Was patient discharged with a pulse oximeter? no      ACM provided contact information. No further follow-up call identified based on severity of symptoms and risk factors. Advised on zone sheet, symptom management, reporting worsening symptoms, deep breathing exercises, staying active, and hydration.

## 2022-07-25 NOTE — TELEPHONE ENCOUNTER
Pt has been holding coumadin since 07/22/2022. She will continue to hold until she hears from our office with today's INR results.

## 2022-07-26 ENCOUNTER — TELEPHONE (OUTPATIENT)
Dept: FAMILY MEDICINE CLINIC | Age: 82
End: 2022-07-26

## 2022-07-26 RX ORDER — LISINOPRIL 2.5 MG/1
2.5 TABLET ORAL DAILY
Qty: 90 TABLET | Refills: 3 | Status: SHIPPED | OUTPATIENT
Start: 2022-07-26 | End: 2022-08-03 | Stop reason: ALTCHOICE

## 2022-07-26 NOTE — ACP (ADVANCE CARE PLANNING)
Advance Care Planning   Healthcare Decision Maker:    Primary Decision Maker: Ruth Thompson - Child - [phone]    Click here to complete Healthcare Decision Makers including selection of the Healthcare Decision Maker Relationship (ie \"Primary\"). Today we documented Decision Maker(s) consistent with Legal Next of Kin hierarchy.

## 2022-07-29 ENCOUNTER — PATIENT MESSAGE (OUTPATIENT)
Dept: INTERNAL MEDICINE CLINIC | Age: 82
End: 2022-07-29

## 2022-08-01 NOTE — TELEPHONE ENCOUNTER
From: Dayana Swenson  To: Dr. Janie Anders: 7/29/2022 7:00 PM EDT  Subject: Lisinipril    This is causing me to cough constantly. Is there anything else you can recommend instead of this medication. We ordered a new pill cutter and would like to try going back on the old pill I was on.

## 2022-08-02 NOTE — TELEPHONE ENCOUNTER
I spoke with patient and the Quinapril was changed to Lisinopril 2.5 mg as she was having trouble cutting the Quinapril 5 mg in half . Patient has a terrible cough on the Lisinopril so she would like to go back to the Quinapril as she has got a new pill cutter . Okay it change back ?  I will update medication list .

## 2022-08-03 ENCOUNTER — TELEPHONE (OUTPATIENT)
Dept: INTERNAL MEDICINE CLINIC | Age: 82
End: 2022-08-03

## 2022-08-03 RX ORDER — QUINAPRIL 5 1/1
2.5 TABLET ORAL DAILY
COMMUNITY
End: 2022-10-24 | Stop reason: DRUGHIGH

## 2022-08-03 NOTE — TELEPHONE ENCOUNTER
----- Message from Queta Ramos MD sent at 8/2/2022  1:58 PM EDT -----  Yes; if she gets cough on quinapril, can change to ARB

## 2022-08-05 ENCOUNTER — NURSE ONLY (OUTPATIENT)
Dept: LAB | Age: 82
End: 2022-08-05

## 2022-08-05 DIAGNOSIS — I48.20 CHRONIC ATRIAL FIBRILLATION (HCC): ICD-10-CM

## 2022-08-05 DIAGNOSIS — E03.9 ACQUIRED HYPOTHYROIDISM: ICD-10-CM

## 2022-08-05 LAB
INR BLD: 2.13 (ref 0.85–1.13)
T4 FREE: 1.99 NG/DL (ref 0.93–1.76)
TSH SERPL DL<=0.05 MIU/L-ACNC: 6.98 UIU/ML (ref 0.4–4.2)

## 2022-08-06 LAB — T3 FREE: 1.44 PG/ML (ref 2.02–4.43)

## 2022-08-08 ENCOUNTER — TELEPHONE (OUTPATIENT)
Dept: FAMILY MEDICINE CLINIC | Age: 82
End: 2022-08-08

## 2022-08-08 ENCOUNTER — ANTI-COAG VISIT (OUTPATIENT)
Dept: FAMILY MEDICINE CLINIC | Age: 82
End: 2022-08-08

## 2022-08-08 NOTE — TELEPHONE ENCOUNTER
----- Message from Luann Torres MD sent at 8/5/2022  6:02 PM EDT -----  INR good. Continue same dosage and recheck INR in 1 month.

## 2022-08-08 NOTE — PROGRESS NOTES
Message from Blanca Gaffney MD sent at 8/5/2022  6:02 PM EDT -----  INR good. Continue same dosage and recheck INR in 1 month.

## 2022-09-01 ENCOUNTER — NURSE ONLY (OUTPATIENT)
Dept: LAB | Age: 82
End: 2022-09-01

## 2022-09-01 ENCOUNTER — HOSPITAL ENCOUNTER (OUTPATIENT)
Dept: PHYSICAL THERAPY | Age: 82
Setting detail: THERAPIES SERIES
Discharge: HOME | End: 2022-09-01
Payer: MEDICARE

## 2022-09-01 DIAGNOSIS — I48.20 CHRONIC ATRIAL FIBRILLATION (HCC): ICD-10-CM

## 2022-09-01 LAB — INR BLD: 1.93 (ref 0.85–1.13)

## 2022-09-01 PROCEDURE — 97162 PT EVAL MOD COMPLEX 30 MIN: CPT

## 2022-09-01 PROCEDURE — 97530 THERAPEUTIC ACTIVITIES: CPT

## 2022-09-01 PROCEDURE — 97110 THERAPEUTIC EXERCISES: CPT

## 2022-09-01 NOTE — PROGRESS NOTES
** PLEASE SIGN, DATE AND TIME CERTIFICATION BELOW AND RETURN TO [facility] OUTPATIENT REHABILITATION (FAX #: [phone]). ATTEST/CO-SIGN IF ACCESSING VIA [employer]. THANK YOU.**    I certify that I have examined the patient below and determined that Physical Medicine and Rehabilitation service is necessary and that I approve the established plan of care for up to 90 days or as specifically noted. Attestation, signature or co-signature of physician indicates approval of certification requirements.    ________________________ ____________ __________  Physician Signature   Date   Time  [facility]  PHYSICAL [facility]ERAPY  [x] EVALUATION  [] DAILY NOTE (LAND) [] DAILY NOTE (AQUATIC ) [] PROGRESS NOTE [] DISCHARGE NOTE    [x] [facility]   [] [address]    [] [address] [facility]   [] Randeen Erps    Date: 2022  Patient Name:  Jason Martinez  : 1940  MRN: [id number]  CSN: [id number]    Referring Practitioner Dinorah Feliz MD   Diagnosis Age-related osteoporosis without current pathological fracture [M81.0]    Treatment Diagnosis Impaired balance, abnormal gait, B knee pain, back pain, B LE weakness, decreased endurance   Date of Evaluation 22    Additional Pertinent History HTN, pacemaker, CAD, CHF      Functional Outcome Measure Used Tinetti   Functional Outcome Score 1628 - no AD (22)       Insurance: Primary: Payor: MEDICARE /  /  / ,   Secondary: [facility]   Authorization Information: No pre-cert required   Visit # , 1/10 for progress note   Visits Allowed: Patient has unlimited visits based on medical necessity   Recertification Date: 2022   Physician Follow-Up: F/u in about 1 month   Physician Orders: Eval    History of Present Illness: Pt is a 81 y/o female presenting to skilled PT services with c/o osteoporosis and instability.  Was dx with COVID-19 about 6 weeks ago and since then has been more unsteady on her feet, feels weak, and has been experiencing B knee pain. Denies history of falls but is fearful of falling. Uses 4WW when out of the home for stability, no AD within the home. Also reports chronic back pain. SUBJECTIVE: Pt presents with 1TV. Denies pain currently. Social/Functional History and Current Status:  Medications and Allergies have been reviewed and are listed on Medical History Questionnaire. Heather Barrois lives with spouse in a multiple floor home with ability to complete ADL's on main floor with a ramp to enter.     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     Objective:    OBSERVATION   Pain Denies pain currently, but does have recurrent B knee pain and back pain   Sensation B LE light touch sensation intact   Bed Mobility Mod I   Transfers Mod I, but unsteady upon standing   Ambulation Mod I, 4WW: good stability, equal step length and stance time, good heal strike  No AD: unsteady, lateral trunk sway, mod path deviations, wide MK, unequal step length   Stairs Not tested, Does not encounter steps, has ramp at home   Balance Tinetti: 16/28     POSTURE    No Deficit Deficit Comments   Forward Head  x    Rounded Shoulders  x    Kyphosis  x    Lordosis      Lateral Shift      Scoliosis      Iliac Crest      PSIS      ASIS      Leg Length Discrp  x L ant innominate   Slumped Sitting          TRUNK RANGE OF MOTION   Flexion:    Extension:    Lateral Flexion Left:    Lateral Flexion Right:    Rotation Left: Rotation Right:    Trunk Range of Motion is [facility]  [x]       LOWER EXTREMITY RANGE OF MOTION    Left Right Comments   Hip Flexion knee to chest      Hip Extension      Hip ABDuction      Hip ADDuction      Hip Internal Rotation      Hip External Rotation      Hip Range of Motion is [facility]  [x]      Knee Flexion      Knee Extension      Knee Range of Motion is [facility]  [x]       LOWER EXTREMITY STRENGTH    Left Right Comments   Hip Flexion 4 4    Hip Abduction 4 4    Hip Adduction      Hip Extension      Hip External Rotation      Knee Flexion 4 4    Knee Extension 4 4    Ankle DF 4+ 4+    Ankle PF      LE strength is [facility] []    SPECIAL TESTS (+/-)    Left Right Comments   SLR  - -    90/90 Hamstring length      SKTC      DKTC      Slump      SI Compression/Distraction      ANNETTE - -    SPEEDY - -    Adalberto Sanchez          TREATMENT   Precautions: pacemaker   Pain: No pain today    \"X in shaded column indicates activity completed today    *\" next to exercise/intervention indicates progression   Modalities Parameters/  Location  Notes                     Manual Therapy Time/Technique  Notes                     Exercise/Intervention   Notes   Nustep              Supine:       Piriformis stretch into ER 2x30 sec  x    TA contraction 10x5 sec sec  x                  Seated:       HS stretch       Marches, LAPALOMA, HR/TR 10x  x                  MET to correct L ant innominate 5x5 sec  x      Specific Interventions Next Treatment: Nustep warm up, B LE strengthening and stretching, core strengthening, balance training, gait training without AD, progress slowly as pt fatigues quickly!, monitor pelvic alignment    Activity/Treatment Tolerance:  [x]  Patient tolerated treatment well  []  Patient limited by fatigue  []  Patient limited by pain   []  Patient limited by medical complications  []  Other:     Assessment:  Pt is a 81 y/o female presenting to skilled PT services with c/o instability and back/knee pain.  Pt demonstrates Impaired balance, abnormal gait, B knee pain, back pain, B LE weakness, decreased endurance, limiting her ability to perform ADLs and household tasks. She is a high fall risk, scoring 16/28 on Tinetti. Pt will benefit from skilled PT services to increase strength, endurance, reduce pain, improve balance and overall gait mechanics to improve ability to perform functional mobility tasks for return to PLOF. Body Structures/Functions/Activity Limitations: impaired activity tolerance, impaired balance, impaired endurance, impaired strength, pain, and abnormal gait  Prognosis: fair      GOALS:  Patient Goal: improve balance    Short Term Goals:  Time Frame: 4 weeks    1. Patient will increase B LE strength to 4+/5 to promote ease of household tasks. 2. Patient will ambulate household distances without AD while demonstrating no path deviating and equal step length to allow improved overall gait mechanics and stability. 3. Patient will be indep with HEP in order to meet long term goals. Long Term Goals:  Time Frame: 8 weeks    1. Patient will be indep with HEP in order to prevent re-injury and improve ability to perform functional mobility tasks. 2. Patient will improve Tinetti score from 16/28 to 19/28 to reduce fall risk and improve overall safety with functional mobility tasks. Patient Education:   [x]  HEP/Education Completed: Plan of Care, Goals, HO provided for HEP, continue current home program of current therex with additions made today  [address] [place] [address] Access Code: [email or web address]  []  No new Education completed  []  Reviewed Prior HEP      [x]  Patient verbalized and/or demonstrated understanding of education provided. []  Patient unable to verbalize and/or demonstrate understanding of education provided. Will continue education.   []  Barriers to learning:     PLAN:  Treatment Recommendations: Strengthening, Balance Training, Functional Mobility Training, Endurance Training, Gait Training, Neuromuscular Re-education, Manual Therapy - Soft Tissue Mobilization, Pain Management, Home Exercise Program, Patient Education, Self-Care Education and Training, and Modalities    [x]  Plan of care initiated. Plan to see patient 2 times per week for 8 weeks to address the treatment planned outlined above.   []  Continue with current plan of care  []  Modify plan of care as follows:    []  Hold pending physician visit  []  Discharge    Time In 1130   Time Out 1225   Timed Code Minutes: 25 min   Total Treatment Time: 55 min     Electronically Signed by: Estuardo Johnson PT

## 2022-09-02 ENCOUNTER — TELEPHONE (OUTPATIENT)
Dept: FAMILY MEDICINE CLINIC | Age: 82
End: 2022-09-02

## 2022-09-02 NOTE — TELEPHONE ENCOUNTER
----- Message from Magda Parada MD sent at 9/2/2022  6:40 AM EDT -----  INR good. Continue same dosage and recheck INR in 1 month.

## 2022-09-06 ENCOUNTER — OFFICE VISIT (OUTPATIENT)
Dept: INTERNAL MEDICINE CLINIC | Age: 82
End: 2022-09-06
Payer: MEDICARE

## 2022-09-06 VITALS
HEIGHT: 61 IN | WEIGHT: 171.4 LBS | DIASTOLIC BLOOD PRESSURE: 86 MMHG | SYSTOLIC BLOOD PRESSURE: 112 MMHG | HEART RATE: 64 BPM | TEMPERATURE: 97.2 F | BODY MASS INDEX: 32.36 KG/M2

## 2022-09-06 DIAGNOSIS — E03.9 HYPOTHYROIDISM, UNSPECIFIED TYPE: ICD-10-CM

## 2022-09-06 DIAGNOSIS — D50.9 IRON DEFICIENCY ANEMIA, UNSPECIFIED IRON DEFICIENCY ANEMIA TYPE: ICD-10-CM

## 2022-09-06 DIAGNOSIS — I48.20 CHRONIC ATRIAL FIBRILLATION (HCC): ICD-10-CM

## 2022-09-06 DIAGNOSIS — I51.9 LEFT VENTRICULAR SYSTOLIC DYSFUNCTION: Primary | ICD-10-CM

## 2022-09-06 DIAGNOSIS — T50.905D ADVERSE EFFECT OF DRUG, SUBSEQUENT ENCOUNTER: ICD-10-CM

## 2022-09-06 DIAGNOSIS — N18.31 STAGE 3A CHRONIC KIDNEY DISEASE (HCC): ICD-10-CM

## 2022-09-06 PROCEDURE — G8399 PT W/DXA RESULTS DOCUMENT: HCPCS | Performed by: INTERNAL MEDICINE

## 2022-09-06 PROCEDURE — 3288F FALL RISK ASSESSMENT DOCD: CPT | Performed by: INTERNAL MEDICINE

## 2022-09-06 PROCEDURE — 99213 OFFICE O/P EST LOW 20 MIN: CPT | Performed by: INTERNAL MEDICINE

## 2022-09-06 PROCEDURE — G8417 CALC BMI ABV UP PARAM F/U: HCPCS | Performed by: INTERNAL MEDICINE

## 2022-09-06 PROCEDURE — 1036F TOBACCO NON-USER: CPT | Performed by: INTERNAL MEDICINE

## 2022-09-06 PROCEDURE — 1090F PRES/ABSN URINE INCON ASSESS: CPT | Performed by: INTERNAL MEDICINE

## 2022-09-06 PROCEDURE — 1123F ACP DISCUSS/DSCN MKR DOCD: CPT | Performed by: INTERNAL MEDICINE

## 2022-09-06 PROCEDURE — G8427 DOCREV CUR MEDS BY ELIG CLIN: HCPCS | Performed by: INTERNAL MEDICINE

## 2022-09-06 RX ORDER — PANTOPRAZOLE SODIUM 40 MG/1
40 TABLET, DELAYED RELEASE ORAL
Qty: 180 TABLET | Refills: 3 | Status: SHIPPED | OUTPATIENT
Start: 2022-09-06 | End: 2022-10-27

## 2022-09-06 SDOH — ECONOMIC STABILITY: FOOD INSECURITY: WITHIN THE PAST 12 MONTHS, YOU WORRIED THAT YOUR FOOD WOULD RUN OUT BEFORE YOU GOT MONEY TO BUY MORE.: NEVER TRUE

## 2022-09-06 SDOH — ECONOMIC STABILITY: FOOD INSECURITY: WITHIN THE PAST 12 MONTHS, THE FOOD YOU BOUGHT JUST DIDN'T LAST AND YOU DIDN'T HAVE MONEY TO GET MORE.: NEVER TRUE

## 2022-09-06 ASSESSMENT — SOCIAL DETERMINANTS OF HEALTH (SDOH): HOW HARD IS IT FOR YOU TO PAY FOR THE VERY BASICS LIKE FOOD, HOUSING, MEDICAL CARE, AND HEATING?: NOT HARD AT ALL

## 2022-09-06 ASSESSMENT — PATIENT HEALTH QUESTIONNAIRE - PHQ9
SUM OF ALL RESPONSES TO PHQ QUESTIONS 1-9: 0
SUM OF ALL RESPONSES TO PHQ QUESTIONS 1-9: 0
2. FEELING DOWN, DEPRESSED OR HOPELESS: 0
1. LITTLE INTEREST OR PLEASURE IN DOING THINGS: 0
SUM OF ALL RESPONSES TO PHQ QUESTIONS 1-9: 0
SUM OF ALL RESPONSES TO PHQ QUESTIONS 1-9: 0
SUM OF ALL RESPONSES TO PHQ9 QUESTIONS 1 & 2: 0

## 2022-09-06 NOTE — PATIENT INSTRUCTIONS
IF YOUR PRESSURE IS LOW IN AM; TAKE IT LATER; IT MAY IMPROVE; IF NOT, HOLD THE ACCUPRIL ( QUINAPRIL) AND LET THE OFFICE KNOW

## 2022-09-06 NOTE — PROGRESS NOTES
noted     Musculoskeletal -   msk exam:[id number]}     Extremities - no pedal edema noted     Skin - normal coloration and turgor, no rashes, no suspicious skin lesions noted            An electronic signature was used to authenticate this note.     --Bigg Gill MD

## 2022-09-07 ENCOUNTER — HOSPITAL ENCOUNTER (OUTPATIENT)
Dept: PHYSICAL THERAPY | Age: 82
Setting detail: THERAPIES SERIES
Discharge: HOME | End: 2022-09-07
Payer: MEDICARE

## 2022-09-07 PROCEDURE — 97110 THERAPEUTIC EXERCISES: CPT

## 2022-09-07 NOTE — PROGRESS NOTES
[facility]  PHYSICAL THERAPY  [] EVALUATION  [x] DAILY NOTE (LAND) [] DAILY NOTE (AQUATIC ) [] PROGRESS NOTE [] DISCHARGE NOTE    [x] OUTPATIENT REHABILITATION CENTER [facility]   [] [place] [address]    [] [facility]   [] Astridteressa Sharee    Date: 2022  Patient Name:  Brady Santiago  : 1940  MRN: [id number]  CSN: [id number]    Referring Practitioner Isabelle Flanagan MD   Diagnosis Age-related osteoporosis without current pathological fracture [M81.0]    Treatment Diagnosis Impaired balance, abnormal gait, B knee pain, back pain, B LE weakness, decreased endurance   Date of Evaluation 22    Additional Pertinent History HTN, pacemaker, CAD, CHF      Functional Outcome Measure Used Tinetti   Functional Outcome Score  - no AD (22)       Insurance: Primary: Payor: MEDICARE /  /  / ,   Secondary: [facility]   Authorization Information: No pre-cert required   Visit # 2, 2 for progress note   Visits Allowed: Patient has unlimited visits based on medical necessity   Recertification Date: 2022   Physician Follow-Up: F/u in about 1 month   Physician Orders: Eval    History of Present Illness: Pt is a 81 y/o female presenting to skilled PT services with c/o osteoporosis and instability. Was dx with COVID-19 about 6 weeks ago and since then has been more unsteady on her feet, feels weak, and has been experiencing B knee pain. Denies history of falls but is fearful of falling. Uses 4WW when out of the home for stability, no AD within the home. Also reports chronic back pain. SUBJECTIVE: Patient presents with 3VK and states she is feeling about the same with lower back and knee soreness. Rates current pain 2/10. Reports compliance with HEP.       TREATMENT   Precautions: pacemaker   Pain: 2/10 lower back and B knees    \"X in shaded column indicates activity completed today    *\" next to exercise/intervention indicates progression   Modalities Parameters/  Location  Notes                     Manual Therapy Time/Technique  Notes                     Exercise/Intervention   Notes   Nustep* seat 9 arms 9 5 min Level 1 x           Supine:       Piriformis stretch into ER 2x30 sec  x    LTR* 5x5 sec  x    HS stretch 90/90* 2x 30 sec  x    TA bracing 10x5 sec sec  x    TA with ball* 10x5 sec  x    TA with marches* 10x  x    TA with SLR* 5x  x           Seated:       HS stretch       Marches, LAQ, HR/TR 10x  x                  MET to correct L ant innominate 5x5 sec  x      Specific Interventions Next Treatment: Nustep warm up, B LE strengthening and stretching, core strengthening, balance training, gait training without AD, progress slowly as pt fatigues quickly!, monitor pelvic alignment    Activity/Treatment Tolerance:  [x]  Patient tolerated treatment well  []  Patient limited by fatigue  []  Patient limited by pain   []  Patient limited by medical complications  []  Other:     Assessment:  Continued with exercises as listed above working on strength and flexibility. Provided cues for correct abdominal brace technique with exercises. Good tolerance with progressions. Will continue to progress to improve functional mobility for return to PLOF. GOALS:  Patient Goal: improve balance    Short Term Goals:  Time Frame: 4 weeks    1. Patient will increase B LE strength to 4+/5 to promote ease of household tasks. 2. Patient will ambulate household distances without AD while demonstrating no path deviating and equal step length to allow improved overall gait mechanics and stability. 3. Patient will be indep with HEP in order to meet long term goals. Long Term Goals:  Time Frame: 8 weeks    1. Patient will be indep with HEP in order to prevent re-injury and improve ability to perform functional mobility tasks. 2. Patient will improve Tinetti score from 16/28 to 19/28 to reduce fall risk and improve overall safety with functional mobility tasks. Patient Education:   [x]  HEP/Education Completed: Patient given updated HOMartine Godoy Access Code: [email or web address]  []  No new Education completed  []  Reviewed Prior HEP      [x]  Patient verbalized and/or demonstrated understanding of education provided. []  Patient unable to verbalize and/or demonstrate understanding of education provided. Will continue education. []  Barriers to learning:     PLAN:  Treatment Recommendations: Strengthening, Balance Training, Functional Mobility Training, Endurance Training, Gait Training, Neuromuscular Re-education, Manual Therapy - Soft Tissue Mobilization, Pain Management, Home Exercise Program, Patient Education, Self-Care Education and Training, and Modalities    []  Plan of care initiated. Plan to see patient 2 times per week for 8 weeks to address the treatment planned outlined above.   [x]  Continue with current plan of care  []  Modify plan of care as follows:    []  Hold pending physician visit  []  Discharge    Time In 1340   Time Out 1420   Timed Code Minutes:  40 min   Total Treatment Time: 40 min     Electronically Signed by: Jeannine Salamanca PTA

## 2022-09-13 ENCOUNTER — HOSPITAL ENCOUNTER (OUTPATIENT)
Dept: PHYSICAL THERAPY | Age: 82
Setting detail: THERAPIES SERIES
Discharge: HOME | End: 2022-09-13
Payer: MEDICARE

## 2022-09-13 PROCEDURE — 97110 THERAPEUTIC EXERCISES: CPT

## 2022-09-13 NOTE — PROGRESS NOTES
[facility]  PHYSICAL THERAPY  [] EVALUATION  [x] DAILY NOTE (LAND) [] DAILY NOTE (AQUATIC ) [] PROGRESS NOTE [] DISCHARGE NOTE    [x] OUTPATIENT REHABILITATION CENTER [facility]   [] [facility]    [] [facility]   [] Elodia Bending    Date: 2022  Patient Name:  Gemma Almanza  : 1940  MRN: [id number]  CSN: [id number]    Referring Practitioner Darcy Dimas MD   Diagnosis Age-related osteoporosis without current pathological fracture [M81.0]    Treatment Diagnosis Impaired balance, abnormal gait, B knee pain, back pain, B LE weakness, decreased endurance   Date of Evaluation 22    Additional Pertinent History HTN, pacemaker, CAD, CHF      Functional Outcome Measure Used Tinetti   Functional Outcome Score  - no AD (22)       Insurance: Primary: Payor: Roanne Board /  /  / ,   Secondary: [facility]   Authorization Information: No pre-cert required   Visit # 3, 3/10 for progress note   Visits Allowed: Patient has unlimited visits based on medical necessity   Recertification Date: 2022   Physician Follow-Up: F/u in about 1 month   Physician Orders: Eval    History of Present Illness: Pt is a 81 y/o female presenting to skilled PT services with c/o osteoporosis and instability. Was dx with COVID-19 about 6 weeks ago and since then has been more unsteady on her feet, feels weak, and has been experiencing B knee pain. Denies history of falls but is fearful of falling. Uses 4WW when out of the home for stability, no AD within the home. Also reports chronic back pain. SUBJECTIVE: Pt presents with 4WW and states her knees to be bothering her the most today. Reports she was sore after previous session but did not last long.        TREATMENT   Precautions: pacemaker   Pain: 2/10 lower back and B knees    \"X in shaded column indicates activity completed today    *\" next to exercise/intervention indicates progression   Modalities Parameters/  Location  Notes                     Manual Therapy Time/Technique  Notes                     Exercise/Intervention   Notes   Nustep seat 9 arms 9 5 min Level 2* x Pt noted fatigue at completion           Supine:       Piriformis stretch into ER/IR* 2x30 sec  x    LTR 10*x5 sec  x    HS stretch 90/90 2x 30 sec  x    TA bracing 12*x5 sec sec  x    TA with ball 12*x5 sec      TA with marches 12*x  x    TA with SLR 10*x  x    TA with bent knee fall outs- bilateral/unilateral* x10  x    Bridge* x10 2 sec x           Seated:       HS stretch* x2 20 sec x    Marches, LAQ, HR/TR 12*x  x                  MET to correct L ant innominate 5x5 sec        Specific Interventions Next Treatment: Nustep warm up, B LE strengthening and stretching, core strengthening, balance training, gait training without AD, progress slowly as pt fatigues quickly!, monitor pelvic alignment    Activity/Treatment Tolerance:  [x]  Patient tolerated treatment well  []  Patient limited by fatigue  []  Patient limited by pain   []  Patient limited by medical complications  []  Other:     Assessment:  Progressed therex with increased reps as shown above per flow sheet. Pt noted fatigue at completion of Nu step. Vcs for proper technique needed to ensure maximum muscle activation. Pt denied adverse reactions during session. Will continue to progress as tolerated by pt per POC. GOALS:  Patient Goal: improve balance    Short Term Goals:  Time Frame: 4 weeks    1. Patient will increase B LE strength to 4+/5 to promote ease of household tasks. 2. Patient will ambulate household distances without AD while demonstrating no path deviating and equal step length to allow improved overall gait mechanics and stability. 3. Patient will be indep with HEP in order to meet long term goals. Long Term Goals:  Time Frame: 8 weeks    1.  Patient will be indep with HEP in order to prevent re-injury and improve ability to perform functional mobility tasks.   2. Patient will improve Tinetti score from 16/28 to 19/28 to reduce fall risk and improve overall safety with functional mobility tasks. Patient Education:   []  HEP/Education Completed: Patient given updated SANTI Godoy Access Code: [email or web address]  []  No new Education completed  [x]  Reviewed Prior HEP      [x]  Patient verbalized and/or demonstrated understanding of education provided. []  Patient unable to verbalize and/or demonstrate understanding of education provided. Will continue education. []  Barriers to learning:     PLAN:  Treatment Recommendations: Strengthening, Balance Training, Functional Mobility Training, Endurance Training, Gait Training, Neuromuscular Re-education, Manual Therapy - Soft Tissue Mobilization, Pain Management, Home Exercise Program, Patient Education, Self-Care Education and Training, and Modalities    []  Plan of care initiated. Plan to see patient 2 times per week for 8 weeks to address the treatment planned outlined above.   [x]  Continue with current plan of care  []  Modify plan of care as follows:    []  Hold pending physician visit  []  Discharge    Time In 0845   Time Out 0924   Timed Code Minutes: 39   Total Treatment Time: 39     Electronically Signed by: Kelvin Corey PTA

## 2022-09-16 ENCOUNTER — HOSPITAL ENCOUNTER (OUTPATIENT)
Dept: PHYSICAL THERAPY | Age: 82
Setting detail: THERAPIES SERIES
Discharge: HOME | End: 2022-09-16
Payer: MEDICARE

## 2022-09-16 PROCEDURE — 97110 THERAPEUTIC EXERCISES: CPT

## 2022-09-16 NOTE — PROGRESS NOTES
[address] [facility]  PHYSICAL THERAPY  [] EVALUATION  [x] DAILY NOTE (LAND) [] DAILY NOTE (AQUATIC ) [] PROGRESS NOTE [] DISCHARGE NOTE    [x] OUTPATIENT REHABILITATION CENTER [facility]   [] [place] [address]    [] [facility]   [] Dony Hazard    Date: 2022  Patient Name:  Maikel Jackson  : 1940  MRN: [id number]  CSN: [id number]    Referring Practitioner Monty Bingham MD   Diagnosis Age-related osteoporosis without current pathological fracture [M81.0]    Treatment Diagnosis Impaired balance, abnormal gait, B knee pain, back pain, B LE weakness, decreased endurance   Date of Evaluation 22    Additional Pertinent History HTN, pacemaker, CAD, CHF      Functional Outcome Measure Used Tinetti   Functional Outcome Score  - no AD (22)       Insurance: Primary: Payor: MEDICARE /  /  / ,   Secondary: [facility]   Authorization Information: No pre-cert required   Visit # 4, 4/10 for progress note   Visits Allowed: Patient has unlimited visits based on medical necessity   Recertification Date: 2022   Physician Follow-Up: F/u in about 1 month   Physician Orders: Eval    History of Present Illness: Pt is a 81 y/o female presenting to skilled PT services with c/o osteoporosis and instability. Was dx with COVID-19 about 6 weeks ago and since then has been more unsteady on her feet, feels weak, and has been experiencing B knee pain. Denies history of falls but is fearful of falling. Uses 4WW when out of the home for stability, no AD within the home. Also reports chronic back pain. SUBJECTIVE: Patient walking into therapy with her 4ww. She states that her lower back was bothering her some this morning but after taking tylenol it is feeling ok. She states that her balance might be improving a little. She states that she tries to perform her exercises at home almost everyday.        TREATMENT   Precautions: pacemaker   Pain: Denies pain after taking tylenol    \"X in shaded column indicates activity completed today    *\" next to exercise/intervention indicates progression   Modalities Parameters/  Location  Notes                     Manual Therapy Time/Technique  Notes                     Exercise/Intervention   Notes   Nustep seat 9 arms 9 5 min Level 2 X           Supine:       Piriformis stretch into ER/IR 2x30 sec  X Noted increased tightness at LLE compared to RLE   LTR 10x5 sec  X    HS stretch 90/90 2x 30 sec  X    TA bracing 12x5 sec   X    TA bracing with ball 12x5 sec  X    TA bracing with marches 12x  X    TA bracing with SLR 12*x  X Slight quad lag noted at LLE   TA bracing with bent knee fall outs- bilateral/unilateral with Peach theraband*  x10  X Patient took theraband for home use. Bridge x10 2 sec X           Seated:       HS stretch x2 20 sec X    TA bracing with marches, LAQ, HR/TR 12x  X           Standing:        Step stance- B lead*  1x20 sec  X No UE   Feet together EO * 1x20 sec   X No UE          MET to correct L ant innominate 5x5 sec        Specific Interventions Next Treatment: Nustep warm up, B LE strengthening and stretching, core strengthening, balance training, gait training without AD, progress slowly as pt fatigues quickly!, monitor pelvic alignment    Activity/Treatment Tolerance:  [x]  Patient tolerated treatment well  []  Patient limited by fatigue  []  Patient limited by pain   []  Patient limited by medical complications  []  Other:     Assessment: Patient did not note as much fatigue with the nu step this session. She continued with therapeutic exercises as documented above. Progressions and additional exercises added indicated by *. Patient noted increased tightness at her LLE compared to her RLE while performing piriformis stretching. She tolerated session well. GOALS:  Patient Goal: improve balance    Short Term Goals:  Time Frame: 4 weeks    1.  Patient will increase B LE strength to 4+/5 to promote ease of household tasks. 2. Patient will ambulate household distances without AD while demonstrating no path deviating and equal step length to allow improved overall gait mechanics and stability. 3. Patient will be indep with HEP in order to meet long term goals. Long Term Goals:  Time Frame: 8 weeks    1. Patient will be indep with HEP in order to prevent re-injury and improve ability to perform functional mobility tasks. 2. Patient will improve Tinetti score from 16/28 to 19/28 to reduce fall risk and improve overall safety with functional mobility tasks. Patient Education:   [x]  HEP/Education Completed: Monitoring response to progressions made. [employer] Access Code: [email or web address]  []  No new Education completed  [x]  Reviewed Prior HEP      [x]  Patient verbalized and/or demonstrated understanding of education provided. []  Patient unable to verbalize and/or demonstrate understanding of education provided. Will continue education. []  Barriers to learning:     PLAN:  Treatment Recommendations: Strengthening, Balance Training, Functional Mobility Training, Endurance Training, Gait Training, Neuromuscular Re-education, Manual Therapy - Soft Tissue Mobilization, Pain Management, Home Exercise Program, Patient Education, Self-Care Education and Training, and Modalities    []  Plan of care initiated. Plan to see patient 2 times per week for 8 weeks to address the treatment planned outlined above.   [x]  Continue with current plan of care  []  Modify plan of care as follows:    []  Hold pending physician visit  []  Discharge    Time In 0845   Time Out 0929   Timed Code Minutes: 44   Total Treatment Time: 44     Electronically Signed by: Bob Dumont PTA

## 2022-09-19 ENCOUNTER — HOSPITAL ENCOUNTER (OUTPATIENT)
Dept: PHYSICAL THERAPY | Age: 82
Setting detail: THERAPIES SERIES
Discharge: HOME | End: 2022-09-19
Payer: MEDICARE

## 2022-09-19 ENCOUNTER — NURSE ONLY (OUTPATIENT)
Dept: LAB | Age: 82
End: 2022-09-19

## 2022-09-19 DIAGNOSIS — T50.905D ADVERSE EFFECT OF DRUG, SUBSEQUENT ENCOUNTER: ICD-10-CM

## 2022-09-19 LAB
ALBUMIN SERPL-MCNC: 3.1 G/DL (ref 3.5–5.1)
ALP BLD-CCNC: 181 U/L (ref 38–126)
ALT SERPL-CCNC: 28 U/L (ref 11–66)
AST SERPL-CCNC: 54 U/L (ref 5–40)
BILIRUB SERPL-MCNC: 0.3 MG/DL (ref 0.3–1.2)
BILIRUBIN DIRECT: < 0.2 MG/DL (ref 0–0.3)
TOTAL PROTEIN: 6.3 G/DL (ref 6.1–8)

## 2022-09-19 PROCEDURE — 97110 THERAPEUTIC EXERCISES: CPT

## 2022-09-19 NOTE — PROGRESS NOTES
[address] [facility]  PHYSICAL THERAPY  [x] DAILY NOTE (LAND) [] DAILY NOTE (AQUATIC ) [] PROGRESS NOTE [] DISCHARGE NOTE    [x] OUTPATIENT REHABILITATION CENTER [facility]   [] [place] [address]    [] [facility]   [] Sarthak Javier    Date: 2022  Patient Name:  Sunny Calloway  : 1940  MRN: [id number]  CSN: [id number]    Referring Practitioner Supriya Gamez MD   Diagnosis Age-related osteoporosis without current pathological fracture [M81.0]    Treatment Diagnosis Impaired balance, abnormal gait, B knee pain, back pain, B LE weakness, decreased endurance   Date of Evaluation 22    Additional Pertinent History HTN, pacemaker, CAD, CHF      Functional Outcome Measure Used Tinetti   Functional Outcome Score  - no AD (22)       Insurance: Primary: Payor: MEDICARE /  /  / ,   Secondary: [facility]   Authorization Information: No pre-cert required   Visit # 5, 5/10 for progress note   Visits Allowed: Patient has unlimited visits based on medical necessity   Recertification Date: 2022   Physician Follow-Up: F/u in about 1 month   Physician Orders: Eval    History of Present Illness: Pt is a 81 y/o female presenting to skilled PT services with c/o osteoporosis and instability. Was dx with COVID-19 about 6 weeks ago and since then has been more unsteady on her feet, feels weak, and has been experiencing B knee pain. Denies history of falls but is fearful of falling. Uses 4WW when out of the home for stability, no AD within the home. Also reports chronic back pain. SUBJECTIVE: Patient denies having any pain currently and reporting no other complains.  Has taken tylenol this AM.      TREATMENT   Precautions: pacemaker   Pain:     \"X in shaded column indicates activity completed today    *\" next to exercise/intervention indicates progression   Modalities Parameters/  Location  Notes                     Manual Therapy Time/Technique  Notes HEP in order to meet long term goals. Long Term Goals:  Time Frame: 8 weeks    1. Patient will be indep with HEP in order to prevent re-injury and improve ability to perform functional mobility tasks. 2. Patient will improve Tinetti score from 16/28 to 19/28 to reduce fall risk and improve overall safety with functional mobility tasks. Patient Education:   [x]  HEP/Education Completed: side lying exercises added. [email or web address] Access Code: [email or web address]  []  No new Education completed  [x]  Reviewed Prior HEP      [x]  Patient verbalized and/or demonstrated understanding of education provided. []  Patient unable to verbalize and/or demonstrate understanding of education provided. Will continue education. []  Barriers to learning:     PLAN:  Treatment Recommendations: Strengthening, Balance Training, Functional Mobility Training, Endurance Training, Gait Training, Neuromuscular Re-education, Manual Therapy - Soft Tissue Mobilization, Pain Management, Home Exercise Program, Patient Education, Self-Care Education and Training, and Modalities    []  Plan of care initiated. Plan to see patient 2 times per week for 8 weeks to address the treatment planned outlined above.   [x]  Continue with current plan of care  []  Modify plan of care as follows:    []  Hold pending physician visit  []  Discharge    Time In 0933   Time Out 1013   Timed Code Minutes: 40   Total Treatment Time: 40     Electronically Signed by: Naresh Resendiz PTA

## 2022-09-20 ENCOUNTER — TELEPHONE (OUTPATIENT)
Dept: INTERNAL MEDICINE CLINIC | Age: 82
End: 2022-09-20

## 2022-09-22 ENCOUNTER — HOSPITAL ENCOUNTER (OUTPATIENT)
Dept: PHYSICAL THERAPY | Age: 82
Setting detail: THERAPIES SERIES
Discharge: HOME | End: 2022-09-22
Payer: MEDICARE

## 2022-09-22 PROCEDURE — 97110 THERAPEUTIC EXERCISES: CPT

## 2022-09-22 NOTE — PROGRESS NOTES
[address] [facility]  PHYSICAL THERAPY  [x] DAILY NOTE (LAND) [] DAILY NOTE (AQUATIC ) [] PROGRESS NOTE [] DISCHARGE NOTE    [x] OUTPATIENT REHABILITATION CENTER - [place]   [] [place] [address]    [] [facility]   [] Ron Pitcher    Date: 2022  Patient Name:  Heather Barrios  : 1940  MRN: [id number]  CSN: [id number]    Referring Practitioner Lul Blanco MD   Diagnosis Age-related osteoporosis without current pathological fracture [M81.0]    Treatment Diagnosis Impaired balance, abnormal gait, B knee pain, back pain, B LE weakness, decreased endurance   Date of Evaluation 22    Additional Pertinent History HTN, pacemaker, CAD, CHF      Functional Outcome Measure Used Tinetti   Functional Outcome Score  - no AD (22)       Insurance: Primary: Payor: MEDICARE /  /  / ,   Secondary: [facility]   Authorization Information: No pre-cert required   Visit # 6, 6/10 for progress note   Visits Allowed: Patient has unlimited visits based on medical necessity   Recertification Date: 2022   Physician Follow-Up: F/u in about 1 month   Physician Orders: Eval    History of Present Illness: Pt is a 81 y/o female presenting to skilled PT services with c/o osteoporosis and instability. Was dx with COVID-19 about 6 weeks ago and since then has been more unsteady on her feet, feels weak, and has been experiencing B knee pain. Denies history of falls but is fearful of falling. Uses 4WW when out of the home for stability, no AD within the home. Also reports chronic back pain. SUBJECTIVE: Patient presents with 4 wheeled walker. Patient denies pain upon initial interview. Subjective reports of weakness in the bilateral knees. Patient reports regular use of tylenol in the morning to alleviate back pain. Patient reports that her back pain subsides as the day progresses.        TREATMENT   Precautions: pacemaker   Pain:     \"X in shaded column indicates activity completed today    *\" next to exercise/intervention indicates progression   Modalities Parameters/  Location  Notes                     Manual Therapy Time/Technique  Notes                     Exercise/Intervention   Notes   NuStep seat 9 arms 9 5 min Level 3* x           Supine:       Piriformis stretch into ER/IR 2x30 sec  x Noted increased tightness at LLE compared to RLE   LTR 10x5 sec  x    HS stretch 90/90 2x 30 sec  x    TA bracing 12x5 sec   x    TA bracing with ball 15x5 sec  x    TA bracing with marches 12x  x    TA bracing with SLR 15x  x Slight quad lag noted at LLE   TA bracing with bent knee fall outs- bilateral/unilateral with Peach theraband*  x10  x Patient took theraband for home use. Bridge x10 2 sec x    Side lying hip abduction, clam shell, reverse clamshells x10 2 sec x    Seated:       HS stretch x2 20 sec     TA bracing with marches, LAQ, HR/TR 12x             Standing:        Step stance- B lead 1x20 sec   No UE   Feet together EO  1x20 sec    No UE   Side step  5 R/L  x Needing UE support   MET to correct L ant innominate 5x5 sec        Specific Interventions Next Treatment: NuStep warm up, B LE strengthening and stretching, core strengthening, balance training, gait training without AD, progress slowly as pt fatigues quickly!, monitor pelvic alignment    Activity/Treatment Tolerance:  [x]  Patient tolerated treatment well  []  Patient limited by fatigue  []  Patient limited by pain   []  Patient limited by medical complications  []  Other:     Assessment: Treatment interventions completed as recorded above. Intermittent therapeutic rest breaks required throughout treatment session due to mild fatigue. Patient tolerated treatment well. GOALS:  Patient Goal: improve balance    Short Term Goals:  Time Frame: 4 weeks    1. Patient will increase B LE strength to 4+/5 to promote ease of household tasks.   2. Patient will ambulate household distances without AD while demonstrating no path deviating and equal step length to allow improved overall gait mechanics and stability. 3. Patient will be indep with HEP in order to meet long term goals. Long Term Goals:  Time Frame: 8 weeks    1. Patient will be indep with HEP in order to prevent re-injury and improve ability to perform functional mobility tasks. 2. Patient will improve Tinetti score from 16/28 to 19/28 to reduce fall risk and improve overall safety with functional mobility tasks. Patient Education:   []  HEP/Education Completed: side lying exercises added. [employer] Access Code: [email or web address]  [x]  No new Education completed  []  Reviewed Prior HEP      []  Patient verbalized and/or demonstrated understanding of education provided. []  Patient unable to verbalize and/or demonstrate understanding of education provided. Will continue education. []  Barriers to learning:     PLAN:  Treatment Recommendations: Strengthening, Balance Training, Functional Mobility Training, Endurance Training, Gait Training, Neuromuscular Re-education, Manual Therapy - Soft Tissue Mobilization, Pain Management, Home Exercise Program, Patient Education, Self-Care Education and Training, and Modalities    []  Plan of care initiated. Plan to see patient 2 times per week for 8 weeks to address the treatment planned outlined above.   [x]  Continue with current plan of care  []  Modify plan of care as follows:    []  Hold pending physician visit  []  Discharge    Time In 0950   Time Out 1035   Timed Code Minutes: 45   Total Treatment Time: 45     Electronically Signed by: Los Larson PTA

## 2022-09-26 ENCOUNTER — HOSPITAL ENCOUNTER (OUTPATIENT)
Dept: PHYSICAL THERAPY | Age: 82
Setting detail: THERAPIES SERIES
Discharge: HOME | End: 2022-09-26
Payer: MEDICARE

## 2022-09-26 PROCEDURE — 97110 THERAPEUTIC EXERCISES: CPT

## 2022-09-26 NOTE — PROGRESS NOTES
[address] [facility]  PHYSICAL THERAPY  [x] DAILY NOTE (LAND) [] DAILY NOTE (AQUATIC ) [] PROGRESS NOTE [] DISCHARGE NOTE    [x] OUTPATIENT REHABILITATION CENTER [facility]   [] [place] [address]    [] [facility]   [] Armando Payment    Date: 2022  Patient Name:  Diamond Luther  : 1940  MRN: [id number]  CSN: [id number]    Referring Practitioner Lexi Talavera MD   Diagnosis Age-related osteoporosis without current pathological fracture [M81.0]    Treatment Diagnosis Impaired balance, abnormal gait, B knee pain, back pain, B LE weakness, decreased endurance   Date of Evaluation 22    Additional Pertinent History HTN, pacemaker, CAD, CHF      Functional Outcome Measure Used Tinetti   Functional Outcome Score  - no AD (22)       Insurance: Primary: Payor: Alecia Reis /  /  / ,   Secondary: [facility]   Authorization Information: No pre-cert required   Visit # 7, 7/10 for progress note   Visits Allowed: Patient has unlimited visits based on medical necessity   Recertification Date: 2022   Physician Follow-Up: F/u in about 1 month   Physician Orders: Eval    History of Present Illness: Pt is a 81 y/o female presenting to skilled PT services with c/o osteoporosis and instability. Was dx with COVID-19 about 6 weeks ago and since then has been more unsteady on her feet, feels weak, and has been experiencing B knee pain. Denies history of falls but is fearful of falling. Uses 4WW when out of the home for stability, no AD within the home. Also reports chronic back pain. SUBJECTIVE: Pt presents with 4 wheeled walker. Pt notes having L knee pain throughout the weekend into today. Notes its highest pain level at 5-6/10 and today /10 but reports it continues to feel uncomfortable.        TREATMENT   Precautions: pacemaker   Pain:     \"X in shaded column indicates activity completed today    *\" next to exercise/intervention indicates progression Modalities Parameters/  Location  Notes                     Manual Therapy Time/Technique  Notes                     Exercise/Intervention   Notes   NuStep seat 9 arms 9 6* min Level 3 x           Supine:       Piriformis stretch into ER/IR 2x30 sec  x Noted increased tightness at LLE compared to RLE   LTR 10x5 sec  x    HS stretch 90/90 2x 30 sec  x    TA bracing 15*x5 sec   x    TA bracing with ball 15x5 sec      TA bracing with marches 15*x  x    TA bracing with SLR 15x  x Slight quad lag noted at LLE   TA bracing with bent knee fall outs- bilateral/unilateral with Peach theraband*  x10   Patient took theraband for home use. Bridge X15* 2 sec x    Side lying hip abduction, clam shell, reverse clamshells x10 2 sec x Difficulty maintaining anterior shift of pelvis in SL   TA bracing with UE raise* x10  x    Dead bugs* x10  x Vcs for proper sequencing    Seated:       HS stretch x2 20 sec     TA bracing with marches, LAQ, HR/TR 12x             Standing:        Step stance- B lead 1x20 sec   No UE   Feet together EO  1x20 sec    No UE   Side step  5 R/L   Needing UE support   MET to correct L ant innominate 5x5 sec        Specific Interventions Next Treatment: NuStep warm up, B LE strengthening and stretching, core strengthening, balance training, gait training without AD, progress slowly as pt fatigues quickly!, monitor pelvic alignment    Activity/Treatment Tolerance:  [x]  Patient tolerated treatment well  []  Patient limited by fatigue  []  Patient limited by pain   []  Patient limited by medical complications  []  Other:     Assessment: Held standing exercises this date secondary to c/o pain in L knee. Pt tolerated increased reps and progressions well, denied adverse reactions throughout session. Will continue to progress as tolerated by pt per POC. Resume standing exercises if pt's knee pain improves next visit. GOALS:  Patient Goal: improve balance    Short Term Goals:  Time Frame: 4 weeks    1. Patient will increase B LE strength to 4+/5 to promote ease of household tasks. 2. Patient will ambulate household distances without AD while demonstrating no path deviating and equal step length to allow improved overall gait mechanics and stability. 3. Patient will be indep with HEP in order to meet long term goals. Long Term Goals:  Time Frame: 8 weeks    1. Patient will be indep with HEP in order to prevent re-injury and improve ability to perform functional mobility tasks. 2. Patient will improve Tinetti score from 16/28 to 19/28 to reduce fall risk and improve overall safety with functional mobility tasks. Patient Education:   []  HEP/Education Completed: side lying exercises added. [employer] Access Code: [email or web address]  [x]  No new Education completed  []  Reviewed Prior HEP      []  Patient verbalized and/or demonstrated understanding of education provided. []  Patient unable to verbalize and/or demonstrate understanding of education provided. Will continue education. []  Barriers to learning:     PLAN:  Treatment Recommendations: Strengthening, Balance Training, Functional Mobility Training, Endurance Training, Gait Training, Neuromuscular Re-education, Manual Therapy - Soft Tissue Mobilization, Pain Management, Home Exercise Program, Patient Education, Self-Care Education and Training, and Modalities    []  Plan of care initiated. Plan to see patient 2 times per week for 8 weeks to address the treatment planned outlined above.   [x]  Continue with current plan of care  []  Modify plan of care as follows:    []  Hold pending physician visit  []  Discharge    Time In 1016   Time Out 1055   Timed Code Minutes: 39   Total Treatment Time: 39     Electronically Signed by: Gideon Fothergill, PTA

## 2022-09-28 ENCOUNTER — APPOINTMENT (OUTPATIENT)
Dept: INTERVENTIONAL RADIOLOGY/VASCULAR | Age: 82
DRG: 392 | End: 2022-09-28
Payer: MEDICARE

## 2022-09-28 ENCOUNTER — HOSPITAL ENCOUNTER (INPATIENT)
Age: 82
LOS: 1 days | Discharge: HOME | DRG: 392 | End: 2022-09-30
Attending: PHYSICIAN ASSISTANT | Admitting: PHYSICIAN ASSISTANT
Payer: MEDICARE

## 2022-09-28 ENCOUNTER — APPOINTMENT (OUTPATIENT)
Dept: GENERAL RADIOLOGY | Age: 82
DRG: 392 | End: 2022-09-28
Payer: MEDICARE

## 2022-09-28 ENCOUNTER — APPOINTMENT (OUTPATIENT)
Dept: CT IMAGING | Age: 82
DRG: 392 | End: 2022-09-28
Payer: MEDICARE

## 2022-09-28 DIAGNOSIS — I82.451 ACUTE DEEP VEIN THROMBOSIS (DVT) OF RIGHT PERONEAL VEIN (HCC): ICD-10-CM

## 2022-09-28 DIAGNOSIS — K21.00 GASTROESOPHAGEAL REFLUX DISEASE WITH ESOPHAGITIS, UNSPECIFIED WHETHER HEMORRHAGE: ICD-10-CM

## 2022-09-28 DIAGNOSIS — R07.9 CHEST PAIN, UNSPECIFIED TYPE: Primary | ICD-10-CM

## 2022-09-28 LAB
ALBUMIN SERPL-MCNC: 2.9 G/DL (ref 3.5–5.1)
ALP BLD-CCNC: 175 U/L (ref 38–126)
ALT SERPL-CCNC: 31 U/L (ref 11–66)
ANION GAP SERPL CALCULATED.3IONS-SCNC: 10 MEQ/L (ref 8–16)
AST SERPL-CCNC: 58 U/L (ref 5–40)
BASOPHILS # BLD: 1.3 %
BASOPHILS ABSOLUTE: 0.1 THOU/MM3 (ref 0–0.1)
BILIRUB SERPL-MCNC: 0.2 MG/DL (ref 0.3–1.2)
BUN BLDV-MCNC: 28 MG/DL (ref 7–22)
CALCIUM SERPL-MCNC: 8.6 MG/DL (ref 8.5–10.5)
CHLORIDE BLD-SCNC: 105 MEQ/L (ref 98–111)
CO2: 23 MEQ/L (ref 23–33)
CREAT SERPL-MCNC: 1.1 MG/DL (ref 0.4–1.2)
D-DIMER QUANTITATIVE: 248 NG/ML FEU (ref 0–500)
DIGOXIN LEVEL: 0.7 NG/ML (ref 0.5–2)
EKG ATRIAL RATE: 60 BPM
EKG Q-T INTERVAL: 480 MS
EKG QRS DURATION: 152 MS
EKG QTC CALCULATION (BAZETT): 487 MS
EKG R AXIS: -62 DEGREES
EKG T AXIS: 104 DEGREES
EKG VENTRICULAR RATE: 62 BPM
EOSINOPHIL # BLD: 2.2 %
EOSINOPHILS ABSOLUTE: 0.1 THOU/MM3 (ref 0–0.4)
ERYTHROCYTE [DISTWIDTH] IN BLOOD BY AUTOMATED COUNT: 18.1 % (ref 11.5–14.5)
ERYTHROCYTE [DISTWIDTH] IN BLOOD BY AUTOMATED COUNT: 60.9 FL (ref 35–45)
GFR SERPL CREATININE-BSD FRML MDRD: 48 ML/MIN/1.73M2
GLUCOSE BLD-MCNC: 119 MG/DL (ref 70–108)
HCT VFR BLD CALC: 28 % (ref 37–47)
HEMOGLOBIN: 8.3 GM/DL (ref 12–16)
IMMATURE GRANS (ABS): 0.03 THOU/MM3 (ref 0–0.07)
IMMATURE GRANULOCYTES: 0.5 %
INR BLD: 3.13 (ref 0.85–1.13)
LIPASE: 73.9 U/L (ref 5.6–51.3)
LYMPHOCYTES # BLD: 13.1 %
LYMPHOCYTES ABSOLUTE: 0.8 THOU/MM3 (ref 1–4.8)
MCH RBC QN AUTO: 27.6 PG (ref 26–33)
MCHC RBC AUTO-ENTMCNC: 29.6 GM/DL (ref 32.2–35.5)
MCV RBC AUTO: 93 FL (ref 81–99)
MONOCYTES # BLD: 13.9 %
MONOCYTES ABSOLUTE: 0.9 THOU/MM3 (ref 0.4–1.3)
NUCLEATED RED BLOOD CELLS: 0 /100 WBC
OSMOLALITY CALCULATION: 282.3 MOSMOL/KG (ref 275–300)
PLATELET # BLD: 264 THOU/MM3 (ref 130–400)
PMV BLD AUTO: 10.1 FL (ref 9.4–12.4)
POTASSIUM REFLEX MAGNESIUM: 4.9 MEQ/L (ref 3.5–5.2)
RBC # BLD: 3.01 MILL/MM3 (ref 4.2–5.4)
SEG NEUTROPHILS: 69 %
SEGMENTED NEUTROPHILS ABSOLUTE COUNT: 4.3 THOU/MM3 (ref 1.8–7.7)
SODIUM BLD-SCNC: 138 MEQ/L (ref 135–145)
TOTAL PROTEIN: 6 G/DL (ref 6.1–8)
TROPONIN T: < 0.01 NG/ML
WBC # BLD: 6.3 THOU/MM3 (ref 4.8–10.8)

## 2022-09-28 PROCEDURE — G0378 HOSPITAL OBSERVATION PER HR: HCPCS

## 2022-09-28 PROCEDURE — 99220 PR INITIAL OBSERVATION CARE/DAY 70 MINUTES: CPT | Performed by: PHYSICIAN ASSISTANT

## 2022-09-28 PROCEDURE — 80162 ASSAY OF DIGOXIN TOTAL: CPT

## 2022-09-28 PROCEDURE — 99285 EMERGENCY DEPT VISIT HI MDM: CPT

## 2022-09-28 PROCEDURE — 6370000000 HC RX 637 (ALT 250 FOR IP): Performed by: PHYSICIAN ASSISTANT

## 2022-09-28 PROCEDURE — 85379 FIBRIN DEGRADATION QUANT: CPT

## 2022-09-28 PROCEDURE — 96374 THER/PROPH/DIAG INJ IV PUSH: CPT

## 2022-09-28 PROCEDURE — 83690 ASSAY OF LIPASE: CPT

## 2022-09-28 PROCEDURE — 2580000003 HC RX 258: Performed by: PHYSICIAN ASSISTANT

## 2022-09-28 PROCEDURE — 2500000003 HC RX 250 WO HCPCS: Performed by: PHYSICIAN ASSISTANT

## 2022-09-28 PROCEDURE — 80053 COMPREHEN METABOLIC PANEL: CPT

## 2022-09-28 PROCEDURE — 85025 COMPLETE CBC W/AUTO DIFF WBC: CPT

## 2022-09-28 PROCEDURE — 6360000004 HC RX CONTRAST MEDICATION: Performed by: PHYSICIAN ASSISTANT

## 2022-09-28 PROCEDURE — 36415 COLL VENOUS BLD VENIPUNCTURE: CPT

## 2022-09-28 PROCEDURE — A4216 STERILE WATER/SALINE, 10 ML: HCPCS | Performed by: PHYSICIAN ASSISTANT

## 2022-09-28 PROCEDURE — 93971 EXTREMITY STUDY: CPT

## 2022-09-28 PROCEDURE — 85610 PROTHROMBIN TIME: CPT

## 2022-09-28 PROCEDURE — 71275 CT ANGIOGRAPHY CHEST: CPT

## 2022-09-28 PROCEDURE — 93005 ELECTROCARDIOGRAM TRACING: CPT | Performed by: PHYSICIAN ASSISTANT

## 2022-09-28 PROCEDURE — 71045 X-RAY EXAM CHEST 1 VIEW: CPT

## 2022-09-28 PROCEDURE — 93010 ELECTROCARDIOGRAM REPORT: CPT | Performed by: NUCLEAR MEDICINE

## 2022-09-28 PROCEDURE — 84484 ASSAY OF TROPONIN QUANT: CPT

## 2022-09-28 RX ORDER — SODIUM CHLORIDE 0.9 % (FLUSH) 0.9 %
10 SYRINGE (ML) INJECTION EVERY 12 HOURS SCHEDULED
Status: DISCONTINUED | OUTPATIENT
Start: 2022-09-28 | End: 2022-09-30 | Stop reason: HOSPADM

## 2022-09-28 RX ORDER — MAGNESIUM SULFATE IN WATER 40 MG/ML
2000 INJECTION, SOLUTION INTRAVENOUS PRN
Status: DISCONTINUED | OUTPATIENT
Start: 2022-09-28 | End: 2022-09-30 | Stop reason: HOSPADM

## 2022-09-28 RX ORDER — POTASSIUM CHLORIDE 20 MEQ/1
40 TABLET, EXTENDED RELEASE ORAL PRN
Status: DISCONTINUED | OUTPATIENT
Start: 2022-09-28 | End: 2022-09-30 | Stop reason: HOSPADM

## 2022-09-28 RX ORDER — WARFARIN SODIUM 3 MG/1
1.5 TABLET ORAL
Status: COMPLETED | OUTPATIENT
Start: 2022-09-28 | End: 2022-09-28

## 2022-09-28 RX ORDER — QUINAPRIL 5 1/1
2.5 TABLET ORAL DAILY
Status: DISCONTINUED | OUTPATIENT
Start: 2022-09-28 | End: 2022-09-30 | Stop reason: HOSPADM

## 2022-09-28 RX ORDER — WARFARIN SODIUM 3 MG/1
3 TABLET ORAL
Status: DISCONTINUED | OUTPATIENT
Start: 2022-09-28 | End: 2022-09-28

## 2022-09-28 RX ORDER — ONDANSETRON 4 MG/1
4 TABLET, ORALLY DISINTEGRATING ORAL EVERY 8 HOURS PRN
Status: DISCONTINUED | OUTPATIENT
Start: 2022-09-28 | End: 2022-09-30 | Stop reason: HOSPADM

## 2022-09-28 RX ORDER — ACETAMINOPHEN 325 MG/1
650 TABLET ORAL EVERY 6 HOURS PRN
Status: DISCONTINUED | OUTPATIENT
Start: 2022-09-28 | End: 2022-09-30 | Stop reason: HOSPADM

## 2022-09-28 RX ORDER — FERROUS SULFATE 325(65) MG
325 TABLET ORAL EVERY OTHER DAY
Status: DISCONTINUED | OUTPATIENT
Start: 2022-09-28 | End: 2022-09-30 | Stop reason: HOSPADM

## 2022-09-28 RX ORDER — PANTOPRAZOLE SODIUM 40 MG/1
40 TABLET, DELAYED RELEASE ORAL
Status: DISCONTINUED | OUTPATIENT
Start: 2022-09-28 | End: 2022-09-30 | Stop reason: HOSPADM

## 2022-09-28 RX ORDER — ONDANSETRON 2 MG/ML
4 INJECTION INTRAMUSCULAR; INTRAVENOUS EVERY 6 HOURS PRN
Status: DISCONTINUED | OUTPATIENT
Start: 2022-09-28 | End: 2022-09-30 | Stop reason: HOSPADM

## 2022-09-28 RX ORDER — POLYETHYLENE GLYCOL 3350 17 G/17G
17 POWDER, FOR SOLUTION ORAL DAILY PRN
Status: DISCONTINUED | OUTPATIENT
Start: 2022-09-28 | End: 2022-09-30 | Stop reason: HOSPADM

## 2022-09-28 RX ORDER — BUMETANIDE 1 MG/1
1 TABLET ORAL DAILY
Status: DISCONTINUED | OUTPATIENT
Start: 2022-09-28 | End: 2022-09-30 | Stop reason: HOSPADM

## 2022-09-28 RX ORDER — SPIRONOLACTONE 25 MG/1
25 TABLET ORAL EVERY OTHER DAY
Status: DISCONTINUED | OUTPATIENT
Start: 2022-09-29 | End: 2022-09-30 | Stop reason: HOSPADM

## 2022-09-28 RX ORDER — SODIUM CHLORIDE 0.9 % (FLUSH) 0.9 %
10 SYRINGE (ML) INJECTION PRN
Status: DISCONTINUED | OUTPATIENT
Start: 2022-09-28 | End: 2022-09-30 | Stop reason: HOSPADM

## 2022-09-28 RX ORDER — POTASSIUM CHLORIDE 7.45 MG/ML
10 INJECTION INTRAVENOUS PRN
Status: DISCONTINUED | OUTPATIENT
Start: 2022-09-28 | End: 2022-09-30 | Stop reason: HOSPADM

## 2022-09-28 RX ORDER — ACETAMINOPHEN 650 MG/1
650 SUPPOSITORY RECTAL EVERY 6 HOURS PRN
Status: DISCONTINUED | OUTPATIENT
Start: 2022-09-28 | End: 2022-09-30 | Stop reason: HOSPADM

## 2022-09-28 RX ORDER — METOPROLOL SUCCINATE 100 MG/1
150 TABLET, EXTENDED RELEASE ORAL DAILY
Status: DISCONTINUED | OUTPATIENT
Start: 2022-09-29 | End: 2022-09-30 | Stop reason: HOSPADM

## 2022-09-28 RX ORDER — AMIODARONE HYDROCHLORIDE 200 MG/1
200 TABLET ORAL DAILY
Status: DISCONTINUED | OUTPATIENT
Start: 2022-09-28 | End: 2022-09-30 | Stop reason: HOSPADM

## 2022-09-28 RX ORDER — SODIUM CHLORIDE 9 MG/ML
INJECTION, SOLUTION INTRAVENOUS PRN
Status: DISCONTINUED | OUTPATIENT
Start: 2022-09-28 | End: 2022-09-30 | Stop reason: HOSPADM

## 2022-09-28 RX ADMIN — AMIODARONE HYDROCHLORIDE 200 MG: 200 TABLET ORAL at 18:53

## 2022-09-28 RX ADMIN — LIDOCAINE HYDROCHLORIDE: 20 SOLUTION ORAL; TOPICAL at 08:42

## 2022-09-28 RX ADMIN — SODIUM CHLORIDE, PRESERVATIVE FREE 10 ML: 5 INJECTION INTRAVENOUS at 20:48

## 2022-09-28 RX ADMIN — FAMOTIDINE 20 MG: 10 INJECTION, SOLUTION INTRAVENOUS at 08:40

## 2022-09-28 RX ADMIN — PANTOPRAZOLE SODIUM 40 MG: 40 TABLET, DELAYED RELEASE ORAL at 18:55

## 2022-09-28 RX ADMIN — QUINAPRIL 2.5 MG: 5 TABLET ORAL at 18:56

## 2022-09-28 RX ADMIN — IOPAMIDOL 80 ML: 755 INJECTION, SOLUTION INTRAVENOUS at 10:54

## 2022-09-28 RX ADMIN — LEVOTHYROXINE SODIUM 175 MCG: 150 TABLET ORAL at 18:54

## 2022-09-28 RX ADMIN — WARFARIN SODIUM 1.5 MG: 3 TABLET ORAL at 18:57

## 2022-09-28 ASSESSMENT — PAIN SCALES - GENERAL: PAINLEVEL_OUTOF10: 3

## 2022-09-28 ASSESSMENT — PAIN DESCRIPTION - PAIN TYPE: TYPE: ACUTE PAIN

## 2022-09-28 ASSESSMENT — PAIN - FUNCTIONAL ASSESSMENT: PAIN_FUNCTIONAL_ASSESSMENT: 0-10

## 2022-09-28 NOTE — ED NOTES
Pt transported to [facility] on cart in stable condition. Floor contacted before transport and spoke with Enrrique Soto.      Adam Stuart  09/28/22 [id number]

## 2022-09-28 NOTE — ED NOTES
Patient resting in bed. Respirations easy and unlabored. No distress noted. Call light within reach.        Lilliam Mistry RN  09/28/22 1257

## 2022-09-28 NOTE — H&P
Hospitalist History & Physical    Patient:  Robert Gomez    Unit/Bed:20/020A  YOB: 1940  MRN: [id number]   Acct: [de-identified]   PCP: Eusebia Lewis MD  Code Status: Prior    Date of Service: Pt seen/examined on 09/28/22 and admitted to Observation with expected LOS less than two midnights due to medical therapy. Chief Complaint: chest pain    Assessment/Plan:    Chest pain: Troponin negative, no ischemic changes on EKG. CTA negative for PE. Heart score 5. Low suspicion for ACS, will hold off on nitro and heparin gtt. Trend troponin, Echo ordered, consult cardiology for consideration of ischemic work-up. GERD, esophagitis: Possible etiology for #1. Resume home Protonix. Monitor need for repeat GI cocktail. Chronic HFrEF: EF 25% on Echo 03/2020, s/p BV ICD. No overt signs of decompensation. Repeat echo pending. Resume home Bumex, spironolactone, metoprolol, quinapril. Chronic A Fib: Rate controlled on amiodarone, metoprolol. Anticoagulated on Coumadin. DVT: Doppler reported DVT in right peroneal vein. This is below the knee and does not require treatment. CTA negative. Chronic normocytic anemia: No signs of acute bleeding, hemoglobin stable with baseline. CKD stage III: Cr stable with baseline        History of Present Illness:  Robert Gomez is a [de-identified] y.o. female with PMHx of AF, CHF s/p ICD, GERD, NAFLD,hypothyroidism who presented to [facility] with chief complaint of chest pain. Patient states she woke up last night with chest pain, described as pressure in the center of her chest.  Associated with mild shortness of breath and palpitations. Patient states that she got up and took Mylanta and Protonix. She states that she has history of esophagitis, GERD and often has issues with this. States her last EGD was approximately 3 months ago. Patient states that typically the medications will resolve her pain, but did not this time.   Patient states pain did go away after GI cocktail in the ED. patient also notes right lower extremity swelling present over the last couple weeks, has resolved. She denies fever/chills, cough, abdominal pain, N/V/D, reflux. Patient admitted to med telemetry for chest pain rule out. Review of Systems: Pertinent positives as noted in the HPI. All other systems reviewed and negative. Past Medical History:        Diagnosis Date    AICD (automatic cardioverter/defibrillator) present 05/1999    Arthritis 5/21/2019    Atrial fibrillation (HCC)     chronic    Biventricular ICD (implantable cardiac defibrillator) in place 10/09    C. difficile diarrhea     CAD (coronary artery disease)     Cancer (HCC)     right hand    CHF (congestive heart failure) ([facility]     Diverticulitis 01/2013    GERD (gastroesophageal reflux disease)     hiatal hernia    Hiatal hernia     Hyperlipidemia     Hypertension     Menopause     1994    NAFLD (nonalcoholic fatty liver disease) 1/31/2017    Nonischemic cardiomyopathy ([facility]     chronic    Pacemaker     Retroperitoneal hemorrhage 07/99    transfusion x3    Thyroid disease        Past Surgical History:        Procedure Laterality Date    CARDIAC DEFIBRILLATOR Edis Hash      Dr Freed Warner    COLONOSCOPY  2008    w/ EGD    COLONOSCOPY N/A 7/16/2019    COLONOSCOPY POLYPECTOMY SNARE/COLD BIOPSY performed by Katehrine Crouch MD at [facility]    ENDOSCOPY, COLON, [id number] [place] [address]    replaced 3x    SKIN BIOPSY  2010    Skin CA with graft.     UPPER GASTROINTESTINAL ENDOSCOPY N/A 7/16/2019    EGD DILATION SAVORY performed by Katherine Crouch MD at [facility] Left 7/16/2019    EGD BIOPSY performed by Katherine Crouch MD at [facility] 12/2/2019    EGD ESOPHAGEAL MANOMETRY AND PH MONITORING 24 HR performed by Katherine Crouch MD at [facility]       Home Medications:   No current facility-administered medications on file prior to encounter. Current Outpatient Medications on File Prior to Encounter   Medication Sig Dispense Refill    CALCIUM PO Take by mouth      VITAMIN D PO Take by mouth      pantoprazole (PROTONIX) 40 MG tablet Take 1 tablet by mouth 2 times daily (before meals) 180 tablet 3    quinapril (ACCUPRIL) 5 MG tablet Take 2.5 mg by mouth in the morning. metoprolol succinate (TOPROL XL) 100 MG extended release tablet Take 1.5 tablets by mouth daily 60 tablet 5    digoxin (LANOXIN) 125 MCG tablet Take 1 tablet by mouth every other day 45 tablet 3    bumetanide (BUMEX) 1 MG tablet Take 1 tablet by mouth daily 90 tablet 3    metOLazone (ZAROXOLYN) 2.5 MG tablet Take as needed for ankle swelling; notify office when you take it 15 tablet 3    spironolactone (ALDACTONE) 25 MG tablet TAKE 1 TABLET BY MOUTH EVERY OTHER DAY 45 tablet 3    amiodarone (CORDARONE) 200 MG tablet Take 1 tablet by mouth once daily 90 tablet 3    Handicap Placard MISC by Does not apply route Request parking placard due to medical conditions. Duration of 5 years. 1 each 0    levothyroxine (SYNTHROID) 175 MCG tablet Take 1 tablet by mouth Daily New dose. 90 tablet 3    warfarin (COUMADIN) 3 MG tablet Alternating 3 mg/1.5 mg or as directed per INR results. 90 tablet 3    ferrous sulfate (IRON 325) 325 (65 Fe) MG tablet Take 325 mg by mouth every other day      Alum & Mag Hydroxide-Simeth (MYLANTA PO) Take by mouth as needed       acetaminophen (TYLENOL) 325 MG tablet Take 650 mg by mouth every 6 hours as needed for Pain or Fever      Misc. Devices (LUMBAR SUPPORT CUSHION) MISC by Does not apply route. Dx: low back pain, sciatica. 1 each 0    Ascorbic Acid (VITAMIN C) 1000 MG tablet Take 1,000 mg by mouth 2 times daily.            Allergies:    Ativan [lorazepam], Codeine, Lisinopril, Nystatin, Nystatin, Percocet [oxycodone-acetaminophen], Relafen [nabumetone], Tape [adhesive tape], and Celebrex [celecoxib]    Social History:    reports that she has never smoked. She has never used smokeless tobacco. She reports that she does not drink alcohol and does not use drugs. Family History:       Problem Relation Age of Onset    Heart Disease Mother     Diabetes Mother     High Cholesterol Mother     High Blood Pressure Brother     Cancer Sister     High Blood Pressure Brother     High Blood Pressure Brother     Parkinsonism Brother     Diabetes Brother     Heart Disease Brother     Kidney Disease Brother     High Blood Pressure Brother     High Cholesterol Father     Miscarriages / Stillbirths Daughter     Arthritis Neg Hx     Asthma Neg Hx     Birth Defects Neg Hx     Depression Neg Hx     Early Death Neg Hx     Learning Disabilities Neg Hx     Mental Illness Neg Hx     Mental Retardation Neg Hx     Stroke Neg Hx     Substance Abuse Neg Hx     Vision Loss Neg Hx     Other Neg Hx        Diet:  No diet orders on file      Physical Exam:  BP (!) 125/46   Pulse 60   Temp 97.6 °F (36.4 °C) (Oral)   Resp 16   Ht 5' (1.524 m)   Wt 170 lb (77.1 kg)   SpO2 99%   BMI 33.20 kg/m²   General:   Pleasant elderly female. NAD. HEENT:  normocephalic and atraumatic. No scleral icterus. PERR. Neck: supple. No JVD. No thyromegaly. Lungs: clear to auscultation. No retractions  Cardiac: RRR without murmur. Abdomen: soft. Epigastric tenderness. Bowel sounds positive. Extremities:  No clubbing, cyanosis, or edema x 4. Vasculature: capillary refill < 3 seconds. Palpable LE pulses bilaterally. Skin:  warm and dry. No rashes or lesions. Psych:  Alert and oriented x3. Affect appropriate  Lymph:  No supraclavicular adenopathy. Neurologic:  No focal deficit. No seizures.     Data: (All radiographs, tracings, PFTs, and imaging are personally viewed and interpreted unless otherwise noted)  Labs:   Recent Labs     09/28/22  0847   WBC 6.3   HGB 8.3*   HCT 28.0*        Recent Labs     09/28/22  0847      K 4.9      CO2 23   BUN 28*   CREATININE 1.1   CALCIUM 8.6     Recent Labs     09/28/22  0847   AST 58*   ALT 31   BILITOT 0.2*   ALKPHOS 175*     Recent Labs     09/28/22  0847   INR 3.13*     No results for input(s): Nadeen Valencia in the last 72 hours. Urinalysis:   Lab Results   Component Value Date/Time    NITRU NEGATIVE 07/22/2022 11:45 AM    WBCUA 5-9 07/22/2022 11:45 AM    BACTERIA FEW 07/22/2022 11:45 AM    RBCUA 3-5 07/22/2022 11:45 AM    BLOODU NEGATIVE 07/22/2022 11:45 AM    SPECGRAV 1.008 01/09/2013 02:00 PM    GLUCOSEU NEGATIVE 07/22/2022 11:45 AM       EKG:  ventricular paced rhythm    Radiology:  CTA Chest W WO Contrast   Final Result      1. No CT evidence of pulmonary embolus. 2. Other findings as described above. **This report has been created using voice recognition software. It may contain minor errors which are inherent in voice recognition technology. **      Final report electronically signed by Dr Sandoval Durbin on 9/28/2022 11:13 AM      VL DUP LOWER EXTREMITY VENOUS RIGHT   Final Result   1. Sonographic evidence for deep venous thrombosis (below-the-knee) in the right peroneal vein. **This report has been created using voice recognition software. It may contain minor errors which are inherent in voice recognition technology. **      Final report electronically signed by Dr Sandoval Durbin on 9/28/2022 8:20 AM      XR CHEST PORTABLE   Final Result   Possible early infiltrate versus atelectasis in the left lung base. Mild background scarring. **This report has been created using voice recognition software. It may contain minor errors which are inherent in voice recognition technology. **      Final report electronically signed by Dr. Ghassan Sanders on 9/28/2022 7:45 AM        CTA Chest W WO Contrast    Result Date: 9/28/2022  PROCEDURE: CTA CHEST W WO CONTRAST CLINICAL INFORMATION: Chest pain/new diagnosis DVT/rule out PE COMPARISON: 3/9/2020 TECHNIQUE: Helical CT angiography of the chest was obtained with intravenous contrast. Multiplanar reformats are provided. PE protocol was utilized. 0.9 mm and 3  mm axial images were provided through the chest after the administration of IV contrast. A non-contrast localizer was obtained. 10 mm MIP reconstructions of the chest performed in coronal and sagittal planes All CT scans at this facility use dose modulation, iterative reconstruction, and/or weight based dosing when appropriate to reduce the radiation dose to as low as reasonably achievable. CONTRAST: 80  cc of Isovue-370  intravenously FINDINGS: There is adequate opacification of the main pulmonary artery and its branches. . No CT evidence of pulmonary embolus . The heart is mildly enlarged. The main pulmonary artery is dilated at 3.3 cm. Intramuscular lipoma is seen in the right shoulder. Aortocoronary calcifications. A small hiatal hernia is seen. Bibasilar subsegmental atelectasis and/or scarring. No focal pulmonary consolidation. No large pulmonary mass. Central airway is patent. No pleural effusions. No significant pericardial effusion. Ascending thoracic aorta is not dilated. No significantly enlarged mediastinal or  axillary lymph node. The thyroid is not enlarged. No chest wall mass. Limited evaluation below the diaphragm show mildly nodular liver surface morphology. The gallbladder is surgically absent. Small ascites. . Bones: Degenerative changes of the thoracic spine. The bones appear demineralized. .     1. No CT evidence of pulmonary embolus. 2. Other findings as described above. **This report has been created using voice recognition software. It may contain minor errors which are inherent in voice recognition technology. ** Final report electronically signed by Dr Susanne Chew on 9/28/2022 11:13 AM    XR CHEST PORTABLE    Result Date: 9/28/2022  PROCEDURE: XR CHEST PORTABLE CLINICAL INFORMATION: chest pain. Chest pressure this morning. COMPARISON: 7/22/2022, 11/28/2021. TECHNIQUE: AP semiupright view of the chest. FINDINGS: There is a 3-lead pacemaker. There is cardiomegaly. The mediastinum is not widened. There are some increased markings in the left lung base. This can indicate early infiltrate. There is stable scarring in the right lung. There are no pleural effusions. The pulmonary vascularity is normal.No suspicious osseous lesions are present. Possible early infiltrate versus atelectasis in the left lung base. Mild background scarring. **This report has been created using voice recognition software. It may contain minor errors which are inherent in voice recognition technology. ** Final report electronically signed by Dr. Misty London on 9/28/2022 7:45 AM    VL DUP LOWER EXTREMITY VENOUS RIGHT    Result Date: 9/28/2022  PROCEDURE: VL DUP LOWER EXTREMITY VENOUS RIGHT CLINICAL INFORMATION: Swelling, Rule Out DVT COMPARISON: None TECHNIQUE: Grayscale imaging, color Doppler imaging and spectral waveform analysis of the right lower extremity performed in longitudinal and transverse planes from the inguinal region to the distal calf. FINDINGS: Lack of flow and noncompressibility of the right peroneal vein. There is normal venous flow within and compressibility of the right common femoral, femoral, profunda femoral and popliteal veins. There is normal augmentation. The right saphenofemoral junction is  unremarkable. There is flow within and compressibility of the right gastrocnemius, anterior tibial, and posterior tibial veins. There is flow within and compressibility/augmentation of the left common femoral vein. 1. Sonographic evidence for deep venous thrombosis (below-the-knee) in the right peroneal vein. **This report has been created using voice recognition software. It may contain minor errors which are inherent in voice recognition technology. ** Final report electronically signed by Dr Naren Swanson on 9/28/2022 8:20 AM        Tele:   [x] yes             [] no      Thank you Dione Cranker, MD for the opportunity to be involved in this patient's care.     Electronically signed by Maksim Herring PA-C on 9/28/2022 at 1:21 PM

## 2022-09-28 NOTE — ED NOTES
Patient resting in bed. Respirations easy and unlabored. No distress noted. Call light within reach.        Teetee Connell RN  09/28/22 [id number]

## 2022-09-28 NOTE — ED NOTES
Patient resting in bed. Respirations easy and unlabored. No distress noted. Call light within reach.        Twila Mode, RN  09/28/22 [id number]

## 2022-09-28 NOTE — PROGRESS NOTES
Warfarin Pharmacy Consult Note    Eli Mejias is a [de-identified] y.o. female for whom pharmacy has been asked to manage warfarin therapy. Consulting Provider: Maria E Fink PA-C  Warfarin Indication: A fib/flutter  Target INR range: 2.0-3.0   Warfarin dose prior to admission: 3 mg daily  Outpatient warfarin provider: Latonia Tate MD    Recent Labs     09/28/22  0847   INR 3.13*     Recent Labs     09/28/22  0847   HGB 8.3*        Concurrent anticoagulants/antiplatelets: None  Significant warfarin drug-drug interactions: amiodarone (HM), levothyroxine (HM)    Date INR Warfarin Dose   9/28/22 3.13 1.5 mg                                   INR will be monitored routinely until therapeutic INR is achieved. Pharmacy will continue to follow.  Thank you for the consult,    [facility]) , PGY1 Pharmacy Resident 9/28/2022 3:47 PM

## 2022-09-28 NOTE — ED PROVIDER NOTES
[facility] [address] [address] EMERGENCY DEPT  EMERGENCY DEPARTMENT ENCOUNTER      Pt Name: Olaf Broussard  MRN: [id number]  [place] 1940  Date of evaluation: 9/28/2022  Provider: Romy Theodore PA-C    CHIEF COMPLAINT     Chief Complaint   Patient presents with    Chest Pain    Shortness of Breath       HISTORY OF PRESENT ILLNESS    Olaf Broussard is a [de-identified] y.o. female who presents to the emergency department with complaints of epigastric pain as well as right leg swelling. Patient states the pain started around 4:00 this morning. She says all in the epigastrium. Patient states she does have a history of GERD. She drank some milk as well as Mylanta which did not seem to help. Upon my assessment she said the pain is completely resolved. Nothing makes the pain better or worse. She sometimes the pain would travel to the back. Denies any fevers or chills. Denies coughing or wheezing. Denies shortness of breath. Denies any history of cardiac disease. She states she has a history of CHF and has a defibrillator pacemaker. She said that she also noticed some swelling in her right leg. Although she states the swelling has improved since she is been here. Patient states that she takes a diuretic which usually helps the swelling. Patient has had scopes done and been seen by GI in the past.  Patient this time denies any nausea. Denies diarrhea. Triage notes and Nursing notes were reviewed by myself. Any discrepancies are addressed above.     PAST MEDICAL HISTORY     Past Medical History:   Diagnosis Date    AICD (automatic cardioverter/defibrillator) present 05/1999    Arthritis 5/21/2019    Atrial fibrillation (HCC)     chronic    Biventricular ICD (implantable cardiac defibrillator) in place 10/09    C. difficile diarrhea     CAD (coronary artery disease)     Cancer (HCC)     right hand    CHF (congestive heart failure) ([facility] [address]     Diverticulitis 01/2013    GERD (gastroesophageal reflux disease)     hiatal hernia Hiatal hernia     Hyperlipidemia     Hypertension     Menopause     1994    NAFLD (nonalcoholic fatty liver disease) 1/31/2017    Nonischemic cardiomyopathy (HCC)     chronic    Pacemaker     Retroperitoneal hemorrhage 07/99    transfusion x3    Thyroid disease        SURGICAL HISTORY       Past Surgical History:   Procedure Laterality Date    CARDIAC DEFIBRILLATOR Riaz Conley    COLONOSCOPY  2008    w/ EGD    COLONOSCOPY N/A 7/16/2019    COLONOSCOPY POLYPECTOMY SNARE/COLD BIOPSY performed by Cory Okeefe MD at [facility]    ENDOSCOPY, COLON, [address] [address]    replaced 3x    SKIN BIOPSY  2010    Skin CA with graft. UPPER GASTROINTESTINAL ENDOSCOPY N/A 7/16/2019    EGD DILATION SAVORY performed by Cory Okeefe MD at [facility] 1 Left 7/16/2019    EGD BIOPSY performed by Cory Okeefe MD at [facility] 1 N/A 12/2/2019    EGD ESOPHAGEAL MANOMETRY AND PH MONITORING 24 HR performed by Cory Okeefe MD at [address] [facility]       Previous Medications    ACETAMINOPHEN (TYLENOL) 325 MG TABLET    Take 650 mg by mouth every 6 hours as needed for Pain or Fever    ALUM & MAG HYDROXIDE-SIMETH (MYLANTA PO)    Take by mouth as needed     AMIODARONE (CORDARONE) 200 MG TABLET    Take 1 tablet by mouth once daily    ASCORBIC ACID (VITAMIN C) 1000 MG TABLET    Take 1,000 mg by mouth 2 times daily. BUMETANIDE (BUMEX) 1 MG TABLET    Take 1 tablet by mouth daily    CALCIUM PO    Take by mouth    DIGOXIN (LANOXIN) 125 MCG TABLET    Take 1 tablet by mouth every other day    FERROUS SULFATE (IRON 325) 325 (65 FE) MG TABLET    Take 325 mg by mouth every other day    HANDICAP PLACARD MISC    by Does not apply route Request parking placard due to medical conditions. Duration of 5 years.     LEVOTHYROXINE (SYNTHROID) 175 MCG TABLET    Take 1 tablet by mouth Daily New dose. METOLAZONE (ZAROXOLYN) 2.5 MG TABLET    Take as needed for ankle swelling; notify office when you take it    METOPROLOL SUCCINATE (TOPROL XL) 100 MG EXTENDED RELEASE TABLET    Take 1.5 tablets by mouth daily    MISC. DEVICES (LUMBAR SUPPORT CUSHION) MISC    by Does not apply route. Dx: low back pain, sciatica. PANTOPRAZOLE (PROTONIX) 40 MG TABLET    Take 1 tablet by mouth 2 times daily (before meals)    QUINAPRIL (ACCUPRIL) 5 MG TABLET    Take 2.5 mg by mouth in the morning. SPIRONOLACTONE (ALDACTONE) 25 MG TABLET    TAKE 1 TABLET BY MOUTH EVERY OTHER DAY    VITAMIN D PO    Take by mouth    WARFARIN (COUMADIN) 3 MG TABLET    Alternating 3 mg/1.5 mg or as directed per INR results.        ALLERGIES     Ativan [lorazepam], Codeine, Lisinopril, Nystatin, Nystatin, Percocet [oxycodone-acetaminophen], Relafen [nabumetone], Tape [adhesive tape], and Celebrex [celecoxib]    FAMILY HISTORY       Family History   Problem Relation Age of Onset    Heart Disease Mother     Diabetes Mother     High Cholesterol Mother     High Blood Pressure Brother     Cancer Sister     High Blood Pressure Brother     High Blood Pressure Brother     Parkinsonism Brother     Diabetes Brother     Heart Disease Brother     Kidney Disease Brother     High Blood Pressure Brother     High Cholesterol Father     Miscarriages / Stillbirths Daughter     Arthritis Neg Hx     Asthma Neg Hx     Birth Defects Neg Hx     Depression Neg Hx     Early Death Neg Hx     Learning Disabilities Neg Hx     Mental Illness Neg Hx     Mental Retardation Neg Hx     Stroke Neg Hx     Substance Abuse Neg Hx     Vision Loss Neg Hx     Other Neg Hx         SOCIAL HISTORY     Social History     Socioeconomic History    Marital status:      Spouse name: Mata Bailey     Number of children: 4    Years of education: None    Highest education level: None   Occupational History     Employer: [employer]   Tobacco Use Smoking status: Never    Smokeless tobacco: Never   Vaping Use    Vaping Use: Never used   Substance and Sexual Activity    Alcohol use: No     Alcohol/week: 0.0 standard drinks    Drug use: No    Sexual activity: Not Currently     Social Determinants of Health     Financial Resource Strain: Low Risk     Difficulty of Paying Living Expenses: Not hard at all   Food Insecurity: No Food Insecurity    Worried About Running Out of Food in the Last Year: Never true    Ran Out of Food in the Last Year: Never true       REVIEW OF SYSTEMS       A 10 point review of systems discussed the patient and the pertinent positives and names are listed in the HPI    Except as noted above the remainder of the review of systems was reviewed and is negative. PHYSICAL EXAM    (up to 7 for level 4, 8 or more for level 5)     ED Triage Vitals [09/28/22 0653]   BP Temp Temp Source Heart Rate Resp SpO2 Height Weight   (!) 147/57 97.6 °F (36.4 °C) Oral 64 14 100 % 5' (1.524 m) 170 lb (77.1 kg)       General: nontoxic appearing. HEENT: Normocephalic/atraumatic. Extraocular muscles are intact. mucosa moist and pink. Neck: Full range of motion. Lungs: Clear to auscultation in all lung fields. No retractions. No respiratory distress. Heart: Regular rate and rhythm. Abdomen: Soft, tender in the epigastrium. No guarding or rebound tenderness. Bowel sounds are noted. Extremities: Range of motion is full. Radial pulses 2 out of 4 bilaterally. Neurologic: Alert and oriented. Normal motor and sensory function. Skin: Warm, dry, free of rashes  DIAGNOSTIC RESULTS     EKG: (none if blank)  All EKG's areinterpreted by the Emergency Department Physician who either signs or Co-signs this chart in the absence of a cardiologist.    EKG obtained at 6:49 AM shows a paced rhythm with a rate of 62. No further analysis due to the paced rhythm.     RADIOLOGY: (none if blank)   Interpretationper the Radiologist below, if available at the time of this note:    CTA Chest W WO Contrast   Final Result      1. No CT evidence of pulmonary embolus. 2. Other findings as described above. **This report has been created using voice recognition software. It may contain minor errors which are inherent in voice recognition technology. **      Final report electronically signed by Dr Jennifer Rios on 9/28/2022 11:13 AM      VL DUP LOWER EXTREMITY VENOUS RIGHT   Final Result   1. Sonographic evidence for deep venous thrombosis (below-the-knee) in the right peroneal vein. **This report has been created using voice recognition software. It may contain minor errors which are inherent in voice recognition technology. **      Final report electronically signed by Dr Jennifer Rios on 9/28/2022 8:20 AM      XR CHEST PORTABLE   Final Result   Possible early infiltrate versus atelectasis in the left lung base. Mild background scarring. **This report has been created using voice recognition software. It may contain minor errors which are inherent in voice recognition technology. **      Final report electronically signed by Dr. Elidia Canales on 9/28/2022 7:45 AM          LABS:  Labs Reviewed   CBC WITH AUTO DIFFERENTIAL - Abnormal; Notable for the following components:       Result Value    RBC 3.01 (*)     Hemoglobin 8.3 (*)     Hematocrit 28.0 (*)     MCHC 29.6 (*)     RDW-CV 18.1 (*)     RDW-SD 60.9 (*)     Lymphocytes Absolute 0.8 (*)     All other components within normal limits   COMPREHENSIVE METABOLIC PANEL W/ REFLEX TO MG FOR LOW K - Abnormal; Notable for the following components:    Glucose 119 (*)     BUN 28 (*)     AST 58 (*)     Alkaline Phosphatase 175 (*)     Total Protein 6.0 (*)     Albumin 2.9 (*)     Total Bilirubin 0.2 (*)     All other components within normal limits   LIPASE - Abnormal; Notable for the following components:    Lipase 73.9 (*)     All other components within normal limits   PROTIME-INR - Abnormal; Notable for the following components:    INR 3.13 (*)     All other components within normal limits   GLOMERULAR FILTRATION RATE, ESTIMATED - Abnormal; Notable for the following components:    Est, Glom Filt Rate 48 (*)     All other components within normal limits   TROPONIN   D-DIMER, QUANTITATIVE   DIGOXIN LEVEL   ANION GAP   OSMOLALITY   TROPONIN       All other labs were within normal range or not returned as of this dictation. EMERGENCY DEPARTMENT COURSE and Medical Decision Making:     MDM  /   Patient was initially given GI cocktail which she said did improve. However, the pain did come back and was intermittent throughout her stay. Patient DVT did show a right peroneal vein DVT. Patient at this time then had a CT PE protocol given the fact that she had a positive DVT while on Coumadin with a therapeutic INR. The CT did not reveal any PE. She had 2 negative troponins however, her heart score is 5 which is moderate risk. Therefore I did review her chart she did not have any recent cardiac work-up therefore I did feel she would benefit from observation chest pain rule out. I did speak with the hospitalist was agreeable for this. CONSULTS: (None if blank)  None    Procedures: (None if blank)       CLINICAL IMPRESSION      1. Chest pain, unspecified type    2. Acute deep vein thrombosis (DVT) of right peroneal vein ([address]          DISPOSITION/PLAN   DISPOSITION Decision To Admit 09/28/2022 12:05:22 PM      PATIENT REFERRED TO:  No follow-up provider specified.     DISCHARGE MEDICATIONS:  New Prescriptions    No medications on file              (Please note that portions of this note were completed with a voice recognition program.  Efforts weremade to edit the dictations but occasionally words are mis-transcribed.)      Michelle Flowers PA-C(electronically signed)              Michelle Flowers PA-C  09/28/22 5671

## 2022-09-28 NOTE — ED TRIAGE NOTES
Pt comes to ED through triage with c/c of chest pain and shortness of breath. PT states that symptoms began today around 0400 which woke her from her sleep. Pt states that chest pain radiated to the middle of her back. Pt took last dose of Coumadin yesterday at 1900. Pt rates current pain level at 3/10. EKG completed. Pt on telemetry.

## 2022-09-29 ENCOUNTER — APPOINTMENT (OUTPATIENT)
Dept: PHYSICAL THERAPY | Age: 82
End: 2022-09-29
Payer: MEDICARE

## 2022-09-29 PROBLEM — K21.00 GASTROESOPHAGEAL REFLUX DISEASE WITH ESOPHAGITIS: Status: ACTIVE | Noted: 2022-09-29

## 2022-09-29 LAB
ALBUMIN SERPL-MCNC: 2.5 G/DL (ref 3.5–5.1)
ALP BLD-CCNC: 132 U/L (ref 38–126)
ALT SERPL-CCNC: 23 U/L (ref 11–66)
ANION GAP SERPL CALCULATED.3IONS-SCNC: 7 MEQ/L (ref 8–16)
AST SERPL-CCNC: 41 U/L (ref 5–40)
BILIRUB SERPL-MCNC: 0.3 MG/DL (ref 0.3–1.2)
BUN BLDV-MCNC: 23 MG/DL (ref 7–22)
CALCIUM SERPL-MCNC: 8 MG/DL (ref 8.5–10.5)
CHLORIDE BLD-SCNC: 107 MEQ/L (ref 98–111)
CO2: 23 MEQ/L (ref 23–33)
CREAT SERPL-MCNC: 1 MG/DL (ref 0.4–1.2)
ERYTHROCYTE [DISTWIDTH] IN BLOOD BY AUTOMATED COUNT: 18.3 % (ref 11.5–14.5)
ERYTHROCYTE [DISTWIDTH] IN BLOOD BY AUTOMATED COUNT: 62.9 FL (ref 35–45)
GFR SERPL CREATININE-BSD FRML MDRD: 53 ML/MIN/1.73M2
GLUCOSE BLD-MCNC: 82 MG/DL (ref 70–108)
HCT VFR BLD CALC: 25 % (ref 37–47)
HEMOGLOBIN: 7.4 GM/DL (ref 12–16)
INR BLD: 2.84 (ref 0.85–1.13)
LV EF: 25 %
LVEF MODALITY: NORMAL
MCH RBC QN AUTO: 27.8 PG (ref 26–33)
MCHC RBC AUTO-ENTMCNC: 29.6 GM/DL (ref 32.2–35.5)
MCV RBC AUTO: 94 FL (ref 81–99)
PLATELET # BLD: 226 THOU/MM3 (ref 130–400)
PMV BLD AUTO: 10.3 FL (ref 9.4–12.4)
POTASSIUM REFLEX MAGNESIUM: 4.8 MEQ/L (ref 3.5–5.2)
RBC # BLD: 2.66 MILL/MM3 (ref 4.2–5.4)
SODIUM BLD-SCNC: 137 MEQ/L (ref 135–145)
TOTAL PROTEIN: 5.1 G/DL (ref 6.1–8)
WBC # BLD: 4.9 THOU/MM3 (ref 4.8–10.8)

## 2022-09-29 PROCEDURE — 85027 COMPLETE CBC AUTOMATED: CPT

## 2022-09-29 PROCEDURE — 2580000003 HC RX 258: Performed by: PHYSICIAN ASSISTANT

## 2022-09-29 PROCEDURE — 80053 COMPREHEN METABOLIC PANEL: CPT

## 2022-09-29 PROCEDURE — G0378 HOSPITAL OBSERVATION PER HR: HCPCS

## 2022-09-29 PROCEDURE — 6370000000 HC RX 637 (ALT 250 FOR IP): Performed by: PHYSICIAN ASSISTANT

## 2022-09-29 PROCEDURE — 93306 TTE W/DOPPLER COMPLETE: CPT

## 2022-09-29 PROCEDURE — 36415 COLL VENOUS BLD VENIPUNCTURE: CPT

## 2022-09-29 PROCEDURE — 99232 SBSQ HOSP IP/OBS MODERATE 35: CPT | Performed by: NURSE PRACTITIONER

## 2022-09-29 PROCEDURE — 1200000000 HC SEMI PRIVATE

## 2022-09-29 PROCEDURE — 99223 1ST HOSP IP/OBS HIGH 75: CPT | Performed by: INTERNAL MEDICINE

## 2022-09-29 PROCEDURE — 85610 PROTHROMBIN TIME: CPT

## 2022-09-29 RX ORDER — WARFARIN SODIUM 3 MG/1
3 TABLET ORAL ONCE
Status: COMPLETED | OUTPATIENT
Start: 2022-09-29 | End: 2022-09-29

## 2022-09-29 RX ADMIN — PANTOPRAZOLE SODIUM 40 MG: 40 TABLET, DELAYED RELEASE ORAL at 04:35

## 2022-09-29 RX ADMIN — SPIRONOLACTONE 25 MG: 25 TABLET ORAL at 09:03

## 2022-09-29 RX ADMIN — AMIODARONE HYDROCHLORIDE 200 MG: 200 TABLET ORAL at 09:03

## 2022-09-29 RX ADMIN — SODIUM CHLORIDE, PRESERVATIVE FREE 10 ML: 5 INJECTION INTRAVENOUS at 09:03

## 2022-09-29 RX ADMIN — SODIUM CHLORIDE, PRESERVATIVE FREE 10 ML: 5 INJECTION INTRAVENOUS at 20:19

## 2022-09-29 RX ADMIN — BUMETANIDE 1 MG: 1 TABLET ORAL at 09:03

## 2022-09-29 RX ADMIN — METOPROLOL SUCCINATE 150 MG: 100 TABLET, EXTENDED RELEASE ORAL at 09:03

## 2022-09-29 RX ADMIN — PANTOPRAZOLE SODIUM 40 MG: 40 TABLET, DELAYED RELEASE ORAL at 15:57

## 2022-09-29 RX ADMIN — QUINAPRIL 2.5 MG: 5 TABLET ORAL at 09:03

## 2022-09-29 RX ADMIN — WARFARIN SODIUM 3 MG: 3 TABLET ORAL at 18:07

## 2022-09-29 RX ADMIN — LEVOTHYROXINE SODIUM 175 MCG: 150 TABLET ORAL at 04:36

## 2022-09-29 ASSESSMENT — PAIN - FUNCTIONAL ASSESSMENT: PAIN_FUNCTIONAL_ASSESSMENT: ACTIVITIES ARE NOT PREVENTED

## 2022-09-29 ASSESSMENT — PAIN SCALES - GENERAL
PAINLEVEL_OUTOF10: 1
PAINLEVEL_OUTOF10: 0

## 2022-09-29 ASSESSMENT — PAIN DESCRIPTION - DESCRIPTORS: DESCRIPTORS: PRESSURE

## 2022-09-29 ASSESSMENT — PAIN DESCRIPTION - ORIENTATION: ORIENTATION: MID

## 2022-09-29 ASSESSMENT — PAIN DESCRIPTION - ONSET: ONSET: GRADUAL

## 2022-09-29 ASSESSMENT — PAIN DESCRIPTION - PAIN TYPE: TYPE: ACUTE PAIN

## 2022-09-29 ASSESSMENT — PAIN DESCRIPTION - FREQUENCY: FREQUENCY: INTERMITTENT

## 2022-09-29 ASSESSMENT — PAIN DESCRIPTION - LOCATION: LOCATION: CHEST

## 2022-09-29 NOTE — PLAN OF CARE
Problem: Discharge Planning  Goal: Discharge to home or other facility with appropriate resources  Outcome: Progressing  Flowsheets  Taken 9/28/2022 2231 by Bk Kam RN  Discharge to home or other facility with appropriate resources:   Identify barriers to discharge with patient and caregiver   Arrange for needed discharge resources and transportation as appropriate   Identify discharge learning needs (meds, wound care, etc)  Taken 9/28/2022 1420 by Gia Avalos RN  Discharge to home or other facility with appropriate resources: Identify barriers to discharge with patient and caregiver     Problem: Pain  Goal: Verbalizes/displays adequate comfort level or baseline comfort level  Outcome: Progressing  Flowsheets (Taken 9/28/2022 2231)  Verbalizes/displays adequate comfort level or baseline comfort level: Assess pain using appropriate pain scale

## 2022-09-29 NOTE — PROGRESS NOTES
Hospitalist Progress Note    Patient:  Freedom [place]      Unit/Bed:8A-20/020-A    YOB: 1940    MRN: [id number]       Acct: [de-identified]     PCP: Aram Watts MD    Date of Admission: 9/28/2022    Assessment/Plan:      Chest pain, resolved: Possibly secondary to #2. ACS ruled out. CTA negative for PE. Echo completed, awaiting results. Echo 3/11/2020 EF 25%. Cardiac catheterization in 2019 with nonobstructive disease. Continue telemetry monitoring. Cardiology seen. Plan for stress test 9/30/22 as she had caffeine today. GERD, esophagitis:  EGD 2019. Possible etiology for #1. Continue home Protonix. GI cocktail PRN. Will need new GI referral at discharge. Chronic HFrEF:  S/P BV ICD. Echo 3/11/2020 EF 25%. Echo completed today, awaiting results. Previously followed at the CHF clinic. Continue home Bumex, Spironolactone, Metoprolol, Amiodarone, quinapril. Daily weights. Strict intake and output. Low sodium diet. 2L fluid restriction. Chronic Atrial Fibrillation:  S/P BV ICD. EKG 9/28/22 Ventricular-paced with biventricular pacemaker detected. [id number] 5. HAS-BLED Score 5. INR 3.13 today. Pharmacy dosing Coumadin. Continue home amiodarone and metoprolol. Continue telemetry monitoring. Non-obstructive CAD:   Cardiac catheterization 2019. Carb controlled, low fat, low cholesterol, high fiber, 2gm Na Diet. Cardiology consulted, plan for stress test 9/30/22. DVT, right peroneal vein:  Patient is at a low risk of embolization due to location of DVT. She is already receiving anticoagulation. D-Dimer negative. Patient is a high risk for bleeding. INR 3.13 today. Pharmacy dosing Coumadin. No further treatment is warranted. Chronic normocytic anemia:  Hgb 7.4. Transfuse if Hgb <7 and/or symptomatic. Continue home iron supplement. CKD Stage III:  Cr. 1.0 and baseline. Primary HTN:  -736'U systolically. Continue home metoprolol, quinapril.  Continue to monitor BP's. Hypothyroidism:  Continue home levothyroxine. Repeat TFTs. Previously uncontrolled. Chief Complaint: chest pain    Hospital Course: Patient presented to ED with complaints of chest pain. Patient states she woke up last night with chest pain, described as pressure in the center of her chest. Also complains of mild shortness of breath and palpitations. Patient states that she got up and took Mylanta and Protonix. She states that she has history of GERD and often has issues with this. Patient states that typically the medications will resolve her pain, but did not this time so she called the squad to bring her to the ED. Patient states pain did go away after GI cocktail in the ED. Patient admitted to med telemetry for chest pain rule out. Subjective: Lying in bed, family at bedside. Denies chest pain at this time. States chest pain last night and this morning in the center of her chest and describes as burning. States at times it radiates to her back. Denies fever or chills. Denies headache, dizziness or lightheadedness. Does state that when she went to the bathroom earlier she felt dizzy and weak and SOB. Denies any abdominal pain, N/V.          Medications:  Reviewed    Infusion Medications    sodium chloride       Scheduled Medications    amiodarone  200 mg Oral Daily    bumetanide  1 mg Oral Daily    ferrous sulfate  325 mg Oral Every Other Day    levothyroxine  175 mcg Oral Daily    pantoprazole  40 mg Oral BID AC    spironolactone  25 mg Oral Every Other Day    sodium chloride flush  10 mL IntraVENous 2 times per day    quinapril  2.5 mg Oral Daily    warfarin placeholder: dosing by pharmacy   Other RX Placeholder    metoprolol succinate  150 mg Oral Daily     PRN Meds: sodium chloride flush, sodium chloride, ondansetron **OR** ondansetron, polyethylene glycol, acetaminophen **OR** acetaminophen, potassium chloride **OR** potassium alternative oral replacement **OR** potassium chloride, magnesium sulfate, GI cocktail      Intake/Output Summary (Last 24 hours) at 9/29/2022 1327  Last data filed at 9/28/2022 2048  Gross per 24 hour   Intake 160 ml   Output 0 ml   Net 160 ml       Diet:  ADULT DIET; Regular; 5 carb choices (75 gm/meal); Low Fat/Low Chol/High Fiber/2 gm Na  Diet NPO    Exam:  BP (!) 121/59   Pulse 66   Temp 98 °F (36.7 °C) (Oral)   Resp 16   Ht 5' (1.524 m)   Wt 170 lb (77.1 kg)   SpO2 97%   BMI 33.20 kg/m²     General appearance: No apparent distress, appears stated age and cooperative. HEENT: Pupils equal, round, and reactive to light. Conjunctivae/corneas clear. Neck: Supple, with full range of motion. No jugular venous distention. Trachea midline. Respiratory:  Normal respiratory effort. Clear to auscultation, bilaterally without Rales/Wheezes/Rhonchi. Cardiovascular: Regular rate and rhythm with normal S1/S2 without murmurs, rubs or gallops. Pacer left upper chest.   Abdomen: Soft, non-tender, non-distended with normal bowel sounds. Musculoskeletal: passive and active ROM x 4 extremities. Skin: Skin color, texture, turgor normal.  No rashes or lesions. BLE trace nonpitting edema. Neurologic:  Neurovascularly intact without any focal sensory/motor deficits. Psychiatric: Alert and oriented, thought content appropriate, normal insight  Capillary Refill: Brisk,< 3 seconds   Peripheral Pulses: +2 palpable, equal bilaterally       Labs:   Recent Labs     09/28/22  0847 09/29/22  0632   WBC 6.3 4.9   HGB 8.3* 7.4*   HCT 28.0* 25.0*    226     Recent Labs     09/28/22  0847 09/29/22  0632    137   K 4.9 4.8    107   CO2 23 23   BUN 28* 23*   CREATININE 1.1 1.0   CALCIUM 8.6 8.0*     Recent Labs     09/28/22  0847 09/29/22  0632   AST 58* 41*   ALT 31 23   BILITOT 0.2* 0.3   ALKPHOS 175* 132*     Recent Labs     09/28/22  0847   INR 3.13*     No results for input(s): CKTOTAL, TROPONINI in the last 72 hours.     Urinalysis:      Lab Results   Component Value Date/Time    NITRU NEGATIVE 07/22/2022 11:45 AM    WBCUA 5-9 07/22/2022 11:45 AM    BACTERIA FEW 07/22/2022 11:45 AM    RBCUA 3-5 07/22/2022 11:45 AM    BLOODU NEGATIVE 07/22/2022 11:45 AM    SPECGRAV 1.008 01/09/2013 02:00 PM    GLUCOSEU NEGATIVE 07/22/2022 11:45 AM       Radiology:  CTA Chest W WO Contrast   Final Result      1. No CT evidence of pulmonary embolus. 2. Other findings as described above. **This report has been created using voice recognition software. It may contain minor errors which are inherent in voice recognition technology. **      Final report electronically signed by Dr Susanne Chew on 9/28/2022 11:13 AM      VL DUP LOWER EXTREMITY VENOUS RIGHT   Final Result   1. Sonographic evidence for deep venous thrombosis (below-the-knee) in the right peroneal vein. **This report has been created using voice recognition software. It may contain minor errors which are inherent in voice recognition technology. **      Final report electronically signed by Dr Susanne Chew on 9/28/2022 8:20 AM      XR CHEST PORTABLE   Final Result   Possible early infiltrate versus atelectasis in the left lung base. Mild background scarring. **This report has been created using voice recognition software. It may contain minor errors which are inherent in voice recognition technology. **      Final report electronically signed by Dr. Ernestina Petersen on 9/28/2022 7:45 AM          Diet: ADULT DIET; Regular; 5 carb choices (75 gm/meal);  Low Fat/Low Chol/High Fiber/2 gm Na  Diet NPO    DVT prophylaxis: [] Lovenox                                 [] SCDs                                 [] SQ Heparin                                 [] Encourage ambulation           [x] Already on Anticoagulation     Disposition:    [x] Home       [] TCU       [] Rehab       [] Psych       [] SNF       [] [place]       [] Other-    Code Status: Full Code          Electronically signed by Micheline MCKINNONN, RN, AG-ACNP Student on 9/29/2022 at 1:27 PM     [address] [facility] Hospital[facility] - CNP

## 2022-09-29 NOTE — PROGRESS NOTES
Warfarin Pharmacy Consult Note    Warfarin Indication: A fib/flutter  Target INR: 2.0-3.0  Dose prior to admission: 3 mg daily    Recent Labs     09/28/22  0847 09/29/22  1341   INR 3.13* 2.84*     Recent Labs     09/28/22  0847 09/29/22  0632   HGB 8.3* 7.4*    226     Concurrent anticoagulants/antiplatelets: None  Significant warfarin drug-drug interactions: amiodarone (HM), levothyroxine (HM)     Date INR Warfarin Dose   9/28/22 3.13 1.5 mg    9/29/22 2.84 3 mg                                                 Monitoring:                   INR will be monitored daily until therapeutic INR is achieved.     Myesha Mehta [facility] , PGY1 Pharmacy Resident 9/29/2022 3:46 PM

## 2022-09-29 NOTE — CONSULTS
[facility]  Cardiology Consult      Patient:  Diana Varma  YOB: 1940    MRN: [id number]   Acct: [de-identified]     Primary Care Physician: Junior Alegre MD    REASON FOR CONSULT:    chest pain     CHIEF COMPLAINT:    Chest pain     HISTORY OF PRESENT ILLNESS:    Diana Varma is a pleasant [de-identified]year old female patient with past medical history that includes:   Past Medical History:   Diagnosis Date    AICD (automatic cardioverter/defibrillator) present 05/1999    Arthritis 5/21/2019    Atrial fibrillation ([address]     chronic    Biventricular ICD (implantable cardiac defibrillator) in place 10/09    C. difficile diarrhea     CAD (coronary artery disease)     Cancer ([address]     right hand    CHF (congestive heart failure) [address] [address]     Diverticulitis 01/2013    GERD (gastroesophageal reflux disease)     hiatal hernia    Hiatal hernia     Hyperlipidemia     Hypertension     Menopause     1994    NAFLD (nonalcoholic fatty liver disease) 1/31/2017    Nonischemic cardiomyopathy ([facility] [address]     chronic    Pacemaker     Retroperitoneal hemorrhage 07/99    transfusion x3    Thyroid disease    She has h/o HFrEF, previously followed at CHF clinic. She is s/p BiV ICD. On coumadin for A Fib. Echocardiogram 2020 revealed an EF of 20%. LHC in 019 revealed nonobstructive CAD. The patient was admitted to the hospital on 9/28/2022. She states that ~ 4 am, while she was in bed, she developed retrosternal, pressure like chest pain that radiated to the back, associated with SOB. She drank water, pain persisted, she presented to ER. Patient reports having intermittent chest pains since then, none now. Peroneal vein DV on US. EKG revealed v-paced rhythm, PVC noted. Troponin <0.01, <0.01, <0.01. Chest CTA was negative for PE. INR was 3.1. Has occasional orthostatic dizziness. Patient denies syncope.      All labs, EKG's, diagnostic testing and images as well as cardiac cath, stress testing   were reviewed during this encounter    CARDIAC TESTING  Echo:   ECHO: Results for orders placed during the hospital encounter of 05/13/19    Echocardiogram 2D/ M-Mode/ Colorflow/ Do    Narrative  Transthoracic Echocardiography Report (TTE)    Demographics    Patient Name    Kathy Covington Gender               Female    MR #            [id number]       Race                     Ethnicity    Account #       [de-identified]       Room Number          0019    Accession       [id number]       Date of Study        05/14/2019  Number    Date of Birth   1940      Referring Gloris Kayser MD Doyce Furnace MD    Age             70 year(s)      Sherman Burnham MD  Physician    Procedure    Type of Study    TTE procedure:ECHOCARDIOGRAM COMPLETE 2D W DOPPLER W COLOR. Procedure Date  Date: 05/14/2019 Start: 02:23 PM    Study Location: Bedside  Technical Quality: Limited visualization due to poor acoustical window. Indications:Congestive heart failure. Additional Medical History:Chest pain, ICD, hypertension, nonischemic  cardiomyopathy, morbid obesity, hyperlipidemia, GERD, CAD, atrial  fibrillation, diabetic    Patient Status: Routine    Height: 62.2 inches Weight: 198.42 pounds BSA: 1.91 m^2 BMI: 36.05 kg/m^2    BP: 147/75 mmHg    Allergies  - See Epic. - See Epic. Conclusions    Summary  Left Ventricular size is Severely increased . Normal left ventricular wall thickness. There was severe global hypokinesis of the left ventricle. Ejection fraction is visually estimated in the range of 20% to 25%.   Left atrial size was dilated    Signature    ----------------------------------------------------------------  Electronically signed by Shawn Mcclure MD (Interpreting  physician) on 05/15/2019 at 07:07 AM  ----------------------------------------------------------------    Findings    Mitral Valve  The mitral valve structure was normal with normal leaflet separation. DOPPLER: The transmitral velocity was within the normal range with no  evidence for mitral stenosis. There was mild mitral regurgitation. Aortic Valve  The aortic valve was trileaflet with increase thickness and cuspal  separation. DOPPLER: Transaortic velocity was within the normal range with  no evidence of aortic stenosis. There was no evidence of aortic  regurgitation. Tricuspid Valve  The tricuspid valve structure was normal with normal leaflet separation. DOPPLER: There was no evidence of tricuspid stenosis. There was mild  tricuspid regurgitation. Pulmonic Valve  The pulmonic valve leaflets exhibited normal thickness, no calcification,  and normal cuspal separation. DOPPLER: The transpulmonic velocity was  within the normal range with no evidence for regurgitation. Left Atrium  Left atrial size was dilated    Left Ventricle  Left Ventricular size is Severely increased . Normal left ventricular wall thickness. There was severe global hypokinesis of the left ventricle. Ejection fraction is visually estimated in the range of 20% to 25%. Right Atrium  Right atrial size was normal.    Right Ventricle  The right ventricular size was normal with normal systolic function and  wall thickness. Pacer Wire visualized in right ventricle. Pericardial Effusion  The pericardium was normal in appearance with no evidence of a pericardial  effusion. Pleural Effusion  No evidence of pleural effusion. Aorta / Great Vessels  -Aortic root dimension within normal limits.  -The Pulmonary artery is within normal limits. -IVC size is within normal limits with normal respiratory phasic changes.     M-Mode/2D Measurements & Calculations    LV Diastolic   LV Systolic Dimension:    AV Cusp Separation: 2.2 cmLA  Dimension: 5.2 4.5 cm                    Dimension: 5.9 cmAO Root  cm             LV Volume Diastolic:      Dimension: 3.3 cmLA Area: 37.4  LV FS:13.5 %   129.9 ml cm^2  LV PW          LV Volume Systolic: 39.7  Diastolic: 0.9 ml  cm             LV EDV/LV EDV Index:  Septum         129.9 ml/68 m^2LV ESV/LV  RV Diastolic Dimension: 2.3 cm  Diastolic: 1   ESV Index: 92.7 ml/48 m^2  cm             EF Calculated: 28.9 %     LA/Aorta: 1.79    LV Length: 6.1 cm         LA volume/Index: 148.7 ml /78m^2    LV Area  Diastolic:  09.7 cm^2  LV Area  Systolic: 64.2  cm^2    Doppler Measurements & Calculations    MV Peak E-Wave: 83.9     AV Peak Velocity: 75.4 LVOT Peak Velocity: 63.6  cm/s                     cm/s                   cm/s  MV Peak A-Wave: 35.9     AV Peak Gradient: 2.27 LVOT Peak Gradient: 2 mmHg  cm/s                     mmHg  MV E/A Ratio: 2.34                              TV Peak E-Wave: 46.7 cm/s  MV Peak Gradient: 2.82                          TV Peak A-Wave: 37.9 cm/s  mmHg  TV Peak Gradient: 0.87  MV Deceleration Time:                           mmHg  257 msec                                        TR Velocity:276 cm/s  TR Gradient:30.47 mmHg  AV DVI (Vmax):0.84     PV Peak Velocity: 54.8  cm/s  PV Peak Gradient: 1.2 mmHg    [id number]%[id number]     Cath:2019  PROCEDURES PERFORMED:  1.  IV conscious sedation. The patient was given the IV conscious  sedation in incremental dosages by the circulating cath lab RN,  monitored by the cath lab monitor tech under my supervision. The procedure started at 07:00 a.m. and finished at 07:30 a.m. No acute  complications from the procedure. 2.  Left heart cath. The right radial artery was cannulated with a  6-Danish radial sheath. The patient was given the verapamil,  nitroglycerin, and heparin through the sheath. The heart catheterization showed the RCA is the dominant artery. The  RCA was patent. The left main was patent and bifurcates to the LAD and  circumflex.   The LAD had mild atheromatous nonobstructive disease about  40% proximal.  Distally, it was patent. Circumflex was patent. The  left ventriculogram showed dilated left ventricle, severe diffuse  hypokinesis of the left ventricle, and ejection fraction was 15% to 20%. No mitral regurg. No gradient across the aortic valve. The patient has  diastolic dysfunction of the left ventricle. The patient had no acute  complications from the procedure. IMPRESSION:  1. In summary, severe dilated congestive cardiomyopathy with an  ejection fraction about 15% to 20%. 2.  Diastolic dysfunction of the left ventricle. 3.  Nonobstructive disease of the LAD. 4.  Patent left main. 5.  Patent circumflex. 6.  Patent RCA. Past Medical History:    Past Medical History:   Diagnosis Date    AICD (automatic cardioverter/defibrillator) present 05/1999    Arthritis 5/21/2019    Atrial fibrillation (HCC)     chronic    Biventricular ICD (implantable cardiac defibrillator) in place 10/09    C. difficile diarrhea     CAD (coronary artery disease)     Cancer (HCC)     right hand    CHF (congestive heart failure) ([address]     Diverticulitis 01/2013    GERD (gastroesophageal reflux disease)     hiatal hernia    Hiatal hernia     Hyperlipidemia     Hypertension     Menopause     1994    NAFLD (nonalcoholic fatty liver disease) 1/31/2017    Nonischemic cardiomyopathy ([address]     chronic    Pacemaker     Retroperitoneal hemorrhage 07/99    transfusion x3    Thyroid disease        Past Surgical History:    Past Surgical History:   Procedure Laterality Date    CARDIAC DEFIBRILLATOR [facility]      Dr Zafar Thomas    COLONOSCOPY  2008    w/ EGD    COLONOSCOPY N/A 7/16/2019    COLONOSCOPY POLYPECTOMY SNARE/COLD BIOPSY performed by Andrew Goldstein MD at [facility]    ENDOSCOPY, COLON, [id number]    replaced 3x    SKIN BIOPSY  2010    Skin CA with graft.     UPPER GASTROINTESTINAL ENDOSCOPY N/A 7/16/2019    EGD DILATION SAVORY performed by Nati De Santiago MD at [facility] Left 7/16/2019    EGD BIOPSY performed by Nati De Santiago MD at [facility] 12/2/2019    EGD ESOPHAGEAL MANOMETRY AND PH MONITORING 24 HR performed by Nati De Santiago MD at [facility]       Medications Prior to Admission:    Medications Prior to Admission: CALCIUM PO, Take by mouth  VITAMIN D PO, Take by mouth  pantoprazole (PROTONIX) 40 MG tablet, Take 1 tablet by mouth 2 times daily (before meals)  quinapril (ACCUPRIL) 5 MG tablet, Take 2.5 mg by mouth in the morning. metoprolol succinate (TOPROL XL) 100 MG extended release tablet, Take 1.5 tablets by mouth daily  digoxin (LANOXIN) 125 MCG tablet, Take 1 tablet by mouth every other day  bumetanide (BUMEX) 1 MG tablet, Take 1 tablet by mouth daily  metOLazone (ZAROXOLYN) 2.5 MG tablet, Take as needed for ankle swelling; notify office when you take it  spironolactone (ALDACTONE) 25 MG tablet, TAKE 1 TABLET BY MOUTH EVERY OTHER DAY  amiodarone (CORDARONE) 200 MG tablet, Take 1 tablet by mouth once daily  Handicap Placard MISC, by Does not apply route Request parking placard due to medical conditions. Duration of 5 years. levothyroxine (SYNTHROID) 175 MCG tablet, Take 1 tablet by mouth Daily New dose. warfarin (COUMADIN) 3 MG tablet, Alternating 3 mg/1.5 mg or as directed per INR results. ferrous sulfate (IRON 325) 325 (65 Fe) MG tablet, Take 325 mg by mouth every other day  Alum & Mag Hydroxide-Simeth (MYLANTA PO), Take by mouth as needed   acetaminophen (TYLENOL) 325 MG tablet, Take 650 mg by mouth every 6 hours as needed for Pain or Fever  Misc. Devices (LUMBAR SUPPORT CUSHION) MISC, by Does not apply route. Dx: low back pain, sciatica. Ascorbic Acid (VITAMIN C) 1000 MG tablet, Take 1,000 mg by mouth 2 times daily.       Allergies:    Ativan [lorazepam], Codeine, Lisinopril, Nystatin, Nystatin, Percocet [oxycodone-acetaminophen], Relafen [nabumetone], Tape [adhesive tape], and Celebrex [celecoxib]    Social History:    reports that she has never smoked. She has never used smokeless tobacco. She reports that she does not drink alcohol and does not use drugs. Family History:   family history includes Cancer in her sister; Diabetes in her brother and mother; Heart Disease in her brother and mother; High Blood Pressure in her brother, brother, brother, and brother; High Cholesterol in her father and mother; Kidney Disease in her brother; [place] in her daughter; Parkinsonism in her brother. REVIEW OF SYSTEMS:  Constitutional: negative for anorexia, chills and fevers,weight change  Skin: negative for new skin rash per patient  HEENT: negative for head trauma or new visual changes  Respiratory: negative for cough, hemoptysis, wheezing, WINN  Cardiovascular: negative for  orthopnea, palpitations and syncope. Gastrointestinal: negative for abdominal pain,nausea , vomiting, constipation, diarrhea. Hematologic/lymphatic: negative for bruising,prolonged bleeding,blood clots  Musculoskeletal:negative for muscle weakness, myalgias,wasting  Neurological: negative for coordination problems, gait problems and vertigo  Behavioral/Psych:negative for mood/sleep disturbance      PHYSICAL EXAM:   Vitals:Patient Vitals for the past 24 hrs:   BP Temp Temp src Pulse Resp SpO2   09/29/22 0842 (!) 129/57 98 °F (36.7 °C) Oral 66 16 100 %   09/29/22 0415 (!) 110/57 97.5 °F (36.4 °C) Oral 66 16 94 %   09/29/22 0000 134/64 -- -- -- -- --   09/28/22 2344 (!) 103/28 97.7 °F (36.5 °C) Oral 61 16 96 %   09/28/22 2042 (!) 119/47 97.9 °F (36.6 °C) Oral 64 18 100 %   09/28/22 1410 137/71 -- -- 61 17 96 %   09/28/22 1110 (!) 125/46 -- -- 60 16 99 %       Last 3 weights:    Wt Readings from Last 3 Encounters:   09/28/22 170 lb (77.1 kg)   09/06/22 171 lb 6.4 oz (77.7 kg)   08/12/22 173 lb 12.8 oz (78.8 kg)     24 hour intake/output:  Intake/Output Summary (Last 24 hours) at 9/29/2022 1049  Last data filed at 9/28/2022 2048  Gross per 24 hour   Intake 160 ml   Output 0 ml   Net 160 ml     BMI:Body mass index is 33.2 kg/m². General Appearance: alert and oriented to person, place and time, well developed and well- nourished, in no acute distress  Skin: warm and dry, no rash or erythema  Eyes: pupils equal, round, and reactive to light, extraocular eye movements intact, conjunctivae normal  Neck: supple and non-tender without mass, no thyromegaly or thyroid nodules, no cervical lymphadenopathy  Pulmonary/Chest: clear to auscultation bilaterally- no wheezes, rales or rhonchi, normal air movement, no respiratory distress  Cardiovascular: normal rate, regular rhythm, normal S1 and S2, no murmur. No rubs, clicks, or gallops, distal pulses intact, no carotid bruits, Negative JVD  Radial Pulses: intact 2+  Abdomen: soft, non-tender, non-distended, normal bowel sounds, no masses or organomegaly  Extremities: no cyanosis, clubbing . no Edema  Musculoskeletal: normal range of motion, no joint swelling, deformity or tenderness      RADIOLOGY   CTA Chest W WO Contrast    Result Date: 9/28/2022  PROCEDURE: CTA CHEST W WO CONTRAST CLINICAL INFORMATION: Chest pain/new diagnosis DVT/rule out PE COMPARISON: 0/2/1930 TECHNIQUE: Helical CT angiography of the chest was obtained with intravenous contrast. Multiplanar reformats are provided. PE protocol was utilized. 0.9 mm and 3  mm axial images were provided through the chest after the administration of IV contrast. A non-contrast localizer was obtained. 10 mm MIP reconstructions of the chest performed in coronal and sagittal planes All CT scans at this facility use dose modulation, iterative reconstruction, and/or weight based dosing when appropriate to reduce the radiation dose to as low as reasonably achievable.  CONTRAST: 80  cc of Isovue-370  intravenously FINDINGS: There is adequate opacification of the main pulmonary artery and its branches. . No CT evidence of pulmonary embolus . The heart is mildly enlarged. The main pulmonary artery is dilated at 3.3 cm. Intramuscular lipoma is seen in the right shoulder. Aortocoronary calcifications. A small hiatal hernia is seen. Bibasilar subsegmental atelectasis and/or scarring. No focal pulmonary consolidation. No large pulmonary mass. Central airway is patent. No pleural effusions. No significant pericardial effusion. Ascending thoracic aorta is not dilated. No significantly enlarged mediastinal or  axillary lymph node. The thyroid is not enlarged. No chest wall mass. Limited evaluation below the diaphragm show mildly nodular liver surface morphology. The gallbladder is surgically absent. Small ascites. . Bones: Degenerative changes of the thoracic spine. The bones appear demineralized. .     1. No CT evidence of pulmonary embolus. 2. Other findings as described above. **This report has been created using voice recognition software. It may contain minor errors which are inherent in voice recognition technology. ** Final report electronically signed by Dr Vin Cartagena on 9/28/2022 11:13 AM    XR CHEST PORTABLE    Result Date: 9/28/2022  PROCEDURE: XR CHEST PORTABLE CLINICAL INFORMATION: chest pain. Chest pressure this morning. COMPARISON: 7/22/2022, 11/28/2021. TECHNIQUE: AP semiupright view of the chest. FINDINGS: There is a 3-lead pacemaker. There is cardiomegaly. The mediastinum is not widened. There are some increased markings in the left lung base. This can indicate early infiltrate. There is stable scarring in the right lung. There are no pleural effusions. The pulmonary vascularity is normal.No suspicious osseous lesions are present. Possible early infiltrate versus atelectasis in the left lung base. Mild background scarring. **This report has been created using voice recognition software.  It may contain minor errors which are inherent in voice recognition technology. ** Final report electronically signed by Dr. Herbert Morris on 9/28/2022 7:45 AM    VL DUP LOWER EXTREMITY VENOUS RIGHT    Result Date: 9/28/2022  PROCEDURE: VL DUP LOWER EXTREMITY VENOUS RIGHT CLINICAL INFORMATION: Swelling, Rule Out DVT COMPARISON: None TECHNIQUE: Grayscale imaging, color Doppler imaging and spectral waveform analysis of the right lower extremity performed in longitudinal and transverse planes from the inguinal region to the distal calf. FINDINGS: Lack of flow and noncompressibility of the right peroneal vein. There is normal venous flow within and compressibility of the right common femoral, femoral, profunda femoral and popliteal veins. There is normal augmentation. The right saphenofemoral junction is  unremarkable. There is flow within and compressibility of the right gastrocnemius, anterior tibial, and posterior tibial veins. There is flow within and compressibility/augmentation of the left common femoral vein. 1. Sonographic evidence for deep venous thrombosis (below-the-knee) in the right peroneal vein. **This report has been created using voice recognition software. It may contain minor errors which are inherent in voice recognition technology. ** Final report electronically signed by Dr Yaya Guadarrama on 9/28/2022 8:20 AM      LABS:  Recent Labs     09/28/22  0930 09/28/22  1427 09/28/22  1656   TROPONINT < 0.010 < 0.010 < 0.010     CBC:   Lab Results   Component Value Date/Time    WBC 4.9 09/29/2022 06:32 AM    RBC 2.66 09/29/2022 06:32 AM    RBC 3.06 11/29/2011 06:13 AM    HGB 7.4 09/29/2022 06:32 AM    HCT 25.0 09/29/2022 06:32 AM    MCV 94.0 09/29/2022 06:32 AM    MCH 27.8 09/29/2022 06:32 AM    MCHC 29.6 09/29/2022 06:32 AM    RDW 15.1 02/25/2016 09:40 PM     09/29/2022 06:32 AM    MPV 10.3 09/29/2022 06:32 AM     BMP:    Lab Results   Component Value Date/Time     09/29/2022 06:32 AM    K 4.8 09/29/2022 06:32 AM     09/29/2022 06:32 AM    CO2 23 09/29/2022 06:32 AM    BUN 23 09/29/2022 06:32 AM    LABALBU 2.5 09/29/2022 06:32 AM    LABALBU 4.1 05/09/2012 10:57 AM    CREATININE 1.0 09/29/2022 06:32 AM    CALCIUM 8.0 09/29/2022 06:32 AM    LABGLOM 53 09/29/2022 06:32 AM    GLUCOSE 82 09/29/2022 06:32 AM    GLUCOSE 147 05/09/2012 10:57 AM     Hepatic Function Panel:    Lab Results   Component Value Date/Time    ALKPHOS 132 09/29/2022 06:32 AM    ALT 23 09/29/2022 06:32 AM    AST 41 09/29/2022 06:32 AM    PROT 5.1 09/29/2022 06:32 AM    BILITOT 0.3 09/29/2022 06:32 AM    BILIDIR <0.2 09/19/2022 10:46 AM    LABALBU 2.5 09/29/2022 06:32 AM    LABALBU 4.1 05/09/2012 10:57 AM     Magnesium:    Lab Results   Component Value Date/Time    MG 2.3 07/22/2022 10:00 AM     Warfarin PT/INR:  No components found for: PTPATWAR, PTINRWAR  HgBA1c:    Lab Results   Component Value Date/Time    LABA1C 6.6 02/16/2018 12:00 AM     FLP:    Lab Results   Component Value Date/Time    TRIG 113 03/10/2020 04:12 AM    HDL 40 03/10/2020 04:12 AM    LDLCALC 158 03/10/2020 04:12 AM     TSH:    Lab Results   Component Value Date/Time    TSH 6.980 08/05/2022 09:29 AM     BNP:   Lab Results   Component Value Date    .3 (H) 02/07/2013       Heart Score = 2     ASSESSMENT:  Chest pain   H/o NICMP  Chronic HFrEF  S/p BiV AICD  Chronic HFrEF  Dizziness   Nonobstructive CAD  HTN  Dyslipidemia   H/O Hiatal hernia, GERD    RECOMMENDATIONS:  She has h/o HFrEF, previously followed at CHF clinic.  She is s/p BiV ICD  On coumadin for A Fib  Echocardiogram 2020 revealed an EF of 20%  LHC in 019 revealed nonobstructive CAD  Reports intermittent chest pains, mixed features   ACS was ruled out   EKG revealed v-paced rhythm, PVC noted  Troponin <0.01, <0.01, <0.01  Chest CTA was negative for PE  Continue telemetry   Ate breakfast today, had coffee  Keep NPO post midnight, stress test in AM   Based on patient's risk factors and clinical presentation, stress test is indicated to investigate for possible underlying ischemic heart disease. Patient  agrees with that work up and its risks and benefits and potential need for additional testing if stress test is abnormal such as cardiac catheterization  Consider GI etiology if stress test is negative   Mild dizziness  Check orthostatic vitals     Above findings and plan of care were discussed with patient and her family, questions were answered, agreeable to plan. Thank you for allowing me to participate in the care of this patient. Please let me know if I can be of any further assistance.       Belen Resendiz MD, Adamlonnie Elizalde   10:49 AM  9/29/2022

## 2022-09-29 NOTE — CARE COORDINATION
9/29/22, 8:52 AM EDT      DISCHARGE PLANNING EVALUATION    Terell Swenson  Admitted: 9/28/2022  Hospital Day: 0    Location: 8A-20/020-A Reason for admit: Chest pain [R07.9]  Acute deep vein thrombosis (DVT) of right peroneal vein (HCC) [I82.451]  Chest pain, unspecified type [R07.9]    Procedure: No.   Past Medical History:   Diagnosis Date    AICD (automatic cardioverter/defibrillator) present 05/1999    Arthritis 5/21/2019    Atrial fibrillation (HCC)     chronic    Biventricular ICD (implantable cardiac defibrillator) in place 10/09    C. difficile diarrhea     CAD (coronary artery disease)     Cancer (HCC)     right hand    CHF (congestive heart failure) ([facility]     Diverticulitis 01/2013    GERD (gastroesophageal reflux disease)     hiatal hernia    Hiatal hernia     Hyperlipidemia     Hypertension     Menopause     1994    NAFLD (nonalcoholic fatty liver disease) 1/31/2017    Nonischemic cardiomyopathy ([facility] [address]     chronic    Pacemaker     Retroperitoneal hemorrhage 07/99    transfusion x3    Thyroid disease        Barriers to Discharge: Admitted from ER with CP and SOB. Epigastric pain. Imaging revealed peroneal vein DVT. Pt has been on coumadin. Trops neg. Cardiology consulted. Echo. PCP: Hussain Rg MD      Patient Goals/Plan/Treatment Preferences: Met with Flora today. Great Granddaughter present. Pt is from home with spouse. Self sufficient but moving slower since having Covid. She has no home services. She does have a walker and cane available that she has been using occasionally. She has a PCP, she drives, no issues getting meds and she is insured.      Transportation/Food Security/Housekeeping Addressed: no issues identified

## 2022-09-30 ENCOUNTER — APPOINTMENT (OUTPATIENT)
Dept: NON INVASIVE DIAGNOSTICS | Age: 82
DRG: 392 | End: 2022-09-30
Payer: MEDICARE

## 2022-09-30 VITALS
SYSTOLIC BLOOD PRESSURE: 106 MMHG | RESPIRATION RATE: 20 BRPM | TEMPERATURE: 97.6 F | HEART RATE: 62 BPM | DIASTOLIC BLOOD PRESSURE: 53 MMHG | WEIGHT: 172 LBS | OXYGEN SATURATION: 99 % | BODY MASS INDEX: 33.77 KG/M2 | HEIGHT: 60 IN

## 2022-09-30 LAB
ANION GAP SERPL CALCULATED.3IONS-SCNC: 12 MEQ/L (ref 8–16)
BASOPHILS # BLD: 1.5 %
BASOPHILS ABSOLUTE: 0.1 THOU/MM3 (ref 0–0.1)
BUN BLDV-MCNC: 22 MG/DL (ref 7–22)
CALCIUM SERPL-MCNC: 8.1 MG/DL (ref 8.5–10.5)
CHLORIDE BLD-SCNC: 104 MEQ/L (ref 98–111)
CO2: 21 MEQ/L (ref 23–33)
CREAT SERPL-MCNC: 1.1 MG/DL (ref 0.4–1.2)
EOSINOPHIL # BLD: 3.5 %
EOSINOPHILS ABSOLUTE: 0.2 THOU/MM3 (ref 0–0.4)
ERYTHROCYTE [DISTWIDTH] IN BLOOD BY AUTOMATED COUNT: 18 % (ref 11.5–14.5)
ERYTHROCYTE [DISTWIDTH] IN BLOOD BY AUTOMATED COUNT: 62.4 FL (ref 35–45)
GFR SERPL CREATININE-BSD FRML MDRD: 48 ML/MIN/1.73M2
GLUCOSE BLD-MCNC: 84 MG/DL (ref 70–108)
HCT VFR BLD CALC: 26.8 % (ref 37–47)
HEMOGLOBIN: 7.9 GM/DL (ref 12–16)
IMMATURE GRANS (ABS): 0.03 THOU/MM3 (ref 0–0.07)
IMMATURE GRANULOCYTES: 0.6 %
INR BLD: 2.59 (ref 0.85–1.13)
LYMPHOCYTES # BLD: 22.4 %
LYMPHOCYTES ABSOLUTE: 1.2 THOU/MM3 (ref 1–4.8)
MCH RBC QN AUTO: 27.9 PG (ref 26–33)
MCHC RBC AUTO-ENTMCNC: 29.5 GM/DL (ref 32.2–35.5)
MCV RBC AUTO: 94.7 FL (ref 81–99)
MONOCYTES # BLD: 18.7 %
MONOCYTES ABSOLUTE: 1 THOU/MM3 (ref 0.4–1.3)
NUCLEATED RED BLOOD CELLS: 0 /100 WBC
PLATELET # BLD: 255 THOU/MM3 (ref 130–400)
PMV BLD AUTO: 10.6 FL (ref 9.4–12.4)
POTASSIUM SERPL-SCNC: 5 MEQ/L (ref 3.5–5.2)
RBC # BLD: 2.83 MILL/MM3 (ref 4.2–5.4)
SEG NEUTROPHILS: 53.3 %
SEGMENTED NEUTROPHILS ABSOLUTE COUNT: 2.8 THOU/MM3 (ref 1.8–7.7)
SODIUM BLD-SCNC: 137 MEQ/L (ref 135–145)
T4 FREE: 2.01 NG/DL (ref 0.93–1.76)
TSH SERPL DL<=0.05 MIU/L-ACNC: 9.61 UIU/ML (ref 0.4–4.2)
WBC # BLD: 5.2 THOU/MM3 (ref 4.8–10.8)

## 2022-09-30 PROCEDURE — A9500 TC99M SESTAMIBI: HCPCS | Performed by: NURSE PRACTITIONER

## 2022-09-30 PROCEDURE — 3430000000 HC RX DIAGNOSTIC RADIOPHARMACEUTICAL: Performed by: NURSE PRACTITIONER

## 2022-09-30 PROCEDURE — 85610 PROTHROMBIN TIME: CPT

## 2022-09-30 PROCEDURE — 84443 ASSAY THYROID STIM HORMONE: CPT

## 2022-09-30 PROCEDURE — 6360000002 HC RX W HCPCS

## 2022-09-30 PROCEDURE — 84439 ASSAY OF FREE THYROXINE: CPT

## 2022-09-30 PROCEDURE — 80048 BASIC METABOLIC PNL TOTAL CA: CPT

## 2022-09-30 PROCEDURE — 36415 COLL VENOUS BLD VENIPUNCTURE: CPT

## 2022-09-30 PROCEDURE — 6370000000 HC RX 637 (ALT 250 FOR IP): Performed by: PHYSICIAN ASSISTANT

## 2022-09-30 PROCEDURE — 85025 COMPLETE CBC W/AUTO DIFF WBC: CPT

## 2022-09-30 PROCEDURE — 93017 CV STRESS TEST TRACING ONLY: CPT | Performed by: NUCLEAR MEDICINE

## 2022-09-30 PROCEDURE — 78452 HT MUSCLE IMAGE SPECT MULT: CPT

## 2022-09-30 PROCEDURE — 99239 HOSP IP/OBS DSCHRG MGMT >30: CPT | Performed by: NURSE PRACTITIONER

## 2022-09-30 PROCEDURE — 2580000003 HC RX 258: Performed by: PHYSICIAN ASSISTANT

## 2022-09-30 RX ORDER — WARFARIN SODIUM 3 MG/1
3 TABLET ORAL
Status: COMPLETED | OUTPATIENT
Start: 2022-09-30 | End: 2022-09-30

## 2022-09-30 RX ADMIN — PANTOPRAZOLE SODIUM 40 MG: 40 TABLET, DELAYED RELEASE ORAL at 09:26

## 2022-09-30 RX ADMIN — METOPROLOL SUCCINATE 150 MG: 100 TABLET, EXTENDED RELEASE ORAL at 09:25

## 2022-09-30 RX ADMIN — PANTOPRAZOLE SODIUM 40 MG: 40 TABLET, DELAYED RELEASE ORAL at 17:11

## 2022-09-30 RX ADMIN — Medication 34 MILLICURIE: at 08:00

## 2022-09-30 RX ADMIN — SODIUM CHLORIDE, PRESERVATIVE FREE 10 ML: 5 INJECTION INTRAVENOUS at 11:31

## 2022-09-30 RX ADMIN — Medication 9.8 MILLICURIE: at 07:10

## 2022-09-30 RX ADMIN — LEVOTHYROXINE SODIUM 175 MCG: 150 TABLET ORAL at 09:24

## 2022-09-30 RX ADMIN — Medication 325 MG: at 09:23

## 2022-09-30 RX ADMIN — ACETAMINOPHEN 650 MG: 325 TABLET ORAL at 00:28

## 2022-09-30 RX ADMIN — BUMETANIDE 1 MG: 1 TABLET ORAL at 09:23

## 2022-09-30 RX ADMIN — WARFARIN SODIUM 3 MG: 3 TABLET ORAL at 17:12

## 2022-09-30 RX ADMIN — AMIODARONE HYDROCHLORIDE 200 MG: 200 TABLET ORAL at 09:22

## 2022-09-30 RX ADMIN — QUINAPRIL 2.5 MG: 5 TABLET ORAL at 09:28

## 2022-09-30 ASSESSMENT — PAIN - FUNCTIONAL ASSESSMENT: PAIN_FUNCTIONAL_ASSESSMENT: ACTIVITIES ARE NOT PREVENTED

## 2022-09-30 ASSESSMENT — PAIN SCALES - GENERAL
PAINLEVEL_OUTOF10: 0
PAINLEVEL_OUTOF10: 4

## 2022-09-30 ASSESSMENT — PAIN DESCRIPTION - FREQUENCY: FREQUENCY: INTERMITTENT

## 2022-09-30 ASSESSMENT — PAIN DESCRIPTION - DESCRIPTORS: DESCRIPTORS: ACHING

## 2022-09-30 ASSESSMENT — PAIN DESCRIPTION - LOCATION: LOCATION: HEAD

## 2022-09-30 ASSESSMENT — PAIN DESCRIPTION - ORIENTATION: ORIENTATION: ANTERIOR

## 2022-09-30 ASSESSMENT — PAIN DESCRIPTION - PAIN TYPE: TYPE: ACUTE PAIN

## 2022-09-30 NOTE — PROGRESS NOTES
Hospitalist Progress Note    Patient:  Bertin Cary      Unit/Bed:8A-20/020-A    YOB: 1940    MRN: [id number]       Acct: [de-identified]     PCP: Reji Goyal MD    Date of Admission: 9/28/2022    Assessment/Plan:    Chest pain, resolved:   ACS ruled out. CTA negative for PE. Echo 9/29/22 EF 25% with moderate pulmonary hypertension. Cardiac catheterization in 2019 with nonobstructive disease. Continue telemetry monitoring. Stress test with moderate inferior infarct LV dysfunction and dilated ventricle no obvious ischemia. Cardiology following, will likely proceed with [facility]. GERD, esophagitis:  EGD 2019. Continue home Protonix. GI cocktail PRN. Will need new GI referral at discharge. Chronic HFrEF:  S/P BV ICD. EF 25% this admission. Previously followed at the CHF clinic. Continue home Bumex, Spironolactone, Metoprolol, Amiodarone, quinapril. Daily weights. Strict intake and output. Low sodium diet. 2L fluid restriction. Chronic Atrial Fibrillation: On chronic OAC. S/P BV ICD. EKG 9/28/22 Ventricular-paced with biventricular pacemaker detected. [id number] 5. HAS-BLED Score 5. INR 2.59 today. Pharmacy dosing Coumadin. Continue home amiodarone and metoprolol. Continue telemetry monitoring. Non-obstructive CAD:   Cardiac catheterization 2019. Carb controlled, low fat, low cholesterol, high fiber, 2gm Na Diet. Cardiology following. DVT, right peroneal vein:  Patient is at a low risk of embolization due to location of DVT. She is already on anticoagulation. D-Dimer negative. Patient is a high risk for bleeding. INR therapeutic. Pharmacy dosing Coumadin. No further treatment is warranted. Chronic normocytic anemia:  Hgb 7.9. Transfuse if Hgb <7 and/or symptomatic. Continue home iron supplement. CKD Stage III:  Cr. 1.1 and baseline. Primary HTN:  BP stable. Continue home metoprolol, quinapril. Continue to monitor BP's. Orthostatic BP's negative. Hypothyroidism: with abnormal TFTs. Continue home levothyroxine. TSH 9.610, Free T4 2.01. Possibley contrast related. Obtain a free t4 by dialysis. 11. Mildly elevated AST. Downward trend. Repeat in AM.    Chief Complaint: chest pain    Hospital Course: Patient presented to ED with complaints of chest pain. Patient states she woke up last night with chest pain, described as pressure in the center of her chest. Also complains of mild shortness of breath and palpitations. Patient states that she got up and took Mylanta and Protonix. She states that she has history of GERD and often has issues with this. Patient states that typically the medications will resolve her pain, but did not this time so she called the squad to bring her to the ED. Patient states pain did go away after GI cocktail in the ED. Patient admitted to [facility] telemetry for chest pain rule out. Cardiology consulted. Stress test today. Possibly discharge later today pending results. Subjective: Sitting up in chair. Denies chest pain at this time. Denies fever or chills. Denies headache, dizziness or lightheadedness. Denies SOB or cough. Denies any abdominal pain, N/V.          Medications:  Reviewed    Infusion Medications    sodium chloride       Scheduled Medications    amiodarone  200 mg Oral Daily    bumetanide  1 mg Oral Daily    ferrous sulfate  325 mg Oral Every Other Day    levothyroxine  175 mcg Oral Daily    pantoprazole  40 mg Oral BID AC    spironolactone  25 mg Oral Every Other Day    sodium chloride flush  10 mL IntraVENous 2 times per day    quinapril  2.5 mg Oral Daily    warfarin placeholder: dosing by pharmacy   Other RX Placeholder    metoprolol succinate  150 mg Oral Daily     PRN Meds: sodium chloride flush, sodium chloride, ondansetron **OR** ondansetron, polyethylene glycol, acetaminophen **OR** acetaminophen, potassium chloride **OR** potassium alternative oral replacement **OR** potassium chloride, magnesium sulfate, GI cocktail    No intake or output data in the 24 hours ending 09/30/22 1230      Diet:  Diet NPO    Exam:  BP (!) 122/58   Pulse 61   Temp (!) 96.6 °F (35.9 °C) (Oral)   Resp 20   Ht 5' (1.524 m)   Wt 172 lb (78 kg)   SpO2 99%   BMI 33.59 kg/m²     General appearance: No apparent distress, appears stated age and cooperative. HEENT: Pupils equal, round, and reactive to light. Conjunctivae/corneas clear. Neck: Supple, with full range of motion. No jugular venous distention. Trachea midline. Respiratory:  Normal respiratory effort. Clear to auscultation, bilaterally without Rales/Wheezes/Rhonchi. Cardiovascular: Regular rate and rhythm with normal S1/S2 without murmurs, rubs or gallops. Pacer left upper chest.   Abdomen: Soft, non-tender, non-distended with normal bowel sounds. Musculoskeletal: passive and active ROM x 4 extremities. Skin: Skin color, texture, turgor normal.  No rashes or lesions. BLE trace nonpitting edema. Neurologic:  Neurovascularly intact without any focal sensory/motor deficits. Psychiatric: Alert and oriented, thought content appropriate, normal insight  Capillary Refill: Brisk,< 3 seconds   Peripheral Pulses: +2 palpable, equal bilaterally       Labs:   Recent Labs     09/28/22  0847 09/29/22  0632 09/30/22  0611   WBC 6.3 4.9 5.2   HGB 8.3* 7.4* 7.9*   HCT 28.0* 25.0* 26.8*    226 255       Recent Labs     09/28/22  0847 09/29/22  0632 09/30/22  0611    137 137   K 4.9 4.8 5.0    107 104   CO2 23 23 21*   BUN 28* 23* 22   CREATININE 1.1 1.0 1.1   CALCIUM 8.6 8.0* 8.1*       Recent Labs     09/28/22  0847 09/29/22  0632   AST 58* 41*   ALT 31 23   BILITOT 0.2* 0.3   ALKPHOS 175* 132*       Recent Labs     09/28/22  0847 09/29/22  1341 09/30/22  0611   INR 3.13* 2.84* 2.59*       No results for input(s): CKTOTAL, TROPONINI in the last 72 hours.     Urinalysis:      Lab Results   Component Value Date/Time    NITRU NEGATIVE 07/22/2022 11:45 AM    WBCUA 5-9 07/22/2022 11:45 AM    BACTERIA FEW 07/22/2022 11:45 AM    RBCUA 3-5 07/22/2022 11:45 AM    BLOODU NEGATIVE 07/22/2022 11:45 AM    SPECGRAV 1.008 01/09/2013 02:00 PM    GLUCOSEU NEGATIVE 07/22/2022 11:45 AM       Radiology:  CTA Chest W WO Contrast   Final Result      1. No CT evidence of pulmonary embolus. 2. Other findings as described above. **This report has been created using voice recognition software. It may contain minor errors which are inherent in voice recognition technology. **      Final report electronically signed by Dr Lisbeth Casey on 9/28/2022 11:13 AM      VL DUP LOWER EXTREMITY VENOUS RIGHT   Final Result   1. Sonographic evidence for deep venous thrombosis (below-the-knee) in the right peroneal vein. **This report has been created using voice recognition software. It may contain minor errors which are inherent in voice recognition technology. **      Final report electronically signed by Dr Lisbeth Casey on 9/28/2022 8:20 AM      XR CHEST PORTABLE   Final Result   Possible early infiltrate versus atelectasis in the left lung base. Mild background scarring. **This report has been created using voice recognition software. It may contain minor errors which are inherent in voice recognition technology. **      Final report electronically signed by Dr. Madison Rod on 9/28/2022 7:45 AM          Diet: Diet NPO    DVT prophylaxis: [] Lovenox                                 [] SCDs                                 [] SQ Heparin                                 [] Encourage ambulation           [x] Already on Anticoagulation     Disposition:    [x] Home       [] TCU       [] Rehab       [] Psych       [] SNF       [] [facility]       [] Other-    Code Status: Full Code          Electronically signed by Debbie MCKINNONN, RN, AG-ACNP Student on 9/30/2022 at 12:30 PM     [facility], [address] [place] [address] 9/30/2022

## 2022-09-30 NOTE — PROGRESS NOTES
Discharge teaching and instructions for diagnosis/procedure of chest pain completed with patient using teachback method. AVS reviewed. Patient voiced understanding follow up appointments, and care of self at home.  Discharged in a wheelchair to  home with support per family

## 2022-09-30 NOTE — PLAN OF CARE
Problem: Pain  Goal: Verbalizes/displays adequate comfort level or baseline comfort level  Outcome: Progressing    Pt verbalized pain and comfort achieved after tylenol given for headache. Pt understandings pain scale. Will continue to monitor.

## 2022-09-30 NOTE — PROGRESS NOTES
Warfarin Pharmacy Consult Note    Warfarin Indication: A fib/flutter  [facility] INR: 2.0-3.0  Dose prior to admission: 3 mg daily    Recent Labs     09/28/22  0847 09/29/22  1341 09/30/22  0611   INR 3.13* 2.84* 2.59*     Recent Labs     09/28/22  0847 09/29/22  0632 09/30/22  0611   HGB 8.3* 7.4* 7.9*    226 255     Concurrent anticoagulants/antiplatelets: None  Significant warfarin drug-drug interactions: amiodarone (HM), levothyroxine (HM)     Date INR Warfarin Dose   9/28/22 3.13 1.5 mg    9/29/22 2.84 3 mg    9/29/22  2.59 3 mg                                            Monitoring:                   INR will be monitored daily until therapeutic INR is achieved.     Denisa Jeffrey [facility] , PGY1 Pharmacy Resident 9/30/2022 10:19 AM

## 2022-09-30 NOTE — CARE COORDINATION
9/30/22, 11:48 AM EDT    DISCHARGE ON GOING EVALUATION    [facility] day: 1  Location: 8A-20/020-A Reason for admit: Chest pain [R07.9]  Acute deep vein thrombosis (DVT) of right peroneal vein (HCC) [I82.451]  Chest pain, unspecified type [R07.9]   Procedure: 9-30-22 Stress Test.  Barriers to Discharge: Planning Stress Test today. No new CP reported. PCP: Charles Morales MD  Readmission Risk Score: 18.7%  Patient Goals/Plan/Treatment Preferences: Return home with spouse. 9/30/22, 11:49 AM EDT    Patient goals/plan/ treatment preferences discussed by  and . Patient goals/plan/ treatment preferences reviewed with patient/ family. Patient/ family verbalize understanding of discharge plan and are in agreement with goal/plan/treatment preferences. Understanding was demonstrated using the teach back method. AVS provided by RN at time of discharge, which includes all necessary medical information pertaining to the patients current course of illness, treatment, post-discharge goals of care, and treatment preferences. Services At/After Discharge: None       IMM Letter  IMM Letter given to Patient/Family/Significant other/Guardian/POA/by[de-identified] Francia ARENAS Case Manager. IMM Letter date given[de-identified] 09/30/22  IMM Letter time given[de-identified] 8186  Observation Status Letter date given[de-identified] 09/28/22  Observation Status Letter time given[de-identified] 80  Observation Status Letter given to Patient/Family/Significant other/Guardian/POA/by[de-identified] Pt Access     Potential discharge in next 24 hours. Plans return home with spouse.

## 2022-09-30 NOTE — PLAN OF CARE
Problem: Discharge Planning  Goal: Discharge to home or other facility with appropriate resources  Outcome: Completed  Flowsheets (Taken 9/30/2022 1719)  Discharge to home or other facility with appropriate resources: Identify barriers to discharge with patient and caregiver  Note: Pt expected to be discharged to home     Problem: Pain  Goal: Verbalizes/displays adequate comfort level or baseline comfort level  Outcome: Completed  Flowsheets (Taken 9/30/2022 1719)  Verbalizes/displays adequate comfort level or baseline comfort level:   Encourage patient to monitor pain and request assistance   Assess pain using appropriate pain scale  Note: Educated on pain rating scale. States no pain at this time. States pain goal is to have no pain. Problem: Chronic Conditions and Co-morbidities  Goal: Patient's chronic conditions and co-morbidity symptoms are monitored and maintained or improved  Outcome: Completed  Flowsheets (Taken 9/30/2022 1719)  Care Plan - Patient's Chronic Conditions and Co-Morbidity Symptoms are Monitored and Maintained or Improved: Monitor and assess patient's chronic conditions and comorbid symptoms for stability, deterioration, or improvement  Note: Monitoring patient's chronic conditions. Worsening or new s/s will be reported to provider. Care plan reviewed with patient. Patient verbalize understanding of the plan of care and contribute to goal setting.

## 2022-10-01 PROBLEM — R74.01 ELEVATED AST (SGOT): Status: ACTIVE | Noted: 2022-10-01

## 2022-10-01 NOTE — DISCHARGE SUMMARY
Hospital Medicine Discharge Summary      Patient Identification:   Jonny Clancy   : 1940  MRN: [id number]   Account: [de-identified]      Patient's PCP: Veronica Luke MD    Admit Date: 2022     Discharge Date: 2022      Admitting Physician: Marlena Martinez PA-C     Discharge Provider: [facility], APRN - CNP     Discharge Diagnoses and Hospital Course:      Presented to the ED with chest pain. Chest pain, resolved: Likely GI related. ACS ruled out. CTA negative for PE. Echo 22 EF 25% with moderate pulmonary hypertension. Cardiac catheterization in 2019 with nonobstructive disease. Continue telemetry monitoring. Stress test with moderate inferior infarct LV dysfunction and dilated ventricle no obvious ischemia. Ok to discharge per cardiology. GERD, esophagitis:  EGD . Continue home Protonix. GI cocktail PRN. GI referral given at discharge. Chronic HFrEF:  S/P BV ICD. EF 25% this admission. Previously followed at the CHF clinic. Continue home Bumex, Spironolactone, Metoprolol, Amiodarone, quinapril. Daily weights. Strict intake and output. Low sodium diet. 2L fluid restriction. Chronic Atrial Fibrillation: On chronic OAC. S/P BV ICD. EKG 22 Ventricular-paced with biventricular pacemaker detected. [id number] 5. HAS-BLED Score 5. INR 2.59 today. Pharmacy dosing Coumadin. Continue home amiodarone and metoprolol. Non-obstructive CAD:   Cardiac catheterization 2019. Carb controlled, low fat, low cholesterol, high fiber, 2gm Na Diet. Cardiology seen. DVT, right peroneal vein:  Patient is at a low risk of embolization due to location of DVT. She is already on anticoagulation. INR therapeutic. Pharmacy dosing Coumadin. No further treatment is warranted. Chronic normocytic anemia:  Hgb 7.9. Transfuse if Hgb <7 and/or symptomatic. Continue home iron supplement. CKD Stage III:  Cr. 1.1 and baseline. Primary HTN:  BP stable. Continue home metoprolol, quinapril. Continue to monitor BP's. Orthostatic BP's negative. Hypothyroidism: with abnormal TFTs. Continue home levothyroxine. TSH 9.610, Free T4 2.01. Possibley contrast related. free t4 by dialysis is pending. 11. Mildly elevated AST. Downward trend. Chest pain resolved. Not felt to be cardiac by cardiologist. Nabil Ricketts to discharge home today with OP follow up. The patient was seen and examined on day of discharge and this discharge summary is in conjunction with any daily progress note from day of discharge. Exam:     Vitals:  Vitals:    09/30/22 0629 09/30/22 0900 09/30/22 1120 09/30/22 1513   BP:  (!) 125/51 (!) 122/58 (!) 106/53   Pulse:  63 61 62   Resp:  18 20 20   Temp:  97.7 °F (36.5 °C) (!) 96.6 °F (35.9 °C) 97.6 °F (36.4 °C)   TempSrc:  Oral Oral Oral   SpO2:  99% 99% 99%   Weight: 172 lb (78 kg)      Height:         Weight: Weight: 172 lb (78 kg)     24 hour intake/output:No intake or output data in the 24 hours ending 10/01/22 1618      General appearance:  No apparent distress, appears stated age and cooperative. HEENT:  Normal cephalic, atraumatic without obvious deformity. Pupils equal, round, and reactive to light. Extra ocular muscles intact. Conjunctivae/corneas clear. Neck: Supple, with full range of motion. No jugular venous distention. Trachea midline. Respiratory:  Normal respiratory effort. Clear to auscultation, bilaterally without Rales/Wheezes/Rhonchi. Cardiovascular:  Regular rate and rhythm with normal S1/S2 without murmurs, rubs or gallops. Abdomen: Soft, non-tender, non-distended with normal bowel sounds. Musculoskeletal:  No clubbing, cyanosis or edema bilaterally. Full range of motion without deformity. Skin: Skin color, texture, turgor normal.  No rashes or lesions. Neurologic:  Neurovascularly intact without any focal sensory/motor deficits.  Cranial nerves: II-XII intact, grossly non-focal.  Psychiatric:  Alert and oriented, thought content appropriate, normal insight  Capillary Refill: Brisk,< 3 seconds   Peripheral Pulses: +2 palpable, equal bilaterally       Labs: For convenience and continuity at follow-up the following most recent labs are provided:      CBC:    Lab Results   Component Value Date/Time    WBC 5.2 09/30/2022 06:11 AM    HGB 7.9 09/30/2022 06:11 AM    HCT 26.8 09/30/2022 06:11 AM     09/30/2022 06:11 AM       Renal:    Lab Results   Component Value Date/Time     09/30/2022 06:11 AM    K 5.0 09/30/2022 06:11 AM    K 4.8 09/29/2022 06:32 AM     09/30/2022 06:11 AM    CO2 21 09/30/2022 06:11 AM    BUN 22 09/30/2022 06:11 AM    CREATININE 1.1 09/30/2022 06:11 AM    CALCIUM 8.1 09/30/2022 06:11 AM         Significant Diagnostic Studies    Radiology:   CTA Chest W WO Contrast   Final Result      1. No CT evidence of pulmonary embolus. 2. Other findings as described above. **This report has been created using voice recognition software. It may contain minor errors which are inherent in voice recognition technology. **      Final report electronically signed by Dr Twila Fuentes on 9/28/2022 11:13 AM      VL DUP LOWER EXTREMITY VENOUS RIGHT   Final Result   1. Sonographic evidence for deep venous thrombosis (below-the-knee) in the right peroneal vein. **This report has been created using voice recognition software. It may contain minor errors which are inherent in voice recognition technology. **      Final report electronically signed by Dr Twila Fuentes on 9/28/2022 8:20 AM      XR CHEST PORTABLE   Final Result   Possible early infiltrate versus atelectasis in the left lung base. Mild background scarring. **This report has been created using voice recognition software. It may contain minor errors which are inherent in voice recognition technology. **      Final report electronically signed by Dr. Cem Mckeon on 9/28/2022 7:45 AM             Consults: IP CONSULT TO PHARMACY  IP CONSULT TO CARDIOLOGY    Disposition:    [x] Home       [] TCU       [] Rehab       [] Psych       [] SNF       [] [facility]       [] Other-    Condition at Discharge: Stable    Code Status:  Full code    Patient Instructions: Activity: activity as tolerated  Diet: Reg diet      Follow-up visits:   Bogdan Canela MD  [address] [facility]  [address] [address]  [phone]    Follow up on 10/6/2022  Follow up appointment October 6, 2022 at 1:30pm.    Cristopher Abraham MD  [address] [facility] [address]  [phone]    Schedule an appointment as soon as possible for a visit in 1 month(s)           Discharge Medications:        Medication List        CONTINUE taking these medications      acetaminophen 325 MG tablet  Commonly known as: TYLENOL     amiodarone 200 MG tablet  Commonly known as: CORDARONE  Take 1 tablet by mouth once daily     bumetanide 1 MG tablet  Commonly known as: BUMEX  Take 1 tablet by mouth daily     CALCIUM PO     digoxin 125 MCG tablet  Commonly known as: LANOXIN  Take 1 tablet by mouth every other day     ferrous sulfate 325 (65 Fe) MG tablet  Commonly known as: IRON 325     Handicap Placard Misc  by Does not apply route Request parking placard due to medical conditions. Duration of 5 years. levothyroxine 175 MCG tablet  Commonly known as: SYNTHROID  Take 1 tablet by mouth Daily New dose. Lumbar Support Cushion Misc  by Does not apply route. Dx: low back pain, sciatica. metOLazone 2.5 MG tablet  Commonly known as: ZAROXOLYN  Take as needed for ankle swelling; notify office when you take it     metoprolol succinate 100 MG extended release tablet  Commonly known as:  Toprol XL  Take 1.5 tablets by mouth daily     MYLANTA PO     pantoprazole 40 MG tablet  Commonly known as: PROTONIX  Take 1 tablet by mouth 2 times daily (before meals)     quinapril 5 MG tablet  Commonly known as: ACCUPRIL     spironolactone 25 MG tablet  Commonly known as: ALDACTONE  TAKE 1 TABLET BY MOUTH EVERY OTHER DAY     vitamin C 1000 MG tablet     VITAMIN D PO     warfarin 3 MG tablet  Commonly known as: COUMADIN  Take as directed. If you are unsure how to take this medication, talk to your nurse or doctor. Original instructions: Alternating 3 mg/1.5 mg or as directed per INR results. Time Spent on discharge is more than 32 minutes in the examination, evaluation, counseling and review of medications and discharge plan. Signed: Thank you Narendra Guerra MD for the opportunity to be involved in this patient's care.     Electronically signed by ELIA Langford CNP on 10/1/2022 at 4:18 PM

## 2022-10-03 ENCOUNTER — CLINICAL DOCUMENTATION ONLY (OUTPATIENT)
Facility: CLINIC | Age: 82
End: 2022-10-03

## 2022-10-04 ENCOUNTER — HOSPITAL ENCOUNTER (OUTPATIENT)
Dept: PHYSICAL THERAPY | Age: 82
Setting detail: THERAPIES SERIES
End: 2022-10-04
Payer: MEDICARE

## 2022-10-06 ENCOUNTER — OFFICE VISIT (OUTPATIENT)
Dept: FAMILY MEDICINE CLINIC | Age: 82
End: 2022-10-06

## 2022-10-06 VITALS
WEIGHT: 177 LBS | SYSTOLIC BLOOD PRESSURE: 120 MMHG | BODY MASS INDEX: 34.57 KG/M2 | DIASTOLIC BLOOD PRESSURE: 60 MMHG | OXYGEN SATURATION: 98 % | HEART RATE: 61 BPM | RESPIRATION RATE: 18 BRPM

## 2022-10-06 DIAGNOSIS — I48.20 CHRONIC ATRIAL FIBRILLATION (HCC): ICD-10-CM

## 2022-10-06 DIAGNOSIS — Z09 HOSPITAL DISCHARGE FOLLOW-UP: ICD-10-CM

## 2022-10-06 DIAGNOSIS — K21.9 GASTROESOPHAGEAL REFLUX DISEASE, UNSPECIFIED WHETHER ESOPHAGITIS PRESENT: Primary | ICD-10-CM

## 2022-10-06 DIAGNOSIS — I82.451 ACUTE DEEP VEIN THROMBOSIS (DVT) OF RIGHT PERONEAL VEIN (HCC): ICD-10-CM

## 2022-10-06 ASSESSMENT — ENCOUNTER SYMPTOMS
BACK PAIN: 0
ABDOMINAL PAIN: 1
EYE PAIN: 0
WHEEZING: 0
VOMITING: 0
BLOOD IN STOOL: 0
RHINORRHEA: 0
CHEST TIGHTNESS: 0
SHORTNESS OF BREATH: 0
DIARRHEA: 0
NAUSEA: 0
COUGH: 0
SORE THROAT: 0
CONSTIPATION: 0

## 2022-10-06 NOTE — PROGRESS NOTES
Referral, requesting to call Pt daughter to schedule, OV notes, Pt summary, Pt medication list and insurance cards faxed to [phone]

## 2022-10-06 NOTE — PROGRESS NOTES
Post-Discharge Transitional Care Follow Up      Roderick Swenson   YOB: 1940    Date of Office Visit:  10/6/2022  Date of Hospital Admission: 9/28/22  Date of Hospital Discharge: 9/30/22  Readmission Risk Score (high >=14%. Medium >=10%):Readmission Risk Score: 18.7      Care management risk score Rising risk (score 2-5) and Complex Care (Scores >=6): No Risk Score On File     Non face to face  following discharge, date last encounter closed (first attempt may have been earlier): 10/03/2022     Call initiated 2 business days of discharge: Yes     Gastroesophageal reflux disease, unspecified whether esophagitis present  -     Oliverio Zhao MD, Gastroenterology, [facility] VIRGEN HERZOG II.VIERTEL  -chronic condition, exacerbated, refer to GI for further evaluation and treatment  Acute deep vein thrombosis (DVT) of right peroneal vein ([address]  -new finding, anticoagulated with therapeutic coumadin,   Lab Results   Component Value Date    INR 2.59 (H) 09/30/2022    INR 2.84 (H) 09/29/2022    INR 3.13 (H) 09/28/2022    PROTIME 25.8 01/26/2015    PROTIME 26.6 12/22/2014    PROTIME 24.8 06/12/2014       Chronic atrial fibrillation (HCC)  -stable, controlled on metoprolol and coumadin, digoxin    Medical Decision Making: moderate complexity  No follow-ups on file. Subjective:   HPI  Presented to [facility] with chest pain      Inpatient course: Discharge summary reviewed- see chart. Interval history/Current status: Ruled out for cardiac cause, felt to be GI. Improved with GI cocktail. Follow up with GI. Right peroneal DVT below the knee. Brace for the left knee, seeing [facility]. , nonsmoker,  pmh reviewed.        Patient Active Problem List   Diagnosis    Hyperlipidemia    Nonischemic cardiomyopathy (HCC)    Chronic atrial fibrillation (HCC)    Hypothyroidism    Biventricular implantable cardioverter-defibrillator in situ    Abnormal LFTs    Benign essential HTN    NAFLD (nonalcoholic fatty liver disease)    Left ventricular systolic dysfunction    Ventricular tachycardia (paroxysmal)    Anxiety    Arthritis    Ventricular arrhythmia    Chronic kidney disease, stage III (moderate) (HCC)    Anemia    Chronic anticoagulation    Iron deficiency    Obesity (BMI 30-39. 9)    Chest pain    Gastroesophageal reflux disease with esophagitis    Elevated AST (SGOT)    Acute deep vein thrombosis (DVT) of right peroneal vein (HCC)       Medications listed as ordered at the time of discharge from hospital     Medication List            Accurate as of October 6, 2022  1:27 PM. If you have any questions, ask your nurse or doctor. CONTINUE taking these medications      acetaminophen 325 MG tablet  Commonly known as: TYLENOL     amiodarone 200 MG tablet  Commonly known as: CORDARONE  Take 1 tablet by mouth once daily     bumetanide 1 MG tablet  Commonly known as: BUMEX  Take 1 tablet by mouth daily     CALCIUM PO     digoxin 125 MCG tablet  Commonly known as: LANOXIN  Take 1 tablet by mouth every other day     ferrous sulfate 325 (65 Fe) MG tablet  Commonly known as: IRON 325     Handicap Placard Misc  by Does not apply route Request parking placard due to medical conditions. Duration of 5 years. levothyroxine 175 MCG tablet  Commonly known as: SYNTHROID  Take 1 tablet by mouth Daily New dose. Lumbar Support Cushion Misc  by Does not apply route. Dx: low back pain, sciatica. metOLazone 2.5 MG tablet  Commonly known as: ZAROXOLYN  Take as needed for ankle swelling; notify office when you take it     metoprolol succinate 100 MG extended release tablet  Commonly known as:  Toprol XL  Take 1.5 tablets by mouth daily     MYLANTA PO     pantoprazole 40 MG tablet  Commonly known as: PROTONIX  Take 1 tablet by mouth 2 times daily (before meals)     quinapril 5 MG tablet  Commonly known as: ACCUPRIL     spironolactone 25 MG tablet  Commonly known as: ALDACTONE  TAKE 1 TABLET BY MOUTH EVERY OTHER DAY     vitamin C 1000 MG tablet     VITAMIN D PO     warfarin 3 MG tablet  Commonly known as: COUMADIN  Take as directed by the anticoagulation clinic. If you are unsure how to take this medication, talk to your nurse or doctor. Original instructions: Alternating 3 mg/1.5 mg or as directed per INR results. Medications marked \"taking\" at this time  Outpatient Medications Marked as Taking for the 10/6/22 encounter (Office Visit) with Clinton Greenberg MD   Medication Sig Dispense Refill    CALCIUM PO Take by mouth      VITAMIN D PO Take by mouth      pantoprazole (PROTONIX) 40 MG tablet Take 1 tablet by mouth 2 times daily (before meals) 180 tablet 3    quinapril (ACCUPRIL) 5 MG tablet Take 2.5 mg by mouth in the morning. metoprolol succinate (TOPROL XL) 100 MG extended release tablet Take 1.5 tablets by mouth daily 60 tablet 5    digoxin (LANOXIN) 125 MCG tablet Take 1 tablet by mouth every other day 45 tablet 3    bumetanide (BUMEX) 1 MG tablet Take 1 tablet by mouth daily 90 tablet 3    metOLazone (ZAROXOLYN) 2.5 MG tablet Take as needed for ankle swelling; notify office when you take it 15 tablet 3    spironolactone (ALDACTONE) 25 MG tablet TAKE 1 TABLET BY MOUTH EVERY OTHER DAY 45 tablet 3    amiodarone (CORDARONE) 200 MG tablet Take 1 tablet by mouth once daily 90 tablet 3    Handicap Placard MISC by Does not apply route Request parking placard due to medical conditions. Duration of 5 years. 1 each 0    levothyroxine (SYNTHROID) 175 MCG tablet Take 1 tablet by mouth Daily New dose. 90 tablet 3    warfarin (COUMADIN) 3 MG tablet Alternating 3 mg/1.5 mg or as directed per INR results. 90 tablet 3    ferrous sulfate (IRON 325) 325 (65 Fe) MG tablet Take 325 mg by mouth every other day      Alum & Mag Hydroxide-Simeth (MYLANTA PO) Take by mouth as needed       acetaminophen (TYLENOL) 325 MG tablet Take 650 mg by mouth every 6 hours as needed for Pain or Fever      Misc.  Devices (LUMBAR SUPPORT CUSHION) MISC by Does not apply route. Dx: low back pain, sciatica. 1 each 0    Ascorbic Acid (VITAMIN C) 1000 MG tablet Take 1,000 mg by mouth 2 times daily. Medications patient taking as of now reconciled against medications ordered at time of hospital discharge: Yes    Review of Systems   Constitutional:  Negative for chills, fatigue, fever and unexpected weight change. HENT:  Negative for congestion, ear pain, rhinorrhea and sore throat. Eyes:  Negative for pain and visual disturbance. Respiratory:  Negative for cough, chest tightness, shortness of breath and wheezing. Cardiovascular:  Positive for chest pain. Negative for palpitations. Gastrointestinal:  Positive for abdominal pain. Negative for blood in stool, constipation, diarrhea, nausea and vomiting. Genitourinary:  Negative for difficulty urinating, frequency, hematuria and urgency. Musculoskeletal:  Negative for back pain, joint swelling, myalgias and neck pain. Left knee pain   Skin:  Negative for rash. Neurological:  Negative for dizziness and headaches. Hematological:  Negative for adenopathy. Does not bruise/bleed easily. Psychiatric/Behavioral:  Negative for behavioral problems and sleep disturbance. The patient is not nervous/anxious. Objective:    /60   Pulse 61   Resp 18   Wt 177 lb (80.3 kg)   SpO2 98%   BMI 34.57 kg/m²   Physical Exam  Vitals and nursing note reviewed. Constitutional:       Appearance: She is well-developed. HENT:      Head: Normocephalic and atraumatic. Right Ear: External ear normal.      Left Ear: External ear normal.      Nose: Nose normal.      Mouth/Throat:      Mouth: Mucous membranes are moist.   Eyes:      Pupils: Pupils are equal, round, and reactive to light. Neck:      Thyroid: No thyromegaly. Cardiovascular:      Rate and Rhythm: Normal rate. Rhythm irregularly irregular. Heart sounds: Normal heart sounds. Pulmonary:      Breath sounds: Normal breath sounds.  No wheezing or rales. Abdominal:      General: Bowel sounds are normal.      Palpations: Abdomen is soft. Tenderness: There is no abdominal tenderness. There is no guarding or rebound. Musculoskeletal:         General: Normal range of motion. Cervical back: Neck supple. Legs:       Comments: Left knee brace   Lymphadenopathy:      Cervical: No cervical adenopathy. Skin:     General: Skin is warm and dry. Findings: No rash. Neurological:      Mental Status: She is alert and oriented to person, place, and time. Cranial Nerves: No cranial nerve deficit. Deep Tendon Reflexes: Reflexes are normal and symmetric. An electronic signature was used to authenticate this note.   --Latonia Tate MD

## 2022-10-07 ENCOUNTER — APPOINTMENT (OUTPATIENT)
Dept: PHYSICAL THERAPY | Age: 82
End: 2022-10-07
Payer: MEDICARE

## 2022-10-11 ENCOUNTER — HOSPITAL ENCOUNTER (OUTPATIENT)
Dept: PHYSICAL THERAPY | Age: 82
Setting detail: THERAPIES SERIES
Discharge: HOME | End: 2022-10-11
Payer: MEDICARE

## 2022-10-11 ENCOUNTER — OFFICE VISIT (OUTPATIENT)
Dept: INTERNAL MEDICINE CLINIC | Age: 82
End: 2022-10-11
Payer: MEDICARE

## 2022-10-11 ENCOUNTER — PATIENT MESSAGE (OUTPATIENT)
Dept: INTERNAL MEDICINE CLINIC | Age: 82
End: 2022-10-11

## 2022-10-11 VITALS
BODY MASS INDEX: 32.66 KG/M2 | WEIGHT: 173 LBS | TEMPERATURE: 97.1 F | HEIGHT: 61 IN | HEART RATE: 70 BPM | DIASTOLIC BLOOD PRESSURE: 64 MMHG | SYSTOLIC BLOOD PRESSURE: 140 MMHG

## 2022-10-11 DIAGNOSIS — R79.89 ELEVATED SERUM FREE T4 LEVEL: ICD-10-CM

## 2022-10-11 DIAGNOSIS — E03.4 HYPOTHYROIDISM DUE TO ACQUIRED ATROPHY OF THYROID: ICD-10-CM

## 2022-10-11 DIAGNOSIS — K21.00 GASTROESOPHAGEAL REFLUX DISEASE WITH ESOPHAGITIS WITHOUT HEMORRHAGE: ICD-10-CM

## 2022-10-11 DIAGNOSIS — I51.9 LEFT VENTRICULAR SYSTOLIC DYSFUNCTION: Primary | ICD-10-CM

## 2022-10-11 DIAGNOSIS — D64.9 ANEMIA, UNSPECIFIED TYPE: ICD-10-CM

## 2022-10-11 DIAGNOSIS — I48.20 CHRONIC ATRIAL FIBRILLATION (HCC): ICD-10-CM

## 2022-10-11 PROCEDURE — 1090F PRES/ABSN URINE INCON ASSESS: CPT | Performed by: INTERNAL MEDICINE

## 2022-10-11 PROCEDURE — G8417 CALC BMI ABV UP PARAM F/U: HCPCS | Performed by: INTERNAL MEDICINE

## 2022-10-11 PROCEDURE — G8427 DOCREV CUR MEDS BY ELIG CLIN: HCPCS | Performed by: INTERNAL MEDICINE

## 2022-10-11 PROCEDURE — 1111F DSCHRG MED/CURRENT MED MERGE: CPT | Performed by: INTERNAL MEDICINE

## 2022-10-11 PROCEDURE — G8399 PT W/DXA RESULTS DOCUMENT: HCPCS | Performed by: INTERNAL MEDICINE

## 2022-10-11 PROCEDURE — G8484 FLU IMMUNIZE NO ADMIN: HCPCS | Performed by: INTERNAL MEDICINE

## 2022-10-11 PROCEDURE — 1123F ACP DISCUSS/DSCN MKR DOCD: CPT | Performed by: INTERNAL MEDICINE

## 2022-10-11 PROCEDURE — 1036F TOBACCO NON-USER: CPT | Performed by: INTERNAL MEDICINE

## 2022-10-11 PROCEDURE — 97530 THERAPEUTIC ACTIVITIES: CPT

## 2022-10-11 PROCEDURE — 99213 OFFICE O/P EST LOW 20 MIN: CPT | Performed by: INTERNAL MEDICINE

## 2022-10-11 NOTE — DISCHARGE SUMMARY
[address] [facility]  PHYSICAL THERAPY  [] DAILY NOTE (LAND) [] DAILY NOTE (AQUATIC ) [] PROGRESS NOTE [x] DISCHARGE NOTE    [x] OUTPATIENT REHABILITATION CENTER [facility]   [] Adeline     [] [facility]   [] Suha Navarro    Date: 10/11/2022  Patient Name:  Morris Bloch  : 1940  MRN: [id number]  CSN: [id number]    Referring Practitioner Babar Desouza MD   Diagnosis Age-related osteoporosis without current pathological fracture [M81.0]    Treatment Diagnosis Impaired balance, abnormal gait, B knee pain, back pain, B LE weakness, decreased endurance   Date of Evaluation 22    Additional Pertinent History HTN, pacemaker, CAD, CHF      Functional Outcome Measure Used Tinetti   Functional Outcome Score  - no AD (22)  - no AD (10/11/22)      Insurance: Primary: Payor: Sarah Hernandez /  /  / ,   Secondary: [facility]   Authorization Information: No pre-cert required   Visit # 8, 0/10 for progress note   Visits Allowed: Patient has unlimited visits based on medical necessity   Recertification Date: 2022   Physician Follow-Up: F/u in about 1 month   Physician Orders: Eval    History of Present Illness: Pt is a 81 y/o female presenting to skilled PT services with c/o osteoporosis and instability. Was dx with COVID-19 about 6 weeks ago and since then has been more unsteady on her feet, feels weak, and has been experiencing B knee pain. Denies history of falls but is fearful of falling. Uses 4WW when out of the home for stability, no AD within the home. Also reports chronic back pain. SUBJECTIVE: Pt presents with 4 wheeled walker with daughter. States on  she was getting OOB and put all her wt on L LE and heard a pop in knee. Had immediate pain and went to [facility]. Did have xray completed which revealed subluxation of patella. Was given a cortizone shot which only helped for a few days. States she is not a candidate for surgery.  Was given a brace for L knee to wear all the time. Continues to have increased pain and does not feel she can continue with therapy. Pt reports 5/10 pain currently in L knee, 10/10 pain with ambulation. Requested d/c today. TREATMENT   Precautions: pacemaker   Pain: 5/10 L knee    \"X in shaded column indicates activity completed today    *\" next to exercise/intervention indicates progression   Modalities Parameters/  Location  Notes                     Manual Therapy Time/Technique  Notes                     Exercise/Intervention   Notes   NuStep seat 9 arms 9 6* min Level 3            Supine:       Piriformis stretch into ER/IR 2x30 sec   Noted increased tightness at LLE compared to RLE   LTR 10x5 sec      HS stretch 90/90 2x 30 sec      TA bracing 15*x5 sec       TA bracing with ball 15x5 sec      TA bracing with marches 15*x      TA bracing with SLR 15x   Slight quad lag noted at LLE   TA bracing with bent knee fall outs- bilateral/unilateral with [place] theraband*  x10   Patient took theraband for home use. Bridge X15* 2 sec     Side lying hip abduction, clam shell, reverse clamshells x10 2 sec  Difficulty maintaining anterior shift of pelvis in SL   TA bracing with UE raise* x10      Dead bugs* x10   Vcs for proper sequencing    Seated:       HS stretch x2 20 sec     TA bracing with marchpraneeth, LAQ, HR/TR 12x             Standing:        Step stance- B lead 1x20 sec   No UE   Feet together EO  1x20 sec    No UE   Side step  5 R/L   Needing UE support   MET to correct L ant innominate 5x5 sec        Activity/Treatment Tolerance:  [x]  Patient tolerated treatment well  []  Patient limited by fatigue  []  Patient limited by pain   []  Patient limited by medical complications  []  Other:     Assessment: Pt had made fair progress with skilled PT services, however experienced recent setback due to L knee inury. Pt has met 1/3 STG and 0/2 LTG.  She continues to demonstrates decreased LE strength with L>R, increased L knee pain, abnormal gait, and impaired balance. Pt is d/c at this time due to pt stating inability to tolerate therapy due to knee injury. Recommend new order when ready to return to therapy. GOALS:  Patient Goal: improve balance    Short Term Goals:  Time Frame: 4 weeks    1. Patient will increase B LE strength to 4+/5 to promote ease of household tasks. [] Goal Met [x] Goal Not Met [] Continue Goal [] Discontinue Goal  [] Revise Goal  Goal Assessment: R hip flex and ABD, knee flex/ext and ankle DF 4+/5, L hip flex and ABD 4/5, knee flex 4/5, knee ext 3+/5, ankle DF 4+/5    2. Patient will ambulate household distances without AD while demonstrating no path deviating and equal step length to allow improved overall gait mechanics and stability. [] Goal Met [x] Goal Not Met [] Continue Goal [] Discontinue Goal  [] Revise Goal  Goal Assessment: ambulates at all times with 4WW: demonstrates decreased L TKE and L stance time, antalgic  Without AD: moderate path deviations, reaching for objects for stability, decreased stance time and step length on L, lacks L TKE, requires CGA    3. Patient will be indep with HEP in order to meet long term goals. [x] Goal Met [] Goal Not Met [] Continue Goal [] Discontinue Goal  [] Revise Goal  Goal Assessment: reports fair compliance with HEP, performing 4-5x/wk    Long Term Goals:  Time Frame: 8 weeks    1. Patient will be indep with HEP in order to prevent re-injury and improve ability to perform functional mobility tasks. [] Goal Met [x] Goal Not Met [] Continue Goal [] Discontinue Goal  [] Revise Goal    2. Patient will improve Tinetti score from 16/28 to 19/28 to reduce fall risk and improve overall safety with functional mobility tasks.     [] Goal Met [x] Goal Not Met [] Continue Goal [] Discontinue Goal  [] Revise Goal  Goal Assessment: 14/28 today, due to knee injury      Patient Education:   []  HEP/Education Completed: continue HEP at tolerated, get new order from physician when ready to return to therapy  [facility] Access Code: [email or web address]  [x]  No new Education completed  []  Reviewed Prior HEP      []  Patient verbalized and/or demonstrated understanding of education provided. []  Patient unable to verbalize and/or demonstrate understanding of education provided. Will continue education. []  Barriers to learning:     PLAN:  []  Plan of care initiated. Plan to see patient 2 times per week for 8 weeks to address the treatment planned outlined above.   []  Continue with current plan of care  []  Modify plan of care as follows:    []  Hold pending physician visit  [x]  Discharge    Time In 0955   Time Out 1015   Timed Code Minutes: 20   Total Treatment Time: 20     Electronically Signed by: Farzana Durbin PT

## 2022-10-11 NOTE — PROGRESS NOTES
Stone Salinas (:  1940) is a [de-identified] y.o. female,Established patient, here for evaluation of the following chief complaint(s):  Atrial Fibrillation (Chronic/), Other (Lv dysfunction), Shortness of Breath (Was just gasping for air 3-4 days after hospitalization if tried to do anything-lasted 1/2 day. Still gets SOB some but not gasping), Knee Pain (Injury to left knee-very painful and wearing a brace-goes back to [facility] in a month and if no better they are saying she needs a knee replacement-pt not really up for that but wants to discuss), Hoarse (Feels like voice is not smooth oir is getting horase or laryngitis), and Chest Pain (Was in hospital for chest pain and they decided it was GI-she has tried tums, mylanta-wondering if needs something else for her stomach-got a GI cocktail in hospital--waiting on an appt with dr Chavo garza-dtr will check on this )         ASSESSMENT/PLAN:  1. Left ventricular systolic dysfunction- she was sob after recent hospitalization; instructed to decrease toprol to try increase ACEI. Discussed SGLT2i again; she is willing to try; will try increase ACEI first  2. Anemia, unspecified type- last iron ok; ck B12  -     Vitamin B12; Future  3. Hypothyroidism due to acquired atrophy of thyroid  -     Prolactin; Future- her tests have been odd with both T4 and TSH elevated. To see Dr Pablo Silvestre again in Nov.  Ck prolactin as indicator of pituitary malfunction  4. Chronic atrial fibrillation (HCC) on [address]  5. Elevated serum free T4 level- See above    6. Gastroesophageal reflux disease with esophagitis without hemorrhage; She is to see Dr Violet Cohen. She is hoarse; advised to try sleep elevated; get reports from Dr Teresita Packer      Return in about 6 weeks (around 2022). Subjective   SUBJECTIVE/OBJECTIVE:  HPI pt with lv dysfx, EF 25, recent hosp with cp; stress neg. Cath 2019 neg. She has hx reflux but no heartburn sxs. Denies ankle/leg swelling.       Did not tolerate entresto. Discussed SGLT2i again; she is willing to try samples. Review of Systems   Has painful knee from OA; advised would not recommend surgery. Suggested diluted capsaicin. She takes bps at home, tends to run on low side. Objective   Physical Exam     BP (!) 140/64 (Site: Left Upper Arm)   Pulse 70   Temp 97.1 °F (36.2 °C)   Ht 5' 0.71\" (1.542 m)   Wt 173 lb (78.5 kg)   BMI 33.00 kg/m²     General appearance - alert, well appearing, and in no distress    Mental Status - alert, oriented to person, place, and time          Neck - supple, no significant adenopathy        Chest - clear to auscultation, no wheezes, rales or rhonchi, symmetric air entry     Heart - normal rate and regular rhythm, systolic murmur 2/6 at lower left sternal border     Abdomen - soft, nontender, nondistended, no masses or organomegaly         Neurological - alert, oriented, normal speech, no focal findings or movement disorder noted     Musculoskeletal -   msk [id number]}     Extremities - no pedal edema noted     Skin - normal coloration and turgor, no rashes, no suspicious skin lesions noted        An electronic signature was used to authenticate this note.     --Venkata Dobbs MD

## 2022-10-12 NOTE — TELEPHONE ENCOUNTER
From: Sebastien Swenson  To: Dr. Stu Velez  Sent: 10/11/2022 5:45 PM EDT  Subject: Vitamin d    Mom is taking 125mcg once a day

## 2022-10-20 ENCOUNTER — PATIENT MESSAGE (OUTPATIENT)
Dept: INTERNAL MEDICINE CLINIC | Age: 82
End: 2022-10-20

## 2022-10-20 NOTE — TELEPHONE ENCOUNTER
From: Krunal Hicks  To: Dr. Rio Yeboah: 10/20/2022 11:26 AM EDT  Subject: Blood pressure     131/76 Thursday   135/72 Friday   118/65 Saturday   132/64 Sunday   132/66 Monday   141/73 Tuesday

## 2022-10-20 NOTE — TELEPHONE ENCOUNTER
Pt was supposed to send in pulse readings also. I called pt and she states she gave them to her dtr. She will check on it and get back with me. Sanam Marroquin decreased her toprol to 100 mg.

## 2022-10-20 NOTE — TELEPHONE ENCOUNTER
[address] [place] [address] (supporting Patt Cheng MD) 32 minutes ago (5:29 PM)     Monday 62  Tuesday  63  Thursday  62  Friday 66  Saturday 71  Sunday 65

## 2022-10-21 ENCOUNTER — HOSPITAL ENCOUNTER (OUTPATIENT)
Dept: PULMONOLOGY | Age: 82
Discharge: HOME | End: 2022-10-21
Payer: MEDICARE

## 2022-10-21 DIAGNOSIS — T50.905D ADVERSE EFFECT OF DRUG, SUBSEQUENT ENCOUNTER: ICD-10-CM

## 2022-10-21 PROCEDURE — 94729 DIFFUSING CAPACITY: CPT

## 2022-10-21 PROCEDURE — 94010 BREATHING CAPACITY TEST: CPT

## 2022-10-24 RX ORDER — QUINAPRIL 5 1/1
2.5 TABLET ORAL 2 TIMES DAILY
Qty: 30 TABLET | Refills: 3 | COMMUNITY
Start: 2022-10-24 | End: 2022-11-10 | Stop reason: SDUPTHER

## 2022-10-24 RX ORDER — METOPROLOL SUCCINATE 100 MG/1
100 TABLET, EXTENDED RELEASE ORAL DAILY
Qty: 30 TABLET | Refills: 3 | COMMUNITY
Start: 2022-10-24

## 2022-10-24 NOTE — TELEPHONE ENCOUNTER
I called pt and advised her to increase her Quinapril to 2.5 mg. Bid and report in some BPs next week. She verbalizes understanding.

## 2022-10-24 NOTE — TELEPHONE ENCOUNTER
You mention in your last office note about increasing her ace but you were planning to decrease the toprol first.  Looks like she is on 2.5 mg.  Of quinapril once daily in the am.

## 2022-10-27 RX ORDER — SPIRONOLACTONE 25 MG/1
25 TABLET ORAL EVERY OTHER DAY
Qty: 45 TABLET | Refills: 0 | Status: SHIPPED | OUTPATIENT
Start: 2022-10-27

## 2022-10-27 RX ORDER — AMIODARONE HYDROCHLORIDE 200 MG/1
TABLET ORAL
Qty: 90 TABLET | Refills: 0 | Status: SHIPPED | OUTPATIENT
Start: 2022-10-27

## 2022-10-27 RX ORDER — PANTOPRAZOLE SODIUM 40 MG/1
TABLET, DELAYED RELEASE ORAL
Qty: 60 TABLET | Refills: 0 | Status: SHIPPED | OUTPATIENT
Start: 2022-10-27

## 2022-10-27 RX ORDER — LISINOPRIL 2.5 MG/1
TABLET ORAL
Qty: 30 TABLET | Refills: 0 | Status: SHIPPED | OUTPATIENT
Start: 2022-10-27 | End: 2022-11-03 | Stop reason: SINTOL

## 2022-11-03 ENCOUNTER — TELEPHONE (OUTPATIENT)
Dept: INTERNAL MEDICINE CLINIC | Age: 82
End: 2022-11-03

## 2022-11-03 NOTE — TELEPHONE ENCOUNTER
WE received an alert from pts insurance that pt was prescribed lisinopril and quinapril recently. I spoke with pt and she states she refused the Lisinopril at the pharmacy as that one made her cough. She is on Quinapril.

## 2022-11-05 ENCOUNTER — NURSE ONLY (OUTPATIENT)
Dept: LAB | Age: 82
End: 2022-11-05

## 2022-11-05 DIAGNOSIS — I48.20 CHRONIC ATRIAL FIBRILLATION (HCC): ICD-10-CM

## 2022-11-05 DIAGNOSIS — D64.9 ANEMIA, UNSPECIFIED TYPE: ICD-10-CM

## 2022-11-05 DIAGNOSIS — E03.9 ACQUIRED HYPOTHYROIDISM: ICD-10-CM

## 2022-11-05 DIAGNOSIS — E03.4 HYPOTHYROIDISM DUE TO ACQUIRED ATROPHY OF THYROID: ICD-10-CM

## 2022-11-05 LAB
INR BLD: 3.18 (ref 0.85–1.13)
PROLACTIN: 16 NG/ML
T3 FREE: 1.53 PG/ML (ref 2.02–4.43)
T4 FREE: 1.77 NG/DL (ref 0.93–1.76)
TSH SERPL DL<=0.05 MIU/L-ACNC: 12.39 UIU/ML (ref 0.4–4.2)
VITAMIN B-12: 702 PG/ML (ref 211–911)

## 2022-11-07 ENCOUNTER — TELEPHONE (OUTPATIENT)
Dept: FAMILY MEDICINE CLINIC | Age: 82
End: 2022-11-07

## 2022-11-07 NOTE — TELEPHONE ENCOUNTER
----- Message from Tod Ernst MD sent at 11/6/2022  7:37 AM EST -----  INR good. Continue same dosage and recheck INR in 1 month.

## 2022-11-08 ENCOUNTER — PATIENT MESSAGE (OUTPATIENT)
Dept: INTERNAL MEDICINE CLINIC | Age: 82
End: 2022-11-08

## 2022-11-09 ENCOUNTER — TELEPHONE (OUTPATIENT)
Dept: INTERNAL MEDICINE CLINIC | Age: 82
End: 2022-11-09

## 2022-11-09 DIAGNOSIS — I48.20 CHRONIC ATRIAL FIBRILLATION (HCC): ICD-10-CM

## 2022-11-09 RX ORDER — WARFARIN SODIUM 3 MG/1
TABLET ORAL
Qty: 30 TABLET | Refills: 0 | Status: SHIPPED | OUTPATIENT
Start: 2022-11-09 | End: 2022-11-14 | Stop reason: SDUPTHER

## 2022-11-09 RX ORDER — LEVOTHYROXINE SODIUM 175 UG/1
175 TABLET ORAL DAILY
Qty: 90 TABLET | Refills: 3 | OUTPATIENT
Start: 2022-11-09

## 2022-11-09 NOTE — TELEPHONE ENCOUNTER
Roberto Blankenship called requesting a refill on the following medications:  Requested Prescriptions     Pending Prescriptions Disp Refills    warfarin (COUMADIN) 3 MG tablet 90 tablet 3     Sig: Alternating 3 mg/1.5 mg or as directed per INR results.        Date of last visit: 10/6/2022  Date of next visit (if applicable):Visit date not found  Date of last refill: 5/23/2021 90 with 3 refills   [facility]arm[facility] Name: [facility]

## 2022-11-09 NOTE — TELEPHONE ENCOUNTER
I notified pt to increase quinapril to 5 mg. Bid. And send in some BPs again next week. Script will be sent in.

## 2022-11-09 NOTE — TELEPHONE ENCOUNTER
From: Shayla Swenson  To: Dr. Sierra Groves: 11/8/2022 9:50 AM EST  Subject: Blood pressure     Monday 140/73 pulse 66  Tuesday 131/70 pulse 60  Wednesday 132/71 pulse 69  Thursday 135/75 pulse 64  Friday 137/82 pulse 77  Saturday 137/74 pulse 62

## 2022-11-09 NOTE — TELEPHONE ENCOUNTER
----- Message from Rakel Starr MD sent at 11/5/2022  2:06 PM EDT -----  Tell her prolactin and B12 ok; T4 slightly elevated;  Alele ordered

## 2022-11-10 RX ORDER — QUINAPRIL 5 1/1
5 TABLET ORAL 2 TIMES DAILY
Qty: 60 TABLET | Refills: 11 | Status: SHIPPED | OUTPATIENT
Start: 2022-11-10

## 2022-11-11 SDOH — HEALTH STABILITY: PHYSICAL HEALTH: ON AVERAGE, HOW MANY MINUTES DO YOU ENGAGE IN EXERCISE AT THIS LEVEL?: 0 MIN

## 2022-11-11 SDOH — HEALTH STABILITY: PHYSICAL HEALTH: ON AVERAGE, HOW MANY DAYS PER WEEK DO YOU ENGAGE IN MODERATE TO STRENUOUS EXERCISE (LIKE A BRISK WALK)?: 0 DAYS

## 2022-11-11 ASSESSMENT — PATIENT HEALTH QUESTIONNAIRE - PHQ9
SUM OF ALL RESPONSES TO PHQ QUESTIONS 1-9: 0
1. LITTLE INTEREST OR PLEASURE IN DOING THINGS: 0
2. FEELING DOWN, DEPRESSED OR HOPELESS: 0
SUM OF ALL RESPONSES TO PHQ QUESTIONS 1-9: 0
SUM OF ALL RESPONSES TO PHQ9 QUESTIONS 1 & 2: 0
SUM OF ALL RESPONSES TO PHQ QUESTIONS 1-9: 0
SUM OF ALL RESPONSES TO PHQ QUESTIONS 1-9: 0

## 2022-11-11 ASSESSMENT — LIFESTYLE VARIABLES
HOW MANY STANDARD DRINKS CONTAINING ALCOHOL DO YOU HAVE ON A TYPICAL DAY: 0
HOW MANY STANDARD DRINKS CONTAINING ALCOHOL DO YOU HAVE ON A TYPICAL DAY: PATIENT DOES NOT DRINK
HOW OFTEN DO YOU HAVE A DRINK CONTAINING ALCOHOL: 1
HOW OFTEN DO YOU HAVE SIX OR MORE DRINKS ON ONE OCCASION: 1
HOW OFTEN DO YOU HAVE A DRINK CONTAINING ALCOHOL: NEVER

## 2022-11-14 ENCOUNTER — OFFICE VISIT (OUTPATIENT)
Dept: FAMILY MEDICINE CLINIC | Age: 82
End: 2022-11-14
Payer: MEDICARE

## 2022-11-14 VITALS
WEIGHT: 162.8 LBS | HEART RATE: 60 BPM | SYSTOLIC BLOOD PRESSURE: 124 MMHG | HEIGHT: 62 IN | BODY MASS INDEX: 29.96 KG/M2 | DIASTOLIC BLOOD PRESSURE: 82 MMHG | RESPIRATION RATE: 12 BRPM

## 2022-11-14 DIAGNOSIS — Z00.00 MEDICARE ANNUAL WELLNESS VISIT, SUBSEQUENT: Primary | ICD-10-CM

## 2022-11-14 DIAGNOSIS — I48.20 CHRONIC ATRIAL FIBRILLATION (HCC): ICD-10-CM

## 2022-11-14 PROCEDURE — G0439 PPPS, SUBSEQ VISIT: HCPCS | Performed by: FAMILY MEDICINE

## 2022-11-14 PROCEDURE — G8484 FLU IMMUNIZE NO ADMIN: HCPCS | Performed by: FAMILY MEDICINE

## 2022-11-14 PROCEDURE — 1123F ACP DISCUSS/DSCN MKR DOCD: CPT | Performed by: FAMILY MEDICINE

## 2022-11-14 PROCEDURE — 3078F DIAST BP <80 MM HG: CPT | Performed by: FAMILY MEDICINE

## 2022-11-14 PROCEDURE — 3074F SYST BP LT 130 MM HG: CPT | Performed by: FAMILY MEDICINE

## 2022-11-14 RX ORDER — FAMOTIDINE 40 MG/1
TABLET, FILM COATED ORAL
COMMUNITY
Start: 2022-11-13

## 2022-11-14 RX ORDER — WARFARIN SODIUM 3 MG/1
TABLET ORAL
Qty: 90 TABLET | Refills: 3 | Status: SHIPPED | OUTPATIENT
Start: 2022-11-14

## 2022-11-14 NOTE — PATIENT INSTRUCTIONS
Personalized Preventive Plan for Rdoerick Swenson - 11/14/2022  Medicare offers a range of preventive health benefits. Some of the tests and screenings are paid in full while other may be subject to a deductible, co-insurance, and/or copay. Some of these benefits include a comprehensive review of your medical history including lifestyle, illnesses that may run in your family, and various assessments and screenings as appropriate. After reviewing your medical record and screening and assessments performed today your provider may have ordered immunizations, labs, imaging, and/or referrals for you. A list of these orders (if applicable) as well as your Preventive Care list are included within your After Visit Summary for your review. Other Preventive Recommendations:    A preventive eye exam performed by an eye specialist is recommended every 1-2 years to screen for glaucoma; cataracts, macular degeneration, and other eye disorders. A preventive dental visit is recommended every 6 months. Try to get at least 150 minutes of exercise per week or 10,000 steps per day on a pedometer . Order or download the FREE \"Exercise & Physical Activity: Your Everyday Guide\" from [facility] [employer]. Call 8-678.157.8682 or search [facility] [employer] online. You need 8525-4830 mg of calcium and 2547-5031 IU of vitamin D per day. It is possible to meet your calcium requirement with diet alone, but a vitamin D supplement is usually necessary to meet this goal.  When exposed to the sun, use a sunscreen that protects against both UVA and UVB radiation with an SPF of 30 or greater. Reapply every 2 to 3 hours or after sweating, drying off with a towel, or swimming. Always wear a seat belt when traveling in a car. Always wear a helmet when riding a bicycle or motorcycle.

## 2022-11-14 NOTE — PROGRESS NOTES
Medicare Annual Wellness Visit    Tanovirgen Calderon is here for Medicare AWV (Medication refills)    Assessment & Plan   1. Medicare annual wellness visit, subsequent      2. Chronic atrial fibrillation (HCC)  -stable, controlled on metoprolol and coumadin  - warfarin (COUMADIN) 3 MG tablet; Alternating 3 mg/1.5 mg or as directed per INR results. Dispense: 90 tablet; Refill: 3  Encouraged diet, exercise and weight loss. Reviewed health maintenance   Recommendations for Preventive Services Due: see orders and patient instructions/AVS.  Recommended screening schedule for the next 5-10 years is provided to the patient in written form: see Patient Instructions/AVS.     Return for Medicare Annual Wellness Visit in 1 year. Subjective       Patient's complete Health Risk Assessment and screening values have been reviewed and are found in Flowsheets. The following problems were reviewed today and where indicated follow up appointments were made and/or referrals ordered.     Positive Risk Factor Screenings with Interventions:    Fall Risk:  Do you feel unsteady or are you worried about falling? : (!) yes  2 or more falls in past year?: no  Fall with injury in past year?: no   Fall Risk Interventions:    Home safety tips provided             Health Habits/Nutrition:  Physical Activity: Inactive    Days of Exercise per Week: 0 days    Minutes of Exercise per Session: 0 min     Have you lost any weight without trying in the past 3 months?: No  Body mass index: (!) 30.06  Have you seen the dentist within the past year?: (!) No  Health Habits/Nutrition Interventions:  Nutritional issues:  educational materials for healthy, well-balanced diet provided  Dental exam overdue:  patient encouraged to make appointment with his/her dentist    Hearing/Vision:  Do you or your family notice any trouble with your hearing that hasn't been managed with hearing aids?: (!) Yes  Do you have difficulty driving, watching TV, or doing any of your daily activities because of your eyesight?: No  Have you had an eye exam within the past year?: Yes  No results found. Hearing/Vision Interventions:  Hearing concerns:  patient declines any further evaluation/treatment for hearing issues     ADLs:  In the past 7 days, did you need help from others to perform any of the following everyday activities: Eating, dressing, grooming, bathing, toileting, or walking/balance?: No  In the past 7 days, did you need help from others to take care of any of the following: Laundry, housekeeping, banking/finances, shopping, telephone use, food preparation, transportation, or taking medications?: (!) Yes  Select all that apply: [employer], Housekeeping, Shopping, Food Preparation  ADL Interventions:  Family helps with these          Objective   Vitals:    11/14/22 1312   BP: 124/82   Pulse: 60   Resp: 12   Weight: 162 lb 12.8 oz (73.8 kg)   Height: 5' 1.7\" (1.567 m)      Body mass index is 30.07 kg/m².       General Appearance: alert and oriented to person, place and time, well developed and well- nourished, in no acute distress  Skin: warm and dry, no rash or erythema  Head: normocephalic and atraumatic  Eyes: pupils equal, round, and reactive to light, extraocular eye movements intact, conjunctivae normal  ENT: tympanic membrane, external ear and ear canal normal bilaterally, nose without deformity, nasal mucosa and turbinates normal without polyps  Neck: supple and non-tender without mass, no thyromegaly or thyroid nodules, no cervical lymphadenopathy  Pulmonary/Chest: clear to auscultation bilaterally- no wheezes, rales or rhonchi, normal air movement, no respiratory distress  Cardiovascular: normal rate, regular rhythm, normal S1 and S2, no murmurs, rubs, clicks, or gallops, distal pulses intact, no carotid bruits  Abdomen: soft, non-tender, non-distended, normal bowel sounds, no masses or organomegaly  Extremities: no cyanosis, clubbing or edema  Musculoskeletal: normal range of motion, no joint swelling, deformity or tenderness  Neurologic: reflexes normal and symmetric, no cranial nerve deficit, gait, coordination and speech normal       Allergies   Allergen Reactions    Ativan [Lorazepam]      Gets anxiety    Codeine     Lisinopril Cough    Nystatin      Mycostatin powder    Nystatin Hives    Percocet [Oxycodone-Acetaminophen]     Relafen [Nabumetone]      Stomach upset    Tape Beverlee Sarna Tape]     Celebrex [Celecoxib] Rash     Prior to Visit Medications    Medication Sig Taking? Authorizing Provider   warfarin (COUMADIN) 3 MG tablet Alternating 3 mg/1.5 mg or as directed per INR results. Yes Arlen Chavez MD   levothyroxine (SYNTHROID) 175 MCG tablet Take 1 tablet by mouth Daily New dose. Yes Beto Grubbs MD   quinapril (ACCUPRIL) 5 MG tablet Take 1 tablet by mouth in the morning and at bedtime Yes Cedric Bell MD   amiodarone (CORDARONE) 200 MG tablet Take 1 tablet by mouth once daily Yes Cedric Bell MD   spironolactone (ALDACTONE) 25 MG tablet TAKE 1 TABLET BY MOUTH EVERY OTHER DAY Yes Cedric Bell MD   pantoprazole (PROTONIX) 40 MG tablet TAKE 1 TABLET BY MOUTH TWICE DAILY BEFORE MEAL(S) Yes Cedric Bell MD   metoprolol succinate (TOPROL XL) 100 MG extended release tablet Take 1 tablet by mouth daily Yes Cedric Bell MD   CALCIUM PO Take 600 mg by mouth in the morning and at bedtime Yes Historical Provider, MD   VITAMIN D PO Take by mouth daily Yes Historical Provider, MD   bumetanide (BUMEX) 1 MG tablet Take 1 tablet by mouth daily Yes Cedric Bell MD   metOLazone (ZAROXOLYN) 2.5 MG tablet Take as needed for ankle swelling; notify office when you take it Yes MD Aditi Ibarra MISC by Does not apply route Request parking placard due to medical conditions. Duration of 5 years.  Yes Arlen Chavez MD   ferrous sulfate (IRON 325) 325 (65 Fe) MG tablet Take 325 mg by mouth every other day Yes Historical Provider, MD Mello Hydroxide-Simeth (MYLANTA PO) Take by mouth as needed  Yes Historical Provider, MD   acetaminophen (TYLENOL) 325 MG tablet Take 650 mg by mouth every 6 hours as needed for Pain or Fever Yes Historical Provider, MD   Misc. Devices (LUMBAR SUPPORT CUSHION) MISC by Does not apply route. Dx: low back pain, sciatica. Yes Huong Upton MD   Ascorbic Acid (VITAMIN C) 1000 MG tablet Take 1,000 mg by mouth 2 times daily.    Yes Historical Provider, MD   famotidine (PEPCID) 40 MG tablet   Historical Provider, MD   digoxin (LANOXIN) 125 MCG tablet Take 1 tablet by mouth every other day  MD Miguel JacobsTejaspreet (Including outside providers/suppliers regularly involved in providing care):   Patient Care Team:  Huong Upton MD as PCP - General (Family Medicine)  Huong Upton MD as PCP - [facility] Empaneled Provider  Mable Paget, MD as Consulting Physician (Otolaryngology)     Reviewed and updated this visit:  Tobacco  Allergies  Meds  Problems  Med Hx  Surg Hx  Soc Hx  Fam Hx

## 2022-11-16 ENCOUNTER — PATIENT MESSAGE (OUTPATIENT)
Dept: INTERNAL MEDICINE CLINIC | Age: 82
End: 2022-11-16

## 2022-11-17 NOTE — TELEPHONE ENCOUNTER
From: Yadira Aden  To: Dr. Garcia Leak: 11/16/2022 3:46 PM EST  Subject: Blood pressure     Friday 130/72 p 70  Saturday 11/66 p 69  Sunday 113/66 p 67  Monday 137/80 p 65  Tuesday 123/62 p 60  Wednesday 122/73 p 80

## 2022-11-28 ENCOUNTER — OFFICE VISIT (OUTPATIENT)
Dept: INTERNAL MEDICINE CLINIC | Age: 82
End: 2022-11-28
Payer: MEDICARE

## 2022-11-28 ENCOUNTER — TELEPHONE (OUTPATIENT)
Dept: INTERNAL MEDICINE CLINIC | Age: 82
End: 2022-11-28

## 2022-11-28 VITALS
WEIGHT: 165.6 LBS | HEART RATE: 68 BPM | HEIGHT: 62 IN | TEMPERATURE: 97.1 F | SYSTOLIC BLOOD PRESSURE: 120 MMHG | DIASTOLIC BLOOD PRESSURE: 70 MMHG | BODY MASS INDEX: 30.47 KG/M2

## 2022-11-28 DIAGNOSIS — D50.0 CHRONIC BLOOD LOSS ANEMIA: ICD-10-CM

## 2022-11-28 DIAGNOSIS — E61.1 IRON DEFICIENCY: ICD-10-CM

## 2022-11-28 DIAGNOSIS — I51.9 LEFT VENTRICULAR SYSTOLIC DYSFUNCTION: Primary | ICD-10-CM

## 2022-11-28 DIAGNOSIS — E03.4 HYPOTHYROIDISM DUE TO ACQUIRED ATROPHY OF THYROID: ICD-10-CM

## 2022-11-28 PROCEDURE — 3074F SYST BP LT 130 MM HG: CPT | Performed by: INTERNAL MEDICINE

## 2022-11-28 PROCEDURE — G8427 DOCREV CUR MEDS BY ELIG CLIN: HCPCS | Performed by: INTERNAL MEDICINE

## 2022-11-28 PROCEDURE — G8399 PT W/DXA RESULTS DOCUMENT: HCPCS | Performed by: INTERNAL MEDICINE

## 2022-11-28 PROCEDURE — 1036F TOBACCO NON-USER: CPT | Performed by: INTERNAL MEDICINE

## 2022-11-28 PROCEDURE — G8484 FLU IMMUNIZE NO ADMIN: HCPCS | Performed by: INTERNAL MEDICINE

## 2022-11-28 PROCEDURE — G8417 CALC BMI ABV UP PARAM F/U: HCPCS | Performed by: INTERNAL MEDICINE

## 2022-11-28 PROCEDURE — 1090F PRES/ABSN URINE INCON ASSESS: CPT | Performed by: INTERNAL MEDICINE

## 2022-11-28 PROCEDURE — 1123F ACP DISCUSS/DSCN MKR DOCD: CPT | Performed by: INTERNAL MEDICINE

## 2022-11-28 PROCEDURE — 99214 OFFICE O/P EST MOD 30 MIN: CPT | Performed by: INTERNAL MEDICINE

## 2022-11-28 PROCEDURE — 3078F DIAST BP <80 MM HG: CPT | Performed by: INTERNAL MEDICINE

## 2022-11-28 RX ORDER — DIGOXIN 125 MCG
125 TABLET ORAL EVERY OTHER DAY
Qty: 45 TABLET | Refills: 3 | Status: SHIPPED | OUTPATIENT
Start: 2022-11-28 | End: 2023-02-26

## 2022-11-28 NOTE — PROGRESS NOTES
Dionte Salcedo (:  1940) is a [de-identified] y.o. female,Established patient, here for evaluation of the following chief complaint(s): Other (LV dysfunction)         ASSESSMENT/PLAN:  1. Left ventricular systolic dysfunction- denies sob; we bumped her quinapril since last visit; explore SGLT2i.   2. Chronic blood loss anemia - to have EGD in .  -     CBC with Auto Differential; Future  -     Iron; Future  -     Soluble Transferrin Receptor; Future  3. Hypothyroidism due to acquired atrophy of thyroid- saw Dr Carolina Rosas recently; tests for pit malfx ordered  4. Iron deficiency- repeat labs      Return in about 4 months (around 3/15/2023). Subjective   SUBJECTIVE/OBJECTIVE:  HPI pt with nonisch cm, chronic afib, anemia, elebv T4 seen in f/u. No unusual cardiac sxs. Will research Jardiance to add. She is to get EGD. She notes vague abdominal pain midline after she eats. She has had elev TSH and T4 both for mos. Dr Carolina Rosas is following. Review of Systems   Twelve point ROS completed and found to be negative except as noted above. Current Outpatient Medications   Medication Instructions    acetaminophen (TYLENOL) 650 mg, Oral, EVERY 6 HOURS PRN    Alum & Mag Hydroxide-Simeth (MYLANTA PO) Oral, PRN    amiodarone (CORDARONE) 200 MG tablet Take 1 tablet by mouth once daily    bumetanide (BUMEX) 1 mg, Oral, DAILY    CALCIUM  mg, Oral, 2 times daily    digoxin (LANOXIN) 125 mcg, Oral, EVERY OTHER DAY    famotidine (PEPCID) 40 MG tablet No dose, route, or frequency recorded. ferrous sulfate (IRON 325) 325 mg, Oral, EVERY OTHER DAY    Handicap Placard MISC Does not apply, Request parking placard due to medical conditions. Duration of 5 years. levothyroxine (SYNTHROID) 175 mcg, Oral, DAILY, New dose. metOLazone (ZAROXOLYN) 2.5 MG tablet Take as needed for ankle swelling; notify office when you take it    metoprolol succinate (TOPROL XL) 100 mg, Oral, DAILY    Misc.  Devices (LUMBAR SUPPORT CUSHION) MISC Does not apply, Dx: low back pain, sciatica. pantoprazole (PROTONIX) 40 MG tablet TAKE 1 TABLET BY MOUTH TWICE DAILY BEFORE MEAL(S)    quinapril (ACCUPRIL) 5 mg, Oral, 2 times daily    spironolactone (ALDACTONE) 25 mg, Oral, EVERY OTHER DAY    vitamin C 1,000 mg, 2 TIMES DAILY    VITAMIN D PO Oral, DAILY    warfarin (COUMADIN) 3 MG tablet Alternating 3 mg/1.5 mg or as directed per INR results. Objective   Physical Exam   /70 (Site: Left Upper Arm)   Pulse 68   Temp 97.1 °F (36.2 °C)   Ht 5' 1.69\" (1.567 m)   Wt 165 lb 9.6 oz (75.1 kg)   BMI 30.59 kg/m²     General appearance - alert, well appearing, and in no distress    Mental Status - alert, oriented to person, place, and time          Neck - supple, no significant adenopathy        Chest - clear to auscultation, no wheezes, rales or rhonchi, symmetric air entry     Heart - normal rate, regular rhythm, normal S1, S2, no murmurs, rubs, clicks or gallops  no JVD    Abdomen - soft, nontender, nondistended, no masses or organomegaly         Neurological - alert, oriented, normal speech, no focal findings or movement disorder noted     Musculoskeletal -   msk [id number]}     Extremities - no pedal edema noted     Skin - normal coloration and turgor, no rashes, no suspicious skin lesions noted            An electronic signature was used to authenticate this note.     --Annetta Batista MD

## 2022-12-07 RX ORDER — METOPROLOL SUCCINATE 100 MG/1
100 TABLET, EXTENDED RELEASE ORAL DAILY
Qty: 90 TABLET | Refills: 1 | Status: SHIPPED | OUTPATIENT
Start: 2022-12-07

## 2022-12-09 ENCOUNTER — NURSE ONLY (OUTPATIENT)
Dept: LAB | Age: 82
End: 2022-12-09

## 2022-12-09 DIAGNOSIS — I48.20 CHRONIC ATRIAL FIBRILLATION (HCC): ICD-10-CM

## 2022-12-09 DIAGNOSIS — D50.0 CHRONIC BLOOD LOSS ANEMIA: ICD-10-CM

## 2022-12-09 DIAGNOSIS — E03.9 ACQUIRED HYPOTHYROIDISM: ICD-10-CM

## 2022-12-09 LAB
ANISOCYTOSIS: PRESENT
BASOPHILS # BLD: 1.3 %
BASOPHILS ABSOLUTE: 0.1 THOU/MM3 (ref 0–0.1)
EOSINOPHIL # BLD: 2 %
EOSINOPHILS ABSOLUTE: 0.1 THOU/MM3 (ref 0–0.4)
ERYTHROCYTE [DISTWIDTH] IN BLOOD BY AUTOMATED COUNT: 20.5 % (ref 11.5–14.5)
ERYTHROCYTE [DISTWIDTH] IN BLOOD BY AUTOMATED COUNT: 71.7 FL (ref 35–45)
HCT VFR BLD CALC: 33.4 % (ref 37–47)
HEMOGLOBIN: 9.8 GM/DL (ref 12–16)
IMMATURE GRANS (ABS): 0.04 THOU/MM3 (ref 0–0.07)
IMMATURE GRANULOCYTES: 0.6 %
INR BLD: 4.24 (ref 0.85–1.13)
IRON: 38 UG/DL (ref 50–170)
LYMPHOCYTES # BLD: 14.6 %
LYMPHOCYTES ABSOLUTE: 0.9 THOU/MM3 (ref 1–4.8)
MCH RBC QN AUTO: 28.5 PG (ref 26–33)
MCHC RBC AUTO-ENTMCNC: 29.3 GM/DL (ref 32.2–35.5)
MCV RBC AUTO: 97.1 FL (ref 81–99)
MONOCYTES # BLD: 12.4 %
MONOCYTES ABSOLUTE: 0.8 THOU/MM3 (ref 0.4–1.3)
NUCLEATED RED BLOOD CELLS: 0 /100 WBC
PLATELET # BLD: 304 THOU/MM3 (ref 130–400)
PMV BLD AUTO: 10.3 FL (ref 9.4–12.4)
RBC # BLD: 3.44 MILL/MM3 (ref 4.2–5.4)
SCAN OF BLOOD SMEAR: NORMAL
SEG NEUTROPHILS: 69.1 %
SEGMENTED NEUTROPHILS ABSOLUTE COUNT: 4.4 THOU/MM3 (ref 1.8–7.7)
WBC # BLD: 6.4 THOU/MM3 (ref 4.8–10.8)

## 2022-12-10 ENCOUNTER — PATIENT MESSAGE (OUTPATIENT)
Dept: FAMILY MEDICINE CLINIC | Age: 82
End: 2022-12-10

## 2022-12-12 ENCOUNTER — TELEPHONE (OUTPATIENT)
Dept: FAMILY MEDICINE CLINIC | Age: 82
End: 2022-12-12

## 2022-12-12 ENCOUNTER — NURSE ONLY (OUTPATIENT)
Dept: LAB | Age: 82
End: 2022-12-12

## 2022-12-12 DIAGNOSIS — I48.20 CHRONIC ATRIAL FIBRILLATION (HCC): ICD-10-CM

## 2022-12-12 LAB — INR BLD: 1.96 (ref 0.85–1.13)

## 2022-12-12 NOTE — TELEPHONE ENCOUNTER
----- Message from Narendra Guerra MD sent at 12/12/2022  2:07 PM EST -----  Inr at 1.96. Decrease to alternating 3/1.5. Recheck inr in 1 week.

## 2022-12-12 NOTE — TELEPHONE ENCOUNTER
From: Adi Swenson  To: Dr. Valdivia Bahai: 12/10/2022 9:14 AM EST  Subject: Coumadin     Dose is 3 mg every day except Saturday and Sunday she takes 1.5 mg

## 2022-12-13 ENCOUNTER — TELEPHONE (OUTPATIENT)
Dept: INTERNAL MEDICINE CLINIC | Age: 82
End: 2022-12-13

## 2022-12-13 NOTE — TELEPHONE ENCOUNTER
Patient notified her hemoglobin improving. She does note she doesn't feel quite as tired as she was.

## 2022-12-13 NOTE — TELEPHONE ENCOUNTER
----- Message from Maria Elena Britt MD sent at 12/10/2022  5:11 AM EST -----  Tell her hgb improved

## 2022-12-13 NOTE — TELEPHONE ENCOUNTER
Patient notified labs still indicate iron deficiency. She has had EGDs in the past and she is having one 12/22/22. She has never had iron infusions.

## 2022-12-13 NOTE — TELEPHONE ENCOUNTER
----- Message from Nichol Swan MD sent at 12/13/2022  2:13 PM EST -----  Tell her the labs still indicate iron deficiency although her hgb is stable; did she have egd? Has she ever had IV iron?

## 2022-12-14 NOTE — TELEPHONE ENCOUNTER
Patient would be agreeable to iron infusions if you feel appropriate. She is unfamiliar with, I will mail her patient information on iron infusions.

## 2022-12-15 ENCOUNTER — ANESTHESIA EVENT (OUTPATIENT)
Dept: ENDOSCOPY | Age: 82
End: 2022-12-15
Payer: MEDICARE

## 2022-12-15 ENCOUNTER — HOSPITAL ENCOUNTER (OUTPATIENT)
Age: 82
Setting detail: OUTPATIENT SURGERY
Discharge: HOME | End: 2022-12-15
Attending: INTERNAL MEDICINE | Admitting: INTERNAL MEDICINE
Payer: MEDICARE

## 2022-12-15 ENCOUNTER — ANESTHESIA (OUTPATIENT)
Dept: ENDOSCOPY | Age: 82
End: 2022-12-15
Payer: MEDICARE

## 2022-12-15 VITALS
BODY MASS INDEX: 30.11 KG/M2 | TEMPERATURE: 97.1 F | RESPIRATION RATE: 16 BRPM | WEIGHT: 163 LBS | SYSTOLIC BLOOD PRESSURE: 119 MMHG | DIASTOLIC BLOOD PRESSURE: 57 MMHG | HEART RATE: 61 BPM | OXYGEN SATURATION: 98 %

## 2022-12-15 PROCEDURE — 3700000001 HC ADD 15 MINUTES (ANESTHESIA): Performed by: INTERNAL MEDICINE

## 2022-12-15 PROCEDURE — 3609012400 HC EGD TRANSORAL BIOPSY SINGLE/MULTIPLE: Performed by: INTERNAL MEDICINE

## 2022-12-15 PROCEDURE — 88342 IMHCHEM/IMCYTCHM 1ST ANTB: CPT

## 2022-12-15 PROCEDURE — 7100000011 HC PHASE II RECOVERY - ADDTL 15 MIN: Performed by: INTERNAL MEDICINE

## 2022-12-15 PROCEDURE — 6360000002 HC RX W HCPCS: Performed by: NURSE ANESTHETIST, CERTIFIED REGISTERED

## 2022-12-15 PROCEDURE — 3700000000 HC ANESTHESIA ATTENDED CARE: Performed by: INTERNAL MEDICINE

## 2022-12-15 PROCEDURE — 2709999900 HC NON-CHARGEABLE SUPPLY: Performed by: INTERNAL MEDICINE

## 2022-12-15 PROCEDURE — 2580000003 HC RX 258: Performed by: INTERNAL MEDICINE

## 2022-12-15 PROCEDURE — 2500000003 HC RX 250 WO HCPCS: Performed by: NURSE ANESTHETIST, CERTIFIED REGISTERED

## 2022-12-15 PROCEDURE — 88305 TISSUE EXAM BY PATHOLOGIST: CPT

## 2022-12-15 PROCEDURE — 7100000010 HC PHASE II RECOVERY - FIRST 15 MIN: Performed by: INTERNAL MEDICINE

## 2022-12-15 RX ORDER — SODIUM CHLORIDE 9 MG/ML
25 INJECTION, SOLUTION INTRAVENOUS PRN
Status: DISCONTINUED | OUTPATIENT
Start: 2022-12-15 | End: 2022-12-15 | Stop reason: HOSPADM

## 2022-12-15 RX ORDER — LIDOCAINE HYDROCHLORIDE 20 MG/ML
INJECTION, SOLUTION INFILTRATION; PERINEURAL PRN
Status: DISCONTINUED | OUTPATIENT
Start: 2022-12-15 | End: 2022-12-15 | Stop reason: SDUPTHER

## 2022-12-15 RX ORDER — PROPOFOL 10 MG/ML
INJECTION, EMULSION INTRAVENOUS PRN
Status: DISCONTINUED | OUTPATIENT
Start: 2022-12-15 | End: 2022-12-15 | Stop reason: SDUPTHER

## 2022-12-15 RX ORDER — SODIUM CHLORIDE 0.9 % (FLUSH) 0.9 %
5-40 SYRINGE (ML) INJECTION EVERY 12 HOURS SCHEDULED
Status: DISCONTINUED | OUTPATIENT
Start: 2022-12-15 | End: 2022-12-15 | Stop reason: HOSPADM

## 2022-12-15 RX ORDER — SODIUM CHLORIDE 0.9 % (FLUSH) 0.9 %
5-40 SYRINGE (ML) INJECTION PRN
Status: DISCONTINUED | OUTPATIENT
Start: 2022-12-15 | End: 2022-12-15 | Stop reason: HOSPADM

## 2022-12-15 RX ADMIN — PROPOFOL 20 MG: 10 INJECTION, EMULSION INTRAVENOUS at 08:54

## 2022-12-15 RX ADMIN — PROPOFOL 20 MG: 10 INJECTION, EMULSION INTRAVENOUS at 08:57

## 2022-12-15 RX ADMIN — SODIUM CHLORIDE 25 ML: 9 INJECTION, SOLUTION INTRAVENOUS at 08:00

## 2022-12-15 RX ADMIN — LIDOCAINE HYDROCHLORIDE 80 MG: 20 INJECTION, SOLUTION INFILTRATION; PERINEURAL at 08:52

## 2022-12-15 RX ADMIN — PROPOFOL 60 MG: 10 INJECTION, EMULSION INTRAVENOUS at 08:52

## 2022-12-15 ASSESSMENT — PAIN - FUNCTIONAL ASSESSMENT: PAIN_FUNCTIONAL_ASSESSMENT: NONE - DENIES PAIN

## 2022-12-15 NOTE — PROGRESS NOTES
Recovery mode, arouses easily, denies discomfort. Taking fluids,  discussed findings and plan of care with pt and , understandings verbalized.

## 2022-12-15 NOTE — PROGRESS NOTES
EGD completed. Photos taken. 1 biopsy taken, 1 jar labeled and sent to lab for processing. Pt tolerated procedure well    Scope #  used.

## 2022-12-15 NOTE — ANESTHESIA POSTPROCEDURE EVALUATION
Department of Anesthesiology  Postprocedure Note    Patient: Ike Quinn  MRN: [id number]  YOB: 1940  Date of evaluation: 12/15/2022      Procedure Summary     Date: 12/15/22 Room / Location: 40 [facility] / [facility]    Anesthesia Start: Lonarsh Highn Anesthesia Stop: 6647    Procedure: EGD BIOPSY Diagnosis:       Dyspepsia      (Dyspepsia [R10.13])    Surgeons: Malia Gutiérrez MD Responsible Provider: Sherwin Hernandez MD    Anesthesia Type: MAC, TIVA ASA Status: 3          Anesthesia Type: No value filed.     Debby Phase I: Debby Score: 10    Debby Phase II: Debby Score: 9      Anesthesia Post Evaluation    Patient location during evaluation: bedside  Patient participation: complete - patient participated  Level of consciousness: awake and alert  Pain score: 0  Airway patency: patent  Nausea & Vomiting: no nausea and no vomiting  Complications: no  Cardiovascular status: hemodynamically stable  Respiratory status: spontaneous ventilation, room air and acceptable  Hydration status: stable

## 2022-12-15 NOTE — H&P
[facility]   Pre-Operative History and Physical: EGD    Patient: Maddie Pickering  : 1940     History Obtained From:  patient, electronic medical record    HISTORY OF PRESENT ILLNESS:    The patient is a [de-identified] y.o. female who presents for an EGD for dysphagia, GERD. Past Medical History:        Diagnosis Date    AICD (automatic cardioverter/defibrillator) present 1999    Arthritis 2019    Atrial fibrillation (HCC)     chronic    Biventricular ICD (implantable cardiac defibrillator) in place 10/09    C. difficile diarrhea     CAD (coronary artery disease)     Cancer (HCC)     right hand    CHF (congestive heart failure) ([facility] [address]     Diverticulitis 2013    GERD (gastroesophageal reflux disease)     hiatal hernia    Hiatal hernia     Hyperlipidemia     Hypertension     Menopause         NAFLD (nonalcoholic fatty liver disease) 2017    Nonischemic cardiomyopathy ([facility] [address]     chronic    Pacemaker     Retroperitoneal hemorrhage     transfusion x3    Thyroid disease      Past Surgical History:        Procedure Laterality Date    CARDIAC DEFIBRILLATOR [place] Reveal      Dr Contreras [facility]    COLONOSCOPY      w/ EGD    COLONOSCOPY N/A 2019    COLONOSCOPY POLYPECTOMY SNARE/COLD BIOPSY performed by Bharati Schaefer MD at  [facility]    ENDOSCOPY, COLON, [address]    replaced 3x    SKIN BIOPSY      Skin CA with graft. UPPER GASTROINTESTINAL ENDOSCOPY N/A 2019    EGD DILATION SAVORY performed by Bharati Schaefer MD at [facility] 1 Left 2019    EGD BIOPSY performed by Bharati Schaefer MD at [facility] 1 2019    EGD ESOPHAGEAL MANOMETRY AND PH MONITORING 24 HR performed by Bharati Schaefer MD at  [facility]     Medications Prior to Admission:   No current facility-administered medications on file prior to encounter.      Current Outpatient Medications on File Prior to Encounter   Medication Sig Dispense Refill    CALCIUM PO Take 600 mg by mouth in the morning and at bedtime      VITAMIN D PO Take by mouth daily      bumetanide (BUMEX) 1 MG tablet Take 1 tablet by mouth daily 90 tablet 3    metOLazone (ZAROXOLYN) 2.5 MG tablet Take as needed for ankle swelling; notify office when you take it 15 tablet 3    Handicap Placard MISC by Does not apply route Request parking placard due to medical conditions. Duration of 5 years. 1 each 0    ferrous sulfate (IRON 325) 325 (65 Fe) MG tablet Take 325 mg by mouth every other day      Alum & Mag Hydroxide-Simeth (MYLANTA PO) Take by mouth as needed       acetaminophen (TYLENOL) 325 MG tablet Take 650 mg by mouth every 6 hours as needed for Pain or Fever      Misc. Devices (LUMBAR SUPPORT CUSHION) MISC by Does not apply route. Dx: low back pain, sciatica. 1 each 0    Ascorbic Acid (VITAMIN C) 1000 MG tablet Take 1,000 mg by mouth 2 times daily. Allergies:  Ativan [lorazepam], Codeine, Lisinopril, Nystatin, Nystatin, Percocet [oxycodone-acetaminophen], Relafen [nabumetone], Tape [adhesive tape], and Celebrex [celecoxib]    Social History:   TOBACCO:   reports that she has never smoked. She has never used smokeless tobacco.  ETOH:   reports no history of alcohol use. DRUGS:   reports no history of drug use.   Family History:       Problem Relation Age of Onset    Heart Disease Mother     Diabetes Mother     High Cholesterol Mother     High Blood Pressure Brother     Cancer Sister     High Blood Pressure Brother     High Blood Pressure Brother     Parkinsonism Brother     Diabetes Brother     Heart Disease Brother     Kidney Disease Brother     High Blood Pressure Brother     High Cholesterol Father     Miscarriages / Stillbirths Daughter     Arthritis Neg Hx     Asthma Neg Hx     Birth Defects Neg Hx     Depression Neg Hx     Early Death Neg Hx     Learning Disabilities Neg Hx     Mental Illness Neg Hx     Mental Retardation Neg Hx     Stroke Neg Hx     Substance Abuse Neg Hx     Vision Loss Neg Hx     Other Neg Hx        PHYSICAL EXAM:    /64   Pulse 66   Temp 96.9 °F (36.1 °C) (Temporal)   Resp 16   Wt 163 lb (73.9 kg)   SpO2 97%   BMI 30.11 kg/m²       Heart:  Regular rate and rhythm  Lungs:  No increased work of breathing  Abdomen:  BS+, soft, non-distended, non-tender, no masses palpated    ASA Grade:  See Anesthesia documentation    Mallampati Classification:  See Anesthesia documentation    ASSESSMENT AND PLAN:    1. Patient is a [de-identified] y.o. female here for an EGD with MAC.    2.  Procedure options, risks and benefits reviewed with patient. We specifically discussed that risks include but are not limited to infection, bleeding, perforation, death, and missed lesions. Patient expresses understanding.     Electronically signed by Kulwant Massey MD on 12/15/2022 at 8:47 AM    Kulwant Massey MD  [facility]

## 2022-12-15 NOTE — ANESTHESIA PRE PROCEDURE
Department of Anesthesiology  Preprocedure Note       Name:  Nitin Estes   Age:  [de-identified] y.o.  :  1940                                          MRN:  [id number]         Date:  12/15/2022      Surgeon: Karly Blanco):  Leon Aj MD    Procedure: Procedure(s):  EGD DILATION    Medications prior to admission:   Prior to Admission medications    Medication Sig Start Date End Date Taking? Authorizing Provider   metoprolol succinate (TOPROL XL) 100 MG extended release tablet Take 1 tablet by mouth daily 22   Colonel Ade MD   levothyroxine (SYNTHROID) 175 MCG tablet Take 1 tablet by mouth once daily 22   Manohar Shah MD   empagliflozin (JARDIANCE) 10 MG tablet Take 1 tablet by mouth daily Lot# [id number]    Exp 22   Colonel Ade MD   digoxin [facility]) 125 MCG tablet Take 1 tablet by mouth every other day 22  Colonel Ade MD   famotidine (PEPCID) 40 MG tablet  22   Historical Provider, MD   warfarin (COUMADIN) 3 MG tablet Alternating 3 mg/1.5 mg or as directed per INR results.  22   Marcia Cornea, MD   quinapril (ACCUPRIL) 5 MG tablet Take 1 tablet by mouth in the morning and at bedtime  Patient not taking: Reported on 12/15/2022 11/10/22   Colonel Ade MD   amiodarone (CORDARONE) 200 MG tablet Take 1 tablet by mouth once daily 10/27/22   Colonel Ade MD   spironolactone (ALDACTONE) 25 MG tablet TAKE 1 TABLET BY MOUTH EVERY OTHER DAY 10/27/22   Colonel Aed MD   pantoprazole (PROTONIX) 40 MG tablet TAKE 1 TABLET BY MOUTH TWICE DAILY BEFORE MEAL(S) 10/27/22   Colonel Ade MD   CALCIUM PO Take 600 mg by mouth in the morning and at bedtime    Historical Provider, MD   VITAMIN D PO Take by mouth daily    Historical Provider, MD   bumetanide (BUMEX) 1 MG tablet Take 1 tablet by mouth daily 22   Colonel Ade MD   metOLazone (ZAROXOLYN) 2.5 MG tablet Take as needed for ankle swelling; notify office when you take it 22   Colonel Ade MD   Handicap Placard MISC by Does not apply route Request parking placard due to medical conditions. Duration of 5 years. 11/2/21   Lauren Zhu MD   ferrous sulfate (IRON 325) 325 (65 Fe) MG tablet Take 325 mg by mouth every other day    Historical Provider, MD   Alum & Mag Hydroxide-Simeth (MYLANTA PO) Take by mouth as needed     Historical Provider, MD   acetaminophen (TYLENOL) 325 MG tablet Take 650 mg by mouth every 6 hours as needed for Pain or Fever    Historical Provider, MD   Misc. Devices (LUMBAR SUPPORT CUSHION) MISC by Does not apply route. Dx: low back pain, sciatica. 4/5/13   Lauren Zhu MD   Ascorbic Acid (VITAMIN C) 1000 MG tablet Take 1,000 mg by mouth 2 times daily. Historical Provider, MD       Current medications:    Current Facility-Administered Medications   Medication Dose Route Frequency Provider Last Rate Last Admin    sodium chloride flush 0.9 % injection 5-40 mL  5-40 mL IntraVENous 2 times per day Alicia Mejia MD        sodium chloride flush 0.9 % injection 5-40 mL  5-40 mL IntraVENous PRN Alicia Mejia MD        0.9 % sodium chloride infusion  25 mL IntraVENous PRN Alicia Mejia  mL/hr at 12/15/22 0800 25 mL at 12/15/22 0800       Allergies:     Allergies   Allergen Reactions    Ativan [Lorazepam]      Gets anxiety    Codeine     Lisinopril Cough    Nystatin      Mycostatin powder    Nystatin Hives    Percocet [Oxycodone-Acetaminophen]     Relafen [Nabumetone]      Stomach upset    Tape Delmer Alpers Tape]     Celebrex [Celecoxib] Rash       Problem List:    Patient Active Problem List   Diagnosis Code    Hyperlipidemia E78.5    Nonischemic cardiomyopathy ([address] I42.8    Chronic atrial fibrillation ([facility] I48.20    Hypothyroidism E03.9    Biventricular implantable cardioverter-defibrillator in situ Z95.810    Abnormal LFTs R79.89    Benign essential HTN I10    NAFLD (nonalcoholic fatty liver disease) K76.0    Left ventricular systolic dysfunction T66.7    Ventricular tachycardia (paroxysmal) I47.29    Anxiety F41.9    Arthritis M19.90    Ventricular arrhythmia I49.9    Chronic kidney disease, stage III (moderate) (HCC) N18.30    Anemia D64.9    Chronic anticoagulation Z79.01    Iron deficiency E61.1    Obesity (BMI 30-39. 9) E66.9    Chest pain R07.9    Gastroesophageal reflux disease with esophagitis K21.00    Elevated AST (SGOT) R74.01    Acute deep vein thrombosis (DVT) of right peroneal vein (HCC) I82.451       Past Medical History:        Diagnosis Date    AICD (automatic cardioverter/defibrillator) present 05/1999    Arthritis 5/21/2019    Atrial fibrillation (HCC)     chronic    Biventricular ICD (implantable cardiac defibrillator) in place 10/09    C. difficile diarrhea     CAD (coronary artery disease)     Cancer ([facility]     right hand    CHF (congestive heart failure) ([facility]     Diverticulitis 01/2013    GERD (gastroesophageal reflux disease)     hiatal hernia    Hiatal hernia     Hyperlipidemia     Hypertension     Menopause     1994    NAFLD (nonalcoholic fatty liver disease) 1/31/2017    Nonischemic cardiomyopathy ([facility]     chronic    Pacemaker     Retroperitoneal hemorrhage 07/99    transfusion x3    Thyroid disease        Past Surgical History:        Procedure Laterality Date    CARDIAC DEFIBRILLATOR PLACEMENT     Sallye Batch      Dr Angelita Melendrez  2008    w/ EGD    COLONOSCOPY N/A 7/16/2019    COLONOSCOPY POLYPECTOMY SNARE/COLD BIOPSY performed by Svetlana Ratliff MD at [address] [place], COLON, DIAGNOSTIC     [address] [facility] [address]    replaced 3x    SKIN BIOPSY  2010    Skin CA with graft.     UPPER GASTROINTESTINAL ENDOSCOPY N/A 7/16/2019    EGD DILATION SAVORY performed by Svetlana Ratliff MD at [facility] 1 Left 7/16/2019    EGD BIOPSY performed by Svetlana Ratliff MD at [address] [facility] ENDOSCOPY N/A 12/2/2019    EGD ESOPHAGEAL MANOMETRY AND PH MONITORING 24 HR performed by Esteban Rodriguez MD at [facility]       Social History:    Social History     Tobacco Use    Smoking status: Never    Smokeless tobacco: Never   Substance Use Topics    Alcohol use: Never                                Counseling given: Not Answered      Vital Signs (Current):   Vitals:    12/15/22 0723   BP: 127/64   Pulse: 66   Resp: 16   Temp: 36.1 °C (96.9 °F)   TempSrc: Temporal   SpO2: 97%   Weight: 163 lb (73.9 kg)                                              BP Readings from Last 3 Encounters:   12/15/22 127/64   11/28/22 120/70   11/14/22 124/82       NPO Status: Time of last liquid consumption: 0800 (sip with meds)                        Time of last solid consumption: 1500                        Date of last liquid consumption: 12/15/22                        Date of last solid food consumption: 12/14/22    BMI:   Wt Readings from Last 3 Encounters:   12/15/22 163 lb (73.9 kg)   11/28/22 165 lb 9.6 oz (75.1 kg)   11/14/22 162 lb 12.8 oz (73.8 kg)     Body mass index is 30.11 kg/m².     CBC:   Lab Results   Component Value Date/Time    WBC 6.4 12/09/2022 11:04 AM    RBC 3.44 12/09/2022 11:04 AM    RBC 3.06 11/29/2011 06:13 AM    HGB 9.8 12/09/2022 11:04 AM    HCT 33.4 12/09/2022 11:04 AM    MCV 97.1 12/09/2022 11:04 AM    RDW 15.1 02/25/2016 09:40 PM     12/09/2022 11:04 AM       CMP:   Lab Results   Component Value Date/Time     09/30/2022 06:11 AM    K 5.0 09/30/2022 06:11 AM    K 4.8 09/29/2022 06:32 AM     09/30/2022 06:11 AM    CO2 21 09/30/2022 06:11 AM    BUN 22 09/30/2022 06:11 AM    CREATININE 1.1 09/30/2022 06:11 AM    LABGLOM 48 09/30/2022 06:11 AM    GLUCOSE 84 09/30/2022 06:11 AM    GLUCOSE 147 05/09/2012 10:57 AM    PROT 5.1 09/29/2022 06:32 AM    CALCIUM 8.1 09/30/2022 06:11 AM    BILITOT 0.3 09/29/2022 06:32 AM    ALKPHOS 132 09/29/2022 06:32 AM    AST 41 09/29/2022 06:32 AM    ALT 23 09/29/2022 06:32 AM       POC Tests: No results for input(s): POCGLU, POCNA, POCK, POCCL, POCBUN, POCHEMO, POCHCT in the last 72 hours. Coags:   Lab Results   Component Value Date/Time    PROTIME 25.8 01/26/2015 10:23 AM    PROTIME 24.8 06/12/2014 10:22 AM    INR 1.96 12/12/2022 09:48 AM    APTT 46.5 11/27/2021 10:21 PM       HCG (If Applicable): No results found for: PREGTESTUR, PREGSERUM, HCG, HCGQUANT     ABGs: No results found for: PHART, PO2ART, ISR6DZI, QSO9VJK, BEART, V8LGHHMY     Type & Screen (If Applicable):  Lab Results   Component Value Date    LABRH NEG 02/08/2013       Drug/Infectious Status (If Applicable):  Lab Results   Component Value Date/Time    HEPCAB Negative 05/01/2019 08:39 AM       COVID-19 Screening (If Applicable):   Lab Results   Component Value Date/Time    COVID19 DETECTED 07/22/2022 10:05 AM    COVID19 Not Detected 03/25/2021 11:53 AM           Anesthesia Evaluation  Patient summary reviewed and Nursing notes reviewed no history of anesthetic complications:   Airway: Mallampati: II  TM distance: >3 FB   Neck ROM: full  Mouth opening: > = 3 FB   Dental: normal exam         Pulmonary: breath sounds clear to auscultation                             Cardiovascular:    (+) hypertension: no interval change, pacemaker: no interval change, CAD: no interval change, CHF: no interval change,       ECG reviewed  Rhythm: regular  Rate: normal  Echocardiogram reviewed  Stress test reviewed                Neuro/Psych:               GI/Hepatic/Renal:   (+) GERD: well controlled, liver disease:,           Endo/Other:    (+) hypothyroidism::., .          Pt had no PAT visit       Abdominal:             Vascular: Other Findings:           Anesthesia Plan      MAC and TIVA     ASA 3             Anesthetic plan and risks discussed with patient. Plan discussed with attending.     Attending anesthesiologist reviewed and agrees with Preprocedure content                ELIA Mattson - CRNA   12/15/2022

## 2022-12-15 NOTE — OP NOTE
[facility]  EGD Procedure Note    Patient: Anuja Lentz  : 1940      Procedure: Esophagogastroduodenoscopy with biopsy          Date:  12/15/2022     Endoscopist:   Marquise Taylor MD    Referring Physician: Marquise Taylor MD  Primary Care Physician: Missy Valle MD    Indications: This is a [de-identified]y.o. year old female who presents with GERD, dysphagia, atypical chest pain. Consultation 10/17/22 with Kay John CNP. Jayson Tavares is an [de-identified] yr old female with a significant PMHx of a-fib on Coumadin, CHF with EF of 25%, S/P AICD/defibrillator, here 10/17/2022 as a consult from Dr. Ben Shah for GERD. Taylor Hidalgo states she has had midsternal chest pain \"for years\". She reports going to the hospital on several occasions for the pain. Last hospitalization was at [facility], and labwork, CXR, echo and stress test were reportedly all negative. Her last cardiac cath was 3-4 yrs ago and showed no obstructive CAD. A GI cocktail has been given in the past which helps the pain somewhat. Taylor Hidalgo states her pain is very intermittent. She will have no symptoms for months, then will develop pain that lasts 3-4 days at a time. The pain, when it occurs, is midsternal, feels like \"a warm sensation\" and radiates around the sides. More recently, the pain has been radiating through to her back, and into her bilateral arms. She denies current GERD or reflux symptoms. Nothing triggers the pain. She has been taking Protonix 40 mg BID for the past 2-3 years. Other more recent symptoms include a chronic cough and voice hoarseness. She is having dysphagia to solids and liquids on a daily basis, but this is not a new symptom. She had an EGD with dilatation with Dr. Abdiel Hutson in the past. Last year was her last EGD with him and he apparently \"cauterized some bleeding\". She had a colonoscopy in 2019 that showed a polpy. She was told to have a repeat colonoscopy in 5 years.  She states she does not wish to follow with Dr. Mireille Plaza practice anymore; wants to establish care here. She has a soft BM daily. Has dark stools, but takes an iron supplement daily for \"chronic anemia\". She held her coumadin on 12/13/22. Anesthesia: MAC per Anesthesia. Please see anesthesia report. Consent:  The patient or their legal guardian has signed a consent and is aware of the potential risks, benefits, alternatives, and potential complications of this procedure. These include, but are not limited to hemorrhage, bleeding, post procedural pain, perforation, phlebitis, aspiration, hypotension, hypoxia, cardiovascular events such as arrhythmia, and possibly death. Description of Procedure: The patient was then taken to the endoscopy suite and placed in the left lateral decubitus position and the above IV sedation was administered. A forward-viewing Olympus video endoscope was lubricated and inserted through the patient's mouth into the oropharynx. Under direct visualization, the upper esophagus was intubated. The scope was advanced through the esophagus to the extent of the second portion of duodenum. The scope was then withdrawn with good views of the mucosal surfaces. Both forward and retroflexed views of the stomach were obtained. The stomach was decompressed and the scope was completely withdrawn. The patient tolerated the procedure well and was taken to the recovery area in good condition. There were no immediate complications. Estimated Blood Loss: minimal    Findings:    Esophagus: The GE junction was noted to be at 40 cm. The esophagus appeared normal without evidence of Ritter's esophagus or reflux esophagitis. Dilation was not performed given that patient did not hold coumadin for 5 days. Stomach: There was moderate to severe nodular gastritis with multiple clean based ulcers in gastric antrum. 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 Cold biopsies were taken and placed in Jar 1.     Duodenum: The first and 2nd portions of the duodenum appeared normal with normal villous pattern      Recommendations:   - Await pathology. - Continue current medications.  - Repeat EGD with dilation in 8 weeks for surveillance healing of gastric ulcers. Coumadin will need to be held for 5 days prior to procedure in order to complete dilation.  - Follow up with primary care physician as scheduled. - Follow up with Velia Pelaez CNP in the interim as needed. - Will start esomeprazole 40mg BID for gastric ulcer healing.     Electronically signed by Santino Bledsoe MD on 12/15/2022 at 9:04 AM    Santino Bledsoe MD  [id number]

## 2022-12-15 NOTE — DISCHARGE INSTRUCTIONS
- Await pathology. - Continue current medications.  - Repeat EGD with dilation in 8 weeks for surveillance healing of gastric ulcers. Coumadin will need to be held for 5 days prior to procedure in order to complete dilation.  - Follow up with primary care physician as scheduled. - Follow up with Sharon Hall CNP in the interim as needed. - Will start esomeprazole 40mg BID for gastric ulcer healing.

## 2022-12-20 ENCOUNTER — PATIENT MESSAGE (OUTPATIENT)
Dept: INTERNAL MEDICINE CLINIC | Age: 82
End: 2022-12-20

## 2022-12-20 ENCOUNTER — NURSE ONLY (OUTPATIENT)
Dept: LAB | Age: 82
End: 2022-12-20

## 2022-12-20 ENCOUNTER — PATIENT MESSAGE (OUTPATIENT)
Dept: FAMILY MEDICINE CLINIC | Age: 82
End: 2022-12-20

## 2022-12-20 DIAGNOSIS — I48.20 CHRONIC ATRIAL FIBRILLATION (HCC): ICD-10-CM

## 2022-12-20 DIAGNOSIS — I42.8 NICM (NONISCHEMIC CARDIOMYOPATHY) (HCC): Primary | ICD-10-CM

## 2022-12-20 LAB — INR BLD: 1.86 (ref 0.85–1.13)

## 2022-12-20 NOTE — TELEPHONE ENCOUNTER
From: Saima Montiel  To: Dr. Vaibhav Wilson: 12/20/2022 2:11 PM EST  Subject: Blood pressure     12/6 132/69 pulse 64  12/7 120/64 pulse 63  12/8 112/61 pulse 66  12/9 118/59 pulse 63  12/10 138/70 pulse 64  12/11 117/56 pulse 73  12/12 143/82 pulse 80  12/13 152/75 pulse 63  12/14 125/75 pulse 88  12/15 110/54 pulse 62  12/16 108/56 pulse 71  12/17 131/54 pulse 60  12/18 141/79 pulse 63

## 2022-12-20 NOTE — TELEPHONE ENCOUNTER
From: Dexter Swenson  To: Dr. Jimenez Shorten: 12/20/2022 2:49 PM EST  Subject: Inr    Lab says they need another order for a standing inr. This is the last time they can do it. I get it done at [facility] in [place].

## 2022-12-21 ENCOUNTER — TELEPHONE (OUTPATIENT)
Dept: FAMILY MEDICINE CLINIC | Age: 82
End: 2022-12-21

## 2022-12-21 NOTE — TELEPHONE ENCOUNTER
----- Message from Quinton Benoit MD sent at 12/21/2022  6:49 AM EST -----  Inr is ok, same dose, recheck inr in 2 weeks.

## 2023-01-05 ENCOUNTER — OFFICE VISIT (OUTPATIENT)
Dept: INTERNAL MEDICINE CLINIC | Age: 83
End: 2023-01-05

## 2023-01-05 VITALS
HEART RATE: 64 BPM | DIASTOLIC BLOOD PRESSURE: 60 MMHG | SYSTOLIC BLOOD PRESSURE: 124 MMHG | BODY MASS INDEX: 31.62 KG/M2 | TEMPERATURE: 97 F | HEIGHT: 62 IN | WEIGHT: 171.8 LBS

## 2023-01-05 DIAGNOSIS — E61.1 IRON DEFICIENCY: ICD-10-CM

## 2023-01-05 DIAGNOSIS — K21.9 GASTROESOPHAGEAL REFLUX DISEASE, UNSPECIFIED WHETHER ESOPHAGITIS PRESENT: Primary | ICD-10-CM

## 2023-01-05 DIAGNOSIS — I48.20 CHRONIC ATRIAL FIBRILLATION (HCC): ICD-10-CM

## 2023-01-05 DIAGNOSIS — K21.00 GASTROESOPHAGEAL REFLUX DISEASE WITH ESOPHAGITIS WITHOUT HEMORRHAGE: ICD-10-CM

## 2023-01-05 DIAGNOSIS — E03.9 HYPOTHYROIDISM, UNSPECIFIED TYPE: ICD-10-CM

## 2023-01-05 DIAGNOSIS — I50.22 CHRONIC SYSTOLIC (CONGESTIVE) HEART FAILURE (HCC): Primary | ICD-10-CM

## 2023-01-05 RX ORDER — OMEPRAZOLE 40 MG/1
40 CAPSULE, DELAYED RELEASE ORAL 2 TIMES DAILY
COMMUNITY

## 2023-01-05 RX ORDER — FAMOTIDINE 40 MG/1
40 TABLET, FILM COATED ORAL DAILY
Qty: 90 TABLET | Refills: 3 | Status: SHIPPED | OUTPATIENT
Start: 2023-01-05

## 2023-01-05 ASSESSMENT — PATIENT HEALTH QUESTIONNAIRE - PHQ9
SUM OF ALL RESPONSES TO PHQ QUESTIONS 1-9: 0
2. FEELING DOWN, DEPRESSED OR HOPELESS: 0
SUM OF ALL RESPONSES TO PHQ QUESTIONS 1-9: 0
1. LITTLE INTEREST OR PLEASURE IN DOING THINGS: 0
SUM OF ALL RESPONSES TO PHQ9 QUESTIONS 1 & 2: 0

## 2023-01-05 NOTE — TELEPHONE ENCOUNTER
Shante Perez called requesting a refill on the following medications:  Requested Prescriptions     Pending Prescriptions Disp Refills    famotidine (PEPCID) 40 MG tablet 30 tablet      Sig: Take 1 tablet by mouth daily       Date of last visit: 11/14/2022 AMW    Date of next visit: date not found    Date of last refill: 11/131/22    Pharmacy Name: [address]    Daughter Mahin Gonzalez reports pt has #2 left. Requests #90 day Rx. May does her pill boxes each week. Pls call May at [phone] only if probs.       Thanks,  Junior Steele

## 2023-01-05 NOTE — TELEPHONE ENCOUNTER
Refill request received- patient requesting 90 day supply    Last ordered 12/20, disp #30, refill 3    Last seen today  Next appt 3/20/23    Attached for your signature

## 2023-01-05 NOTE — PROGRESS NOTES
Ramandeep Starks (:  1940) is a [de-identified] y.o. female,Established patient, here for evaluation of the following chief complaint(s): Other (LV DYSFUNCTION-started jardiance)         ASSESSMENT/PLAN:  1. Chronic systolic (congestive) heart failure ([address]- on optimum rx; denies sob  2. Chronic atrial fibrillation ([address]- on coumadin- discussed watchman  3. Gastroesophageal reflux disease with esophagitis without hemorrhage- recent egd- multiple ulcers; to have repeat in 8 wks; suspect source of anemia  4. Iron deficiency- her hgb better; stay course  5. Hypothyroidism, unspecified type- to see Dr Keri Martinez soon. The elevation of TSH  And T4 not well explained. No follow-ups on file. Subjective   SUBJECTIVE/OBJECTIVE:  HPI pt with nonischemic cm, chronic afib, anemia, esophageal ulcers, seen in f/u. She reports no new issues with sob or weight gain. She had recent EGD and is scheduled to have repeat with dilation in 8 wks. She was placed on Farxiga and is tolerating it well. She still has some epigastric discomfort tori at night. Review of Systems   Twelve point ROS completed and found to be negative except as noted above. Current Outpatient Medications   Medication Instructions    acetaminophen (TYLENOL) 650 mg, Oral, EVERY 6 HOURS PRN    Alum & Mag Hydroxide-Simeth (MYLANTA PO) Oral, PRN    amiodarone (CORDARONE) 200 MG tablet Take 1 tablet by mouth once daily    bumetanide (BUMEX) 1 mg, Oral, DAILY    CALCIUM  mg, Oral, 2 times daily    digoxin (LANOXIN) 125 mcg, Oral, EVERY OTHER DAY    empagliflozin (JARDIANCE) 10 mg, Oral, DAILY    famotidine (PEPCID) 40 mg, Oral, DAILY    ferrous sulfate (IRON 325) 325 mg, Oral, EVERY OTHER DAY    Handicap Placard MISC Does not apply, Request parking placard due to medical conditions. Duration of 5 years.     levothyroxine (SYNTHROID) 175 MCG tablet Take 1 tablet by mouth once daily    metOLazone (ZAROXOLYN) 2.5 MG tablet Take as needed for ankle swelling; notify office when you take it    metoprolol succinate (TOPROL XL) 100 mg, Oral, DAILY    Misc. Devices (LUMBAR SUPPORT CUSHION) MISC Does not apply, Dx: low back pain, sciatica. omeprazole (PRILOSEC) 40 mg, Oral, 2 times daily    quinapril (ACCUPRIL) 5 mg, Oral, 2 times daily    spironolactone (ALDACTONE) 25 mg, Oral, EVERY OTHER DAY    vitamin C 1,000 mg, 2 TIMES DAILY    VITAMIN D PO Oral, DAILY    warfarin (COUMADIN) 3 MG tablet Alternating 3 mg/1.5 mg or as directed per INR results. Objective   Physical Exam   /60 (Site: Right Upper Arm)   Pulse 64   Temp 97 °F (36.1 °C)   Ht 5' 1.69\" (1.567 m)   Wt 171 lb 12.8 oz (77.9 kg)   BMI 31.74 kg/m²     General appearance - alert, well appearing, and in no distress    Mental Status - alert, oriented to person, place, and time          Neck - supple, no significant adenopathy        Chest - clear to auscultation, no wheezes, rales or rhonchi, symmetric air entry     Heart - normal rate, regular rhythm, normal S1, S2, no murmurs, rubs, clicks or gallops     Abdomen - soft, nontender, nondistended, no masses or organomegaly         Neurological - alert, oriented, normal speech, no focal findings or movement disorder noted       Extremities - no pedal edema noted     Skin - normal coloration and turgor, no rashes, no suspicious skin lesions noted            An electronic signature was used to authenticate this note.     --Gamal Oswald MD

## 2023-01-11 ENCOUNTER — NURSE ONLY (OUTPATIENT)
Dept: LAB | Age: 83
End: 2023-01-11

## 2023-01-11 ENCOUNTER — TELEPHONE (OUTPATIENT)
Dept: FAMILY MEDICINE CLINIC | Age: 83
End: 2023-01-11

## 2023-01-11 DIAGNOSIS — I42.8 NICM (NONISCHEMIC CARDIOMYOPATHY) (HCC): ICD-10-CM

## 2023-01-11 LAB — INR BLD: 2.62 (ref 0.85–1.13)

## 2023-01-17 ENCOUNTER — HOSPITAL ENCOUNTER (OUTPATIENT)
Age: 83
Setting detail: OUTPATIENT SURGERY
Discharge: HOME | End: 2023-01-17
Attending: INTERNAL MEDICINE | Admitting: INTERNAL MEDICINE
Payer: MEDICARE

## 2023-01-17 ENCOUNTER — ANESTHESIA (OUTPATIENT)
Dept: ENDOSCOPY | Age: 83
End: 2023-01-17
Payer: MEDICARE

## 2023-01-17 ENCOUNTER — ANESTHESIA EVENT (OUTPATIENT)
Dept: ENDOSCOPY | Age: 83
End: 2023-01-17
Payer: MEDICARE

## 2023-01-17 VITALS
RESPIRATION RATE: 16 BRPM | HEIGHT: 60 IN | SYSTOLIC BLOOD PRESSURE: 120 MMHG | WEIGHT: 173 LBS | TEMPERATURE: 96.1 F | HEART RATE: 60 BPM | BODY MASS INDEX: 33.96 KG/M2 | OXYGEN SATURATION: 92 % | DIASTOLIC BLOOD PRESSURE: 50 MMHG

## 2023-01-17 PROCEDURE — 2720000010 HC SURG SUPPLY STERILE: Performed by: INTERNAL MEDICINE

## 2023-01-17 PROCEDURE — 6360000002 HC RX W HCPCS: Performed by: NURSE ANESTHETIST, CERTIFIED REGISTERED

## 2023-01-17 PROCEDURE — 2500000003 HC RX 250 WO HCPCS: Performed by: NURSE ANESTHETIST, CERTIFIED REGISTERED

## 2023-01-17 PROCEDURE — 88305 TISSUE EXAM BY PATHOLOGIST: CPT

## 2023-01-17 PROCEDURE — 2580000003 HC RX 258: Performed by: INTERNAL MEDICINE

## 2023-01-17 PROCEDURE — 3700000000 HC ANESTHESIA ATTENDED CARE: Performed by: INTERNAL MEDICINE

## 2023-01-17 PROCEDURE — 7100000010 HC PHASE II RECOVERY - FIRST 15 MIN: Performed by: INTERNAL MEDICINE

## 2023-01-17 PROCEDURE — 2709999900 HC NON-CHARGEABLE SUPPLY: Performed by: INTERNAL MEDICINE

## 2023-01-17 PROCEDURE — 3609012700 HC EGD DILATION SAVORY: Performed by: INTERNAL MEDICINE

## 2023-01-17 PROCEDURE — 88342 IMHCHEM/IMCYTCHM 1ST ANTB: CPT

## 2023-01-17 PROCEDURE — 3700000001 HC ADD 15 MINUTES (ANESTHESIA): Performed by: INTERNAL MEDICINE

## 2023-01-17 PROCEDURE — 3609012400 HC EGD TRANSORAL BIOPSY SINGLE/MULTIPLE: Performed by: INTERNAL MEDICINE

## 2023-01-17 RX ORDER — SODIUM CHLORIDE 0.9 % (FLUSH) 0.9 %
5-40 SYRINGE (ML) INJECTION PRN
Status: DISCONTINUED | OUTPATIENT
Start: 2023-01-17 | End: 2023-01-17 | Stop reason: HOSPADM

## 2023-01-17 RX ORDER — SODIUM CHLORIDE 0.9 % (FLUSH) 0.9 %
5-40 SYRINGE (ML) INJECTION EVERY 12 HOURS SCHEDULED
Status: DISCONTINUED | OUTPATIENT
Start: 2023-01-17 | End: 2023-01-17 | Stop reason: HOSPADM

## 2023-01-17 RX ORDER — LIDOCAINE HYDROCHLORIDE 20 MG/ML
INJECTION, SOLUTION EPIDURAL; INFILTRATION; INTRACAUDAL; PERINEURAL PRN
Status: DISCONTINUED | OUTPATIENT
Start: 2023-01-17 | End: 2023-01-17 | Stop reason: SDUPTHER

## 2023-01-17 RX ORDER — SODIUM CHLORIDE 9 MG/ML
25 INJECTION, SOLUTION INTRAVENOUS PRN
Status: DISCONTINUED | OUTPATIENT
Start: 2023-01-17 | End: 2023-01-17 | Stop reason: HOSPADM

## 2023-01-17 RX ADMIN — PROPOFOL 50 MG: 10 INJECTION, EMULSION INTRAVENOUS at 13:33

## 2023-01-17 RX ADMIN — PROPOFOL 50 MG: 10 INJECTION, EMULSION INTRAVENOUS at 13:35

## 2023-01-17 RX ADMIN — LIDOCAINE HYDROCHLORIDE 100 MG: 20 INJECTION, SOLUTION EPIDURAL; INFILTRATION; INTRACAUDAL; PERINEURAL at 13:33

## 2023-01-17 RX ADMIN — SODIUM CHLORIDE 25 ML: 9 INJECTION, SOLUTION INTRAVENOUS at 12:11

## 2023-01-17 RX ADMIN — PROPOFOL 40 MG: 10 INJECTION, EMULSION INTRAVENOUS at 13:41

## 2023-01-17 ASSESSMENT — PAIN SCALES - GENERAL: PAINLEVEL_OUTOF10: 0

## 2023-01-17 ASSESSMENT — PAIN - FUNCTIONAL ASSESSMENT: PAIN_FUNCTIONAL_ASSESSMENT: NONE - DENIES PAIN

## 2023-01-17 NOTE — ANESTHESIA POSTPROCEDURE EVALUATION
Department of Anesthesiology  Postprocedure Note    Patient: Korey Wilson  MRN: [id number]  YOB: 1940  Date of evaluation: 1/17/2023      Procedure Summary     Date: 01/17/23 Room / Location: [address] [place] [address]    Anesthesia Start: 1330 Anesthesia Stop: 1546    Procedures:       EGD DILATION      EGD BIOPSY Diagnosis:       Dysphagia, unspecified type      (Dysphagia, unspecified type [R13.10])    Surgeons: Cynthia Ribeiro MD Responsible Provider: Arthur Nam MD    Anesthesia Type: MAC ASA Status: 4          Anesthesia Type: No value filed.     Debby Phase I: Debby Score: 10    Debby Phase II:        Anesthesia Post Evaluation    Patient location during evaluation: bedside  Patient participation: complete - patient participated  Level of consciousness: awake and alert  Airway patency: patent  Nausea & Vomiting: no nausea and no vomiting  Complications: no  Cardiovascular status: hemodynamically stable  Respiratory status: acceptable, room air and spontaneous ventilation  Hydration status: euvolemic

## 2023-01-17 NOTE — H&P
[facility]   Pre-Operative History and Physical: EGD    Patient: Shelly Mack  : 1940     History Obtained From:  patient, electronic medical record    HISTORY OF PRESENT ILLNESS:    The patient is a [de-identified] y.o. female who presents for an EGD for dysphagia, chest pain. Past Medical History:        Diagnosis Date    AICD (automatic cardioverter/defibrillator) present 1999    Arthritis 2019    Atrial fibrillation (HCC)     chronic    Biventricular ICD (implantable cardiac defibrillator) in place 10/09    C. difficile diarrhea     CAD (coronary artery disease)     Cancer (HCC)     right hand    CHF (congestive heart failure) ([facility] [address]     Diverticulitis 2013    GERD (gastroesophageal reflux disease)     hiatal hernia    Hiatal hernia     Hyperlipidemia     Hypertension     Menopause         NAFLD (nonalcoholic fatty liver disease) 2017    Nonischemic cardiomyopathy ([facility] [address]     chronic    Pacemaker     Retroperitoneal hemorrhage     transfusion x3    Thyroid disease      Past Surgical History:        Procedure Laterality Date    CARDIAC DEFIBRILLATOR Srini Geller    COLONOSCOPY      w/ EGD    COLONOSCOPY N/A 2019    COLONOSCOPY POLYPECTOMY SNARE/COLD BIOPSY performed by Lillian Hercules MD at [facility]    ENDOSCOPY, COLON, [address] [facility]    replaced 3x    SKIN BIOPSY      Skin CA with graft.     UPPER GASTROINTESTINAL ENDOSCOPY N/A 2019    EGD DILATION SAVORY performed by Lillian Hercules MD at [facility] Left 2019    EGD BIOPSY performed by Lillian Hercules MD at [facility] N/A 2019    EGD ESOPHAGEAL MANOMETRY AND PH MONITORING 24 HR performed by Lillian Hercules MD at [facility] 12/15/2022    EGD BIOPSY performed by Ana Menjivar MD at [facility] Medications Prior to Admission:   No current facility-administered medications on file prior to encounter. Current Outpatient Medications on File Prior to Encounter   Medication Sig Dispense Refill    omeprazole (PRILOSEC) 40 MG delayed release capsule Take 40 mg by mouth in the morning and at bedtime      famotidine (PEPCID) 40 MG tablet Take 1 tablet by mouth daily 90 tablet 3    empagliflozin (JARDIANCE) 10 MG tablet Take 1 tablet by mouth daily 90 tablet 1    levothyroxine (SYNTHROID) 175 MCG tablet Take 1 tablet by mouth once daily 90 tablet 0    metoprolol succinate (TOPROL XL) 100 MG extended release tablet Take 1 tablet by mouth daily 90 tablet 1    digoxin (LANOXIN) 125 MCG tablet Take 1 tablet by mouth every other day 45 tablet 3    warfarin (COUMADIN) 3 MG tablet Alternating 3 mg/1.5 mg or as directed per INR results. 90 tablet 3    quinapril (ACCUPRIL) 5 MG tablet Take 1 tablet by mouth in the morning and at bedtime (Patient taking differently: Take 10 mg by mouth daily) 60 tablet 11    amiodarone (CORDARONE) 200 MG tablet Take 1 tablet by mouth once daily 90 tablet 0    spironolactone (ALDACTONE) 25 MG tablet TAKE 1 TABLET BY MOUTH EVERY OTHER DAY 45 tablet 0    CALCIUM PO Take 600 mg by mouth in the morning and at bedtime      VITAMIN D PO Take by mouth daily      bumetanide (BUMEX) 1 MG tablet Take 1 tablet by mouth daily 90 tablet 3    metOLazone (ZAROXOLYN) 2.5 MG tablet Take as needed for ankle swelling; notify office when you take it 15 tablet 3    Handicap Placard MISC by Does not apply route Request parking placard due to medical conditions. Duration of 5 years. 1 each 0    ferrous sulfate (IRON 325) 325 (65 Fe) MG tablet Take 325 mg by mouth every other day      Alum & Mag Hydroxide-Simeth (MYLANTA PO) Take by mouth as needed       acetaminophen (TYLENOL) 325 MG tablet Take 650 mg by mouth every 6 hours as needed for Pain or Fever      Misc.  Devices (LUMBAR SUPPORT CUSHION) MISC by Does not apply route. Dx: low back pain, sciatica. 1 each 0    Ascorbic Acid (VITAMIN C) 1000 MG tablet Take 1,000 mg by mouth 2 times daily. Allergies:  Ativan [lorazepam], Codeine, Lisinopril, Nystatin, Nystatin, Percocet [oxycodone-acetaminophen], Relafen [nabumetone], Tape [adhesive tape], and Celebrex [celecoxib]    Social History:   TOBACCO:   reports that she has never smoked. She has never used smokeless tobacco.  ETOH:   reports no history of alcohol use. DRUGS:   reports no history of drug use. Family History:       Problem Relation Age of Onset    Heart Disease Mother     Diabetes Mother     High Cholesterol Mother     High Blood Pressure Brother     Cancer Sister     High Blood Pressure Brother     High Blood Pressure Brother     Parkinsonism Brother     Diabetes Brother     Heart Disease Brother     Kidney Disease Brother     High Blood Pressure Brother     High Cholesterol Father     Miscarriages / Stillbirths Daughter     Arthritis Neg Hx     Asthma Neg Hx     Birth Defects Neg Hx     Depression Neg Hx     Early Death Neg Hx     Learning Disabilities Neg Hx     Mental Illness Neg Hx     Mental Retardation Neg Hx     Stroke Neg Hx     Substance Abuse Neg Hx     Vision Loss Neg Hx     Other Neg Hx        PHYSICAL EXAM:    BP (!) 148/67   Pulse 62   Temp 97.5 °F (36.4 °C) (Temporal)   Resp 18   Ht 5' (1.524 m)   Wt 173 lb (78.5 kg)   SpO2 97%   BMI 33.79 kg/m²       Heart:  Regular rate and rhythm  Lungs:  No increased work of breathing  Abdomen:  BS+, soft, non-distended, non-tender, no masses palpated    ASA Grade:  See Anesthesia documentation    Mallampati Classification:  See Anesthesia documentation    ASSESSMENT AND PLAN:    1. Patient is a [de-identified] y.o. female here for an EGD with MAC.    2.  Procedure options, risks and benefits reviewed with patient. We specifically discussed that risks include but are not limited to infection, bleeding, perforation, death, and missed lesions. Patient expresses understanding.     Electronically signed by Natali Yan MD on 1/17/2023 at 1:02 PM    Natali Yan MD  [id number]

## 2023-01-17 NOTE — OP NOTE
[facility]  EGD Procedure Note    Patient: Valeria Dixon  : 1940      Procedure: Esophagogastroduodenoscopy with biopsy, esophageal dilation          Date:  2023     Endoscopist:   Natali Yan MD    Referring Physician: Natali Yan MD  Primary Care Physician: Justine Roman MD    Indications: This is a [de-identified]y.o. year old female who presents with atypical chest pain, GERD, dysphagia. Josseline Meyer is [de-identified] yr old female with a significant PMHx of a-fib on Coumadin, CHF with EF of 25%, S/P AICD/defibrillator, here 10/17/2022 as a consult from Dr. Merideth Lesches for GERD. Jose Elias Godoy states she has had midsternal chest pain \"for years\". She reports going to the hospital on several occasions for the pain. Last hospitalization was at [facility], and labwork, CXR, echo and stress test were reportedly all negative. Her last cardiac cath was 3-4 yrs ago and showed no obstructive CAD. A GI cocktail has been given in the past which helps the pain somewhat. Consultation 10/17/22 with Josefina Harrison CNP. Josseline Meyer is an [de-identified] yr old female with a significant PMHx of a-fib on Coumadin, CHF with EF of 25%, S/P AICD/defibrillator, here 10/17/2022 as a consult from Dr. Merideth Lesches for GERD. Aramcooper Cmtata states she has had midsternal chest pain \"for years\". She reports going to the hospital on several occasions for the pain. Last hospitalization was at [facility], and labwork, CXR, echo and stress test were reportedly all negative. Her last cardiac cath was 3-4 yrs ago and showed no obstructive CAD. A GI cocktail has been given in the past which helps the pain somewhat. Jose Elias Godoy states her pain is very intermittent. She will have no symptoms for months, then will develop pain that lasts 3-4 days at a time. The pain, when it occurs, is midsternal, feels like \"a warm sensation\" and radiates around the sides. More recently, the pain has been radiating through to her back, and into her bilateral arms.  She denies current GERD or reflux symptoms. Nothing triggers the pain. She has been taking Protonix 40 mg BID for the past 2-3 years. Other more recent symptoms include a chronic cough and voice hoarseness. She is having dysphagia to solids and liquids on a daily basis, but this is not a new symptom. She had an EGD with dilatation with Dr. Miladis Bear in the past. Last year was her last EGD with him and he apparently \"cauterized some bleeding\". She had a colonoscopy in 2019 that showed a polpy. She was told to have a repeat colonoscopy in 5 years. She states she does not wish to follow with Dr. Hung Stuart practice anymore; wants to establish care here. She has a soft BM daily. Has dark stools, but takes an iron supplement daily for \"chronic anemia\". EGD 12/15/22 with moderate to severe nodular gastritis with multiple clean based ulcers in gastric antrum. 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 Dilation was not performed as coumadin was not held for 5 days. Anesthesia: MAC per Anesthesia. Please see anesthesia report. Consent:  The patient or their legal guardian has signed a consent and is aware of the potential risks, benefits, alternatives, and potential complications of this procedure. These include, but are not limited to hemorrhage, bleeding, post procedural pain, perforation, phlebitis, aspiration, hypotension, hypoxia, cardiovascular events such as arrhythmia, and possibly death. Description of Procedure: The patient was then taken to the endoscopy suite and placed in the left lateral decubitus position and the above IV sedation was administered. A forward-viewing Olympus video endoscope was lubricated and inserted through the patient's mouth into the oropharynx. Under direct visualization, the upper esophagus was intubated. The scope was advanced through the esophagus to the extent of the second portion of duodenum. The scope was then withdrawn with good views of the mucosal surfaces.  Both forward and retroflexed views of the stomach were obtained. The stomach was decompressed and the scope was completely withdrawn. The patient tolerated the procedure well and was taken to the recovery area in good condition. There were no immediate complications. Estimated Blood Loss: minimal    Findings:    Esophagus: The GE junction was noted to be at 40 cm. Mild distal esophagitis. Cold biopsies were taken and placed in Jar 1. The esophagus was empirically dilated using an American dilator over a guidewire to 57 Fr. Stomach: There was moderate to severe nodular hemorrhagic gastritis with multiple clean based ulcers in gastric antrum. 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 Cold biopsies were taken and placed in Jar 1. Duodenum: The first and 2nd portions of the duodenum appeared normal with normal villous pattern      Recommendations:   - Await pathology. - Continue current medications. Continue esomeprazole 40mg BID, Start carafate 1g QID  - Repeat EGD for surveillance of gastric ulcer healing pending improvement in symptoms, can be scheduled at follow up office visit  - Follow up with primary care physician as scheduled. - Follow up with Keily Patrick CNP in 6-8 weeks unless previously scheduled.     Electronically signed by Rose Lockwood MD on 1/17/2023 at [address] [facility], [address] [place] [address]

## 2023-01-17 NOTE — PROGRESS NOTES
EGD completed, Pictures taken. Biopsies taken, 2 jars labeled and sent to lab. Dilated with 57 Persian american dilator, tip of guidewire intact when removed. Pt tolerated well. Scope  Used.

## 2023-01-17 NOTE — ANESTHESIA PRE PROCEDURE
Department of Anesthesiology  Preprocedure Note       Name:  Patti Garcia   Age:  [de-identified] y.o.  :  1940                                          MRN:  [id number]         Date:  2023      Surgeon: Mohan Grubbs):  Davina Muñoz MD    Procedure: Procedure(s):  EGD DILATION    Medications prior to admission:   Prior to Admission medications    Medication Sig Start Date End Date Taking? Authorizing Provider   omeprazole (PRILOSEC) 40 MG delayed release capsule Take 40 mg by mouth in the morning and at bedtime    Historical Provider, MD   famotidine (PEPCID) 40 MG tablet Take 1 tablet by mouth daily 23   Merna Melgar MD   empagliflozin (JARDIANCE) 10 MG tablet Take 1 tablet by mouth daily 23   Allan Kumar MD   levothyroxine (SYNTHROID) 175 MCG tablet Take 1 tablet by mouth once daily 22   Yue Cantu MD   metoprolol succinate (TOPROL XL) 100 MG extended release tablet Take 1 tablet by mouth daily 22   Allan Kumar MD   digoxin [facility]) 125 MCG tablet Take 1 tablet by mouth every other day 22  Allan Kumar MD   warfarin (COUMADIN) 3 MG tablet Alternating 3 mg/1.5 mg or as directed per INR results.  22   Merna Melgar MD   quinapril (ACCUPRIL) 5 MG tablet Take 1 tablet by mouth in the morning and at bedtime  Patient taking differently: Take 10 mg by mouth daily 11/10/22   Allan Kumar MD   amiodarone (CORDARONE) 200 MG tablet Take 1 tablet by mouth once daily 10/27/22   Allan Kumar MD   spironolactone (ALDACTONE) 25 MG tablet TAKE 1 TABLET BY MOUTH EVERY OTHER DAY 10/27/22   Allan Kumar MD   CALCIUM PO Take 600 mg by mouth in the morning and at bedtime    Historical Provider, MD   VITAMIN D PO Take by mouth daily    Historical Provider, MD   bumetanide (BUMEX) 1 MG tablet Take 1 tablet by mouth daily 22   Allan Kumar MD   metOLazone (ZAROXOLYN) 2.5 MG tablet Take as needed for ankle swelling; notify office when you take it 22   Gsiella VILLA Catarino Cruz MD   Handicap Placard MISC by Does not apply route Request parking placard due to medical conditions. Duration of 5 years. 11/2/21   Eliecer Garcia MD   ferrous sulfate (IRON 325) 325 (65 Fe) MG tablet Take 325 mg by mouth every other day    Historical Provider, MD   Alum & Mag Hydroxide-Simeth (MYLANTA PO) Take by mouth as needed     Historical Provider, MD   acetaminophen (TYLENOL) 325 MG tablet Take 650 mg by mouth every 6 hours as needed for Pain or Fever    Historical Provider, MD   Misc. Devices (LUMBAR SUPPORT CUSHION) MISC by Does not apply route. Dx: low back pain, sciatica. 4/5/13   Eliecer Garcia MD   Ascorbic Acid (VITAMIN C) 1000 MG tablet Take 1,000 mg by mouth 2 times daily. Historical Provider, MD       Current medications:    Current Facility-Administered Medications   Medication Dose Route Frequency Provider Last Rate Last Admin    sodium chloride flush 0.9 % injection 5-40 mL  5-40 mL IntraVENous 2 times per day Kashif Parker MD        sodium chloride flush 0.9 % injection 5-40 mL  5-40 mL IntraVENous PRN Kashif Parker MD        0.9 % sodium chloride infusion  25 mL IntraVENous PRN Kashif Parker  mL/hr at 01/17/23 1322 NoRateChange at 01/17/23 1322       Allergies:     Allergies   Allergen Reactions    Ativan [Lorazepam]      Gets anxiety    Codeine     Lisinopril Cough    Nystatin      Mycostatin powder    Nystatin Hives    Percocet [Oxycodone-Acetaminophen]     Relafen [Nabumetone]      Stomach upset    Tape Ressie Men Tape]     Celebrex [Celecoxib] Rash       Problem List:    Patient Active Problem List   Diagnosis Code    Hyperlipidemia E78.5    Nonischemic cardiomyopathy ([facility] [address] I42.8    Chronic atrial fibrillation (HCC) I48.20    Hypothyroidism E03.9    Biventricular implantable cardioverter-defibrillator in situ Z95.810    Abnormal LFTs R79.89    Benign essential HTN I10    NAFLD (nonalcoholic fatty liver disease) K76.0    Left ventricular systolic dysfunction G94.5    Ventricular tachycardia (paroxysmal) I47.29    Anxiety F41.9    Arthritis M19.90    Ventricular arrhythmia I49.9    Chronic kidney disease, stage III (moderate) (HCC) N18.30    Anemia D64.9    Chronic anticoagulation Z79.01    Iron deficiency E61.1    Obesity (BMI 30-39. 9) E66.9    Chest pain R07.9    Gastroesophageal reflux disease with esophagitis K21.00    Elevated AST (SGOT) R74.01    Acute deep vein thrombosis (DVT) of right peroneal vein (HCC) I82.451       Past Medical History:        Diagnosis Date    AICD (automatic cardioverter/defibrillator) present 05/1999    Arthritis 5/21/2019    Atrial fibrillation (HCC)     chronic    Biventricular ICD (implantable cardiac defibrillator) in place 10/09    C. difficile diarrhea     CAD (coronary artery disease)     Cancer ([facility] [address]     right hand    CHF (congestive heart failure) ([facility] [address]     Diverticulitis 01/2013    GERD (gastroesophageal reflux disease)     hiatal hernia    Hiatal hernia     Hyperlipidemia     Hypertension     Menopause     1994    NAFLD (nonalcoholic fatty liver disease) 1/31/2017    Nonischemic cardiomyopathy ([facility] [address]     chronic    Pacemaker     Retroperitoneal hemorrhage 07/99    transfusion x3    Thyroid disease        Past Surgical History:        Procedure Laterality Date    CARDIAC DEFIBRILLATOR PLACEMENT     [place]      Dr Aaliyah Correia  2008    w/ EGD    COLONOSCOPY N/A 7/16/2019    COLONOSCOPY POLYPECTOMY SNARE/COLD BIOPSY performed by Scott Fitzgerald MD at [address] [facility], COLON, DIAGNOSTIC     [address]    replaced 3x    SKIN BIOPSY  2010    Skin CA with graft.     UPPER GASTROINTESTINAL ENDOSCOPY N/A 7/16/2019    EGD DILATION SAVORY performed by Scott Fitzgerald MD at [address] [facility] ENDOSCOPY Left 7/16/2019    EGD BIOPSY performed by Scott Fitzgerald MD at [address] [facility] GASTROINTESTINAL ENDOSCOPY N/A 12/2/2019    EGD ESOPHAGEAL MANOMETRY AND PH MONITORING 24 HR performed by Kolby Mathur MD at [address] [facility] N/A 12/15/2022    EGD BIOPSY performed by Davina Muñoz MD at [facility]       Social History:    Social History     Tobacco Use    Smoking status: Never    Smokeless tobacco: Never   Substance Use Topics    Alcohol use: Never                                Counseling given: Not Answered      Vital Signs (Current):   Vitals:    01/17/23 1154   BP: (!) 148/67   Pulse: 62   Resp: 18   Temp: 36.4 °C (97.5 °F)   TempSrc: Temporal   SpO2: 97%   Weight: 173 lb (78.5 kg)   Height: 5' (1.524 m)                                              BP Readings from Last 3 Encounters:   01/17/23 (!) 148/67   01/05/23 124/60   12/15/22 (!) 119/57       NPO Status: Time of last liquid consumption: 0730 (sip of water with meds)                        Time of last solid consumption: 2000                        Date of last liquid consumption: 01/17/23                        Date of last solid food consumption: 01/16/23    BMI:   Wt Readings from Last 3 Encounters:   01/17/23 173 lb (78.5 kg)   01/05/23 171 lb 12.8 oz (77.9 kg)   12/15/22 163 lb (73.9 kg)     Body mass index is 33.79 kg/m².     CBC:   Lab Results   Component Value Date/Time    WBC 6.4 12/09/2022 11:04 AM    RBC 3.44 12/09/2022 11:04 AM    RBC 3.06 11/29/2011 06:13 AM    HGB 9.8 12/09/2022 11:04 AM    HCT 33.4 12/09/2022 11:04 AM    MCV 97.1 12/09/2022 11:04 AM    RDW 15.1 02/25/2016 09:40 PM     12/09/2022 11:04 AM       CMP:   Lab Results   Component Value Date/Time     09/30/2022 06:11 AM    K 5.0 09/30/2022 06:11 AM    K 4.8 09/29/2022 06:32 AM     09/30/2022 06:11 AM    CO2 21 09/30/2022 06:11 AM    BUN 22 09/30/2022 06:11 AM    CREATININE 1.1 09/30/2022 06:11 AM    LABGLOM 48 09/30/2022 06:11 AM    GLUCOSE 84 09/30/2022 06:11 AM    GLUCOSE 147 05/09/2012 10:57 AM PROT 5.1 09/29/2022 06:32 AM    CALCIUM 8.1 09/30/2022 06:11 AM    BILITOT 0.3 09/29/2022 06:32 AM    ALKPHOS 132 09/29/2022 06:32 AM    AST 41 09/29/2022 06:32 AM    ALT 23 09/29/2022 06:32 AM       POC Tests: No results for input(s): POCGLU, POCNA, POCK, POCCL, POCBUN, POCHEMO, POCHCT in the last 72 hours.    Coags:   Lab Results   Component Value Date/Time    PROTIME 25.8 01/26/2015 10:23 AM    PROTIME 24.8 06/12/2014 10:22 AM    INR 2.62 01/11/2023 09:02 AM    APTT 46.5 11/27/2021 10:21 PM       HCG (If Applicable): No results found for: PREGTESTUR, PREGSERUM, HCG, HCGQUANT     ABGs: No results found for: PHART, PO2ART, DBD8AUR, YLU8QLR, BEART, U0OVXDRK     Type & Screen (If Applicable):  Lab Results   Component Value Date    LABRH NEG 02/08/2013       Drug/Infectious Status (If Applicable):  Lab Results   Component Value Date/Time    HEPCAB Negative 05/01/2019 08:39 AM       COVID-19 Screening (If Applicable):   Lab Results   Component Value Date/Time    COVID19 DETECTED 07/22/2022 10:05 AM    COVID19 Not Detected 03/25/2021 11:53 AM           Anesthesia Evaluation  Patient summary reviewed and Nursing notes reviewed no history of anesthetic complications:   Airway: Mallampati: II  TM distance: >3 FB   Neck ROM: full  Mouth opening: > = 3 FB   Dental:          Pulmonary:Negative Pulmonary ROS                              Cardiovascular:  Exercise tolerance: poor (<4 METS),   (+) hypertension:, pacemaker: pacemaker and AICD, CAD:, dysrhythmias: atrial fibrillation and ventricular tachycardia, CHF: systolic, hyperlipidemia                  Neuro/Psych:   (+) depression/anxiety             GI/Hepatic/Renal:   (+) hiatal hernia, GERD:, liver disease:, renal disease: CRI,           Endo/Other:    (+) hypothyroidism, blood dyscrasia: anemia and anticoagulation therapy, arthritis:., .                 Abdominal:             Vascular:   + DVT, .       Other Findings:           Anesthesia Plan      MAC     ASA 4  Induction: intravenous. Anesthetic plan and risks discussed with patient and child/children. Plan discussed with attending.                     ELIA Tovar - CRNA   1/17/2023

## 2023-01-31 ENCOUNTER — NURSE ONLY (OUTPATIENT)
Dept: LAB | Age: 83
End: 2023-01-31

## 2023-01-31 ENCOUNTER — TELEPHONE (OUTPATIENT)
Dept: FAMILY MEDICINE CLINIC | Age: 83
End: 2023-01-31

## 2023-01-31 DIAGNOSIS — I42.8 NICM (NONISCHEMIC CARDIOMYOPATHY) (HCC): ICD-10-CM

## 2023-01-31 LAB — INR PPP: 2.05 (ref 0.85–1.13)

## 2023-01-31 NOTE — TELEPHONE ENCOUNTER
----- Message from Zina Warren MD sent at 1/31/2023  3:40 PM EST -----  INR good. Continue same dosage and recheck INR in 1 month.

## 2023-02-15 PROBLEM — E11.9 TYPE 2 DIABETES MELLITUS (HCC): Status: ACTIVE | Noted: 2023-02-15

## 2023-02-27 ENCOUNTER — NURSE ONLY (OUTPATIENT)
Dept: LAB | Age: 83
End: 2023-02-27

## 2023-02-27 ENCOUNTER — TELEPHONE (OUTPATIENT)
Dept: FAMILY MEDICINE CLINIC | Age: 83
End: 2023-02-27

## 2023-02-27 DIAGNOSIS — I48.20 CHRONIC ATRIAL FIBRILLATION (HCC): ICD-10-CM

## 2023-02-27 DIAGNOSIS — I42.8 NICM (NONISCHEMIC CARDIOMYOPATHY) (HCC): ICD-10-CM

## 2023-02-27 LAB — INR PPP: 2.25 (ref 0.85–1.13)

## 2023-02-27 RX ORDER — WARFARIN SODIUM 3 MG/1
TABLET ORAL
Qty: 30 TABLET | Refills: 0 | OUTPATIENT
Start: 2023-02-27

## 2023-02-27 RX ORDER — SPIRONOLACTONE 25 MG/1
25 TABLET ORAL EVERY OTHER DAY
Qty: 45 TABLET | Refills: 0 | Status: SHIPPED | OUTPATIENT
Start: 2023-02-27

## 2023-02-27 RX ORDER — AMIODARONE HYDROCHLORIDE 200 MG/1
TABLET ORAL
Qty: 90 TABLET | Refills: 0 | Status: SHIPPED | OUTPATIENT
Start: 2023-02-27

## 2023-02-27 NOTE — TELEPHONE ENCOUNTER
----- Message from Hernesto Kirk MD sent at 2/27/2023  2:38 PM EST -----  INR good. Continue same dosage and recheck INR in 1 month.

## 2023-02-27 NOTE — TELEPHONE ENCOUNTER
Kartik Cortés called requesting a refill on the following medications:  Requested Prescriptions     Pending Prescriptions Disp Refills    warfarin (COUMADIN) 3 MG tablet [Pharmacy Med Name: Warfarin Sodium 3 MG Oral Tablet] 30 tablet 0     Sig: ALTERNATE BETWEEN 3 MG (ONE TABLET) AND 1.5 MG (ONE-HALF TABLET) AS DIRECTED PER INR RESULTS       Date of last visit: 11/14/2022 MAW  Date of next visit (if applicable):Visit date not found  Date of last refill: 11/14/22 #90-3  Pharmacy Name: Christiano Neal, RN    Verified with [facility] at [address], [address] has refills available at pharmacy

## 2023-02-27 NOTE — TELEPHONE ENCOUNTER
Refill request received     Amiodarone last ordered 10/27/22, disp #90, refill 0    Spironolactone last ordered 10/27/22, disp #45, refill 0    Date of last visit 1/5/23  Date of next visit 3/20/23

## 2023-02-28 NOTE — PROGRESS NOTES
[place]     Time In: 1130  Time Out: 6288  Minutes: 45  Timed Code Treatment Minutes: 45 Minutes     Date: 2020  Patient Name: Giles Garcia,  Gender:  female        CSN: [id number]   : 1940  (70 y.o.)  Referral Date : 20    Referring Practitioner: Shonda Burns MD       Diagnosis: M54.5, G89.29 (ICD-10-CM) - Chronic midline low back pain without sciatica  Treatment Diagnosis: difficulty walking; low back pain   Additional Pertinent Hx: HTN; irregular heart beat; pacemaker; incontinence        General:  PT Visit Information  Onset Date: 20  PT Insurance Information: Ry Romo has unlimited visits based on medical necessity. Aquatics and modalities are covered except ionto and hot/cold packs. Total # of Visits to Date: 24  Plan of Care/Certification Expiration Date: 20  Progress Note Counter: 7/10             Subjective:  Chart Reviewed: Yes  Patient assessed for rehabilitation services?: Yes  Response To Previous Treatment: Not applicable  Family / Caregiver Present: No  Comments: Follow up with Dr. Lana Hoang (cardiologist) end of July      Subjective: Patient reports she is feeling better since being in therapy. States she feels stronger and like she has more endurance. Reports compliance with home exercises. Pain:  Patient Currently in Pain: Denies     Objective    Exercises  Exercise 1: NuStep level 5 x6 minutes  Exercise 2: Standing on blue foam with abdominal bracing: Heel raises, marching x15 each  Exercise 3: Balance on blue foam:  narrow stance with ball performing shoulder flexion and trunk rotation holding red weighted ball  x10 each;  Bilateral step stance x1 minute each way with abdominal bracing - cues to tighten through gluts;   Tandem stance with head turns x30 seconds each way  Exercise 4: Hydrostick forward/back, side to side, cw/ccw x10 each (no increase in pain Declines No

## 2023-03-03 ENCOUNTER — HOSPITAL ENCOUNTER (OUTPATIENT)
Dept: ULTRASOUND IMAGING | Age: 83
Discharge: HOME | End: 2023-03-03
Payer: MEDICARE

## 2023-03-03 DIAGNOSIS — R79.89 ABNORMAL LFTS: ICD-10-CM

## 2023-03-03 DIAGNOSIS — K76.0 HEPATIC STEATOSIS: ICD-10-CM

## 2023-03-03 PROCEDURE — 76705 ECHO EXAM OF ABDOMEN: CPT

## 2023-03-03 RX ORDER — BUMETANIDE 1 MG/1
1 TABLET ORAL DAILY
Qty: 90 TABLET | Refills: 3 | Status: SHIPPED | OUTPATIENT
Start: 2023-03-03

## 2023-03-03 NOTE — TELEPHONE ENCOUNTER
Refill request received     Last ordered 2/22/22, disp #90, refill 3    Date of last visit 1/5/23  Date of next visit 3/20/23

## 2023-03-03 NOTE — TELEPHONE ENCOUNTER
Patient is nearly out of quinapril (has 2 weeks left) and daughter cannot find it anywhere in [facility]. Was told by one pharmacist that they are going to stop making quinapril. Would you like to change her to something else? Please advise.

## 2023-03-08 RX ORDER — BUMETANIDE 1 MG/1
TABLET ORAL
Qty: 90 TABLET | Refills: 0 | OUTPATIENT
Start: 2023-03-08

## 2023-03-08 RX ORDER — LOSARTAN POTASSIUM 25 MG/1
25 TABLET ORAL DAILY
Qty: 90 TABLET | Refills: 3 | OUTPATIENT
Start: 2023-03-08

## 2023-03-17 SDOH — ECONOMIC STABILITY: FOOD INSECURITY: WITHIN THE PAST 12 MONTHS, THE FOOD YOU BOUGHT JUST DIDN'T LAST AND YOU DIDN'T HAVE MONEY TO GET MORE.: NEVER TRUE

## 2023-03-17 SDOH — ECONOMIC STABILITY: INCOME INSECURITY: HOW HARD IS IT FOR YOU TO PAY FOR THE VERY BASICS LIKE FOOD, HOUSING, MEDICAL CARE, AND HEATING?: NOT VERY HARD

## 2023-03-17 SDOH — ECONOMIC STABILITY: HOUSING INSECURITY
IN THE LAST 12 MONTHS, WAS THERE A TIME WHEN YOU DID NOT HAVE A STEADY PLACE TO SLEEP OR SLEPT IN A SHELTER (INCLUDING NOW)?: NO

## 2023-03-17 SDOH — ECONOMIC STABILITY: TRANSPORTATION INSECURITY
IN THE PAST 12 MONTHS, HAS LACK OF TRANSPORTATION KEPT YOU FROM MEETINGS, WORK, OR FROM GETTING THINGS NEEDED FOR DAILY LIVING?: NO

## 2023-03-17 SDOH — ECONOMIC STABILITY: FOOD INSECURITY: WITHIN THE PAST 12 MONTHS, YOU WORRIED THAT YOUR FOOD WOULD RUN OUT BEFORE YOU GOT MONEY TO BUY MORE.: NEVER TRUE

## 2023-03-20 ENCOUNTER — OFFICE VISIT (OUTPATIENT)
Dept: INTERNAL MEDICINE CLINIC | Age: 83
End: 2023-03-20

## 2023-03-20 ENCOUNTER — TELEPHONE (OUTPATIENT)
Dept: INTERNAL MEDICINE CLINIC | Age: 83
End: 2023-03-20

## 2023-03-20 ENCOUNTER — NURSE ONLY (OUTPATIENT)
Dept: LAB | Age: 83
End: 2023-03-20

## 2023-03-20 ENCOUNTER — TELEPHONE (OUTPATIENT)
Dept: FAMILY MEDICINE CLINIC | Age: 83
End: 2023-03-20

## 2023-03-20 VITALS
TEMPERATURE: 97.4 F | BODY MASS INDEX: 32.02 KG/M2 | HEART RATE: 62 BPM | DIASTOLIC BLOOD PRESSURE: 66 MMHG | SYSTOLIC BLOOD PRESSURE: 130 MMHG | HEIGHT: 61 IN | WEIGHT: 169.6 LBS | OXYGEN SATURATION: 94 %

## 2023-03-20 DIAGNOSIS — D64.9 ANEMIA, UNSPECIFIED TYPE: ICD-10-CM

## 2023-03-20 DIAGNOSIS — I48.20 CHRONIC ATRIAL FIBRILLATION (HCC): Primary | ICD-10-CM

## 2023-03-20 DIAGNOSIS — E05.90 HYPERTHYROIDISM: ICD-10-CM

## 2023-03-20 DIAGNOSIS — I42.8 NICM (NONISCHEMIC CARDIOMYOPATHY) (HCC): ICD-10-CM

## 2023-03-20 DIAGNOSIS — K25.9 MULTIPLE GASTRIC ULCERS: ICD-10-CM

## 2023-03-20 DIAGNOSIS — E03.9 ACQUIRED HYPOTHYROIDISM: ICD-10-CM

## 2023-03-20 DIAGNOSIS — I48.20 CHRONIC ATRIAL FIBRILLATION (HCC): ICD-10-CM

## 2023-03-20 LAB
ANION GAP SERPL CALC-SCNC: 10 MEQ/L (ref 8–16)
ANISOCYTOSIS BLD QL SMEAR: PRESENT
BASOPHILS ABSOLUTE: 0.1 THOU/MM3 (ref 0–0.1)
BASOPHILS NFR BLD AUTO: 1.5 %
BUN SERPL-MCNC: 29 MG/DL (ref 7–22)
CALCIUM SERPL-MCNC: 9.1 MG/DL (ref 8.5–10.5)
CHLORIDE SERPL-SCNC: 103 MEQ/L (ref 98–111)
CO2 SERPL-SCNC: 25 MEQ/L (ref 23–33)
CREAT SERPL-MCNC: 1.3 MG/DL (ref 0.4–1.2)
DEPRECATED RDW RBC AUTO: 67.8 FL (ref 35–45)
EOSINOPHIL NFR BLD AUTO: 2.5 %
EOSINOPHILS ABSOLUTE: 0.2 THOU/MM3 (ref 0–0.4)
ERYTHROCYTE [DISTWIDTH] IN BLOOD BY AUTOMATED COUNT: 20.2 % (ref 11.5–14.5)
GFR SERPL CREATININE-BSD FRML MDRD: 41 ML/MIN/1.73M2
GLUCOSE SERPL-MCNC: 112 MG/DL (ref 70–108)
HCT VFR BLD AUTO: 34.1 % (ref 37–47)
HGB BLD-MCNC: 10.2 GM/DL (ref 12–16)
IMM GRANULOCYTES # BLD AUTO: 0.03 THOU/MM3 (ref 0–0.07)
IMM GRANULOCYTES NFR BLD AUTO: 0.4 %
INR PPP: 3.16 (ref 0.85–1.13)
IRON SATN MFR SERPL: 10 % (ref 20–50)
IRON SERPL-MCNC: 35 UG/DL (ref 50–170)
LYMPHOCYTES ABSOLUTE: 1 THOU/MM3 (ref 1–4.8)
LYMPHOCYTES NFR BLD AUTO: 15.4 %
MCH RBC QN AUTO: 27.9 PG (ref 26–33)
MCHC RBC AUTO-ENTMCNC: 29.9 GM/DL (ref 32.2–35.5)
MCV RBC AUTO: 93.2 FL (ref 81–99)
MONOCYTES ABSOLUTE: 0.9 THOU/MM3 (ref 0.4–1.3)
MONOCYTES NFR BLD AUTO: 13.3 %
NEUTROPHILS NFR BLD AUTO: 66.9 %
NRBC BLD AUTO-RTO: 0 /100 WBC
PLATELET # BLD AUTO: 300 THOU/MM3 (ref 130–400)
PMV BLD AUTO: 10.4 FL (ref 9.4–12.4)
POTASSIUM SERPL-SCNC: 4.6 MEQ/L (ref 3.5–5.2)
RBC # BLD AUTO: 3.66 MILL/MM3 (ref 4.2–5.4)
SCAN OF BLOOD SMEAR: NORMAL
SEGMENTED NEUTROPHILS ABSOLUTE COUNT: 4.5 THOU/MM3 (ref 1.8–7.7)
SODIUM SERPL-SCNC: 138 MEQ/L (ref 135–145)
STOMATOCYTES: ABNORMAL
TIBC SERPL-MCNC: 353 UG/DL (ref 171–450)
TSH SERPL DL<=0.005 MIU/L-ACNC: 10.08 UIU/ML (ref 0.4–4.2)
WBC # BLD AUTO: 6.8 THOU/MM3 (ref 4.8–10.8)

## 2023-03-20 RX ORDER — ESOMEPRAZOLE MAGNESIUM 40 MG/1
CAPSULE, DELAYED RELEASE ORAL
COMMUNITY
Start: 2022-12-28

## 2023-03-20 SDOH — ECONOMIC STABILITY: FOOD INSECURITY: WITHIN THE PAST 12 MONTHS, YOU WORRIED THAT YOUR FOOD WOULD RUN OUT BEFORE YOU GOT MONEY TO BUY MORE.: NEVER TRUE

## 2023-03-20 SDOH — ECONOMIC STABILITY: FOOD INSECURITY: WITHIN THE PAST 12 MONTHS, THE FOOD YOU BOUGHT JUST DIDN'T LAST AND YOU DIDN'T HAVE MONEY TO GET MORE.: NEVER TRUE

## 2023-03-20 SDOH — ECONOMIC STABILITY: INCOME INSECURITY: HOW HARD IS IT FOR YOU TO PAY FOR THE VERY BASICS LIKE FOOD, HOUSING, MEDICAL CARE, AND HEATING?: NOT HARD AT ALL

## 2023-03-20 NOTE — TELEPHONE ENCOUNTER
Ermelinda Robert from Dr. Roxanne Peters office called saying their chart says esomeprazole, pantoprazole, famotidine and carafate. I called them back and left a message for maria that pts dtr who sets up her meds reports  that they are taking Omeprazole and not Pantoprazole as Pantoprazole caused all of the ulcers in her stomach to begin with. I asked her to please clarify and let me know.

## 2023-03-20 NOTE — TELEPHONE ENCOUNTER
----- Message from Jayshree Patrick MD sent at 3/20/2023  2:57 PM EDT -----  INR good. Continue same dosage and recheck INR in 1 month.

## 2023-03-20 NOTE — TELEPHONE ENCOUNTER
Per vo from Dr. Tatyana Cordero left a message  from Dr Shaina Bang asking if pt supposed to be on all of the following, Nexium, Omeprazole, Famotidine and carafate. I asked them to give us a call back.

## 2023-03-20 NOTE — PROGRESS NOTES
(NEXIUM) 40 MG delayed release capsule TAKE 1 CAPSULE BY MOUTH TWICE DAILY    famotidine (PEPCID) 40 mg, Oral, DAILY    ferrous sulfate (IRON 325) 325 mg, Oral, EVERY OTHER DAY    Handicap Placard MISC Does not apply, Request parking placard due to medical conditions. Duration of 5 years. levothyroxine (SYNTHROID) 175 MCG tablet Take 1 tablet by mouth once daily    losartan (COZAAR) 25 mg, Oral, DAILY    metOLazone (ZAROXOLYN) 2.5 MG tablet Take as needed for ankle swelling; notify office when you take it    metoprolol succinate (TOPROL XL) 100 mg, Oral, DAILY    Misc. Devices (LUMBAR SUPPORT CUSHION) MISC Does not apply, Dx: low back pain, sciatica. omeprazole (PRILOSEC) 40 mg, Oral, 2 times daily    spironolactone (ALDACTONE) 25 mg, Oral, EVERY OTHER DAY    sucralfate (CARAFATE) 1 g, Oral, 4 TIMES DAILY    vitamin C 1,000 mg, 2 TIMES DAILY    VITAMIN D  mg, Oral, 2 times daily    warfarin (COUMADIN) 3 MG tablet Alternating 3 mg/1.5 mg or as directed per INR results. Objective   Physical Exam   /66 (Site: Left Upper Arm)   Pulse 62   Temp 97.4 °F (36.3 °C)   Ht 5' 0.71\" (1.542 m)   Wt 169 lb 9.6 oz (76.9 kg)   SpO2 94% Comment: at rest on room air  BMI 32.35 kg/m²     General appearance - alert, well appearing, and in no distress    Mental Status - alert, oriented to person, place, and time          Neck - supple, no significant adenopathy        Chest - clear to auscultation, no wheezes, rales or rhonchi, symmetric air entry     Heart - no murmurs noted, no gallops noted, irregularly irregular rhythm with rate 70, no JVD     Abdomen - tenderness noted epigastric area. Neurological - alert, oriented, normal speech, no focal findings or movement disorder noted     Extremities - no pedal edema noted     Skin - normal coloration and turgor, no rashes, no suspicious skin lesions noted            An electronic signature was used to authenticate this note.     --Brendan Rodney MD

## 2023-03-21 ENCOUNTER — TELEPHONE (OUTPATIENT)
Dept: INTERNAL MEDICINE CLINIC | Age: 83
End: 2023-03-21

## 2023-03-21 NOTE — TELEPHONE ENCOUNTER
----- Message from Emilie Patterson MD sent at 3/20/2023  3:35 PM EDT -----  Tell her h/h much better; would not need iron infusion

## 2023-03-22 LAB — T4 SERPL-MCNC: 11 UG/DL (ref 4.5–10.9)

## 2023-03-23 NOTE — TELEPHONE ENCOUNTER
I notified pt of what Dr. Citlali Corbett office has on record which is not what I understand she is doing. Pt has an EGD on  Monday march 27th and she will clarify with Dr. Lina Foster and let me know.

## 2023-03-28 ENCOUNTER — ANESTHESIA EVENT (OUTPATIENT)
Dept: ENDOSCOPY | Age: 83
End: 2023-03-28
Payer: MEDICARE

## 2023-03-28 ENCOUNTER — HOSPITAL ENCOUNTER (OUTPATIENT)
Age: 83
Setting detail: OBSERVATION
LOS: 1 days | Discharge: HOME | End: 2023-03-29
Attending: INTERNAL MEDICINE | Admitting: INTERNAL MEDICINE
Payer: MEDICARE

## 2023-03-28 ENCOUNTER — ANESTHESIA (OUTPATIENT)
Dept: ENDOSCOPY | Age: 83
End: 2023-03-28
Payer: MEDICARE

## 2023-03-28 PROBLEM — Z45.02 DEFIBRILLATOR DISCHARGE: Status: ACTIVE | Noted: 2023-03-28

## 2023-03-28 LAB
ALBUMIN SERPL BCG-MCNC: 2.7 G/DL (ref 3.5–5.1)
ALP SERPL-CCNC: 119 U/L (ref 38–126)
ALT SERPL W/O P-5'-P-CCNC: 18 U/L (ref 11–66)
ANION GAP SERPL CALC-SCNC: 9 MEQ/L (ref 8–16)
ANISOCYTOSIS BLD QL SMEAR: PRESENT
AST SERPL-CCNC: 33 U/L (ref 5–40)
BASOPHILS ABSOLUTE: 0.1 THOU/MM3 (ref 0–0.1)
BASOPHILS NFR BLD AUTO: 1.3 %
BILIRUB SERPL-MCNC: 0.6 MG/DL (ref 0.3–1.2)
BUN SERPL-MCNC: 28 MG/DL (ref 7–22)
CALCIUM SERPL-MCNC: 8.3 MG/DL (ref 8.5–10.5)
CHLORIDE SERPL-SCNC: 105 MEQ/L (ref 98–111)
CO2 SERPL-SCNC: 23 MEQ/L (ref 23–33)
CREAT SERPL-MCNC: 1.2 MG/DL (ref 0.4–1.2)
DEPRECATED RDW RBC AUTO: 68.9 FL (ref 35–45)
EKG ATRIAL RATE: 59 BPM
EKG Q-T INTERVAL: 524 MS
EKG QRS DURATION: 142 MS
EKG QTC CALCULATION (BAZETT): 531 MS
EKG R AXIS: -80 DEGREES
EKG T AXIS: 121 DEGREES
EKG VENTRICULAR RATE: 62 BPM
EOSINOPHIL NFR BLD AUTO: 1.3 %
EOSINOPHILS ABSOLUTE: 0.1 THOU/MM3 (ref 0–0.4)
ERYTHROCYTE [DISTWIDTH] IN BLOOD BY AUTOMATED COUNT: 20.2 % (ref 11.5–14.5)
GFR SERPL CREATININE-BSD FRML MDRD: 46 ML/MIN/1.73M2
GLUCOSE SERPL-MCNC: 92 MG/DL (ref 70–108)
HCT VFR BLD AUTO: 30 % (ref 37–47)
HGB BLD-MCNC: 8.8 GM/DL (ref 12–16)
IMM GRANULOCYTES # BLD AUTO: 0.05 THOU/MM3 (ref 0–0.07)
IMM GRANULOCYTES NFR BLD AUTO: 0.7 %
INR PPP: 2.3 (ref 0.85–1.13)
LYMPHOCYTES ABSOLUTE: 1 THOU/MM3 (ref 1–4.8)
LYMPHOCYTES NFR BLD AUTO: 14.6 %
MAGNESIUM SERPL-MCNC: 2.1 MG/DL (ref 1.6–2.4)
MCH RBC QN AUTO: 27.7 PG (ref 26–33)
MCHC RBC AUTO-ENTMCNC: 29.3 GM/DL (ref 32.2–35.5)
MCV RBC AUTO: 94.3 FL (ref 81–99)
MONOCYTES ABSOLUTE: 0.8 THOU/MM3 (ref 0.4–1.3)
MONOCYTES NFR BLD AUTO: 12.2 %
NEUTROPHILS NFR BLD AUTO: 69.9 %
NRBC BLD AUTO-RTO: 0 /100 WBC
PHOSPHATE SERPL-MCNC: 3.1 MG/DL (ref 2.4–4.7)
PLATELET # BLD AUTO: 288 THOU/MM3 (ref 130–400)
PMV BLD AUTO: 10.4 FL (ref 9.4–12.4)
POTASSIUM SERPL-SCNC: 4.2 MEQ/L (ref 3.5–5.2)
PROT SERPL-MCNC: 6.3 G/DL (ref 6.1–8)
RBC # BLD AUTO: 3.18 MILL/MM3 (ref 4.2–5.4)
SEGMENTED NEUTROPHILS ABSOLUTE COUNT: 4.7 THOU/MM3 (ref 1.8–7.7)
SODIUM SERPL-SCNC: 137 MEQ/L (ref 135–145)
TROPONIN T: < 0.01 NG/ML
WBC # BLD AUTO: 6.7 THOU/MM3 (ref 4.8–10.8)

## 2023-03-28 PROCEDURE — 84484 ASSAY OF TROPONIN QUANT: CPT

## 2023-03-28 PROCEDURE — 85025 COMPLETE CBC W/AUTO DIFF WBC: CPT

## 2023-03-28 PROCEDURE — 2709999900 HC NON-CHARGEABLE SUPPLY: Performed by: INTERNAL MEDICINE

## 2023-03-28 PROCEDURE — 2500000003 HC RX 250 WO HCPCS: Performed by: NURSE ANESTHETIST, CERTIFIED REGISTERED

## 2023-03-28 PROCEDURE — 93005 ELECTROCARDIOGRAM TRACING: CPT | Performed by: STUDENT IN AN ORGANIZED HEALTH CARE EDUCATION/TRAINING PROGRAM

## 2023-03-28 PROCEDURE — G0378 HOSPITAL OBSERVATION PER HR: HCPCS

## 2023-03-28 PROCEDURE — 36415 COLL VENOUS BLD VENIPUNCTURE: CPT

## 2023-03-28 PROCEDURE — 6360000002 HC RX W HCPCS: Performed by: NURSE ANESTHETIST, CERTIFIED REGISTERED

## 2023-03-28 PROCEDURE — 85610 PROTHROMBIN TIME: CPT

## 2023-03-28 PROCEDURE — 93010 ELECTROCARDIOGRAM REPORT: CPT | Performed by: INTERNAL MEDICINE

## 2023-03-28 PROCEDURE — 83735 ASSAY OF MAGNESIUM: CPT

## 2023-03-28 PROCEDURE — 7100000011 HC PHASE II RECOVERY - ADDTL 15 MIN: Performed by: INTERNAL MEDICINE

## 2023-03-28 PROCEDURE — 3700000000 HC ANESTHESIA ATTENDED CARE: Performed by: INTERNAL MEDICINE

## 2023-03-28 PROCEDURE — 3700000001 HC ADD 15 MINUTES (ANESTHESIA): Performed by: INTERNAL MEDICINE

## 2023-03-28 PROCEDURE — 3609013000 HC EGD TRANSORAL CONTROL BLEEDING ANY METHOD: Performed by: INTERNAL MEDICINE

## 2023-03-28 PROCEDURE — 84100 ASSAY OF PHOSPHORUS: CPT

## 2023-03-28 PROCEDURE — 3609012400 HC EGD TRANSORAL BIOPSY SINGLE/MULTIPLE: Performed by: INTERNAL MEDICINE

## 2023-03-28 PROCEDURE — 7100000010 HC PHASE II RECOVERY - FIRST 15 MIN: Performed by: INTERNAL MEDICINE

## 2023-03-28 PROCEDURE — 2580000003 HC RX 258: Performed by: INTERNAL MEDICINE

## 2023-03-28 PROCEDURE — 2580000003 HC RX 258: Performed by: STUDENT IN AN ORGANIZED HEALTH CARE EDUCATION/TRAINING PROGRAM

## 2023-03-28 PROCEDURE — 88305 TISSUE EXAM BY PATHOLOGIST: CPT

## 2023-03-28 PROCEDURE — 80053 COMPREHEN METABOLIC PANEL: CPT

## 2023-03-28 PROCEDURE — 99222 1ST HOSP IP/OBS MODERATE 55: CPT | Performed by: INTERNAL MEDICINE

## 2023-03-28 RX ORDER — LOSARTAN POTASSIUM 25 MG/1
25 TABLET ORAL DAILY
Status: DISCONTINUED | OUTPATIENT
Start: 2023-03-29 | End: 2023-03-29 | Stop reason: HOSPADM

## 2023-03-28 RX ORDER — LIDOCAINE HYDROCHLORIDE 20 MG/ML
INJECTION, SOLUTION EPIDURAL; INFILTRATION; INTRACAUDAL; PERINEURAL PRN
Status: DISCONTINUED | OUTPATIENT
Start: 2023-03-28 | End: 2023-03-28 | Stop reason: SDUPTHER

## 2023-03-28 RX ORDER — PROPOFOL 10 MG/ML
INJECTION, EMULSION INTRAVENOUS PRN
Status: DISCONTINUED | OUTPATIENT
Start: 2023-03-28 | End: 2023-03-28 | Stop reason: SDUPTHER

## 2023-03-28 RX ORDER — SUCRALFATE 1 G/1
1 TABLET ORAL
Status: DISCONTINUED | OUTPATIENT
Start: 2023-03-28 | End: 2023-03-29 | Stop reason: HOSPADM

## 2023-03-28 RX ORDER — SODIUM CHLORIDE 9 MG/ML
25 INJECTION, SOLUTION INTRAVENOUS PRN
Status: DISCONTINUED | OUTPATIENT
Start: 2023-03-28 | End: 2023-03-28 | Stop reason: HOSPADM

## 2023-03-28 RX ORDER — WARFARIN SODIUM 3 MG/1
3 TABLET ORAL
Status: DISPENSED | OUTPATIENT
Start: 2023-03-28 | End: 2023-03-29

## 2023-03-28 RX ORDER — ONDANSETRON 2 MG/ML
4 INJECTION INTRAMUSCULAR; INTRAVENOUS EVERY 6 HOURS PRN
Status: DISCONTINUED | OUTPATIENT
Start: 2023-03-28 | End: 2023-03-29 | Stop reason: HOSPADM

## 2023-03-28 RX ORDER — AMIODARONE HYDROCHLORIDE 200 MG/1
200 TABLET ORAL DAILY
Status: DISCONTINUED | OUTPATIENT
Start: 2023-03-29 | End: 2023-03-29 | Stop reason: HOSPADM

## 2023-03-28 RX ORDER — DIGOXIN 125 MCG
125 TABLET ORAL EVERY OTHER DAY
Status: DISCONTINUED | OUTPATIENT
Start: 2023-03-29 | End: 2023-03-29 | Stop reason: HOSPADM

## 2023-03-28 RX ORDER — SODIUM CHLORIDE 0.9 % (FLUSH) 0.9 %
5-40 SYRINGE (ML) INJECTION PRN
Status: DISCONTINUED | OUTPATIENT
Start: 2023-03-28 | End: 2023-03-29 | Stop reason: HOSPADM

## 2023-03-28 RX ORDER — ACETAMINOPHEN 325 MG/1
650 TABLET ORAL EVERY 6 HOURS PRN
Status: DISCONTINUED | OUTPATIENT
Start: 2023-03-28 | End: 2023-03-29 | Stop reason: HOSPADM

## 2023-03-28 RX ORDER — ACETAMINOPHEN 650 MG/1
650 SUPPOSITORY RECTAL EVERY 6 HOURS PRN
Status: DISCONTINUED | OUTPATIENT
Start: 2023-03-28 | End: 2023-03-29 | Stop reason: HOSPADM

## 2023-03-28 RX ORDER — METOPROLOL SUCCINATE 100 MG/1
100 TABLET, EXTENDED RELEASE ORAL DAILY
Status: DISCONTINUED | OUTPATIENT
Start: 2023-03-28 | End: 2023-03-28

## 2023-03-28 RX ORDER — SODIUM CHLORIDE 9 MG/ML
INJECTION, SOLUTION INTRAVENOUS PRN
Status: DISCONTINUED | OUTPATIENT
Start: 2023-03-28 | End: 2023-03-29 | Stop reason: HOSPADM

## 2023-03-28 RX ORDER — SODIUM CHLORIDE 0.9 % (FLUSH) 0.9 %
5-40 SYRINGE (ML) INJECTION PRN
Status: DISCONTINUED | OUTPATIENT
Start: 2023-03-28 | End: 2023-03-28 | Stop reason: HOSPADM

## 2023-03-28 RX ORDER — SPIRONOLACTONE 25 MG/1
25 TABLET ORAL EVERY OTHER DAY
Status: DISCONTINUED | OUTPATIENT
Start: 2023-03-29 | End: 2023-03-29 | Stop reason: HOSPADM

## 2023-03-28 RX ORDER — POLYETHYLENE GLYCOL 3350 17 G/17G
17 POWDER, FOR SOLUTION ORAL DAILY PRN
Status: DISCONTINUED | OUTPATIENT
Start: 2023-03-28 | End: 2023-03-29 | Stop reason: HOSPADM

## 2023-03-28 RX ORDER — ONDANSETRON 4 MG/1
4 TABLET, ORALLY DISINTEGRATING ORAL EVERY 8 HOURS PRN
Status: DISCONTINUED | OUTPATIENT
Start: 2023-03-28 | End: 2023-03-29 | Stop reason: HOSPADM

## 2023-03-28 RX ORDER — SODIUM CHLORIDE 0.9 % (FLUSH) 0.9 %
5-40 SYRINGE (ML) INJECTION EVERY 12 HOURS SCHEDULED
Status: DISCONTINUED | OUTPATIENT
Start: 2023-03-28 | End: 2023-03-29 | Stop reason: HOSPADM

## 2023-03-28 RX ORDER — SODIUM CHLORIDE 450 MG/100ML
INJECTION, SOLUTION INTRAVENOUS CONTINUOUS
Status: DISCONTINUED | OUTPATIENT
Start: 2023-03-28 | End: 2023-03-28 | Stop reason: CLARIF

## 2023-03-28 RX ORDER — METOPROLOL SUCCINATE 100 MG/1
100 TABLET, EXTENDED RELEASE ORAL DAILY
Status: DISCONTINUED | OUTPATIENT
Start: 2023-03-29 | End: 2023-03-29 | Stop reason: HOSPADM

## 2023-03-28 RX ORDER — SODIUM CHLORIDE 0.9 % (FLUSH) 0.9 %
5-40 SYRINGE (ML) INJECTION EVERY 12 HOURS SCHEDULED
Status: DISCONTINUED | OUTPATIENT
Start: 2023-03-28 | End: 2023-03-28 | Stop reason: HOSPADM

## 2023-03-28 RX ADMIN — SODIUM CHLORIDE, PRESERVATIVE FREE 10 ML: 5 INJECTION INTRAVENOUS at 21:12

## 2023-03-28 RX ADMIN — SODIUM CHLORIDE 25 ML: 9 INJECTION, SOLUTION INTRAVENOUS at 13:48

## 2023-03-28 RX ADMIN — LIDOCAINE HYDROCHLORIDE 100 MG: 20 INJECTION, SOLUTION EPIDURAL; INFILTRATION; INTRACAUDAL; PERINEURAL at 14:27

## 2023-03-28 RX ADMIN — PROPOFOL 50 MG: 10 INJECTION, EMULSION INTRAVENOUS at 14:39

## 2023-03-28 RX ADMIN — PROPOFOL 50 MG: 10 INJECTION, EMULSION INTRAVENOUS at 14:28

## 2023-03-28 RX ADMIN — PROPOFOL 50 MG: 10 INJECTION, EMULSION INTRAVENOUS at 14:38

## 2023-03-28 RX ADMIN — PROPOFOL 50 MG: 10 INJECTION, EMULSION INTRAVENOUS at 14:27

## 2023-03-28 RX ADMIN — PROPOFOL 50 MG: 10 INJECTION, EMULSION INTRAVENOUS at 14:49

## 2023-03-28 ASSESSMENT — PAIN - FUNCTIONAL ASSESSMENT: PAIN_FUNCTIONAL_ASSESSMENT: NONE - DENIES PAIN

## 2023-03-28 NOTE — ANESTHESIA POSTPROCEDURE EVALUATION
Department of Anesthesiology  Postprocedure Note    Patient: Gemma Almanza  MRN: [id number]  YOB: 1940  Date of evaluation: 3/28/2023      Procedure Summary     Date: 03/28/23 Room / Location: [address] [place] [address]    Anesthesia Start: 2842 Anesthesia Stop: 7376    Procedures:       EGD BIOPSY      EGD CONTROL HEMORRHAGE Diagnosis:       Acute gastric ulcer, unspecified whether gastric ulcer hemorrhage or perforation present      (Acute gastric ulcer, unspecified whether gastric ulcer hemorrhage or perforation present [K25.3])    Surgeons: Ruchi Blum MD Responsible Provider: Kasi Marcus MD    Anesthesia Type: MAC ASA Status: 4          Anesthesia Type: No value filed.     Debby Phase I: Debby Score: 10    Debby Phase II:        Anesthesia Post Evaluation    Patient location during evaluation: bedside  Patient participation: complete - patient participated  Level of consciousness: awake and alert  Airway patency: patent  Nausea & Vomiting: no nausea and no vomiting  Complications: no  Cardiovascular status: hemodynamically stable  Respiratory status: acceptable, spontaneous ventilation and nasal cannula  Hydration status: euvolemic

## 2023-03-28 NOTE — PLAN OF CARE
Problem: Chronic Conditions and Co-morbidities  Goal: Patient's chronic conditions and co-morbidity symptoms are monitored and maintained or improved  Outcome: Progressing  Flowsheets (Taken 3/28/2023 1716)  Care Plan - Patient's Chronic Conditions and Co-Morbidity Symptoms are Monitored and Maintained or Improved:   Monitor and assess patient's chronic conditions and comorbid symptoms for stability, deterioration, or improvement   Collaborate with multidisciplinary team to address chronic and comorbid conditions and prevent exacerbation or deterioration   Update acute care plan with appropriate goals if chronic or comorbid symptoms are exacerbated and prevent overall improvement and discharge     Problem: Safety - Adult  Goal: Free from fall injury  Outcome: Progressing     Problem: Discharge Planning  Goal: Discharge to home or other facility with appropriate resources  Outcome: Progressing  Flowsheets  Taken 3/28/2023 1742  Discharge to home or other facility with appropriate resources: Identify barriers to discharge with patient and caregiver  Taken 3/28/2023 1716  Discharge to home or other facility with appropriate resources:   Identify barriers to discharge with patient and caregiver   Arrange for needed discharge resources and transportation as appropriate   Arrange for interpreters to assist at discharge as needed   Identify discharge learning needs (meds, wound care, etc)

## 2023-03-28 NOTE — PROGRESS NOTES
03/28/23 1740   Encounter Summary   Encounter Overview/Reason  Initial Encounter   Service Provided For: Patient and family together   Referral/Consult From: [address] Family members   Last Encounter  03/28/23   Complexity of Encounter Moderate   Begin Time 1710   End Time  1725   Total Time Calculated 15 min   Assessment/Intervention/Outcome   Assessment Coping   Intervention Active listening;Sustaining Presence/Ministry of presence   Outcome Acceptance   Plan and Referrals   Plan/Referrals Continue to visit, (comment)     Toña Page has four children and grandchildren. We had a good and meaningful conversation around the family. One of her daughters was visiting with her. This was a three-way conversation. This daughter recognized me from her own experience as a patient here. She said to me: You prayed for me when I was a patient here. We covered some common grounds here. INTERVENTION    I spoke with Flora Rowland) and her condition. When the physician was doing a procedure, there was another problem they noticed. Veronica Foster is undergoing treatment for more than one problem. I took time in praying for her and for her family. I also prayed for the nurse who came to attend her at that time. OUTCOME    All of them in the room were happy that I prayed for them and their families.      CARE PLAN    Continued prayer ministry

## 2023-03-28 NOTE — PROGRESS NOTES
1516: brought to phase 2, VSS, daughter at side. 1530: patient awaiting room assignment. 1602: Report called to Room 3B.

## 2023-03-28 NOTE — PROGRESS NOTES
EGD complete, photos taken, 1 specimens taken by biopsy, 1 jar sent to lab, pt tolerated procedure poor. Scope Number [id number] used.

## 2023-03-28 NOTE — H&P
History & Physical       Patient: Sharath Arrieta  YOB: 1940    MRN: [id number]     Acct: [de-identified]    PCP: Luann Torres MD    Date of Admission: 3/28/2023    Date of Service: Patient seen / examined on 03/28/23 and admitted to Inpatient with expected LOS greater than two midnights due to medical therapy. ASSESSMENT / PLAN:    Suspected AICD shock: Firing of defibrillator during EGD noted x2; will obtain EKG, labs, and interrogate pacemaker. Patient denies any chest pain, SOB, or palpitations    Acute gastric ulcer: Patient has history of multiple gastric ulcers, EGD done today; per op note discontinue a small ProSol and Pepcid. Continue Carafate but increase to 4 times daily    Permanent atrial fibrillation: Status post biventricular ICD, [id number] score of 5, HAS-BLED score of 5 patient chronically on Coumadin    Hypothyroidism: Abnormal thyroid labs, most recent T4 and TSH elevated, currently being worked up with endocrinology outpatient, resume home Synthroid at this time. Chronic normocytic anemia: Secondary to iron deficiency anemia, patient on iron supplement at home, baseline 8-10 hemoglobin. Obtain CBC    Osteoporosis: Diagnosed on recent DEXA scan, patient has reportedly refused medical treatment    Nonobstructive CAD: Stress test done in 9/2022 showed moderate inferior infarct with no obvious ischemia, cath done in 2019 showed nonobstructive disease of the LAD. Primary hypertension: Controlled resume home Cozaar, metoprolol, and Aldactone. Chronic HFrEF: Status post biventricular ICD. EF 25% in 9/22 GDMT includes beta-blocker, ACE, Jardiance, and Aldactone. Strict I's and O's, low-sodium diet, daily weights      Chief Complaint: AICD shock    History of Present Illness:  [de-identified] y.o. female who presented to [address] [place] [address] with suspected AICD shock.   Patient has past medical history HFrEF, nonobstructive CAD, hypertension, hypothyroidism, and A-fib High Cholesterol Father     Miscarriages / [place] Daughter     Arthritis Neg Hx     Asthma Neg Hx     Birth Defects Neg Hx     Depression Neg Hx     Early Death Neg Hx     Learning Disabilities Neg Hx     Mental Illness Neg Hx     Mental Retardation Neg Hx     Stroke Neg Hx     Substance Abuse Neg Hx     Vision Loss Neg Hx     Other Neg Hx      REVIEW OF SYSTEMS:  A 14-point ROS was obtained and negative, with the exception of pertinent positives as listed below:    PHYSICAL EXAM:  Vitals:    03/28/23 1334 03/28/23 1516 03/28/23 1527   BP: (!) 127/49 135/60 (!) 157/67   Pulse: 63 60 60   Resp: 18 18 16   Temp: 97 °F (36.1 °C) 97.1 °F (36.2 °C)    TempSrc: Temporal Temporal    SpO2: 97% 98% 97%   Weight: 169 lb (76.7 kg)       General appearance: Alert / well-appearing. Cooperative. NAD. HEENT:  Normocephalic / atraumatic. PERRL. EOM intact. Conjunctivae appear normal.  Neck: Supple. No JVD. Respiratory: Normal respiratory effort on RA. CTAB. No wheezes / rales / rhonchi. Cardiovascular: RRR. Normal S1/S2. No murmurs / rubs / gallops. Abdomen: Soft / non-tender / non-distended. BS present. Musculoskeletal: No cyanosis or edema. Skin: Warm / dry. Normal turgor. Neurologic: A/O x 3. Speech normal. Answers questions appropriately. CN intact. No obvious focal neurologic deficits. Psychiatric: Thought content / judgment / insight appear appropriate. Capillary refill: Brisk bilaterally. Peripheral pulses: +2 bilaterally. Labs:   No results found for this visit on 03/28/23. EKG / Radiology:     EKG:  Reviewed by me --    CXR:   Reviewed by me --    US LIVER    Result Date: 3/3/2023  PROCEDURE: US LIVER CLINICAL INFORMATION: Abnormal liver function tests TECHNIQUE: Ultrasound of the liver was performed. Grayscale and color images were obtained. COMPARISON: Liver ultrasound 3/26/2021 FINDINGS: The gallbladder is surgically absent.  Prominence of the common bile duct is likely postsurgical. There is no

## 2023-03-28 NOTE — PLAN OF CARE
Problem: Chronic Conditions and Co-morbidities  Goal: Patient's chronic conditions and co-morbidity symptoms are monitored and maintained or improved  3/28/2023 1856 by Michelle Duenas RN  Outcome: Progressing  3/28/2023 1746 by Michelle Duenas RN  Outcome: Progressing  Flowsheets (Taken 3/28/2023 1716)  Care Plan - Patient's Chronic Conditions and Co-Morbidity Symptoms are Monitored and Maintained or Improved:   Monitor and assess patient's chronic conditions and comorbid symptoms for stability, deterioration, or improvement   Collaborate with multidisciplinary team to address chronic and comorbid conditions and prevent exacerbation or deterioration   Update acute care plan with appropriate goals if chronic or comorbid symptoms are exacerbated and prevent overall improvement and discharge     Problem: Safety - Adult  Goal: Free from fall injury  3/28/2023 1856 by Michelle Duenas RN  Outcome: Progressing  Flowsheets (Taken 3/28/2023 1828)  Free From Fall Injury: Instruct family/caregiver on patient safety  3/28/2023 1746 by Michelle Duenas RN  Outcome: Progressing     Problem: Discharge Planning  Goal: Discharge to home or other facility with appropriate resources  3/28/2023 1856 by Michelle Duenas RN  Outcome: Progressing  3/28/2023 1746 by Michelle Duenas RN  Outcome: Progressing  Flowsheets  Taken 3/28/2023 1742  Discharge to home or other facility with appropriate resources: Identify barriers to discharge with patient and caregiver  Taken 3/28/2023 1716  Discharge to home or other facility with appropriate resources:   Identify barriers to discharge with patient and caregiver   Arrange for needed discharge resources and transportation as appropriate   Arrange for interpreters to assist at discharge as needed   Identify discharge learning needs (meds, wound care, etc)     Problem: Pain  Goal: Verbalizes/displays adequate comfort level or baseline comfort level  Outcome: Progressing       Pt admission completed.

## 2023-03-28 NOTE — H&P
[facility]   Pre-Operative History and Physical: EGD    Patient: Diana Varma  : 1940     History Obtained From:  patient, electronic medical record    HISTORY OF PRESENT ILLNESS:    The patient is a [de-identified] y.o. female who presents for an EGD for gastric ulcer surveillance. Past Medical History:        Diagnosis Date    AICD (automatic cardioverter/defibrillator) present 1999    Arthritis 2019    Atrial fibrillation (HCC)     chronic    Biventricular ICD (implantable cardiac defibrillator) in place 10/09    C. difficile diarrhea     CAD (coronary artery disease)     Cancer (HCC)     right hand    CHF (congestive heart failure) ([facility] [address]     Diverticulitis 2013    GERD (gastroesophageal reflux disease)     hiatal hernia    Hiatal hernia     Hyperlipidemia     Hypertension     Menopause         NAFLD (nonalcoholic fatty liver disease) 2017    Nonischemic cardiomyopathy ([facility] [address]     chronic    Pacemaker     Retroperitoneal hemorrhage     transfusion x3    Thyroid disease     Type 2 diabetes mellitus 2/15/2023     Past Surgical History:        Procedure Laterality Date    CARDIAC DEFIBRILLATOR Alex Dublin      Dr Dex Head    COLONOSCOPY      w/ EGD    COLONOSCOPY N/A 2019    COLONOSCOPY POLYPECTOMY SNARE/COLD BIOPSY performed by Paulino Allen MD at [facility]    ENDOSCOPY, COLON, [address]    replaced 3x    SKIN BIOPSY      Skin CA with graft.     UPPER GASTROINTESTINAL ENDOSCOPY N/A 2019    EGD DILATION SAVORY performed by Paulino Allen MD at [facility] Left 2019    EGD BIOPSY performed by Paulino Allen MD at [facility] 2019    EGD ESOPHAGEAL MANOMETRY AND PH MONITORING 24 HR performed by Paulino Allen MD at [facility] N/A 12/15/2022    EGD BIOPSY performed by [lorazepam], Codeine, Lisinopril, Nystatin, Nystatin, Percocet [oxycodone-acetaminophen], Relafen [nabumetone], Tape [adhesive tape], and Celebrex [celecoxib]    Social History:   TOBACCO:   reports that she has never smoked. She has never used smokeless tobacco.  ETOH:   reports no history of alcohol use. DRUGS:   reports no history of drug use. Family History:       Problem Relation Age of Onset    Heart Disease Mother     Diabetes Mother     High Cholesterol Mother     High Blood Pressure Brother     Cancer Sister     High Blood Pressure Brother     High Blood Pressure Brother     Parkinsonism Brother     Diabetes Brother     Heart Disease Brother     Kidney Disease Brother     High Blood Pressure Brother     High Cholesterol Father     Miscarriages / Stillbirths Daughter     Arthritis Neg Hx     Asthma Neg Hx     Birth Defects Neg Hx     Depression Neg Hx     Early Death Neg Hx     Learning Disabilities Neg Hx     Mental Illness Neg Hx     Mental Retardation Neg Hx     Stroke Neg Hx     Substance Abuse Neg Hx     Vision Loss Neg Hx     Other Neg Hx        PHYSICAL EXAM:    BP (!) 127/49   Pulse 63   Temp 97 °F (36.1 °C) (Temporal)   Resp 18   Wt 169 lb (76.7 kg)   SpO2 97%   BMI 32.24 kg/m²       Heart:  Regular rate and rhythm  Lungs:  No increased work of breathing  Abdomen:  BS+, soft, non-distended, non-tender, no masses palpated    ASA Grade:  See Anesthesia documentation    Mallampati Classification:  See Anesthesia documentation    ASSESSMENT AND PLAN:    1. Patient is a [de-identified] y.o. female here for an EGD with MAC.    2.  Procedure options, risks and benefits reviewed with patient. We specifically discussed that risks include but are not limited to infection, bleeding, perforation, death, and missed lesions. Patient expresses understanding.     Electronically signed by Jenna Burns MD on 3/28/2023 at 1:48 PM    Jenna Burns MD  [facility]

## 2023-03-28 NOTE — OP NOTE
[facility]  EGD Procedure Note    Patient: Noland Klinefelter  : 1940      Procedure: Esophagogastroduodenoscopy with biopsy          Date:  3/28/2023     Endoscopist:   Dana Rodgers MD    Referring Physician: Dana Rodgers MD  Primary Care Physician: Muriel Lakhani MD    Indications: This is a [de-identified]y.o. year old female who presents for gastric healing surveillance. Anesthesia: MAC per Anesthesia. Please see anesthesia report. Consent:  The patient or their legal guardian has signed a consent and is aware of the potential risks, benefits, alternatives, and potential complications of this procedure. These include, but are not limited to hemorrhage, bleeding, post procedural pain, perforation, phlebitis, aspiration, hypotension, hypoxia, cardiovascular events such as arrhythmia, and possibly death. Description of Procedure: The patient was then taken to the endoscopy suite and placed in the left lateral decubitus position and the above IV sedation was administered. A forward-viewing Olympus video endoscope was lubricated and inserted through the patient's mouth into the oropharynx. Under direct visualization, the upper esophagus was intubated. The scope was advanced through the esophagus to the extent of the second portion of duodenum. The scope was then withdrawn with good views of the mucosal surfaces. Both forward and retroflexed views of the stomach were obtained. The stomach was decompressed and the scope was completely withdrawn. The patient tolerated the procedure well and was taken to the recovery area in good condition. There were no immediate complications. Estimated Blood Loss: minimal    Findings:    Esophagus: The GE junction was noted to be at 40 cm. The esophagus appeared normal without evidence of Ritter's esophagus or reflux esophagitis. 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 The area was treated with APC on gastric settings. Duodenum: A 2-3mm polyp was noted in the 2nd portion of the duodenum. Cold biopsies were taken and placed in Jar 1. Otherwise, the first and 2nd portions of the duodenum appeared normal with normal villous pattern      Recommendations:   - Await pathology. - Continue current medications. Discontinue esomeprazole and pepcid. Continue carafate but increase to QID. - Follow up with primary care physician as scheduled. - Follow up with myself in 4-6 weeks unless previously scheduled. - Will admit patient for observation due to firing of defibrillator during procedure. Hospitalist team notified.      Electronically signed by Memo Chiang MD on 3/28/2023 at 3:16 PM    Memo Chiang MD  [facility]

## 2023-03-28 NOTE — ANESTHESIA PRE PROCEDURE
GI/Hepatic/Renal:   (+) hiatal hernia, GERD:, liver disease:, renal disease: CRI,           Endo/Other:    (+) DiabetesType II DM, , hypothyroidism, blood dyscrasia: anemia and anticoagulation therapy, arthritis:., .                 Abdominal:             Vascular:   + DVT, . Other Findings:             Anesthesia Plan      MAC     ASA 4       Induction: intravenous. Anesthetic plan and risks discussed with patient and child/children. Plan discussed with attending.                     ELIA Meng - CRNA   3/28/2023

## 2023-03-29 ENCOUNTER — TELEPHONE (OUTPATIENT)
Dept: CARDIOLOGY CLINIC | Age: 83
End: 2023-03-29

## 2023-03-29 VITALS
RESPIRATION RATE: 16 BRPM | HEIGHT: 60 IN | WEIGHT: 174.6 LBS | HEART RATE: 61 BPM | DIASTOLIC BLOOD PRESSURE: 60 MMHG | SYSTOLIC BLOOD PRESSURE: 112 MMHG | BODY MASS INDEX: 34.28 KG/M2 | TEMPERATURE: 98.6 F | OXYGEN SATURATION: 98 %

## 2023-03-29 LAB
ANION GAP SERPL CALC-SCNC: 10 MEQ/L (ref 8–16)
ANISOCYTOSIS BLD QL SMEAR: PRESENT
BASOPHILS ABSOLUTE: 0.1 THOU/MM3 (ref 0–0.1)
BASOPHILS NFR BLD AUTO: 1 %
BUN SERPL-MCNC: 30 MG/DL (ref 7–22)
CALCIUM SERPL-MCNC: 8 MG/DL (ref 8.5–10.5)
CHLORIDE SERPL-SCNC: 106 MEQ/L (ref 98–111)
CO2 SERPL-SCNC: 24 MEQ/L (ref 23–33)
CREAT SERPL-MCNC: 1.2 MG/DL (ref 0.4–1.2)
DEPRECATED RDW RBC AUTO: 69 FL (ref 35–45)
EOSINOPHIL NFR BLD AUTO: 1.9 %
EOSINOPHILS ABSOLUTE: 0.2 THOU/MM3 (ref 0–0.4)
ERYTHROCYTE [DISTWIDTH] IN BLOOD BY AUTOMATED COUNT: 20.7 % (ref 11.5–14.5)
GFR SERPL CREATININE-BSD FRML MDRD: 46 ML/MIN/1.73M2
GLUCOSE SERPL-MCNC: 72 MG/DL (ref 70–108)
HCT VFR BLD AUTO: 29.4 % (ref 37–47)
HGB BLD-MCNC: 8.5 GM/DL (ref 12–16)
HYPOCHROMIA BLD QL SMEAR: PRESENT
IMM GRANULOCYTES # BLD AUTO: 0.03 THOU/MM3 (ref 0–0.07)
IMM GRANULOCYTES NFR BLD AUTO: 0.4 %
INR PPP: 2.03 (ref 0.85–1.13)
LYMPHOCYTES ABSOLUTE: 1.5 THOU/MM3 (ref 1–4.8)
LYMPHOCYTES NFR BLD AUTO: 19.6 %
MCH RBC QN AUTO: 27.3 PG (ref 26–33)
MCHC RBC AUTO-ENTMCNC: 28.9 GM/DL (ref 32.2–35.5)
MCV RBC AUTO: 94.5 FL (ref 81–99)
MONOCYTES ABSOLUTE: 1.2 THOU/MM3 (ref 0.4–1.3)
MONOCYTES NFR BLD AUTO: 14.7 %
NEUTROPHILS NFR BLD AUTO: 62.4 %
NRBC BLD AUTO-RTO: 0 /100 WBC
PLATELET # BLD AUTO: 281 THOU/MM3 (ref 130–400)
PMV BLD AUTO: 10.3 FL (ref 9.4–12.4)
POTASSIUM SERPL-SCNC: 4.1 MEQ/L (ref 3.5–5.2)
RBC # BLD AUTO: 3.11 MILL/MM3 (ref 4.2–5.4)
SEGMENTED NEUTROPHILS ABSOLUTE COUNT: 4.9 THOU/MM3 (ref 1.8–7.7)
SODIUM SERPL-SCNC: 140 MEQ/L (ref 135–145)
TROPONIN T: < 0.01 NG/ML
TROPONIN T: < 0.01 NG/ML
WBC # BLD AUTO: 7.9 THOU/MM3 (ref 4.8–10.8)

## 2023-03-29 PROCEDURE — 85025 COMPLETE CBC W/AUTO DIFF WBC: CPT

## 2023-03-29 PROCEDURE — G0378 HOSPITAL OBSERVATION PER HR: HCPCS

## 2023-03-29 PROCEDURE — 85610 PROTHROMBIN TIME: CPT

## 2023-03-29 PROCEDURE — 99239 HOSP IP/OBS DSCHRG MGMT >30: CPT | Performed by: INTERNAL MEDICINE

## 2023-03-29 PROCEDURE — 84484 ASSAY OF TROPONIN QUANT: CPT

## 2023-03-29 PROCEDURE — 80048 BASIC METABOLIC PNL TOTAL CA: CPT

## 2023-03-29 PROCEDURE — 36415 COLL VENOUS BLD VENIPUNCTURE: CPT

## 2023-03-29 RX ORDER — WARFARIN SODIUM 3 MG/1
3 TABLET ORAL ONCE
Status: DISCONTINUED | OUTPATIENT
Start: 2023-03-29 | End: 2023-03-29 | Stop reason: HOSPADM

## 2023-03-29 RX ORDER — WARFARIN SODIUM 3 MG/1
1.5 TABLET ORAL ONCE
Status: DISCONTINUED | OUTPATIENT
Start: 2023-03-29 | End: 2023-03-29

## 2023-03-29 NOTE — PROGRESS NOTES
Warfarin Pharmacy Consult Note    Warfarin Indication:  Afib  Target INR: 2.0-3.0  Dose prior to admission: alternating 3mg and 1.5mg every other day     Recent Labs     03/28/23  1914 03/29/23  0646   INR 2.30* 2.03*     Recent Labs     03/28/23  1810 03/29/23  0333   HGB 8.8* 8.5*    281     Concurrent anticoagulants/antiplatelets: none  Significant warfarin drug-drug interactions: amiodarone (HM), levothyroxine (HM)    Date INR Warfarin Dose   3/28/2023 2.30 not given     3/29/2023 2.03  3 mg                                                Monitoring:                   INR will be monitored daily until therapeutic INR is achieved.     **Please contact pharmacy for discharge instructions when indicated**    Kelley Goldman PharmD 3/29/2023 7:34 AM

## 2023-03-29 NOTE — PROGRESS NOTES
Warfarin Pharmacy Consult Note    Sharath Arrieta is a [de-identified] y.o. female for whom pharmacy has been asked to manage warfarin therapy. Consulting Provider: Dr. Holli Hernandez  Warfarin Indication: Afib  Target INR range: 2.0-3.0   Warfarin dose prior to admission: alternating 3mg and 1.5mg every other day (took 3mg 3/26 and nothing 3/27)  Outpatient warfarin provider: Dr. Cortes Gave    No results for input(s): INR in the last 72 hours. Recent Labs     03/28/23  1810   HGB 8.8*        Concurrent anticoagulants/antiplatelets: none  Significant warfarin drug-drug interactions: amiodarone (HM), levothyroxine (HM)    Date INR Warfarin Dose   3/28/23 2.30 3 mg                                   INR will be monitored routinely until therapeutic INR is achieved. Pharmacy will continue to follow. Thank you for the consult,     Armando Pat.  Brynn Nielson, PharmD  PGY2 Ambulatory Care Pharmacy Resident  3/28/2023 8:50 PM

## 2023-03-29 NOTE — TELEPHONE ENCOUNTER
Recvd call from floor nurse Ananya Camejo. Schedule patient an apt with EP for ICD shock during EGD with Ghanshyam Diaz on 03.28.2023.       Scheduled [phone]

## 2023-03-29 NOTE — PROGRESS NOTES
Pt discharged to home. Reviewed follow up appt dates/time and location. Next dose medications. No PIV  Pt belongings with pt. Pt ambulated to wheelchair. Taken to main lobby [place] to [employer] car.

## 2023-03-29 NOTE — DISCHARGE SUMMARY
DISCHARGE SUMMARY      Patient Identification:   Heather Barrios   : 1940  MRN: [id number]   Account: [de-identified]      Patient's PCP: Blanca Gaffney MD    Admit Date: 3/28/2023     Discharge Date: 3/29/2023     Admitting Physician: Zoya Ocampo MD     Discharge Physician: Javier Regalado DO     Discharge Diagnoses: Active Hospital Problems    Diagnosis Date Noted    Defibrillator discharge [Z45.02] 2023     AICD shock: Firing of defibrillator during EGD noted x2; Interrogation demonstrated discharge around 3pm yesterday. Patient otherwise stable for discharge given no other underlying pathophysiology. Acute gastric ulcer: Noted history of multiple gastric ulcers, EGD done yesterday; follow up with GI. Stop esomeprazole and pepcid. Continue carafate. Permanent atrial fibrillation: Status post biventricular ICD, [id number] score of 5, HAS-BLED score of 5 patient chronically on Coumadin. Will continue coumadin at discharge. Hypothyroidism: Currently being worked up with endocrinology outpatient, resume home Synthroid at this time. Chronic Iron deficiency anemia: Continue iron supplements      Osteoporosis: Noted. Diagnosed on recent DEXA scan, patient has reportedly refused medical treatment     Nonobstructive CAD, stable: Stress test done in 2022 showed moderate inferior infarct with no obvious ischemia, cath done in  showed nonobstructive disease of the LAD. Primary hypertension, stable: Controlled resume home Cozaar, metoprolol, and Aldactone. Chronic HFrEF, stable: Status post biventricular ICD. EF 25% in  GDMT includes beta-blocker, ACE, Jardiance, and Aldactone. Strict I's and O's, low-sodium diet, daily weights    The patient was seen and examined on day of discharge and this discharge summary is in conjunction with any daily progress note from day of discharge.     Hospital Course:   Heather Barrios is a [de-identified] y.o. female admitted to [facility]. [place] [address]  [phone]    Schedule an appointment as soon as possible for a visit in 1 month(s)  For hospital discharge follow up after shock from ICD. deshaun will call patient         Discharge Medications:        Medication List        CONTINUE taking these medications      acetaminophen 325 MG tablet  Commonly known as: TYLENOL     amiodarone 200 MG tablet  Commonly known as: CORDARONE  Take 1 tablet by mouth once daily     bumetanide 1 MG tablet  Commonly known as: BUMEX  Take 1 tablet by mouth daily     CALCIUM PO     digoxin 125 MCG tablet  Commonly known as: LANOXIN  Take 1 tablet by mouth every other day     empagliflozin 10 MG tablet  Commonly known as: Jardiance  Take 1 tablet by mouth daily     ferrous sulfate 325 (65 Fe) MG tablet  Commonly known as: IRON 325     Handicap Placard Misc  by Does not apply route Request parking placard due to medical conditions. Duration of 5 years. levothyroxine 175 MCG tablet  Commonly known as: SYNTHROID  Take 1 tablet by mouth once daily     losartan 25 MG tablet  Commonly known as: Cozaar  Take 1 tablet by mouth daily     Lumbar Support Cushion Misc  by Does not apply route. Dx: low back pain, sciatica. metoprolol succinate 100 MG extended release tablet  Commonly known as: TOPROL XL  Take 1 tablet by mouth daily     MYLANTA PO     omeprazole 40 MG delayed release capsule  Commonly known as: PRILOSEC     spironolactone 25 MG tablet  Commonly known as: ALDACTONE  TAKE 1 TABLET BY MOUTH EVERY OTHER DAY     sucralfate 1 GM tablet  Commonly known as: CARAFATE     vitamin C 1000 MG tablet     VITAMIN D PO     warfarin 3 MG tablet  Commonly known as: COUMADIN  Take as directed. If you are unsure how to take this medication, talk to your nurse or doctor. Original instructions: Alternating 3 mg/1.5 mg or as directed per INR results.             STOP taking these medications      esomeprazole 40 MG delayed release capsule  Commonly known as: NEXIUM     famotidine 40 MG

## 2023-03-29 NOTE — PLAN OF CARE
resources:   Identify barriers to discharge with patient and caregiver   Arrange for needed discharge resources and transportation as appropriate   Identify discharge learning needs (meds, wound care, etc)  3/29/2023 0022 by Omer Robbins RN  Outcome: Progressing  Flowsheets (Taken 3/28/2023 1742 by Wesley Menard RN)  Discharge to home or other facility with appropriate resources: Identify barriers to discharge with patient and caregiver     Problem: Pain  Goal: Verbalizes/displays adequate comfort level or baseline comfort level  3/29/2023 1208 by Merrill Lee RN  Outcome: Adequate for Discharge  Flowsheets (Taken 3/29/2023 0745)  Verbalizes/displays adequate comfort level or baseline comfort level:   Encourage patient to monitor pain and request assistance   Assess pain using appropriate pain scale   Implement non-pharmacological measures as appropriate and evaluate response   Administer analgesics based on type and severity of pain and evaluate response  3/29/2023 0022 by Omer Robbins RN  Outcome: Progressing  Flowsheets (Taken 3/29/2023 0022)  Verbalizes/displays adequate comfort level or baseline comfort level:   Encourage patient to monitor pain and request assistance   Assess pain using appropriate pain scale

## 2023-03-29 NOTE — PLAN OF CARE
Problem: Chronic Conditions and Co-morbidities  Goal: Patient's chronic conditions and co-morbidity symptoms are monitored and maintained or improved  3/29/2023 0022 by Morgan Gallagher RN  Outcome: Progressing  Flowsheets (Taken 3/28/2023 1716 by Morgan Aguilar RN)  Care Plan - Patient's Chronic Conditions and Co-Morbidity Symptoms are Monitored and Maintained or Improved:   Monitor and assess patient's chronic conditions and comorbid symptoms for stability, deterioration, or improvement   Collaborate with multidisciplinary team to address chronic and comorbid conditions and prevent exacerbation or deterioration   Update acute care plan with appropriate goals if chronic or comorbid symptoms are exacerbated and prevent overall improvement and discharge  3/28/2023 1856 by Morgan Aguilar RN  Outcome: Progressing  3/28/2023 1746 by Morgan Aguilar RN  Outcome: Progressing  Flowsheets (Taken 3/28/2023 1716)  Care Plan - Patient's Chronic Conditions and Co-Morbidity Symptoms are Monitored and Maintained or Improved:   Monitor and assess patient's chronic conditions and comorbid symptoms for stability, deterioration, or improvement   Collaborate with multidisciplinary team to address chronic and comorbid conditions and prevent exacerbation or deterioration   Update acute care plan with appropriate goals if chronic or comorbid symptoms are exacerbated and prevent overall improvement and discharge     Problem: Safety - Adult  Goal: Free from fall injury  3/29/2023 0022 by Morgan Gallagher RN  Outcome: Progressing  Flowsheets (Taken 3/28/2023 1828 by Morgan Aguilar RN)  Free From Fall Injury: Instruct family/caregiver on patient safety  3/28/2023 1856 by Morgan Aguilar RN  Outcome: Progressing  Flowsheets (Taken 3/28/2023 1828)  Free From Fall Injury: Instruct family/caregiver on patient safety  3/28/2023 1746 by Morgan Aguilar RN  Outcome: Progressing     Problem: Discharge Planning  Goal: Discharge to home or other facility with appropriate resources  3/29/2023 0022 by Rah Julian RN  Outcome: Progressing  Flowsheets (Taken 3/28/2023 1742 by Mone Almanza RN)  Discharge to home or other facility with appropriate resources: Identify barriers to discharge with patient and caregiver  3/28/2023 1856 by Mone Almanza RN  Outcome: Progressing  3/28/2023 1746 by Mone Almanza RN  Outcome: Progressing  Flowsheets  Taken 3/28/2023 1742  Discharge to home or other facility with appropriate resources: Identify barriers to discharge with patient and caregiver  Taken 3/28/2023 1716  Discharge to home or other facility with appropriate resources:   Identify barriers to discharge with patient and caregiver   Arrange for needed discharge resources and transportation as appropriate   Arrange for interpreters to assist at discharge as needed   Identify discharge learning needs (meds, wound care, etc)     Problem: Pain  Goal: Verbalizes/displays adequate comfort level or baseline comfort level  3/29/2023 0022 by Rah Julian RN  Outcome: Progressing  Flowsheets (Taken 3/29/2023 0022)  Verbalizes/displays adequate comfort level or baseline comfort level:   Encourage patient to monitor pain and request assistance   Assess pain using appropriate pain scale  3/28/2023 1856 by Mone Almanza RN  Outcome: Progressing

## 2023-03-29 NOTE — PROGRESS NOTES
Patient is awake and up in bed. Call light within reach will continue to monitor.       Kari ALMANZA/SN

## 2023-03-29 NOTE — PROGRESS NOTES
Patient is up in chair. No changes for physical assessment. Daughter is here with her for discharge. Primary nurse went over discharge plans.        Yeny ALMANZA/

## 2023-03-29 NOTE — PROGRESS NOTES
Alert and oriented X4. Speech is appropriate and clear. Pupils are equal, round and reactive to light went from 4 mm to 3 mm with consensual response. Upper extremities are pink warm and dry. Sensation is present. Capillary refill and skin turgor is less than three seconds. Hand grasp is strong and equal bilat. No arm drift present. Removed peripheral IV on posterior hand. Apical pulse was 66 bpm.Heart rate and rhythm regular. Regular breathing pattern and moderate depth of respirations. Symmetrical chest movement and lung sounds are clear throughout. Abdomen is flat. Bowel sounds are active in all four quadrants. Non tender without masses. Lower extremities are pink, warm and dry. Sensation is present with no numbness or tingling bilat. No edema present. Pedal push and pull is strong and equal bilat. Dorsalis pedis and posterior tibialis pulses are present. Skin over bony prominens are intact.      Yeny Mckeon RSC/SN

## 2023-03-29 NOTE — CARE COORDINATION
Yes  Would you like Case Management to discuss the discharge plan with any other family members/significant others, and if so, who? No  Plans to Return to Present Housing: Yes  Other Identified Issues/Barriers to RETURNING to current housing: None  Potential Assistance needed at discharge: N/A            Potential DME:    Patient expects to discharge to: [address] [place] [address] for transportation at discharge: Family    Financial    Payor: Kenn Pelaez / Plan: MEDICARE PART A AND B / Product Type: *No Product type* /     Does insurance require precert for SNF: No    Potential assistance Purchasing Medications: No  Meds-to-Beds request: Yes      [address] [place] [address] [phone]  [address] [place] [address]  Phone: [phone] Fax: [phone]      Notes:    Factors facilitating achievement of predicted outcomes: Family support, Caregiver support, Motivated, Cooperative, Pleasant, and Good insight into deficits    Barriers to discharge: N/A    Additional Case Management Notes: Admitted as observations s/p EGD due to AICD firing during procedure. EGD findings below. Troponins negative. Procedure:   3/28 EGD: Esophagus: The GE junction was noted to be at 40 cm. The esophagus appeared normal without evidence of Ritter's esophagus or reflux esophagitis. 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 The area was treated with APC on gastric settings. Duodenum: A 2-3mm polyp was noted in the 2nd portion of the duodenum. Cold biopsies were taken and placed in Jar 1. Otherwise, the first and 2nd portions of the duodenum appeared normal with normal villous pattern.     The Plan for Transition of Care is related to the following treatment goals of Acute gastric ulcer, unspecified whether gastric ulcer hemorrhage or perforation present [K25.3]  Defibrillator discharge [Z45.02]    Patient Goals/Plan/Treatment Preferences: Spoke with Mainor Castellanos, she lives at

## 2023-03-30 ENCOUNTER — CLINICAL DOCUMENTATION ONLY (OUTPATIENT)
Facility: CLINIC | Age: 83
End: 2023-03-30

## 2023-04-04 RX ORDER — LOSARTAN POTASSIUM 25 MG/1
25 TABLET ORAL DAILY
Qty: 90 TABLET | Refills: 3 | Status: SHIPPED | OUTPATIENT
Start: 2023-04-04

## 2023-04-04 RX ORDER — AMIODARONE HYDROCHLORIDE 200 MG/1
200 TABLET ORAL DAILY
Qty: 90 TABLET | Refills: 3 | Status: SHIPPED | OUTPATIENT
Start: 2023-04-04

## 2023-04-04 RX ORDER — SPIRONOLACTONE 25 MG/1
25 TABLET ORAL EVERY OTHER DAY
Qty: 45 TABLET | Refills: 3 | Status: SHIPPED | OUTPATIENT
Start: 2023-04-04

## 2023-04-04 RX ORDER — METOPROLOL SUCCINATE 100 MG/1
TABLET, EXTENDED RELEASE ORAL
Qty: 90 TABLET | Refills: 3 | Status: SHIPPED | OUTPATIENT
Start: 2023-04-04

## 2023-04-10 ENCOUNTER — HOSPITAL ENCOUNTER (OUTPATIENT)
Dept: NURSING | Age: 83
Discharge: HOME | End: 2023-04-10
Payer: MEDICARE

## 2023-04-10 VITALS
SYSTOLIC BLOOD PRESSURE: 111 MMHG | HEART RATE: 69 BPM | TEMPERATURE: 96.5 F | OXYGEN SATURATION: 98 % | RESPIRATION RATE: 16 BRPM | DIASTOLIC BLOOD PRESSURE: 53 MMHG

## 2023-04-10 PROCEDURE — 96360 HYDRATION IV INFUSION INIT: CPT

## 2023-04-10 PROCEDURE — 96365 THER/PROPH/DIAG IV INF INIT: CPT

## 2023-04-10 PROCEDURE — 6360000002 HC RX W HCPCS: Performed by: NURSE PRACTITIONER

## 2023-04-10 PROCEDURE — P9047 ALBUMIN (HUMAN), 25%, 50ML: HCPCS | Performed by: NURSE PRACTITIONER

## 2023-04-10 RX ORDER — ALBUMIN (HUMAN) 12.5 G/50ML
25 SOLUTION INTRAVENOUS ONCE
Status: COMPLETED | OUTPATIENT
Start: 2023-04-10 | End: 2023-04-10

## 2023-04-10 RX ADMIN — ALBUMIN (HUMAN) 25 G: 0.25 INJECTION, SOLUTION INTRAVENOUS at 11:34

## 2023-04-10 ASSESSMENT — PAIN - FUNCTIONAL ASSESSMENT: PAIN_FUNCTIONAL_ASSESSMENT: NONE - DENIES PAIN

## 2023-04-10 NOTE — PROGRESS NOTES
1124 Patient arrived to [facility] ambulatory for albumin infusion. Oriented to room and call light  PT RIGHTS AND RESPONSIBILITIES OFFERED TO PT. Ultrasound stated they drained 1.5L     1134 Medication started and she denies complaints    1234 Medication completed and she denies complaints. Iv removed. Discharge instructions given and explained and she denies questions.       1247 Discharged in wheel chair      _M___ Safety:       (Environmental)  [place] to environment  Ensure ID band is correct and in place/ allergy band as needed  Assess for fall risk  Initiate fall precautions as applicable (fall band, side rails, etc.)  Call light within reach  Bed in low position/ wheels locked    _M___ Pain:       Assess pain level and characteristics  Administer analgesics as ordered  Assess effectiveness of pain management and report to MD as needed    _M___ Knowledge Deficit:  Assess baseline knowledge  Provide teaching at level of understanding  Provide teaching via preferred learning method  Evaluate teaching effectiveness    _M___ Hemodynamic/Respiratory Status:       (Pre and Post Procedure Monitoring)  Assess/Monitor vital signs and LOC  Assess Baseline SpO2 prior to any sedation  Obtain weight/height  Assess vital signs/ LOC until patient meets discharge criteria  Monitor procedure site and notify MD of any issues

## 2023-04-17 ENCOUNTER — OFFICE VISIT (OUTPATIENT)
Dept: INTERNAL MEDICINE CLINIC | Age: 83
End: 2023-04-17
Payer: MEDICARE

## 2023-04-17 ENCOUNTER — NURSE ONLY (OUTPATIENT)
Dept: LAB | Age: 83
End: 2023-04-17

## 2023-04-17 ENCOUNTER — TELEPHONE (OUTPATIENT)
Dept: FAMILY MEDICINE CLINIC | Age: 83
End: 2023-04-17

## 2023-04-17 ENCOUNTER — TELEPHONE (OUTPATIENT)
Dept: INTERNAL MEDICINE CLINIC | Age: 83
End: 2023-04-17

## 2023-04-17 VITALS
SYSTOLIC BLOOD PRESSURE: 130 MMHG | HEIGHT: 60 IN | DIASTOLIC BLOOD PRESSURE: 62 MMHG | BODY MASS INDEX: 33.1 KG/M2 | HEART RATE: 64 BPM | WEIGHT: 168.6 LBS

## 2023-04-17 DIAGNOSIS — I42.8 NICM (NONISCHEMIC CARDIOMYOPATHY) (HCC): ICD-10-CM

## 2023-04-17 DIAGNOSIS — E05.90 HYPERTHYROIDISM: ICD-10-CM

## 2023-04-17 DIAGNOSIS — R18.8 CIRRHOSIS OF LIVER WITH ASCITES, UNSPECIFIED HEPATIC CIRRHOSIS TYPE (HCC): ICD-10-CM

## 2023-04-17 DIAGNOSIS — N18.31 STAGE 3A CHRONIC KIDNEY DISEASE (HCC): ICD-10-CM

## 2023-04-17 DIAGNOSIS — K74.60 CIRRHOSIS OF LIVER WITH ASCITES, UNSPECIFIED HEPATIC CIRRHOSIS TYPE (HCC): ICD-10-CM

## 2023-04-17 DIAGNOSIS — I48.20 CHRONIC ATRIAL FIBRILLATION (HCC): ICD-10-CM

## 2023-04-17 DIAGNOSIS — R18.8 OTHER ASCITES: Primary | ICD-10-CM

## 2023-04-17 LAB — INR PPP: 3.08 (ref 0.85–1.13)

## 2023-04-17 PROCEDURE — 3075F SYST BP GE 130 - 139MM HG: CPT | Performed by: INTERNAL MEDICINE

## 2023-04-17 PROCEDURE — 99213 OFFICE O/P EST LOW 20 MIN: CPT | Performed by: INTERNAL MEDICINE

## 2023-04-17 PROCEDURE — G8417 CALC BMI ABV UP PARAM F/U: HCPCS | Performed by: INTERNAL MEDICINE

## 2023-04-17 PROCEDURE — 1090F PRES/ABSN URINE INCON ASSESS: CPT | Performed by: INTERNAL MEDICINE

## 2023-04-17 PROCEDURE — 1036F TOBACCO NON-USER: CPT | Performed by: INTERNAL MEDICINE

## 2023-04-17 PROCEDURE — 1123F ACP DISCUSS/DSCN MKR DOCD: CPT | Performed by: INTERNAL MEDICINE

## 2023-04-17 PROCEDURE — 3078F DIAST BP <80 MM HG: CPT | Performed by: INTERNAL MEDICINE

## 2023-04-17 PROCEDURE — G8427 DOCREV CUR MEDS BY ELIG CLIN: HCPCS | Performed by: INTERNAL MEDICINE

## 2023-04-17 PROCEDURE — G8399 PT W/DXA RESULTS DOCUMENT: HCPCS | Performed by: INTERNAL MEDICINE

## 2023-04-17 PROCEDURE — 1111F DSCHRG MED/CURRENT MED MERGE: CPT | Performed by: INTERNAL MEDICINE

## 2023-04-17 RX ORDER — LEVOTHYROXINE SODIUM 0.15 MG/1
150 TABLET ORAL DAILY
Qty: 30 TABLET | Refills: 5 | Status: SHIPPED | OUTPATIENT
Start: 2023-04-17

## 2023-04-17 RX ORDER — WARFARIN SODIUM 3 MG/1
TABLET ORAL
Qty: 30 TABLET | Refills: 0 | OUTPATIENT
Start: 2023-04-17

## 2023-04-17 RX ORDER — DIGOXIN 125 MCG
125 TABLET ORAL EVERY OTHER DAY
Qty: 45 TABLET | Refills: 3 | Status: SHIPPED | OUTPATIENT
Start: 2023-04-17 | End: 2023-07-16

## 2023-04-17 NOTE — TELEPHONE ENCOUNTER
Su Zepeda called requesting a refill on the following medications:  Requested Prescriptions     Pending Prescriptions Disp Refills    warfarin (COUMADIN) 3 MG tablet [Pharmacy Med Name: Warfarin Sodium 3 MG Oral Tablet] 30 tablet 0     Sig: ALTERNATE BETWEEN 3 MG (ONE TABLET) AND 1.5 MG (ONE-HALF TABLET) AS DIRECTED PER INR RESULTS       Date of last visit: 11/14/2022  Date of next visit (if applicable):Visit date not found  Date of last refill: 11/14/2022 90 with 3  Pharmacy Name: [facility]

## 2023-04-17 NOTE — TELEPHONE ENCOUNTER
----- Message from Artie Diaz MD sent at 4/17/2023  3:49 PM EDT -----  INR good. Continue same dosage and recheck INR in 1 month.

## 2023-04-17 NOTE — PROGRESS NOTES
Shani Marroquin (:  1940) is a [de-identified] y.o. female,Established patient, here for evaluation of the following chief complaint(s):  Atrial Fibrillation (Chronic/), Anemia (ICD fired during EGD), Bloated (Says something  is not right in her abdomen-lower abdomen gets distended and upper seems hard), and Foot Swelling (Of the right-off and on all last week)         ASSESSMENT/PLAN:  1. Other ascites- found recently- SAAG implies portal hypertension; advised may recur, weigh self daily  2. Stage 3a chronic kidney disease ([facility]- stable  3. Cirrhosis of liver with ascites, unspecified hepatic cirrhosis type ([facility] [facility]- etiol uncertain; monitor. 4. Hyperthyroidism with elev TSH; need to r/o pit. Tumor; encouraged to see Dr Mcintosh Nicely again; reduce thyroid to 150mcg      Return in about 4 weeks (around 5/15/2023). Subjective   SUBJECTIVE/OBJECTIVE:  HPI pt with nonisch cm, ICD, newly found cirrhosis liver, ICD shock during EGD due to electrical interference; EGD showed gastric polyp and GAVE; discussed with pt/dtr. To have f/u with Dr Denisha Jin. She notes vague distress in epigastric region, some pain with eating; abdomen feels 'full'. No significant leg swelling. She does not weigh self; encouraged to do so. Review of Systems   Hx elev TSH and T4. Current Outpatient Medications   Medication Instructions    acetaminophen (TYLENOL) 650 mg, Oral, EVERY 6 HOURS PRN    Alum & Mag Hydroxide-Simeth (MYLANTA PO) Oral, PRN    amiodarone (CORDARONE) 200 mg, Oral, DAILY    bumetanide (BUMEX) 1 mg, Oral, DAILY    CALCIUM  mg, Oral, 2 times daily    digoxin (LANOXIN) 125 mcg, Oral, EVERY OTHER DAY    empagliflozin (JARDIANCE) 10 mg, Oral, DAILY    ferrous sulfate (IRON 325) 325 mg, Oral, EVERY OTHER DAY    Handicap Placard MISC Does not apply, Request parking placard due to medical conditions. Duration of 5 years.     levothyroxine (SYNTHROID) 150 mcg, Oral, DAILY    losartan (COZAAR) 25 mg, Oral, DAILY

## 2023-05-01 ENCOUNTER — NURSE ONLY (OUTPATIENT)
Dept: LAB | Age: 83
End: 2023-05-01

## 2023-05-01 LAB
AFP SERPL-MCNC: 1.7 UG/L
ALBUMIN SERPL BCG-MCNC: 3.2 G/DL (ref 3.5–5.1)
ALP SERPL-CCNC: 153 U/L (ref 38–126)
ALT SERPL W/O P-5'-P-CCNC: 23 U/L (ref 11–66)
ANION GAP SERPL CALC-SCNC: 14 MEQ/L (ref 8–16)
AST SERPL-CCNC: 44 U/L (ref 5–40)
BILIRUB CONJ SERPL-MCNC: < 0.2 MG/DL (ref 0–0.3)
BILIRUB SERPL-MCNC: 0.4 MG/DL (ref 0.3–1.2)
BUN SERPL-MCNC: 24 MG/DL (ref 7–22)
CALCIUM SERPL-MCNC: 8.9 MG/DL (ref 8.5–10.5)
CHLORIDE SERPL-SCNC: 105 MEQ/L (ref 98–111)
CO2 SERPL-SCNC: 23 MEQ/L (ref 23–33)
CREAT SERPL-MCNC: 1.2 MG/DL (ref 0.4–1.2)
DEPRECATED RDW RBC AUTO: 74.7 FL (ref 35–45)
ERYTHROCYTE [DISTWIDTH] IN BLOOD BY AUTOMATED COUNT: 20.1 % (ref 11.5–14.5)
FERRITIN SERPL IA-MCNC: 79 NG/ML (ref 10–291)
GFR SERPL CREATININE-BSD FRML MDRD: 46 ML/MIN/1.73M2
GLUCOSE SERPL-MCNC: 119 MG/DL (ref 70–108)
HAV AB SER-ACNC: REACTIVE
HBV CORE IGG+IGM SERPL QL IA: NONREACTIVE
HBV SURFACE AB SER QL IA: POSITIVE
HBV SURFACE AG SERPL QL IA: NEGATIVE
HCT VFR BLD AUTO: 33.2 % (ref 37–47)
HCV IGG SERPL QL IA: NEGATIVE
HGB BLD-MCNC: 9.4 GM/DL (ref 12–16)
INR PPP: 2.58 (ref 0.85–1.13)
IRON SERPL-MCNC: 23 UG/DL (ref 50–170)
MCH RBC QN AUTO: 28.1 PG (ref 26–33)
MCHC RBC AUTO-ENTMCNC: 28.3 GM/DL (ref 32.2–35.5)
MCV RBC AUTO: 99.4 FL (ref 81–99)
PLATELET # BLD AUTO: 334 THOU/MM3 (ref 130–400)
PMV BLD AUTO: 10 FL (ref 9.4–12.4)
POTASSIUM SERPL-SCNC: 4.1 MEQ/L (ref 3.5–5.2)
PROT SERPL-MCNC: 6.9 G/DL (ref 6.1–8)
RBC # BLD AUTO: 3.34 MILL/MM3 (ref 4.2–5.4)
SODIUM SERPL-SCNC: 142 MEQ/L (ref 135–145)
TIBC SERPL-MCNC: 333 UG/DL (ref 171–450)
WBC # BLD AUTO: 6.6 THOU/MM3 (ref 4.8–10.8)

## 2023-05-02 LAB
HAV IGM SER QL: NEGATIVE
MITOCHONDRIAL M2 AB, IGG: 27 U/ML (ref 0–4)
NUCLEAR IGG SER QL IA: DETECTED
TTG IGA SER IA-ACNC: 1.2 U/ML
TTG IGG SER IA-ACNC: < 0.6 U/ML

## 2023-05-03 LAB
MYELOPEROXIDASE AB SER-ACNC: 0 AU/ML (ref 0–19)
PROTEINASE3 AB SER-ACNC: 0 AU/ML (ref 0–19)
SMA IGG SER-ACNC: 37 UNITS (ref 0–19)

## 2023-05-04 LAB
ANA PAT SER IF-IMP: ABNORMAL
NUCLEAR IGG SER QL IF: DETECTED
NUCLEAR IGG TITR SER IF: ABNORMAL {TITER}
SMOOTH MUSCLE IGG TITR SER: ABNORMAL {TITER}

## 2023-05-09 ENCOUNTER — OFFICE VISIT (OUTPATIENT)
Dept: FAMILY MEDICINE CLINIC | Age: 83
End: 2023-05-09
Payer: MEDICARE

## 2023-05-09 ENCOUNTER — PATIENT MESSAGE (OUTPATIENT)
Dept: FAMILY MEDICINE CLINIC | Age: 83
End: 2023-05-09

## 2023-05-09 VITALS
RESPIRATION RATE: 16 BRPM | BODY MASS INDEX: 32.03 KG/M2 | TEMPERATURE: 97.3 F | SYSTOLIC BLOOD PRESSURE: 124 MMHG | DIASTOLIC BLOOD PRESSURE: 60 MMHG | HEART RATE: 74 BPM | WEIGHT: 164 LBS

## 2023-05-09 DIAGNOSIS — I82.451 ACUTE DEEP VEIN THROMBOSIS (DVT) OF RIGHT PERONEAL VEIN (HCC): ICD-10-CM

## 2023-05-09 DIAGNOSIS — M17.0 BILATERAL PRIMARY OSTEOARTHRITIS OF KNEE: Primary | ICD-10-CM

## 2023-05-09 DIAGNOSIS — E11.9 TYPE 2 DIABETES MELLITUS WITHOUT COMPLICATION, WITHOUT LONG-TERM CURRENT USE OF INSULIN (HCC): ICD-10-CM

## 2023-05-09 DIAGNOSIS — I20.8 OTHER FORMS OF ANGINA PECTORIS (HCC): ICD-10-CM

## 2023-05-09 PROCEDURE — G8417 CALC BMI ABV UP PARAM F/U: HCPCS | Performed by: FAMILY MEDICINE

## 2023-05-09 PROCEDURE — 1123F ACP DISCUSS/DSCN MKR DOCD: CPT | Performed by: FAMILY MEDICINE

## 2023-05-09 PROCEDURE — 3074F SYST BP LT 130 MM HG: CPT | Performed by: FAMILY MEDICINE

## 2023-05-09 PROCEDURE — G8427 DOCREV CUR MEDS BY ELIG CLIN: HCPCS | Performed by: FAMILY MEDICINE

## 2023-05-09 PROCEDURE — G8399 PT W/DXA RESULTS DOCUMENT: HCPCS | Performed by: FAMILY MEDICINE

## 2023-05-09 PROCEDURE — 1090F PRES/ABSN URINE INCON ASSESS: CPT | Performed by: FAMILY MEDICINE

## 2023-05-09 PROCEDURE — 1036F TOBACCO NON-USER: CPT | Performed by: FAMILY MEDICINE

## 2023-05-09 PROCEDURE — 99214 OFFICE O/P EST MOD 30 MIN: CPT | Performed by: FAMILY MEDICINE

## 2023-05-09 PROCEDURE — 3078F DIAST BP <80 MM HG: CPT | Performed by: FAMILY MEDICINE

## 2023-05-09 ASSESSMENT — ENCOUNTER SYMPTOMS
BACK PAIN: 0
BLOOD IN STOOL: 0
VOMITING: 0
WHEEZING: 0
RHINORRHEA: 0
EYE PAIN: 0
CHEST TIGHTNESS: 0
COUGH: 0
CONSTIPATION: 0
SHORTNESS OF BREATH: 0
SORE THROAT: 0
NAUSEA: 0
DIARRHEA: 0
ABDOMINAL PAIN: 0

## 2023-05-09 NOTE — TELEPHONE ENCOUNTER
From: Radha Gomez  To: Dr. Huntley Maid: 5/9/2023 8:59 AM EDT  Subject: Blood work     Can you order blood work to get my electrolights checked. Because my legs and knees ache and weakness.

## 2023-05-15 ENCOUNTER — OFFICE VISIT (OUTPATIENT)
Dept: INTERNAL MEDICINE CLINIC | Age: 83
End: 2023-05-15
Payer: MEDICARE

## 2023-05-15 ENCOUNTER — TELEPHONE (OUTPATIENT)
Dept: INTERNAL MEDICINE CLINIC | Age: 83
End: 2023-05-15

## 2023-05-15 ENCOUNTER — NURSE ONLY (OUTPATIENT)
Dept: LAB | Age: 83
End: 2023-05-15

## 2023-05-15 VITALS
HEIGHT: 60 IN | BODY MASS INDEX: 32.86 KG/M2 | DIASTOLIC BLOOD PRESSURE: 60 MMHG | WEIGHT: 167.4 LBS | HEART RATE: 60 BPM | SYSTOLIC BLOOD PRESSURE: 124 MMHG

## 2023-05-15 DIAGNOSIS — R63.0 ANOREXIA: ICD-10-CM

## 2023-05-15 DIAGNOSIS — R53.83 FATIGUE, UNSPECIFIED TYPE: Primary | ICD-10-CM

## 2023-05-15 DIAGNOSIS — I48.20 CHRONIC ATRIAL FIBRILLATION (HCC): ICD-10-CM

## 2023-05-15 DIAGNOSIS — T50.905D ADVERSE EFFECT OF DRUG, SUBSEQUENT ENCOUNTER: ICD-10-CM

## 2023-05-15 DIAGNOSIS — K75.4 AUTOIMMUNE HEPATITIS (HCC): ICD-10-CM

## 2023-05-15 DIAGNOSIS — I50.22 CHRONIC SYSTOLIC (CONGESTIVE) HEART FAILURE (HCC): Primary | ICD-10-CM

## 2023-05-15 DIAGNOSIS — K74.69 OTHER CIRRHOSIS OF LIVER (HCC): ICD-10-CM

## 2023-05-15 DIAGNOSIS — E05.90 HYPERTHYROIDISM: ICD-10-CM

## 2023-05-15 LAB — DIGOXIN SERPL-MCNC: 0.7 NG/ML (ref 0.5–2)

## 2023-05-15 PROCEDURE — 3078F DIAST BP <80 MM HG: CPT | Performed by: INTERNAL MEDICINE

## 2023-05-15 PROCEDURE — 1036F TOBACCO NON-USER: CPT | Performed by: INTERNAL MEDICINE

## 2023-05-15 PROCEDURE — 3074F SYST BP LT 130 MM HG: CPT | Performed by: INTERNAL MEDICINE

## 2023-05-15 PROCEDURE — 1123F ACP DISCUSS/DSCN MKR DOCD: CPT | Performed by: INTERNAL MEDICINE

## 2023-05-15 PROCEDURE — G8399 PT W/DXA RESULTS DOCUMENT: HCPCS | Performed by: INTERNAL MEDICINE

## 2023-05-15 PROCEDURE — 1090F PRES/ABSN URINE INCON ASSESS: CPT | Performed by: INTERNAL MEDICINE

## 2023-05-15 PROCEDURE — G8417 CALC BMI ABV UP PARAM F/U: HCPCS | Performed by: INTERNAL MEDICINE

## 2023-05-15 PROCEDURE — G8427 DOCREV CUR MEDS BY ELIG CLIN: HCPCS | Performed by: INTERNAL MEDICINE

## 2023-05-15 PROCEDURE — 99214 OFFICE O/P EST MOD 30 MIN: CPT | Performed by: INTERNAL MEDICINE

## 2023-05-15 NOTE — TELEPHONE ENCOUNTER
I notified pts dtr Pearl Dig level is OK but that per vo from Dr. Nadege Brown he would like her to have a TSH and free T-4 done. They will get that done this week.

## 2023-05-15 NOTE — PROGRESS NOTES
Maddie Pickering (:  1940) is a [de-identified] y.o. female,Established patient, here for evaluation of the following chief complaint(s): Other (ascites), Cirrhosis, Chronic Kidney Disease, Leg Pain (Bilateral leg aching when walking-does not have this when sitting), and Weight Loss (Nothing recent but dtr wondering if meds needs to be adjusted d/t her weight loss over the last few years-bp running 110 to 1 teens/50)         ASSESSMENT/PLAN:  1. Chronic systolic (congestive) heart failure ([address]- compensated; continue meds; reduce toprol due to weight loss  2. Adverse effect of drug, subsequent encounter  -     Spirometry W/ Diffusion Capacity; Future  3. Anorexia  -     Digoxin Level; Future  4. Autoimmune hepatitis ([address]- c/w labs ordered by Dr Helen Durbin; she is to see her to discuss  5. Hyperthyroidism- ck labs again; consider reduce synthroid  6. Chronic atrial fibrillation ([address]- on coumadin  7. Other cirrhosis of liver ([facility] [address] [facility]- discovered during ct; had paracentesis due to ascites. Possibly due to autoimmune hepatitis based on labs. Will need to consider whether to withdraw amio with resultant risk of arrhythmias  8/ painful knees; suggested see OIO; try capsaicin. Return in about 4 weeks (around 2023). Subjective   SUBJECTIVE/OBJECTIVE:  HPI pt with nonischemic cm; biv icd, chronic afib [facility]. Found to have nodular liver ( prior us did not show that); bloodwork suggests autoimmune hepatitis. She will see Dr Helen Durbin re that. She has pain both knees recently without swelling. Advised to see OIO, try capsaicin. She is chronically fatigued; can barely walk due to knees    She has had elevation of both T4 and TSH, difficult to explain; she is seeing Dr Anthony Atkins. Review of Systems   Twelve point ROS completed and found to be negative except as noted above.     Current Outpatient Medications   Medication Instructions    acetaminophen (TYLENOL) 650

## 2023-05-15 NOTE — PATIENT INSTRUCTIONS
TRY CAPSAICIN CREAM FOR THE KNEES    REDUCE THE TOPROL TO 1/2 TAB DAILY    MAKE APPT WITH OIO FOR KNEES

## 2023-05-16 ENCOUNTER — TELEPHONE (OUTPATIENT)
Dept: INTERNAL MEDICINE CLINIC | Age: 83
End: 2023-05-16

## 2023-05-16 DIAGNOSIS — I47.20 VENTRICULAR TACHYARRHYTHMIA (HCC): Primary | ICD-10-CM

## 2023-05-20 ENCOUNTER — NURSE ONLY (OUTPATIENT)
Dept: LAB | Age: 83
End: 2023-05-20

## 2023-05-20 DIAGNOSIS — E05.90 HYPERTHYROIDISM: ICD-10-CM

## 2023-05-20 LAB
ALBUMIN SERPL BCG-MCNC: 2.9 G/DL (ref 3.5–5.1)
ALP SERPL-CCNC: 142 U/L (ref 38–126)
ALT SERPL W/O P-5'-P-CCNC: 21 U/L (ref 11–66)
ANION GAP SERPL CALC-SCNC: 10 MEQ/L (ref 8–16)
AST SERPL-CCNC: 33 U/L (ref 5–40)
BILIRUB SERPL-MCNC: 0.3 MG/DL (ref 0.3–1.2)
BUN SERPL-MCNC: 37 MG/DL (ref 7–22)
CALCIUM SERPL-MCNC: 8.5 MG/DL (ref 8.5–10.5)
CHLORIDE SERPL-SCNC: 102 MEQ/L (ref 98–111)
CO2 SERPL-SCNC: 24 MEQ/L (ref 23–33)
CREAT SERPL-MCNC: 1.5 MG/DL (ref 0.4–1.2)
DEPRECATED RDW RBC AUTO: 64.5 FL (ref 35–45)
ERYTHROCYTE [DISTWIDTH] IN BLOOD BY AUTOMATED COUNT: 17.6 % (ref 11.5–14.5)
GFR SERPL CREATININE-BSD FRML MDRD: 35 ML/MIN/1.73M2
GLUCOSE SERPL-MCNC: 111 MG/DL (ref 70–108)
HCT VFR BLD AUTO: 27.9 % (ref 37–47)
HGB BLD-MCNC: 7.9 GM/DL (ref 12–16)
MCH RBC QN AUTO: 28.1 PG (ref 26–33)
MCHC RBC AUTO-ENTMCNC: 28.3 GM/DL (ref 32.2–35.5)
MCV RBC AUTO: 99.3 FL (ref 81–99)
PLATELET # BLD AUTO: 268 THOU/MM3 (ref 130–400)
PMV BLD AUTO: 10.1 FL (ref 9.4–12.4)
POTASSIUM SERPL-SCNC: 4.2 MEQ/L (ref 3.5–5.2)
PROT SERPL-MCNC: 6.2 G/DL (ref 6.1–8)
RBC # BLD AUTO: 2.81 MILL/MM3 (ref 4.2–5.4)
SODIUM SERPL-SCNC: 136 MEQ/L (ref 135–145)
T4 FREE SERPL-MCNC: 1.35 NG/DL (ref 0.93–1.76)
TSH SERPL DL<=0.005 MIU/L-ACNC: 26.77 UIU/ML (ref 0.4–4.2)
WBC # BLD AUTO: 5.9 THOU/MM3 (ref 4.8–10.8)

## 2023-05-22 ENCOUNTER — TELEPHONE (OUTPATIENT)
Dept: INTERNAL MEDICINE CLINIC | Age: 83
End: 2023-05-22

## 2023-05-22 ENCOUNTER — HOSPITAL ENCOUNTER (OUTPATIENT)
Dept: PULMONOLOGY | Age: 83
Discharge: HOME | End: 2023-05-22
Payer: MEDICARE

## 2023-05-22 DIAGNOSIS — T50.905D ADVERSE EFFECT OF DRUG, SUBSEQUENT ENCOUNTER: ICD-10-CM

## 2023-05-22 PROCEDURE — 94010 BREATHING CAPACITY TEST: CPT

## 2023-05-22 PROCEDURE — 94729 DIFFUSING CAPACITY: CPT

## 2023-05-22 NOTE — TELEPHONE ENCOUNTER
----- Message from Jonathan Dobbs MD sent at 5/20/2023  1:59 PM EDT -----  Decrease her synthroid to 100 mcg unless otherwise advised by Dr Edis Almanza

## 2023-05-22 NOTE — TELEPHONE ENCOUNTER
Pt was notified of Dr Claudene Bland recommendations. She will contact Dr Phoenix's office to see if he is in agreement with Dr Claudene Bland orders and let me know.

## 2023-05-22 NOTE — TELEPHONE ENCOUNTER
Pt called Dr. Munoz Like but their office wanted a call from our office. I called and spoke to [facility]. They will let me know.

## 2023-05-23 ENCOUNTER — HOSPITAL ENCOUNTER (OUTPATIENT)
Dept: ULTRASOUND IMAGING | Age: 83
Discharge: HOME | End: 2023-05-23
Payer: MEDICARE

## 2023-05-23 ENCOUNTER — HOSPITAL ENCOUNTER (OUTPATIENT)
Dept: NURSING | Age: 83
Discharge: HOME | End: 2023-05-23
Payer: MEDICARE

## 2023-05-23 DIAGNOSIS — R18.8 CIRRHOSIS OF LIVER WITH ASCITES, UNSPECIFIED HEPATIC CIRRHOSIS TYPE (HCC): ICD-10-CM

## 2023-05-23 DIAGNOSIS — K74.60 CIRRHOSIS OF LIVER WITH ASCITES, UNSPECIFIED HEPATIC CIRRHOSIS TYPE (HCC): ICD-10-CM

## 2023-05-23 LAB
ALBUMIN FLD-MCNC: 0.5 GM/DL
CHARACTER, BODY FLUID: CLEAR
COLOR FLD: NORMAL
GRANULOCYTES NFR FLD AUTO: 10.5 %
MESOTHELIAL CELLS BODY FLUID: NORMAL
MONONUC CELLS NFR FLD AUTO: 89.5 %
NUC CELL # FLD AUTO: 189 /CUMM (ref 0–500)
PATHOLOGIST REVIEW: NORMAL
PROT FLD-MCNC: 1.1 GM/DL
RBC # FLD AUTO: < 2000 /CUMM
SPECIMEN: NORMAL
TOTAL VOLUME RECEIVED BODY FLUID: 70 ML

## 2023-05-23 PROCEDURE — 89050 BODY FLUID CELL COUNT: CPT

## 2023-05-23 PROCEDURE — 84157 ASSAY OF PROTEIN OTHER: CPT

## 2023-05-23 PROCEDURE — 82042 OTHER SOURCE ALBUMIN QUAN EA: CPT

## 2023-05-23 PROCEDURE — 88108 CYTOPATH CONCENTRATE TECH: CPT

## 2023-05-23 PROCEDURE — 49083 ABD PARACENTESIS W/IMAGING: CPT

## 2023-05-23 RX ORDER — ALBUMIN (HUMAN) 12.5 G/50ML
25 SOLUTION INTRAVENOUS ONCE
Status: DISCONTINUED | OUTPATIENT
Start: 2023-05-23 | End: 2023-05-26 | Stop reason: HOSPADM

## 2023-05-24 NOTE — TELEPHONE ENCOUNTER
Dr. Darin Villegas office called back saying Dr. Darin Villegas recommendation is to leave the Synthroid dose where it is based on current labs.

## 2023-05-24 NOTE — TELEPHONE ENCOUNTER
Pt was notified Dr. Stewart Vigil recommends she not decrease her synthroid. Pt verbalizes understanding and has an appt in early June.

## 2023-05-31 ENCOUNTER — OFFICE VISIT (OUTPATIENT)
Dept: CARDIOLOGY CLINIC | Age: 83
End: 2023-05-31
Payer: MEDICARE

## 2023-05-31 ENCOUNTER — NURSE ONLY (OUTPATIENT)
Dept: LAB | Age: 83
End: 2023-05-31

## 2023-05-31 ENCOUNTER — NURSE ONLY (OUTPATIENT)
Dept: CARDIOLOGY CLINIC | Age: 83
End: 2023-05-31
Payer: MEDICARE

## 2023-05-31 ENCOUNTER — TELEPHONE (OUTPATIENT)
Dept: INTERNAL MEDICINE CLINIC | Age: 83
End: 2023-05-31

## 2023-05-31 VITALS
HEART RATE: 68 BPM | BODY MASS INDEX: 33.18 KG/M2 | HEIGHT: 60 IN | SYSTOLIC BLOOD PRESSURE: 116 MMHG | DIASTOLIC BLOOD PRESSURE: 56 MMHG | WEIGHT: 169 LBS

## 2023-05-31 DIAGNOSIS — I48.20 ATRIAL FIBRILLATION, CHRONIC (HCC): Primary | ICD-10-CM

## 2023-05-31 DIAGNOSIS — Z95.810 BIVENTRICULAR IMPLANTABLE CARDIOVERTER-DEFIBRILLATOR IN SITU: Primary | ICD-10-CM

## 2023-05-31 DIAGNOSIS — I42.8 NICM (NONISCHEMIC CARDIOMYOPATHY) (HCC): ICD-10-CM

## 2023-05-31 LAB
ALBUMIN SERPL BCG-MCNC: 3 G/DL (ref 3.5–5.1)
ALP SERPL-CCNC: 153 U/L (ref 38–126)
ALT SERPL W/O P-5'-P-CCNC: 27 U/L (ref 11–66)
ANISOCYTOSIS BLD QL SMEAR: PRESENT
AST SERPL-CCNC: 45 U/L (ref 5–40)
BASOPHILS ABSOLUTE: 0.1 THOU/MM3 (ref 0–0.1)
BASOPHILS NFR BLD AUTO: 1.5 %
BILIRUB CONJ SERPL-MCNC: < 0.2 MG/DL (ref 0–0.3)
BILIRUB SERPL-MCNC: 0.4 MG/DL (ref 0.3–1.2)
DEPRECATED RDW RBC AUTO: 65.1 FL (ref 35–45)
EOSINOPHIL NFR BLD AUTO: 1.9 %
EOSINOPHILS ABSOLUTE: 0.1 THOU/MM3 (ref 0–0.4)
ERYTHROCYTE [DISTWIDTH] IN BLOOD BY AUTOMATED COUNT: 18.2 % (ref 11.5–14.5)
FERRITIN SERPL IA-MCNC: 124 NG/ML (ref 10–291)
HCT VFR BLD AUTO: 30.4 % (ref 37–47)
HGB BLD-MCNC: 8.9 GM/DL (ref 12–16)
IMM GRANULOCYTES # BLD AUTO: 0.02 THOU/MM3 (ref 0–0.07)
IMM GRANULOCYTES NFR BLD AUTO: 0.3 %
INR PPP: 3.29 (ref 0.85–1.13)
IRON SERPL-MCNC: 32 UG/DL (ref 50–170)
LYMPHOCYTES ABSOLUTE: 1.2 THOU/MM3 (ref 1–4.8)
LYMPHOCYTES NFR BLD AUTO: 18 %
MCH RBC QN AUTO: 28.5 PG (ref 26–33)
MCHC RBC AUTO-ENTMCNC: 29.3 GM/DL (ref 32.2–35.5)
MCV RBC AUTO: 97.4 FL (ref 81–99)
MONOCYTES ABSOLUTE: 1 THOU/MM3 (ref 0.4–1.3)
MONOCYTES NFR BLD AUTO: 14.3 %
NEUTROPHILS NFR BLD AUTO: 64 %
NRBC BLD AUTO-RTO: 0 /100 WBC
PLATELET # BLD AUTO: 324 THOU/MM3 (ref 130–400)
PMV BLD AUTO: 10.1 FL (ref 9.4–12.4)
PROT SERPL-MCNC: 6.6 G/DL (ref 6.1–8)
RBC # BLD AUTO: 3.12 MILL/MM3 (ref 4.2–5.4)
SCAN OF BLOOD SMEAR: NORMAL
SEGMENTED NEUTROPHILS ABSOLUTE COUNT: 4.4 THOU/MM3 (ref 1.8–7.7)
TIBC SERPL-MCNC: 300 UG/DL (ref 171–450)
WBC # BLD AUTO: 6.8 THOU/MM3 (ref 4.8–10.8)

## 2023-05-31 PROCEDURE — 1123F ACP DISCUSS/DSCN MKR DOCD: CPT | Performed by: INTERNAL MEDICINE

## 2023-05-31 PROCEDURE — 3074F SYST BP LT 130 MM HG: CPT | Performed by: INTERNAL MEDICINE

## 2023-05-31 PROCEDURE — 1090F PRES/ABSN URINE INCON ASSESS: CPT | Performed by: INTERNAL MEDICINE

## 2023-05-31 PROCEDURE — 93000 ELECTROCARDIOGRAM COMPLETE: CPT | Performed by: INTERNAL MEDICINE

## 2023-05-31 PROCEDURE — 1036F TOBACCO NON-USER: CPT | Performed by: INTERNAL MEDICINE

## 2023-05-31 PROCEDURE — G8427 DOCREV CUR MEDS BY ELIG CLIN: HCPCS | Performed by: INTERNAL MEDICINE

## 2023-05-31 PROCEDURE — G8417 CALC BMI ABV UP PARAM F/U: HCPCS | Performed by: INTERNAL MEDICINE

## 2023-05-31 PROCEDURE — 99204 OFFICE O/P NEW MOD 45 MIN: CPT | Performed by: INTERNAL MEDICINE

## 2023-05-31 PROCEDURE — 3078F DIAST BP <80 MM HG: CPT | Performed by: INTERNAL MEDICINE

## 2023-05-31 PROCEDURE — 93283 PRGRMG EVAL IMPLANTABLE DFB: CPT | Performed by: INTERNAL MEDICINE

## 2023-05-31 PROCEDURE — G8399 PT W/DXA RESULTS DOCUMENT: HCPCS | Performed by: INTERNAL MEDICINE

## 2023-05-31 NOTE — PATIENT INSTRUCTIONS
You may receive a survey regarding the care you received during your visit. Your input is valuable to us. We encourage you to complete and return your survey. We hope you will choose us in the future for your healthcare needs. Thank you for choosing [facility]!     Your Medical Assistant Today:  Aracelis Porter      Your RN Today:  Aracelis Porter  Your Provider for Today: Dr. Harsha Mattson  Ph. [phone] opt 1        STOP AMIODARONE

## 2023-05-31 NOTE — TELEPHONE ENCOUNTER
Pts dtr, Pearl, called in saying pt saw Dr. Ilya Fuller today. He stopped her Amiodarone and told her he advises that she stop the jardiance as it is doing her no good but he would leave that up to Dr. Prashanth Sales to make a determination on that as he started the [address] [place] [address]. Also dtr reports that the top part of the icd was not turned on and they turned it on today. Pt has a follow up with Dr. Prashanth Sales on 6/12/23.

## 2023-05-31 NOTE — PROGRESS NOTES
[employer] biv icd in office/uli     Liver cirrhosis/pt states she's been on amiodarone 15-20 years  Was shocked prior to amiodarone Rx, she's thinking it was at fib RVR   Referral from Jace Weir   Had been checked at [facility], wants to transfer here, request placed in carelink   Programmed VVIR, does have atrial lead/at fib biv paced   Offset to -20 per verbal order Uli  Shocking pathways to B>AX 3/5/19 implant   Mostly dependent, rare R wave  Optivol WNL  3/28/23 shock during EGD/cautery    . Gerardo Luke Battery longevity:  2.4    Atrial impedance 323  RV impedance 1292    Shock 43  SVC 86    R wave sensing 7.4    99.6 % RV paced     RV threshold 1.5 V at 1ms had been programmed 6 @ 1, reprogrammed to 3.5 @ 1  LV 1 @ 0.6

## 2023-05-31 NOTE — PROGRESS NOTES
[address] (HW) [address] [facility]  Dept: [phone]  Cardiac Electrophysiology: Consultation Note  Patient's demographics:  Date:   5/31/2023  Patient name:              Garland Shook  YOB: 1940  Sex:    female   MRN:   [id number]    Primary Care Physician:  Corrinne Carney, MD    Referring Physician:      Reason for Consultation:  Hx of VT on amiodarone. Clinical Summary:  82/F with history of nonischemic cardiomyopathy, chronic atrial fibrillation/AV junction ablation and BiV ICD implantation in 1999. She history of ventricular tachycardia and is on chronic amiodarone therapy. Shows undergoing upper GI endoscopy on 3/28/2028 and her defibrillator went off. The patient was under sedation and not feeling. Device interrogation showed PERRL, conjunctiva normal on the general markers suggestive of BMI. No ventricular arrhythmias. 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 Complains of fatigue especially with physical activities. No chest pain, palpitation or shortness of breath. She does report some weight loss and lack of energy following initiation of Jardiance for the management of her heart failure. Lives with her . She has 7 daughters. Denies smoking or alcohol use. Medcial Hx: Chronic AF, NICM dx 1999 (LVEF 25%), chronic AF/AVJ ablation => BiV ICD (1999) with generator cx (4/2014), hx of VT/ICD on amiodarone, HTN, HPL, DM, CKD-III, autoimmune hepatitis related CLD complicated by portal hypertension and GI bleeding due to GAVE. Review of systems:  Constitutional: Negative for chills and fever  HENT: Negative for congestion, sinus pressure, sneezing and sore throat. Eyes: Negative for pain, discharge, redness and itching.    Respiratory: Negative for apnea, cough  Gastrointestinal: Negative for blood in

## 2023-06-01 ENCOUNTER — TELEPHONE (OUTPATIENT)
Dept: FAMILY MEDICINE CLINIC | Age: 83
End: 2023-06-01

## 2023-06-02 DIAGNOSIS — D64.9 ANEMIA, UNSPECIFIED TYPE: Primary | ICD-10-CM

## 2023-06-02 PROBLEM — I50.22 CHRONIC SYSTOLIC (CONGESTIVE) HEART FAILURE (HCC): Status: ACTIVE | Noted: 2023-06-02

## 2023-06-03 LAB
MYELOPEROXIDASE AB SER-ACNC: 0 AU/ML (ref 0–19)
PROTEINASE3 AB SER-ACNC: 0 AU/ML (ref 0–19)

## 2023-06-05 ENCOUNTER — HOSPITAL ENCOUNTER (OUTPATIENT)
Dept: ULTRASOUND IMAGING | Age: 83
Discharge: HOME | End: 2023-06-05
Payer: MEDICARE

## 2023-06-05 ENCOUNTER — TELEPHONE (OUTPATIENT)
Dept: INTERNAL MEDICINE CLINIC | Age: 83
End: 2023-06-05

## 2023-06-05 ENCOUNTER — NURSE ONLY (OUTPATIENT)
Dept: LAB | Age: 83
End: 2023-06-05

## 2023-06-05 ENCOUNTER — HOSPITAL ENCOUNTER (OUTPATIENT)
Dept: NURSING | Age: 83
Discharge: HOME | End: 2023-06-05
Payer: MEDICARE

## 2023-06-05 DIAGNOSIS — D64.9 ANEMIA, UNSPECIFIED TYPE: ICD-10-CM

## 2023-06-05 DIAGNOSIS — K74.60 CIRRHOSIS OF LIVER WITH ASCITES, UNSPECIFIED HEPATIC CIRRHOSIS TYPE (HCC): ICD-10-CM

## 2023-06-05 DIAGNOSIS — E03.9 ACQUIRED HYPOTHYROIDISM: ICD-10-CM

## 2023-06-05 DIAGNOSIS — M81.0 MENOPAUSAL OSTEOPOROSIS: ICD-10-CM

## 2023-06-05 DIAGNOSIS — R18.8 CIRRHOSIS OF LIVER WITH ASCITES, UNSPECIFIED HEPATIC CIRRHOSIS TYPE (HCC): ICD-10-CM

## 2023-06-05 LAB
25(OH)D3 SERPL-MCNC: 54 NG/ML (ref 30–100)
ALBUMIN FLD-MCNC: 0.6 GM/DL
ANION GAP SERPL CALC-SCNC: 9 MEQ/L (ref 8–16)
BUN SERPL-MCNC: 37 MG/DL (ref 7–22)
CALCIUM SERPL-MCNC: 9.2 MG/DL (ref 8.5–10.5)
CHARACTER, BODY FLUID: CLEAR
CHLORIDE SERPL-SCNC: 103 MEQ/L (ref 98–111)
CO2 SERPL-SCNC: 24 MEQ/L (ref 23–33)
COLOR FLD: NORMAL
CREAT SERPL-MCNC: 1.7 MG/DL (ref 0.4–1.2)
GFR SERPL CREATININE-BSD FRML MDRD: 30 ML/MIN/1.73M2
GLUCOSE SERPL-MCNC: 92 MG/DL (ref 70–108)
GRANULOCYTES NFR FLD AUTO: 10.7 %
HCT VFR BLD AUTO: 30 % (ref 37–47)
HGB BLD-MCNC: 8.5 GM/DL (ref 12–16)
MESOTHELIAL CELLS BODY FLUID: NORMAL
MONONUC CELLS NFR FLD AUTO: 89.3 %
NUC CELL # FLD AUTO: 193 /CUMM (ref 0–500)
PATHOLOGIST REVIEW: NORMAL
POTASSIUM SERPL-SCNC: 4.4 MEQ/L (ref 3.5–5.2)
PROT FLD-MCNC: 1 GM/DL
RBC # FLD AUTO: < 2000 /CUMM
SODIUM SERPL-SCNC: 136 MEQ/L (ref 135–145)
SPECIMEN: NORMAL
T4 FREE SERPL-MCNC: 1.36 NG/DL (ref 0.93–1.76)
TOTAL VOLUME RECEIVED BODY FLUID: 70 ML
TSH SERPL DL<=0.005 MIU/L-ACNC: 31.93 UIU/ML (ref 0.4–4.2)

## 2023-06-05 PROCEDURE — 49083 ABD PARACENTESIS W/IMAGING: CPT

## 2023-06-05 PROCEDURE — 82042 OTHER SOURCE ALBUMIN QUAN EA: CPT

## 2023-06-05 PROCEDURE — 84157 ASSAY OF PROTEIN OTHER: CPT

## 2023-06-05 PROCEDURE — 89050 BODY FLUID CELL COUNT: CPT

## 2023-06-05 PROCEDURE — 88108 CYTOPATH CONCENTRATE TECH: CPT

## 2023-06-05 NOTE — TELEPHONE ENCOUNTER
----- Message from Catalino Woods MD sent at 6/5/2023 12:58 PM EDT -----  Her hgb is stable; I am not sure a transfusion would benefit

## 2023-06-05 NOTE — TELEPHONE ENCOUNTER
I notified pt labs are OK and hgb is stable and Dr. Lottie Gruber is not sure if transfusion will help. She has an appt  with Dr. Lottie Gruber on 6/12/23 and I advised her to call if worsening symptoms before then. She verbalizes understanding.

## 2023-06-05 NOTE — TELEPHONE ENCOUNTER
Dr Foster Quan from [facility] ordered labs. Results faxed to GI    She also had her inr done at that time and wanted to be sure you saw it.      INR was 3.29

## 2023-06-12 PROBLEM — D50.9 IRON DEFICIENCY ANEMIA: Status: ACTIVE | Noted: 2023-06-12

## 2023-06-13 ENCOUNTER — APPOINTMENT (OUTPATIENT)
Dept: ULTRASOUND IMAGING | Age: 83
End: 2023-06-13
Payer: MEDICARE

## 2023-06-19 ENCOUNTER — HOSPITAL ENCOUNTER (OUTPATIENT)
Dept: NURSING | Age: 83
Discharge: HOME | End: 2023-06-19
Payer: MEDICARE

## 2023-06-19 ENCOUNTER — TELEPHONE (OUTPATIENT)
Dept: INTERNAL MEDICINE CLINIC | Age: 83
End: 2023-06-19

## 2023-06-19 ENCOUNTER — PATIENT MESSAGE (OUTPATIENT)
Dept: INTERNAL MEDICINE CLINIC | Age: 83
End: 2023-06-19

## 2023-06-19 ENCOUNTER — NURSE ONLY (OUTPATIENT)
Dept: LAB | Age: 83
End: 2023-06-19

## 2023-06-19 VITALS
OXYGEN SATURATION: 99 % | TEMPERATURE: 97.1 F | DIASTOLIC BLOOD PRESSURE: 60 MMHG | HEART RATE: 72 BPM | RESPIRATION RATE: 22 BRPM | SYSTOLIC BLOOD PRESSURE: 123 MMHG

## 2023-06-19 DIAGNOSIS — N18.31 STAGE 3A CHRONIC KIDNEY DISEASE (HCC): ICD-10-CM

## 2023-06-19 DIAGNOSIS — N18.31 STAGE 3A CHRONIC KIDNEY DISEASE (HCC): Primary | ICD-10-CM

## 2023-06-19 DIAGNOSIS — I48.20 CHRONIC ATRIAL FIBRILLATION (HCC): ICD-10-CM

## 2023-06-19 DIAGNOSIS — E61.1 IRON DEFICIENCY: ICD-10-CM

## 2023-06-19 DIAGNOSIS — D50.9 IRON DEFICIENCY ANEMIA, UNSPECIFIED IRON DEFICIENCY ANEMIA TYPE: Primary | ICD-10-CM

## 2023-06-19 DIAGNOSIS — I50.22 CHRONIC SYSTOLIC (CONGESTIVE) HEART FAILURE (HCC): ICD-10-CM

## 2023-06-19 LAB
ANION GAP SERPL CALC-SCNC: 13 MEQ/L (ref 8–16)
BUN SERPL-MCNC: 41 MG/DL (ref 7–22)
CALCIUM SERPL-MCNC: 8.3 MG/DL (ref 8.5–10.5)
CHLORIDE SERPL-SCNC: 104 MEQ/L (ref 98–111)
CO2 SERPL-SCNC: 20 MEQ/L (ref 23–33)
CREAT SERPL-MCNC: 1.9 MG/DL (ref 0.4–1.2)
GFR SERPL CREATININE-BSD FRML MDRD: 26 ML/MIN/1.73M2
GLUCOSE SERPL-MCNC: 123 MG/DL (ref 70–108)
POTASSIUM SERPL-SCNC: 4.4 MEQ/L (ref 3.5–5.2)
SODIUM SERPL-SCNC: 137 MEQ/L (ref 135–145)

## 2023-06-19 PROCEDURE — 2580000003 HC RX 258: Performed by: INTERNAL MEDICINE

## 2023-06-19 PROCEDURE — 6360000002 HC RX W HCPCS: Performed by: INTERNAL MEDICINE

## 2023-06-19 PROCEDURE — 96365 THER/PROPH/DIAG IV INF INIT: CPT

## 2023-06-19 PROCEDURE — 96366 THER/PROPH/DIAG IV INF ADDON: CPT

## 2023-06-19 RX ORDER — DIPHENHYDRAMINE HYDROCHLORIDE 50 MG/ML
50 INJECTION INTRAMUSCULAR; INTRAVENOUS
OUTPATIENT
Start: 2023-07-03

## 2023-06-19 RX ORDER — HEPARIN SODIUM 100 [USP'U]/ML
500 INJECTION, SOLUTION INTRAVENOUS PRN
OUTPATIENT
Start: 2023-07-03

## 2023-06-19 RX ORDER — EPINEPHRINE 1 MG/ML
0.3 INJECTION, SOLUTION INTRAMUSCULAR; SUBCUTANEOUS PRN
OUTPATIENT
Start: 2023-07-03

## 2023-06-19 RX ORDER — SODIUM CHLORIDE 9 MG/ML
INJECTION, SOLUTION INTRAVENOUS CONTINUOUS
OUTPATIENT
Start: 2023-07-03

## 2023-06-19 RX ORDER — SODIUM CHLORIDE 9 MG/ML
5-250 INJECTION, SOLUTION INTRAVENOUS PRN
OUTPATIENT
Start: 2023-07-03

## 2023-06-19 RX ORDER — SODIUM CHLORIDE 0.9 % (FLUSH) 0.9 %
5-40 SYRINGE (ML) INJECTION PRN
OUTPATIENT
Start: 2023-07-03

## 2023-06-19 RX ORDER — SODIUM CHLORIDE 0.9 % (FLUSH) 0.9 %
5-40 SYRINGE (ML) INJECTION PRN
Status: DISCONTINUED | OUTPATIENT
Start: 2023-06-19 | End: 2023-06-20 | Stop reason: HOSPADM

## 2023-06-19 RX ADMIN — IRON SUCROSE 500 MG: 20 INJECTION, SOLUTION INTRAVENOUS at 11:37

## 2023-06-19 NOTE — TELEPHONE ENCOUNTER
From: Radha Gomez  To: Dr. Bernal Fallen: 6/19/2023 10:29 AM EDT  Subject: Blood pressure     Friday 132/79  Saturday 132/73  Sunday 113/62  Monday 117/66 no meds yet

## 2023-06-19 NOTE — PROGRESS NOTES
1330- IV still infusing. Patient resting and denies needs. PT RIGHTS AND RESPONSIBILITIES OFFERED TO PT.     1430- IV infusing. Patient resting with daughter at bedside. 1520- Infusion complete. Patient tolerated well with no issues. Patient to be monitored post infusion. 1545- Patient denies any signs/symptoms of reaction. Vitals stable. IV removed. [id number]- Discharge instructions reviewed with patient and daughter. Verbalized understanding with no further questions. AVS provided. Discharged via wheelchair to lobby in stable condition.                  __M__ Safety:       (Environmental)  [place] to environment  Ensure ID band is correct and in place/ allergy band as needed  Assess for fall risk  Initiate fall precautions as applicable (fall band, side rails, etc.)  Call light within reach  Bed in low position/ wheels locked    __M__ Pain:       Assess pain level and characteristics  Administer analgesics as ordered  Assess effectiveness of pain management and report to MD as needed    __M__ Knowledge Deficit:  Assess baseline knowledge  Provide teaching at level of understanding  Provide teaching via preferred learning method  Evaluate teaching effectiveness    __M__ Hemodynamic/Respiratory Status:       (Pre and Post Procedure Monitoring)  Assess/Monitor vital signs and LOC  Assess Baseline SpO2 prior to any sedation  Obtain weight/height  Assess vital signs/ LOC until patient meets discharge criteria  Monitor procedure site and notify MD of any issues

## 2023-06-19 NOTE — PROGRESS NOTES
1130:  ARRIVES VIA  IN CARE OF DAUGHTER, GRETA. PROCESS REVIEWED. ASSISTED TO CART. 1155:  INFUSION CONTINUES. PT RESTING QUIETLY. 1220:  INFUSION CONTINUES. NO S/S REACTION NOTED. 1250: PT DOZING OFF AND ON. NO COMPLAINTS.

## 2023-06-19 NOTE — TELEPHONE ENCOUNTER
----- Message from Natanael Matias MD sent at 6/19/2023 12:43 PM EDT -----  CREATININE BETTER; JUST STAY ON THE BUMEX; BMP ONE WEEK; LET OFFICE KNOW IF SIGNIFICANT WEIGHT CHANGE

## 2023-06-19 NOTE — DISCHARGE INSTRUCTIONS
ACTIVITY:  Continue usual care with your doctor. Call your doctor immediately if any severe problems or go to the nearest emergency room. I have been treated and hereby acknowledge receiving this instruction sheet.                     Next Venofer infusion: July 3rd @ 8:00am

## 2023-06-19 NOTE — TELEPHONE ENCOUNTER
Pt was notified to just stay on bumex and repeat labs in 1 week. She is to notify our office if significant weight gain. I advised her to weigh daily. . she verbalizes understanding

## 2023-06-20 ENCOUNTER — TELEPHONE (OUTPATIENT)
Dept: INTERNAL MEDICINE CLINIC | Age: 83
End: 2023-06-20

## 2023-06-20 ENCOUNTER — HOSPITAL ENCOUNTER (OUTPATIENT)
Dept: NURSING | Age: 83
Discharge: HOME | End: 2023-06-20
Payer: MEDICARE

## 2023-06-20 ENCOUNTER — HOSPITAL ENCOUNTER (OUTPATIENT)
Dept: ULTRASOUND IMAGING | Age: 83
Discharge: HOME | End: 2023-06-20
Payer: MEDICARE

## 2023-06-20 DIAGNOSIS — K74.69 OTHER CIRRHOSIS OF LIVER (HCC): ICD-10-CM

## 2023-06-20 LAB
ALBUMIN FLD-MCNC: 0.3 GM/DL
CHARACTER, BODY FLUID: CLEAR
COLOR FLD: YELLOW
GRANULOCYTES NFR FLD AUTO: 85.4 %
MESOTHELIAL CELLS BODY FLUID: ABNORMAL
MONONUC CELLS NFR FLD AUTO: 14.6 %
NUC CELL # FLD AUTO: 2056 /CUMM (ref 0–500)
PATHOLOGIST REVIEW: ABNORMAL
PROT FLD-MCNC: 0.7 GM/DL
RBC # FLD AUTO: < 2000 /CUMM
SPECIMEN: ABNORMAL
TOTAL VOLUME RECEIVED BODY FLUID: 70 ML

## 2023-06-20 PROCEDURE — 49083 ABD PARACENTESIS W/IMAGING: CPT

## 2023-06-20 PROCEDURE — 82042 OTHER SOURCE ALBUMIN QUAN EA: CPT

## 2023-06-20 PROCEDURE — 84157 ASSAY OF PROTEIN OTHER: CPT

## 2023-06-20 PROCEDURE — 89050 BODY FLUID CELL COUNT: CPT

## 2023-06-20 RX ORDER — METOPROLOL SUCCINATE 100 MG/1
50 TABLET, EXTENDED RELEASE ORAL DAILY
COMMUNITY

## 2023-06-20 NOTE — TELEPHONE ENCOUNTER
Pearl called in to let us know pt took her levothyroxine at 3 am and 1 hr later had dry heaves and was vomitting profusely   she just does not feel good. She had an iron infusion yesterday and had a paracentesis and took 3 liters off today. She gets another iron infusion in 2 weeks. We reviewed how she takes her meds. Dr. Anant Bowers does not want her taking the synthroid at 3 am and would like her to try it at bedtime but she should hold the night time carafate. Losartan and Spironolactone are still on hold. I also notified dtr pt was instructed to get lab again next week. Also pts other dtr would like pt to start on Protein Whey Powder if that is OK with Dr Anant Bowers. I notified Pearl that Dr Anant Bowers was going to suggest a protein shake anyway and the Whey powder is OK to take. Marquita Vargas was notified to hold all vitamins including iron, vitamin c, multi vitamin, calcium and vitamin d.

## 2023-06-21 ENCOUNTER — TELEPHONE (OUTPATIENT)
Dept: INTERNAL MEDICINE CLINIC | Age: 83
End: 2023-06-21

## 2023-06-21 ENCOUNTER — PATIENT MESSAGE (OUTPATIENT)
Dept: INTERNAL MEDICINE CLINIC | Age: 83
End: 2023-06-21

## 2023-06-21 NOTE — TELEPHONE ENCOUNTER
Per EBF/I notified pt to increase the midodrine to 5 mg. Each dose and we will see her tomorrow. I advised her to take extra care when getting up and moving around. She does use a walker at home as she is weak and dtr has been staying with her.

## 2023-06-21 NOTE — TELEPHONE ENCOUNTER
From: Juany [place]  To: Dr. Mcqueen Harder: 6/21/2023 4:23 PM EDT  Subject: Blood pressure     90/34 left arm after taking the new pill  90/33 right arm 4:30 pm

## 2023-06-21 NOTE — TELEPHONE ENCOUNTER
Pearl and pt called in to let Dr Fernanda Espinoza know she is maybe a smidge better. She is weak. She did not take any meds yesterday was too sick and she states she is not taking anymore thyroid medication. She feels it makes her sick immediately when she takes it and she just cant do it. She was able to get 2 protein shakes down OK and this am she had a piece of toast and scrambled eggs which this is the most she has had in 3 days. She took her BP while I was talking to [facility] and it was 96/47 and 98/38. She has not taken her Midodrine yet and will take it. Dtr states she is very pale and pt says she is very weak that we just don't know how sick she is. Per vo from Dr. Luis Lieberman notified [facility] we would like her to come in tomorrow if pt is able at 10 am.     She says they will find someone to bring her.

## 2023-06-22 ENCOUNTER — OFFICE VISIT (OUTPATIENT)
Dept: INTERNAL MEDICINE CLINIC | Age: 83
End: 2023-06-22
Payer: MEDICARE

## 2023-06-22 VITALS
BODY MASS INDEX: 31.61 KG/M2 | HEIGHT: 60 IN | WEIGHT: 161 LBS | SYSTOLIC BLOOD PRESSURE: 110 MMHG | DIASTOLIC BLOOD PRESSURE: 60 MMHG | HEART RATE: 68 BPM

## 2023-06-22 DIAGNOSIS — I50.22 CHRONIC SYSTOLIC (CONGESTIVE) HEART FAILURE (HCC): Primary | ICD-10-CM

## 2023-06-22 DIAGNOSIS — K75.4 AUTOIMMUNE HEPATITIS (HCC): ICD-10-CM

## 2023-06-22 DIAGNOSIS — E03.9 HYPOTHYROIDISM, UNSPECIFIED TYPE: ICD-10-CM

## 2023-06-22 DIAGNOSIS — K74.69 OTHER CIRRHOSIS OF LIVER (HCC): ICD-10-CM

## 2023-06-22 DIAGNOSIS — I48.20 CHRONIC ATRIAL FIBRILLATION (HCC): ICD-10-CM

## 2023-06-22 PROCEDURE — 99213 OFFICE O/P EST LOW 20 MIN: CPT | Performed by: INTERNAL MEDICINE

## 2023-06-22 PROCEDURE — 3074F SYST BP LT 130 MM HG: CPT | Performed by: INTERNAL MEDICINE

## 2023-06-22 PROCEDURE — 3078F DIAST BP <80 MM HG: CPT | Performed by: INTERNAL MEDICINE

## 2023-06-22 PROCEDURE — G8399 PT W/DXA RESULTS DOCUMENT: HCPCS | Performed by: INTERNAL MEDICINE

## 2023-06-22 PROCEDURE — 1123F ACP DISCUSS/DSCN MKR DOCD: CPT | Performed by: INTERNAL MEDICINE

## 2023-06-22 PROCEDURE — 1036F TOBACCO NON-USER: CPT | Performed by: INTERNAL MEDICINE

## 2023-06-22 PROCEDURE — G8427 DOCREV CUR MEDS BY ELIG CLIN: HCPCS | Performed by: INTERNAL MEDICINE

## 2023-06-22 PROCEDURE — 1090F PRES/ABSN URINE INCON ASSESS: CPT | Performed by: INTERNAL MEDICINE

## 2023-06-22 PROCEDURE — G8417 CALC BMI ABV UP PARAM F/U: HCPCS | Performed by: INTERNAL MEDICINE

## 2023-06-22 RX ORDER — METOCLOPRAMIDE 5 MG/1
TABLET ORAL
Qty: 20 TABLET | Refills: 3 | Status: SHIPPED | OUTPATIENT
Start: 2023-06-22

## 2023-06-22 RX ORDER — MIDODRINE HYDROCHLORIDE 2.5 MG/1
5 TABLET ORAL 3 TIMES DAILY
Qty: 180 TABLET | Refills: 5 | Status: SHIPPED | OUTPATIENT
Start: 2023-06-22

## 2023-06-22 NOTE — PATIENT INSTRUCTIONS
STAY ON THE MIDODRINE, BUMEX ALDACTONE LANOXIN WARFARIN    GET LABS IN A WEEK    WEIGH YOURSELF DAILY    TRY GET IN AT LEAST 2 PROTEIN DRINKS PER DAY

## 2023-06-22 NOTE — PROGRESS NOTES
organomegaly         Neurological - alert, oriented, normal speech, no focal findings or movement disorder noted     Extremities - ankles puffy, nonpitting     Skin - normal coloration and turgor, no rashes, no suspicious skin lesions noted            An electronic signature was used to authenticate this note.     --Radha Oliver MD

## 2023-06-26 ENCOUNTER — HOSPITAL ENCOUNTER (OUTPATIENT)
Dept: NURSING | Age: 83
Discharge: HOME | End: 2023-06-26
Payer: MEDICARE

## 2023-06-26 ENCOUNTER — NURSE ONLY (OUTPATIENT)
Dept: LAB | Age: 83
End: 2023-06-26

## 2023-06-26 ENCOUNTER — TELEPHONE (OUTPATIENT)
Dept: FAMILY MEDICINE CLINIC | Age: 83
End: 2023-06-26

## 2023-06-26 ENCOUNTER — TELEPHONE (OUTPATIENT)
Dept: INTERNAL MEDICINE CLINIC | Age: 83
End: 2023-06-26

## 2023-06-26 ENCOUNTER — PATIENT MESSAGE (OUTPATIENT)
Dept: FAMILY MEDICINE CLINIC | Age: 83
End: 2023-06-26

## 2023-06-26 ENCOUNTER — HOSPITAL ENCOUNTER (OUTPATIENT)
Dept: ULTRASOUND IMAGING | Age: 83
Discharge: HOME | End: 2023-06-26
Payer: MEDICARE

## 2023-06-26 VITALS
DIASTOLIC BLOOD PRESSURE: 53 MMHG | OXYGEN SATURATION: 100 % | TEMPERATURE: 97.8 F | HEART RATE: 67 BPM | SYSTOLIC BLOOD PRESSURE: 101 MMHG | RESPIRATION RATE: 18 BRPM

## 2023-06-26 DIAGNOSIS — R18.8 CIRRHOSIS OF LIVER WITH ASCITES, UNSPECIFIED HEPATIC CIRRHOSIS TYPE (HCC): ICD-10-CM

## 2023-06-26 DIAGNOSIS — I42.8 NICM (NONISCHEMIC CARDIOMYOPATHY) (HCC): ICD-10-CM

## 2023-06-26 DIAGNOSIS — K74.60 CIRRHOSIS OF LIVER WITH ASCITES, UNSPECIFIED HEPATIC CIRRHOSIS TYPE (HCC): ICD-10-CM

## 2023-06-26 DIAGNOSIS — I48.20 CHRONIC ATRIAL FIBRILLATION (HCC): ICD-10-CM

## 2023-06-26 DIAGNOSIS — E03.9 HYPOTHYROIDISM, UNSPECIFIED TYPE: ICD-10-CM

## 2023-06-26 LAB
ALBUMIN FLD-MCNC: < 0.2 GM/DL
ALBUMIN SERPL BCG-MCNC: 2.9 G/DL (ref 3.5–5.1)
ALP SERPL-CCNC: 186 U/L (ref 38–126)
ALT SERPL W/O P-5'-P-CCNC: 54 U/L (ref 11–66)
ANION GAP SERPL CALC-SCNC: 15 MEQ/L (ref 8–16)
AST SERPL-CCNC: 60 U/L (ref 5–40)
BILIRUB SERPL-MCNC: 0.4 MG/DL (ref 0.3–1.2)
BUN SERPL-MCNC: 56 MG/DL (ref 7–22)
CALCIUM SERPL-MCNC: 7.5 MG/DL (ref 8.5–10.5)
CHARACTER, BODY FLUID: NORMAL
CHLORIDE SERPL-SCNC: 100 MEQ/L (ref 98–111)
CO2 SERPL-SCNC: 22 MEQ/L (ref 23–33)
COLOR FLD: YELLOW
CREAT SERPL-MCNC: 1.8 MG/DL (ref 0.4–1.2)
GFR SERPL CREATININE-BSD FRML MDRD: 28 ML/MIN/1.73M2
GLUCOSE SERPL-MCNC: 90 MG/DL (ref 70–108)
GRANULOCYTES NFR FLD AUTO: 14.9 %
INR PPP: 3.9 (ref 0.85–1.13)
MONONUC CELLS NFR FLD AUTO: 85.1 %
NUC CELL # FLD AUTO: 178 /CUMM (ref 0–500)
PATHOLOGIST REVIEW: NORMAL
POTASSIUM SERPL-SCNC: 4.7 MEQ/L (ref 3.5–5.2)
PROT FLD-MCNC: 0.7 GM/DL
PROT SERPL-MCNC: 6.6 G/DL (ref 6.1–8)
RBC # FLD AUTO: < 2000 /CUMM
SODIUM SERPL-SCNC: 137 MEQ/L (ref 135–145)
SPECIMEN: NORMAL
T4 FREE SERPL-MCNC: 1.04 NG/DL (ref 0.93–1.76)
TOTAL VOLUME RECEIVED BODY FLUID: 70 ML
TSH SERPL DL<=0.005 MIU/L-ACNC: 36.75 UIU/ML (ref 0.4–4.2)

## 2023-06-26 PROCEDURE — 82042 OTHER SOURCE ALBUMIN QUAN EA: CPT

## 2023-06-26 PROCEDURE — 84157 ASSAY OF PROTEIN OTHER: CPT

## 2023-06-26 PROCEDURE — 96365 THER/PROPH/DIAG IV INF INIT: CPT

## 2023-06-26 PROCEDURE — 6360000002 HC RX W HCPCS: Performed by: NURSE PRACTITIONER

## 2023-06-26 PROCEDURE — P9047 ALBUMIN (HUMAN), 25%, 50ML: HCPCS | Performed by: NURSE PRACTITIONER

## 2023-06-26 PROCEDURE — 89050 BODY FLUID CELL COUNT: CPT

## 2023-06-26 PROCEDURE — 88108 CYTOPATH CONCENTRATE TECH: CPT

## 2023-06-26 PROCEDURE — 49083 ABD PARACENTESIS W/IMAGING: CPT

## 2023-06-26 RX ORDER — ALBUMIN (HUMAN) 12.5 G/50ML
25 SOLUTION INTRAVENOUS ONCE
Status: COMPLETED | OUTPATIENT
Start: 2023-06-26 | End: 2023-06-26

## 2023-06-26 RX ADMIN — ALBUMIN (HUMAN) 25 G: 0.25 INJECTION, SOLUTION INTRAVENOUS at 09:46

## 2023-06-26 ASSESSMENT — PAIN SCALES - GENERAL: PAINLEVEL_OUTOF10: 0

## 2023-06-27 ENCOUNTER — TELEPHONE (OUTPATIENT)
Dept: INTERNAL MEDICINE CLINIC | Age: 83
End: 2023-06-27

## 2023-06-30 ENCOUNTER — TELEPHONE (OUTPATIENT)
Dept: CARDIOLOGY CLINIC | Age: 83
End: 2023-06-30

## 2023-07-03 ENCOUNTER — HOSPITAL ENCOUNTER (OUTPATIENT)
Dept: NURSING | Age: 83
Discharge: HOME | DRG: 871 | End: 2023-07-03
Payer: MEDICARE

## 2023-07-03 ENCOUNTER — HOSPITAL ENCOUNTER (OUTPATIENT)
Dept: ULTRASOUND IMAGING | Age: 83
Discharge: HOME | End: 2023-07-03
Payer: MEDICARE

## 2023-07-03 VITALS — DIASTOLIC BLOOD PRESSURE: 51 MMHG | TEMPERATURE: 97.3 F | SYSTOLIC BLOOD PRESSURE: 103 MMHG | RESPIRATION RATE: 20 BRPM

## 2023-07-03 DIAGNOSIS — K74.60 CIRRHOSIS OF LIVER WITH ASCITES, UNSPECIFIED HEPATIC CIRRHOSIS TYPE (HCC): ICD-10-CM

## 2023-07-03 DIAGNOSIS — R18.8 CIRRHOSIS OF LIVER WITH ASCITES, UNSPECIFIED HEPATIC CIRRHOSIS TYPE (HCC): ICD-10-CM

## 2023-07-03 DIAGNOSIS — E61.1 IRON DEFICIENCY: ICD-10-CM

## 2023-07-03 DIAGNOSIS — D50.9 IRON DEFICIENCY ANEMIA, UNSPECIFIED IRON DEFICIENCY ANEMIA TYPE: Primary | ICD-10-CM

## 2023-07-03 LAB
ALBUMIN FLD-MCNC: < 0.2 GM/DL
CHARACTER, BODY FLUID: NORMAL
COLOR FLD: NORMAL
GRANULOCYTES NFR FLD AUTO: 24.5 %
MESOTHELIAL CELLS BODY FLUID: NORMAL
MONONUC CELLS NFR FLD AUTO: 75.5 %
NUC CELL # FLD AUTO: 161 /CUMM (ref 0–500)
PATHOLOGIST REVIEW: NORMAL
PROT FLD-MCNC: 0.6 GM/DL
RBC # FLD AUTO: < 2000 /CUMM
SPECIMEN: NORMAL
TOTAL VOLUME RECEIVED BODY FLUID: 70 ML

## 2023-07-03 PROCEDURE — 82042 OTHER SOURCE ALBUMIN QUAN EA: CPT

## 2023-07-03 PROCEDURE — 6360000002 HC RX W HCPCS: Performed by: INTERNAL MEDICINE

## 2023-07-03 PROCEDURE — 96365 THER/PROPH/DIAG IV INF INIT: CPT

## 2023-07-03 PROCEDURE — 96367 TX/PROPH/DG ADDL SEQ IV INF: CPT

## 2023-07-03 PROCEDURE — 89050 BODY FLUID CELL COUNT: CPT

## 2023-07-03 PROCEDURE — 96366 THER/PROPH/DIAG IV INF ADDON: CPT

## 2023-07-03 PROCEDURE — 49083 ABD PARACENTESIS W/IMAGING: CPT

## 2023-07-03 PROCEDURE — P9047 ALBUMIN (HUMAN), 25%, 50ML: HCPCS | Performed by: NURSE PRACTITIONER

## 2023-07-03 PROCEDURE — 84157 ASSAY OF PROTEIN OTHER: CPT

## 2023-07-03 PROCEDURE — 6360000002 HC RX W HCPCS: Performed by: NURSE PRACTITIONER

## 2023-07-03 PROCEDURE — 2580000003 HC RX 258: Performed by: INTERNAL MEDICINE

## 2023-07-03 PROCEDURE — 88108 CYTOPATH CONCENTRATE TECH: CPT

## 2023-07-03 RX ORDER — SODIUM CHLORIDE 9 MG/ML
INJECTION, SOLUTION INTRAVENOUS CONTINUOUS
OUTPATIENT
Start: 2023-07-03

## 2023-07-03 RX ORDER — EPINEPHRINE 1 MG/ML
0.3 INJECTION, SOLUTION INTRAMUSCULAR; SUBCUTANEOUS PRN
OUTPATIENT
Start: 2023-07-03

## 2023-07-03 RX ORDER — HEPARIN SODIUM 100 [USP'U]/ML
500 INJECTION, SOLUTION INTRAVENOUS PRN
OUTPATIENT
Start: 2023-07-03

## 2023-07-03 RX ORDER — SODIUM CHLORIDE 9 MG/ML
5-250 INJECTION, SOLUTION INTRAVENOUS PRN
OUTPATIENT
Start: 2023-07-03

## 2023-07-03 RX ORDER — ALBUMIN (HUMAN) 12.5 G/50ML
25 SOLUTION INTRAVENOUS ONCE
Status: COMPLETED | OUTPATIENT
Start: 2023-07-03 | End: 2023-07-03

## 2023-07-03 RX ORDER — DIPHENHYDRAMINE HYDROCHLORIDE 50 MG/ML
50 INJECTION INTRAMUSCULAR; INTRAVENOUS
OUTPATIENT
Start: 2023-07-03

## 2023-07-03 RX ORDER — SODIUM CHLORIDE 0.9 % (FLUSH) 0.9 %
5-40 SYRINGE (ML) INJECTION PRN
OUTPATIENT
Start: 2023-07-03

## 2023-07-03 RX ORDER — SODIUM CHLORIDE 0.9 % (FLUSH) 0.9 %
5-40 SYRINGE (ML) INJECTION PRN
Status: DISCONTINUED | OUTPATIENT
Start: 2023-07-03 | End: 2023-07-04 | Stop reason: HOSPADM

## 2023-07-03 RX ADMIN — IRON SUCROSE 500 MG: 20 INJECTION, SOLUTION INTRAVENOUS at 10:15

## 2023-07-03 RX ADMIN — ALBUMIN (HUMAN) 25 G: 0.25 INJECTION, SOLUTION INTRAVENOUS at 09:12

## 2023-07-03 NOTE — PROGRESS NOTES
0900 pt arrives via wheelchair with daughter in law for iron and albumin infusion. Infusion explained and questions answered. PT RIGHTS AND RESPONSIBILITIES OFFERED TO PT.   1000 infusion continues. Pt denies needs. 1100 infusion continues. Daughter at bedside. 1200 infusion continues. Pt denies needs. 1300 infusion continues. Daughter's at bedside. 1415 infusion complete. Pt tolerated it well with no complaints. Vitals stable. Pt discharged via wheelchair with daughter with instructions with no complaints.              _m___ Safety:       (Environmental)  [place] to environment  Ensure ID band is correct and in place/ allergy band as needed  Assess for fall risk  Initiate fall precautions as applicable (fall band, side rails, etc.)  Call light within reach  Bed in low position/ wheels locked    __m__ Pain:       Assess pain level and characteristics  Administer analgesics as ordered  Assess effectiveness of pain management and report to MD as needed    __m__ Knowledge Deficit:  Assess baseline knowledge  Provide teaching at level of understanding  Provide teaching via preferred learning method  Evaluate teaching effectiveness    __m__ Hemodynamic/Respiratory Status:       (Pre and Post Procedure Monitoring)  Assess/Monitor vital signs and LOC  Assess Baseline SpO2 prior to any sedation  Obtain weight/height  Assess vital signs/ LOC until patient meets discharge criteria  Monitor procedure site and notify MD of any issues

## 2023-07-05 ENCOUNTER — TELEPHONE (OUTPATIENT)
Dept: INTERNAL MEDICINE CLINIC | Age: 83
End: 2023-07-05

## 2023-07-05 ENCOUNTER — APPOINTMENT (OUTPATIENT)
Dept: CT IMAGING | Age: 83
DRG: 871 | End: 2023-07-05
Payer: MEDICARE

## 2023-07-05 ENCOUNTER — HOSPITAL ENCOUNTER (INPATIENT)
Age: 83
LOS: 4 days | Discharge: HOME | DRG: 871 | End: 2023-07-09
Attending: EMERGENCY MEDICINE | Admitting: STUDENT IN AN ORGANIZED HEALTH CARE EDUCATION/TRAINING PROGRAM
Payer: MEDICARE

## 2023-07-05 ENCOUNTER — APPOINTMENT (OUTPATIENT)
Dept: GENERAL RADIOLOGY | Age: 83
DRG: 871 | End: 2023-07-05
Payer: MEDICARE

## 2023-07-05 DIAGNOSIS — R74.8 SERUM LIPASE ELEVATION: ICD-10-CM

## 2023-07-05 DIAGNOSIS — E87.20 LACTIC ACIDOSIS: ICD-10-CM

## 2023-07-05 DIAGNOSIS — K65.2 SBP (SPONTANEOUS BACTERIAL PERITONITIS) (HCC): Primary | ICD-10-CM

## 2023-07-05 DIAGNOSIS — K71.7 HEPATIC CIRRHOSIS DUE TO TOXIN: ICD-10-CM

## 2023-07-05 DIAGNOSIS — N18.9 ACUTE KIDNEY INJURY SUPERIMPOSED ON CKD (HCC): ICD-10-CM

## 2023-07-05 DIAGNOSIS — N17.9 ACUTE KIDNEY INJURY SUPERIMPOSED ON CKD (HCC): ICD-10-CM

## 2023-07-05 DIAGNOSIS — K76.82 HEPATIC ENCEPHALOPATHY (HCC): ICD-10-CM

## 2023-07-05 DIAGNOSIS — I50.42 CHRONIC COMBINED SYSTOLIC AND DIASTOLIC CONGESTIVE HEART FAILURE (HCC): ICD-10-CM

## 2023-07-05 LAB
ALBUMIN SERPL BCG-MCNC: 2.9 G/DL (ref 3.5–5.1)
ALP SERPL-CCNC: 242 U/L (ref 38–126)
ALT SERPL W/O P-5'-P-CCNC: 36 U/L (ref 11–66)
AMMONIA PLAS-MCNC: 107 UMOL/L (ref 11–60)
ANION GAP SERPL CALC-SCNC: 16 MEQ/L (ref 8–16)
ANISOCYTOSIS BLD QL SMEAR: PRESENT
APTT PPP: 41.2 SECONDS (ref 22–38)
AST SERPL-CCNC: 57 U/L (ref 5–40)
BASOPHILS ABSOLUTE: 0 THOU/MM3 (ref 0–0.1)
BASOPHILS NFR BLD AUTO: 0.5 %
BILIRUB CONJ SERPL-MCNC: < 0.2 MG/DL (ref 0–0.3)
BILIRUB SERPL-MCNC: 0.4 MG/DL (ref 0.3–1.2)
BUN SERPL-MCNC: 45 MG/DL (ref 7–22)
CALCIUM SERPL-MCNC: 8.6 MG/DL (ref 8.5–10.5)
CHLORIDE SERPL-SCNC: 100 MEQ/L (ref 98–111)
CO2 SERPL-SCNC: 21 MEQ/L (ref 23–33)
CREAT SERPL-MCNC: 2.2 MG/DL (ref 0.4–1.2)
DEPRECATED RDW RBC AUTO: 62 FL (ref 35–45)
DIGOXIN SERPL-MCNC: 1.1 NG/ML (ref 0.5–2)
DIGOXIN SERPL-MCNC: 1.3 NG/ML (ref 0.5–2)
EKG ATRIAL RATE: 63 BPM
EKG Q-T INTERVAL: 570 MS
EKG QRS DURATION: 164 MS
EKG QTC CALCULATION (BAZETT): 570 MS
EKG R AXIS: 130 DEGREES
EKG T AXIS: -37 DEGREES
EKG VENTRICULAR RATE: 60 BPM
EOSINOPHIL NFR BLD AUTO: 2 %
EOSINOPHILS ABSOLUTE: 0.1 THOU/MM3 (ref 0–0.4)
ERYTHROCYTE [DISTWIDTH] IN BLOOD BY AUTOMATED COUNT: 18.6 % (ref 11.5–14.5)
GFR SERPL CREATININE-BSD FRML MDRD: 22 ML/MIN/1.73M2
GLUCOSE SERPL-MCNC: 115 MG/DL (ref 70–108)
HCT VFR BLD AUTO: 35.7 % (ref 37–47)
HGB BLD-MCNC: 10.2 GM/DL (ref 12–16)
HYPOCHROMIA BLD QL SMEAR: PRESENT
IMM GRANULOCYTES # BLD AUTO: 0.06 THOU/MM3 (ref 0–0.07)
IMM GRANULOCYTES NFR BLD AUTO: 1 %
INR PPP: 2.92 (ref 0.85–1.13)
LACTIC ACID, SEPSIS: 3.3 MMOL/L (ref 0.5–1.9)
LACTIC ACID, SEPSIS: 3.9 MMOL/L (ref 0.5–1.9)
LIPASE SERPL-CCNC: 148.1 U/L (ref 5.6–51.3)
LYMPHOCYTES ABSOLUTE: 1 THOU/MM3 (ref 1–4.8)
LYMPHOCYTES NFR BLD AUTO: 16.3 %
MCH RBC QN AUTO: 27.5 PG (ref 26–33)
MCHC RBC AUTO-ENTMCNC: 28.6 GM/DL (ref 32.2–35.5)
MCV RBC AUTO: 96.2 FL (ref 81–99)
MONOCYTES ABSOLUTE: 0.4 THOU/MM3 (ref 0.4–1.3)
MONOCYTES NFR BLD AUTO: 6.7 %
NEUTROPHILS NFR BLD AUTO: 73.5 %
NRBC BLD AUTO-RTO: 1 /100 WBC
NT-PROBNP SERPL IA-MCNC: 2951 PG/ML (ref 0–449)
OSMOLALITY SERPL CALC.SUM OF ELEC: 286.3 MOSMOL/KG (ref 275–300)
PLATELET # BLD AUTO: 443 THOU/MM3 (ref 130–400)
PMV BLD AUTO: 9.6 FL (ref 9.4–12.4)
POLYCHROMASIA BLD QL SMEAR: ABNORMAL
POTASSIUM SERPL-SCNC: 4.7 MEQ/L (ref 3.5–5.2)
PROCALCITONIN SERPL IA-MCNC: 0.19 NG/ML (ref 0.01–0.09)
PROT SERPL-MCNC: 7 G/DL (ref 6.1–8)
RBC # BLD AUTO: 3.71 MILL/MM3 (ref 4.2–5.4)
SCAN OF BLOOD SMEAR: NORMAL
SEGMENTED NEUTROPHILS ABSOLUTE COUNT: 4.5 THOU/MM3 (ref 1.8–7.7)
SODIUM SERPL-SCNC: 137 MEQ/L (ref 135–145)
STOMATOCYTES: ABNORMAL
T4 FREE SERPL-MCNC: 0.7 NG/DL (ref 0.93–1.76)
TROPONIN T: 0.01 NG/ML
TROPONIN T: 0.02 NG/ML
TSH SERPL DL<=0.005 MIU/L-ACNC: 119.5 UIU/ML (ref 0.4–4.2)
WBC # BLD AUTO: 6.1 THOU/MM3 (ref 4.8–10.8)

## 2023-07-05 PROCEDURE — 96375 TX/PRO/DX INJ NEW DRUG ADDON: CPT

## 2023-07-05 PROCEDURE — 99223 1ST HOSP IP/OBS HIGH 75: CPT | Performed by: STUDENT IN AN ORGANIZED HEALTH CARE EDUCATION/TRAINING PROGRAM

## 2023-07-05 PROCEDURE — 2580000003 HC RX 258: Performed by: EMERGENCY MEDICINE

## 2023-07-05 PROCEDURE — 85025 COMPLETE CBC W/AUTO DIFF WBC: CPT

## 2023-07-05 PROCEDURE — 87801 DETECT AGNT MULT DNA AMPLI: CPT

## 2023-07-05 PROCEDURE — 99285 EMERGENCY DEPT VISIT HI MDM: CPT

## 2023-07-05 PROCEDURE — 87205 SMEAR GRAM STAIN: CPT

## 2023-07-05 PROCEDURE — 96365 THER/PROPH/DIAG IV INF INIT: CPT

## 2023-07-05 PROCEDURE — 71045 X-RAY EXAM CHEST 1 VIEW: CPT

## 2023-07-05 PROCEDURE — 87186 SC STD MICRODIL/AGAR DIL: CPT

## 2023-07-05 PROCEDURE — 87070 CULTURE OTHR SPECIMN AEROBIC: CPT

## 2023-07-05 PROCEDURE — 6360000002 HC RX W HCPCS: Performed by: EMERGENCY MEDICINE

## 2023-07-05 PROCEDURE — 93005 ELECTROCARDIOGRAM TRACING: CPT | Performed by: EMERGENCY MEDICINE

## 2023-07-05 PROCEDURE — 84484 ASSAY OF TROPONIN QUANT: CPT

## 2023-07-05 PROCEDURE — 82570 ASSAY OF URINE CREATININE: CPT

## 2023-07-05 PROCEDURE — 84443 ASSAY THYROID STIM HORMONE: CPT

## 2023-07-05 PROCEDURE — 87641 MR-STAPH DNA AMP PROBE: CPT

## 2023-07-05 PROCEDURE — 87040 BLOOD CULTURE FOR BACTERIA: CPT

## 2023-07-05 PROCEDURE — 2580000003 HC RX 258: Performed by: STUDENT IN AN ORGANIZED HEALTH CARE EDUCATION/TRAINING PROGRAM

## 2023-07-05 PROCEDURE — 87077 CULTURE AEROBIC IDENTIFY: CPT

## 2023-07-05 PROCEDURE — 84145 PROCALCITONIN (PCT): CPT

## 2023-07-05 PROCEDURE — 83880 ASSAY OF NATRIURETIC PEPTIDE: CPT

## 2023-07-05 PROCEDURE — 85610 PROTHROMBIN TIME: CPT

## 2023-07-05 PROCEDURE — 84439 ASSAY OF FREE THYROXINE: CPT

## 2023-07-05 PROCEDURE — P9047 ALBUMIN (HUMAN), 25%, 50ML: HCPCS | Performed by: STUDENT IN AN ORGANIZED HEALTH CARE EDUCATION/TRAINING PROGRAM

## 2023-07-05 PROCEDURE — 83605 ASSAY OF LACTIC ACID: CPT

## 2023-07-05 PROCEDURE — 2060000000 HC ICU INTERMEDIATE R&B

## 2023-07-05 PROCEDURE — 6360000002 HC RX W HCPCS: Performed by: STUDENT IN AN ORGANIZED HEALTH CARE EDUCATION/TRAINING PROGRAM

## 2023-07-05 PROCEDURE — 85730 THROMBOPLASTIN TIME PARTIAL: CPT

## 2023-07-05 PROCEDURE — 74176 CT ABD & PELVIS W/O CONTRAST: CPT

## 2023-07-05 PROCEDURE — 80048 BASIC METABOLIC PNL TOTAL CA: CPT

## 2023-07-05 PROCEDURE — 80162 ASSAY OF DIGOXIN TOTAL: CPT

## 2023-07-05 PROCEDURE — 36415 COLL VENOUS BLD VENIPUNCTURE: CPT

## 2023-07-05 PROCEDURE — 84540 ASSAY OF URINE/UREA-N: CPT

## 2023-07-05 PROCEDURE — 82140 ASSAY OF AMMONIA: CPT

## 2023-07-05 PROCEDURE — 80076 HEPATIC FUNCTION PANEL: CPT

## 2023-07-05 PROCEDURE — 6370000000 HC RX 637 (ALT 250 FOR IP): Performed by: EMERGENCY MEDICINE

## 2023-07-05 PROCEDURE — 81001 URINALYSIS AUTO W/SCOPE: CPT

## 2023-07-05 PROCEDURE — 93010 ELECTROCARDIOGRAM REPORT: CPT | Performed by: INTERNAL MEDICINE

## 2023-07-05 PROCEDURE — 83690 ASSAY OF LIPASE: CPT

## 2023-07-05 RX ORDER — ONDANSETRON 2 MG/ML
4 INJECTION INTRAMUSCULAR; INTRAVENOUS EVERY 6 HOURS PRN
Status: DISCONTINUED | OUTPATIENT
Start: 2023-07-05 | End: 2023-07-09 | Stop reason: HOSPADM

## 2023-07-05 RX ORDER — SODIUM CHLORIDE 9 MG/ML
INJECTION, SOLUTION INTRAVENOUS PRN
Status: DISCONTINUED | OUTPATIENT
Start: 2023-07-05 | End: 2023-07-09 | Stop reason: HOSPADM

## 2023-07-05 RX ORDER — SODIUM CHLORIDE 0.9 % (FLUSH) 0.9 %
5-40 SYRINGE (ML) INJECTION EVERY 12 HOURS SCHEDULED
Status: DISCONTINUED | OUTPATIENT
Start: 2023-07-05 | End: 2023-07-09 | Stop reason: HOSPADM

## 2023-07-05 RX ORDER — SODIUM CHLORIDE 9 MG/ML
INJECTION, SOLUTION INTRAVENOUS CONTINUOUS
Status: DISCONTINUED | OUTPATIENT
Start: 2023-07-05 | End: 2023-07-05

## 2023-07-05 RX ORDER — LACTULOSE 10 G/15ML
20 SOLUTION ORAL 3 TIMES DAILY
Status: DISCONTINUED | OUTPATIENT
Start: 2023-07-06 | End: 2023-07-06

## 2023-07-05 RX ORDER — MORPHINE SULFATE 4 MG/ML
4 INJECTION, SOLUTION INTRAMUSCULAR; INTRAVENOUS ONCE
Status: COMPLETED | OUTPATIENT
Start: 2023-07-05 | End: 2023-07-05

## 2023-07-05 RX ORDER — LACTULOSE 10 G/15ML
30 SOLUTION ORAL ONCE
Status: COMPLETED | OUTPATIENT
Start: 2023-07-05 | End: 2023-07-05

## 2023-07-05 RX ORDER — ENOXAPARIN SODIUM 100 MG/ML
30 INJECTION SUBCUTANEOUS DAILY
Status: DISCONTINUED | OUTPATIENT
Start: 2023-07-06 | End: 2023-07-05

## 2023-07-05 RX ORDER — SODIUM CHLORIDE 9 MG/ML
INJECTION, SOLUTION INTRAVENOUS CONTINUOUS
Status: ACTIVE | OUTPATIENT
Start: 2023-07-05 | End: 2023-07-06

## 2023-07-05 RX ORDER — POLYETHYLENE GLYCOL 3350 17 G/17G
17 POWDER, FOR SOLUTION ORAL DAILY PRN
Status: DISCONTINUED | OUTPATIENT
Start: 2023-07-05 | End: 2023-07-09 | Stop reason: HOSPADM

## 2023-07-05 RX ORDER — DIGOXIN 125 MCG
125 TABLET ORAL EVERY OTHER DAY
Status: DISCONTINUED | OUTPATIENT
Start: 2023-07-06 | End: 2023-07-06

## 2023-07-05 RX ORDER — METOPROLOL SUCCINATE 50 MG/1
50 TABLET, EXTENDED RELEASE ORAL DAILY
Status: DISCONTINUED | OUTPATIENT
Start: 2023-07-06 | End: 2023-07-09 | Stop reason: HOSPADM

## 2023-07-05 RX ORDER — LEVOTHYROXINE SODIUM 0.15 MG/1
150 TABLET ORAL
Status: DISCONTINUED | OUTPATIENT
Start: 2023-07-06 | End: 2023-07-09 | Stop reason: HOSPADM

## 2023-07-05 RX ORDER — ALBUMIN (HUMAN) 12.5 G/50ML
25 SOLUTION INTRAVENOUS ONCE
Status: COMPLETED | OUTPATIENT
Start: 2023-07-05 | End: 2023-07-06

## 2023-07-05 RX ORDER — 0.9 % SODIUM CHLORIDE 0.9 %
30 INTRAVENOUS SOLUTION INTRAVENOUS ONCE
Status: COMPLETED | OUTPATIENT
Start: 2023-07-05 | End: 2023-07-05

## 2023-07-05 RX ORDER — SODIUM CHLORIDE 0.9 % (FLUSH) 0.9 %
5-40 SYRINGE (ML) INJECTION EVERY 12 HOURS SCHEDULED
Status: DISCONTINUED | OUTPATIENT
Start: 2023-07-05 | End: 2023-07-05

## 2023-07-05 RX ORDER — SODIUM CHLORIDE 9 MG/ML
INJECTION, SOLUTION INTRAVENOUS PRN
Status: DISCONTINUED | OUTPATIENT
Start: 2023-07-05 | End: 2023-07-05

## 2023-07-05 RX ORDER — ACETAMINOPHEN 500 MG
500 TABLET ORAL EVERY 8 HOURS PRN
Status: DISCONTINUED | OUTPATIENT
Start: 2023-07-05 | End: 2023-07-09 | Stop reason: HOSPADM

## 2023-07-05 RX ORDER — SODIUM CHLORIDE 0.9 % (FLUSH) 0.9 %
5-40 SYRINGE (ML) INJECTION PRN
Status: DISCONTINUED | OUTPATIENT
Start: 2023-07-05 | End: 2023-07-05

## 2023-07-05 RX ORDER — SODIUM CHLORIDE 0.9 % (FLUSH) 0.9 %
5-40 SYRINGE (ML) INJECTION PRN
Status: DISCONTINUED | OUTPATIENT
Start: 2023-07-05 | End: 2023-07-09 | Stop reason: HOSPADM

## 2023-07-05 RX ORDER — MIDODRINE HYDROCHLORIDE 5 MG/1
5 TABLET ORAL
Status: DISCONTINUED | OUTPATIENT
Start: 2023-07-06 | End: 2023-07-09 | Stop reason: HOSPADM

## 2023-07-05 RX ORDER — ONDANSETRON 4 MG/1
4 TABLET, ORALLY DISINTEGRATING ORAL EVERY 8 HOURS PRN
Status: DISCONTINUED | OUTPATIENT
Start: 2023-07-05 | End: 2023-07-09 | Stop reason: HOSPADM

## 2023-07-05 RX ADMIN — MORPHINE SULFATE 4 MG: 4 INJECTION, SOLUTION INTRAMUSCULAR; INTRAVENOUS at 18:16

## 2023-07-05 RX ADMIN — LACTULOSE 30 G: 20 SOLUTION ORAL at 20:22

## 2023-07-05 RX ADMIN — SODIUM CHLORIDE 1365 ML: 9 INJECTION, SOLUTION INTRAVENOUS at 19:25

## 2023-07-05 RX ADMIN — CEFEPIME 2000 MG: 2 INJECTION, POWDER, FOR SOLUTION INTRAVENOUS at 18:09

## 2023-07-05 RX ADMIN — SODIUM CHLORIDE: 9 INJECTION, SOLUTION INTRAVENOUS at 21:46

## 2023-07-05 RX ADMIN — ALBUMIN (HUMAN) 25 G: 0.25 INJECTION, SOLUTION INTRAVENOUS at 23:45

## 2023-07-05 RX ADMIN — SODIUM CHLORIDE: 9 INJECTION, SOLUTION INTRAVENOUS at 17:57

## 2023-07-05 RX ADMIN — RIFAXIMIN 550 MG: 550 TABLET ORAL at 20:21

## 2023-07-05 ASSESSMENT — PAIN SCALES - GENERAL
PAINLEVEL_OUTOF10: 0
PAINLEVEL_OUTOF10: 0
PAINLEVEL_OUTOF10: 8
PAINLEVEL_OUTOF10: 7
PAINLEVEL_OUTOF10: 0

## 2023-07-05 ASSESSMENT — PAIN - FUNCTIONAL ASSESSMENT
PAIN_FUNCTIONAL_ASSESSMENT: 0-10
PAIN_FUNCTIONAL_ASSESSMENT: NONE - DENIES PAIN

## 2023-07-05 ASSESSMENT — PAIN DESCRIPTION - LOCATION: LOCATION: ABDOMEN

## 2023-07-05 NOTE — TELEPHONE ENCOUNTER
Pts dtr called in saying pt received a phone call from Dr. Gilson Leo office (GI) that she wanted her to increase the Aldactone. Dtr states you had taken her off that d/t her low Bps. She says pt has done so much better off the medication and with the addition of Midodrine her BPS are finally looking better. This am BP was 117/64. She stayed off the Losartan and Aldactone and stayed on Metoprolol. They have an appt with Dr Cassiyd Byrd on 7/10. Dtr. Wants to maked sure OK to continue course as is until  that appt.

## 2023-07-05 NOTE — TELEPHONE ENCOUNTER
I left a voicemail for north to stay the course until appt next week and will go from there. I also mentioned she might want to call Dr. Mack Macias office to inform them that she was taken off Filiberto Zacarias as BP was low.

## 2023-07-06 ENCOUNTER — APPOINTMENT (OUTPATIENT)
Dept: ULTRASOUND IMAGING | Age: 83
DRG: 871 | End: 2023-07-06
Payer: MEDICARE

## 2023-07-06 PROBLEM — Z86.718 HISTORY OF DVT (DEEP VEIN THROMBOSIS): Status: ACTIVE | Noted: 2023-07-06

## 2023-07-06 PROBLEM — E66.9 DIABETES MELLITUS TYPE 2 IN OBESE (HCC): Status: ACTIVE | Noted: 2023-02-15

## 2023-07-06 PROBLEM — K31.819 GAVE (GASTRIC ANTRAL VASCULAR ECTASIA): Status: ACTIVE | Noted: 2023-07-06

## 2023-07-06 PROBLEM — K74.60 DECOMPENSATION OF CIRRHOSIS OF LIVER (HCC): Status: ACTIVE | Noted: 2023-05-15

## 2023-07-06 PROBLEM — N17.9 ACUTE KIDNEY INJURY SUPERIMPOSED ON CKD (HCC): Status: ACTIVE | Noted: 2023-07-06

## 2023-07-06 PROBLEM — K76.82 HEPATIC ENCEPHALOPATHY (HCC): Status: ACTIVE | Noted: 2023-07-06

## 2023-07-06 PROBLEM — K72.90 DECOMPENSATION OF CIRRHOSIS OF LIVER (HCC): Status: ACTIVE | Noted: 2023-05-15

## 2023-07-06 PROBLEM — I21.4 NSTEMI (NON-ST ELEVATED MYOCARDIAL INFARCTION) (HCC): Status: ACTIVE | Noted: 2023-07-06

## 2023-07-06 PROBLEM — Z86.79 HISTORY OF CAD (CORONARY ARTERY DISEASE): Status: ACTIVE | Noted: 2023-07-06

## 2023-07-06 PROBLEM — N18.9 ACUTE KIDNEY INJURY SUPERIMPOSED ON CKD (HCC): Status: ACTIVE | Noted: 2023-07-06

## 2023-07-06 PROBLEM — E87.8 LOW BICARBONATE: Status: ACTIVE | Noted: 2023-07-06

## 2023-07-06 PROBLEM — E11.69 DIABETES MELLITUS TYPE 2 IN OBESE (HCC): Status: ACTIVE | Noted: 2023-02-15

## 2023-07-06 PROBLEM — R29.898 MUSCULAR DECONDITIONING: Status: ACTIVE | Noted: 2023-07-06

## 2023-07-06 LAB
ABO: NORMAL
ACB COMPLEX DNA BLD POS QL NAA+NON-PROBE: NOT DETECTED
ALBUMIN FLD-MCNC: 0.4 GM/DL
ALBUMIN SERPL BCG-MCNC: 2.8 G/DL (ref 3.5–5.1)
ALBUMIN SERPL BCG-MCNC: 2.8 G/DL (ref 3.5–5.1)
ALP SERPL-CCNC: 163 U/L (ref 38–126)
ALT SERPL W/O P-5'-P-CCNC: 33 U/L (ref 11–66)
AMORPH SED URNS QL MICRO: ABNORMAL
ANION GAP SERPL CALC-SCNC: 16 MEQ/L (ref 8–16)
ANION GAP SERPL CALC-SCNC: 21 MEQ/L (ref 8–16)
ANTIBODY SCREEN: NORMAL
AST SERPL-CCNC: 60 U/L (ref 5–40)
B FRAGILIS DNA BLD POS QL NAA+NON-PROBE: NOT DETECTED
BACTERIA: ABNORMAL
BACTERIA: ABNORMAL
BASOPHILS ABSOLUTE: 0 THOU/MM3 (ref 0–0.1)
BASOPHILS ABSOLUTE: 0 THOU/MM3 (ref 0–0.1)
BASOPHILS NFR BLD AUTO: 0.1 %
BASOPHILS NFR BLD AUTO: 0.2 %
BILIRUB CONJ SERPL-MCNC: 0.5 MG/DL (ref 0–0.3)
BILIRUB SERPL-MCNC: 1.3 MG/DL (ref 0.3–1.2)
BILIRUB UR QL STRIP: ABNORMAL
BILIRUB UR QL STRIP: NEGATIVE
BLACTX-M ISLT/SPM QL: NOT DETECTED
BLAIMP ISLT/SPM QL: NOT DETECTED
BLAKPC ISLT/SPM QL: NOT DETECTED
BLAOXA-48-LIKE ISLT/SPM QL: NOT DETECTED
BLAVIM ISLT/SPM QL: NOT DETECTED
BOTTLE TYPE: ABNORMAL
BUN SERPL-MCNC: 50 MG/DL (ref 7–22)
BUN SERPL-MCNC: 53 MG/DL (ref 7–22)
C ALBICANS DNA BLD POS QL NAA+NON-PROBE: NOT DETECTED
C AURIS DNA BLD POS QL NAA+NON-PROBE: NOT DETECTED
C GATTII+NEOFOR DNA BLD POS QL NAA+N-PRB: NOT DETECTED
C GLABRATA DNA BLD POS QL NAA+NON-PROBE: NOT DETECTED
C KRUSEI DNA BLD POS QL NAA+NON-PROBE: NOT DETECTED
C PARAP DNA BLD POS QL NAA+NON-PROBE: NOT DETECTED
C TROPICLS DNA BLD POS QL NAA+NON-PROBE: NOT DETECTED
CALCIUM SERPL-MCNC: 8.4 MG/DL (ref 8.5–10.5)
CALCIUM SERPL-MCNC: 8.7 MG/DL (ref 8.5–10.5)
CASTS #/AREA URNS LPF: ABNORMAL /LPF
CASTS #/AREA URNS LPF: ABNORMAL /LPF
CHARACTER UR: ABNORMAL
CHARACTER UR: ABNORMAL
CHARACTER, BODY FLUID: ABNORMAL
CHARCOAL URNS QL MICRO: ABNORMAL
CHARCOAL URNS QL MICRO: ABNORMAL
CHLORIDE SERPL-SCNC: 102 MEQ/L (ref 98–111)
CHLORIDE SERPL-SCNC: 105 MEQ/L (ref 98–111)
CO2 SERPL-SCNC: 18 MEQ/L (ref 23–33)
CO2 SERPL-SCNC: 20 MEQ/L (ref 23–33)
COAG NEG STAPH DNA BLD QL NAA+PROBE: NOT DETECTED
COLISTIN RES MCR-1 ISLT/SPM QL: NOT DETECTED
COLOR FLD: YELLOW
COLOR UR: ABNORMAL
COLOR UR: ABNORMAL
CREAT SERPL-MCNC: 2.6 MG/DL (ref 0.4–1.2)
CREAT SERPL-MCNC: 2.9 MG/DL (ref 0.4–1.2)
CREAT UR-MCNC: 235.5 MG/DL
CRYSTALS URNS QL MICRO: ABNORMAL
CRYSTALS URNS QL MICRO: ABNORMAL
DEPRECATED RDW RBC AUTO: 60.4 FL (ref 35–45)
DEPRECATED RDW RBC AUTO: 61.2 FL (ref 35–45)
E CLOAC COMP DNA BLD POS NAA+NON-PROBE: NOT DETECTED
E COLI DNA BLD POS QL NAA+NON-PROBE: DETECTED
E FAECALIS DNA BLD POS QL NAA+NON-PROBE: NOT DETECTED
E FAECIUM DNA BLD POS QL NAA+NON-PROBE: NOT DETECTED
ENTEROBACTERALES DNA BLD POS NAA+N-PRB: DETECTED
EOSINOPHIL NFR BLD AUTO: 0 %
EOSINOPHIL NFR BLD AUTO: 0 %
EOSINOPHILS ABSOLUTE: 0 THOU/MM3 (ref 0–0.4)
EOSINOPHILS ABSOLUTE: 0 THOU/MM3 (ref 0–0.4)
EPITHELIAL CELLS, UA: ABNORMAL /HPF
EPITHELIAL CELLS, UA: ABNORMAL /HPF
ERYTHROCYTE [DISTWIDTH] IN BLOOD BY AUTOMATED COUNT: 18.5 % (ref 11.5–14.5)
ERYTHROCYTE [DISTWIDTH] IN BLOOD BY AUTOMATED COUNT: 18.6 % (ref 11.5–14.5)
GFR SERPL CREATININE-BSD FRML MDRD: 16 ML/MIN/1.73M2
GFR SERPL CREATININE-BSD FRML MDRD: 18 ML/MIN/1.73M2
GLUCOSE FLD-MCNC: 89 MG/DL
GLUCOSE SERPL-MCNC: 102 MG/DL (ref 70–108)
GLUCOSE SERPL-MCNC: 93 MG/DL (ref 70–108)
GLUCOSE UR QL STRIP.AUTO: NEGATIVE MG/DL
GLUCOSE UR QL STRIP.AUTO: NEGATIVE MG/DL
GP B STREP DNA SPEC QL NAA+PROBE: NOT DETECTED
GP B STREP DNA SPEC QL NAA+PROBE: NOT DETECTED
GRANULOCYTES NFR FLD AUTO: 85.2 %
HAEM INFLU DNA BLD POS QL NAA+NON-PROBE: NOT DETECTED
HCT VFR BLD AUTO: 26.1 % (ref 37–47)
HCT VFR BLD AUTO: 26.8 % (ref 37–47)
HCT VFR BLD AUTO: 28.3 % (ref 37–47)
HGB BLD-MCNC: 7.6 GM/DL (ref 12–16)
HGB BLD-MCNC: 7.8 GM/DL (ref 12–16)
HGB BLD-MCNC: 8.1 GM/DL (ref 12–16)
HGB UR QL STRIP.AUTO: NEGATIVE
HGB UR QL STRIP.AUTO: NEGATIVE
HYPOCHROMIA BLD QL SMEAR: PRESENT
ICTOTEST: NEGATIVE
IMM GRANULOCYTES # BLD AUTO: 0.06 THOU/MM3 (ref 0–0.07)
IMM GRANULOCYTES # BLD AUTO: 0.07 THOU/MM3 (ref 0–0.07)
IMM GRANULOCYTES NFR BLD AUTO: 0.4 %
IMM GRANULOCYTES NFR BLD AUTO: 0.4 %
INR PPP: 2.89 (ref 0.85–1.13)
K OXYTOCA DNA BLD POS QL NAA+NON-PROBE: NOT DETECTED
K OXYTOCA DNA BLD POS QL NAA+NON-PROBE: NOT DETECTED
KETONES UR QL STRIP.AUTO: ABNORMAL
KETONES UR QL STRIP.AUTO: ABNORMAL
KLEBSIELLA SP DNA BLD POS QL NAA+NON-PRB: NOT DETECTED
L MONOCYTOG DNA BLD POS QL NAA+NON-PROBE: NOT DETECTED
LACTATE SERPL-SCNC: 2.3 MMOL/L (ref 0.5–2)
LACTATE SERPL-SCNC: 2.5 MMOL/L (ref 0.5–2)
LACTATE SERPL-SCNC: 3 MMOL/L (ref 0.5–2)
LACTATE SERPL-SCNC: 3.5 MMOL/L (ref 0.5–2)
LACTATE SERPL-SCNC: 4.1 MMOL/L (ref 0.5–2)
LDH FLD L TO P-CCNC: 155 U/L
LDH SERPL L TO P-CCNC: 205 U/L (ref 100–190)
LEUKOCYTE ESTERASE UR QL STRIP.AUTO: ABNORMAL
LEUKOCYTE ESTERASE UR QL STRIP.AUTO: ABNORMAL
LV EF: 38 %
LVEF MODALITY: NORMAL
LYMPHOCYTES ABSOLUTE: 0.6 THOU/MM3 (ref 1–4.8)
LYMPHOCYTES ABSOLUTE: 0.7 THOU/MM3 (ref 1–4.8)
LYMPHOCYTES NFR BLD AUTO: 4.3 %
LYMPHOCYTES NFR BLD AUTO: 4.4 %
MCH RBC QN AUTO: 27.8 PG (ref 26–33)
MCH RBC QN AUTO: 28 PG (ref 26–33)
MCHC RBC AUTO-ENTMCNC: 28.6 GM/DL (ref 32.2–35.5)
MCHC RBC AUTO-ENTMCNC: 29.1 GM/DL (ref 32.2–35.5)
MCV RBC AUTO: 95.4 FL (ref 81–99)
MCV RBC AUTO: 97.9 FL (ref 81–99)
MECA ISLT/SPM QL: ABNORMAL
MECA+MECC+MREJ ISLT/SPM QL: ABNORMAL
MESOTHELIAL CELLS BODY FLUID: ABNORMAL
MONOCYTES ABSOLUTE: 1.1 THOU/MM3 (ref 0.4–1.3)
MONOCYTES ABSOLUTE: 1.4 THOU/MM3 (ref 0.4–1.3)
MONOCYTES NFR BLD AUTO: 7.7 %
MONOCYTES NFR BLD AUTO: 8.7 %
MONONUC CELLS NFR FLD AUTO: 14.8 %
MRSA DNA SPEC QL NAA+PROBE: NEGATIVE
MUCOUS THREADS URNS QL MICRO: ABNORMAL
N MEN DNA BLD POS QL NAA+NON-PROBE: NOT DETECTED
NDM: NOT DETECTED
NEUTROPHILS NFR BLD AUTO: 86.3 %
NEUTROPHILS NFR BLD AUTO: 87.5 %
NITRITE UR QL STRIP.AUTO: NEGATIVE
NITRITE UR QL STRIP.AUTO: NEGATIVE
NRBC BLD AUTO-RTO: 0 /100 WBC
NRBC BLD AUTO-RTO: 0 /100 WBC
NUC CELL # FLD AUTO: ABNORMAL /CUMM (ref 0–500)
P AERUGINOSA DNA BLD POS NAA+NON-PROBE: NOT DETECTED
PATHOLOGIST REVIEW: ABNORMAL
PH UR STRIP.AUTO: 5 [PH] (ref 5–9)
PH UR STRIP.AUTO: 5 [PH] (ref 5–9)
PLATELET # BLD AUTO: 273 THOU/MM3 (ref 130–400)
PLATELET # BLD AUTO: 322 THOU/MM3 (ref 130–400)
PLATELET BLD QL SMEAR: ADEQUATE
PLATELET BLD QL SMEAR: ADEQUATE
PMV BLD AUTO: 9.6 FL (ref 9.4–12.4)
PMV BLD AUTO: 9.6 FL (ref 9.4–12.4)
POLYCHROMASIA BLD QL SMEAR: ABNORMAL
POLYCHROMASIA BLD QL SMEAR: ABNORMAL
POTASSIUM SERPL-SCNC: 4.5 MEQ/L (ref 3.5–5.2)
POTASSIUM SERPL-SCNC: 4.6 MEQ/L (ref 3.5–5.2)
PROT FLD-MCNC: 1 GM/DL
PROT SERPL-MCNC: 5.4 G/DL (ref 6.1–8)
PROT SERPL-MCNC: 5.4 G/DL (ref 6.1–8)
PROT UR STRIP.AUTO-MCNC: 30 MG/DL
PROT UR STRIP.AUTO-MCNC: ABNORMAL MG/DL
PROTEUS SPP: NOT DETECTED
RBC # BLD AUTO: 2.81 MILL/MM3 (ref 4.2–5.4)
RBC # BLD AUTO: 2.89 MILL/MM3 (ref 4.2–5.4)
RBC # FLD AUTO: 4000 /CUMM
RBC #/AREA URNS HPF: ABNORMAL /HPF
RBC #/AREA URNS HPF: ABNORMAL /HPF
REASON FOR REJECTION: NORMAL
REASON FOR REJECTION: NORMAL
REJECTED TEST: NORMAL
REJECTED TEST: NORMAL
RENAL EPI CELLS #/AREA URNS HPF: ABNORMAL /[HPF]
RENAL EPI CELLS #/AREA URNS HPF: ABNORMAL /[HPF]
RH FACTOR: NORMAL
S AUREUS DNA BLD POS QL NAA+NON-PROBE: NOT DETECTED
S EPIDERMIDIS DNA BLD POS QL NAA+NON-PRB: NOT DETECTED
S LUGDUNENSIS DNA BLD POS QL NAA+NON-PRB: NOT DETECTED
S MALTOPHILIA DNA BLD POS QL NAA+NON-PRB: NOT DETECTED
S MARCESCENS DNA BLD POS NAA+NON-PROBE: NOT DETECTED
S PYO DNA THROAT QL NAA+PROBE: NOT DETECTED
SALMONELLA DNA BLD POS QL NAA+NON-PROBE: NOT DETECTED
SCAN OF BLOOD SMEAR: NORMAL
SEGMENTED NEUTROPHILS ABSOLUTE COUNT: 13 THOU/MM3 (ref 1.8–7.7)
SEGMENTED NEUTROPHILS ABSOLUTE COUNT: 13.7 THOU/MM3 (ref 1.8–7.7)
SODIUM SERPL-SCNC: 141 MEQ/L (ref 135–145)
SODIUM SERPL-SCNC: 141 MEQ/L (ref 135–145)
SOURCE OF BLOOD CULTURE: ABNORMAL
SP GR UR REFRACT.AUTO: 1.02 (ref 1–1.03)
SPECIFIC GRAVITY UA: 1.02 (ref 1–1.03)
SPECIMEN: ABNORMAL
SPHEROCYTES BLD QL SMEAR: ABNORMAL
STOMATOCYTES: ABNORMAL
STOMATOCYTES: ABNORMAL
STREPTOCOCCUS DNA BLD QL NAA+PROBE: NOT DETECTED
TARGETS BLD QL SMEAR: ABNORMAL
TARGETS BLD QL SMEAR: ABNORMAL
TOTAL VOLUME RECEIVED BODY FLUID: 70 ML
TROPONIN T: 0.02 NG/ML
TROPONIN T: 0.02 NG/ML
UROBILINOGEN UR QL STRIP.AUTO: 1 EU/DL (ref 0–1)
UROBILINOGEN, URINE: 0.2 EU/DL (ref 0–1)
UUN 24H UR-MCNC: 363 MG/DL
VANA+VANB ISLT/SPM QL: ABNORMAL
WBC # BLD AUTO: 14.8 THOU/MM3 (ref 4.8–10.8)
WBC # BLD AUTO: 15.9 THOU/MM3 (ref 4.8–10.8)
WBC #/AREA URNS HPF: ABNORMAL /HPF
WBC #/AREA URNS HPF: ABNORMAL /HPF
YEAST LIKE FUNGI URNS QL MICRO: ABNORMAL
YEAST LIKE FUNGI URNS QL MICRO: ABNORMAL

## 2023-07-06 PROCEDURE — 99233 SBSQ HOSP IP/OBS HIGH 50: CPT | Performed by: STUDENT IN AN ORGANIZED HEALTH CARE EDUCATION/TRAINING PROGRAM

## 2023-07-06 PROCEDURE — 85014 HEMATOCRIT: CPT

## 2023-07-06 PROCEDURE — 89050 BODY FLUID CELL COUNT: CPT

## 2023-07-06 PROCEDURE — 88108 CYTOPATH CONCENTRATE TECH: CPT

## 2023-07-06 PROCEDURE — 85025 COMPLETE CBC W/AUTO DIFF WBC: CPT

## 2023-07-06 PROCEDURE — 82040 ASSAY OF SERUM ALBUMIN: CPT

## 2023-07-06 PROCEDURE — 83615 LACTATE (LD) (LDH) ENZYME: CPT

## 2023-07-06 PROCEDURE — 93307 TTE W/O DOPPLER COMPLETE: CPT

## 2023-07-06 PROCEDURE — 36415 COLL VENOUS BLD VENIPUNCTURE: CPT

## 2023-07-06 PROCEDURE — 87070 CULTURE OTHR SPECIMN AEROBIC: CPT

## 2023-07-06 PROCEDURE — 85610 PROTHROMBIN TIME: CPT

## 2023-07-06 PROCEDURE — P9016 RBC LEUKOCYTES REDUCED: HCPCS

## 2023-07-06 PROCEDURE — 86901 BLOOD TYPING SEROLOGIC RH(D): CPT

## 2023-07-06 PROCEDURE — 85018 HEMOGLOBIN: CPT

## 2023-07-06 PROCEDURE — 84155 ASSAY OF PROTEIN SERUM: CPT

## 2023-07-06 PROCEDURE — 2060000000 HC ICU INTERMEDIATE R&B

## 2023-07-06 PROCEDURE — 84484 ASSAY OF TROPONIN QUANT: CPT

## 2023-07-06 PROCEDURE — 84157 ASSAY OF PROTEIN OTHER: CPT

## 2023-07-06 PROCEDURE — 6370000000 HC RX 637 (ALT 250 FOR IP)

## 2023-07-06 PROCEDURE — 6370000000 HC RX 637 (ALT 250 FOR IP): Performed by: STUDENT IN AN ORGANIZED HEALTH CARE EDUCATION/TRAINING PROGRAM

## 2023-07-06 PROCEDURE — 97530 THERAPEUTIC ACTIVITIES: CPT

## 2023-07-06 PROCEDURE — 2580000003 HC RX 258: Performed by: STUDENT IN AN ORGANIZED HEALTH CARE EDUCATION/TRAINING PROGRAM

## 2023-07-06 PROCEDURE — 82945 GLUCOSE OTHER FLUID: CPT

## 2023-07-06 PROCEDURE — 88305 TISSUE EXAM BY PATHOLOGIST: CPT

## 2023-07-06 PROCEDURE — 36430 TRANSFUSION BLD/BLD COMPNT: CPT

## 2023-07-06 PROCEDURE — 49083 ABD PARACENTESIS W/IMAGING: CPT

## 2023-07-06 PROCEDURE — 82042 OTHER SOURCE ALBUMIN QUAN EA: CPT

## 2023-07-06 PROCEDURE — 80053 COMPREHEN METABOLIC PANEL: CPT

## 2023-07-06 PROCEDURE — 6360000002 HC RX W HCPCS

## 2023-07-06 PROCEDURE — 51798 US URINE CAPACITY MEASURE: CPT

## 2023-07-06 PROCEDURE — 83605 ASSAY OF LACTIC ACID: CPT

## 2023-07-06 PROCEDURE — 86900 BLOOD TYPING SEROLOGIC ABO: CPT

## 2023-07-06 PROCEDURE — P9047 ALBUMIN (HUMAN), 25%, 50ML: HCPCS

## 2023-07-06 PROCEDURE — 6360000002 HC RX W HCPCS: Performed by: STUDENT IN AN ORGANIZED HEALTH CARE EDUCATION/TRAINING PROGRAM

## 2023-07-06 PROCEDURE — 88112 CYTOPATH CELL ENHANCE TECH: CPT

## 2023-07-06 PROCEDURE — 0W9G3ZX DRAINAGE OF PERITONEAL CAVITY, PERCUTANEOUS APPROACH, DIAGNOSTIC: ICD-10-PCS | Performed by: OPHTHALMOLOGY

## 2023-07-06 PROCEDURE — 85027 COMPLETE CBC AUTOMATED: CPT

## 2023-07-06 PROCEDURE — 97162 PT EVAL MOD COMPLEX 30 MIN: CPT

## 2023-07-06 PROCEDURE — 87075 CULTR BACTERIA EXCEPT BLOOD: CPT

## 2023-07-06 PROCEDURE — 82248 BILIRUBIN DIRECT: CPT

## 2023-07-06 PROCEDURE — 0W9G3ZZ DRAINAGE OF PERITONEAL CAVITY, PERCUTANEOUS APPROACH: ICD-10-PCS | Performed by: RADIOLOGY

## 2023-07-06 PROCEDURE — 2580000003 HC RX 258

## 2023-07-06 PROCEDURE — 86850 RBC ANTIBODY SCREEN: CPT

## 2023-07-06 PROCEDURE — 86923 COMPATIBILITY TEST ELECTRIC: CPT

## 2023-07-06 PROCEDURE — 81001 URINALYSIS AUTO W/SCOPE: CPT

## 2023-07-06 RX ORDER — LACTULOSE 10 G/15ML
20 SOLUTION ORAL 2 TIMES DAILY
Status: DISCONTINUED | OUTPATIENT
Start: 2023-07-06 | End: 2023-07-07

## 2023-07-06 RX ORDER — SODIUM CHLORIDE 9 MG/ML
INJECTION, SOLUTION INTRAVENOUS CONTINUOUS
Status: DISCONTINUED | OUTPATIENT
Start: 2023-07-06 | End: 2023-07-07

## 2023-07-06 RX ORDER — SODIUM CHLORIDE 9 MG/ML
INJECTION, SOLUTION INTRAVENOUS PRN
Status: DISCONTINUED | OUTPATIENT
Start: 2023-07-06 | End: 2023-07-09 | Stop reason: HOSPADM

## 2023-07-06 RX ORDER — WARFARIN SODIUM 1 MG/1
0.5 TABLET ORAL ONCE
Status: DISCONTINUED | OUTPATIENT
Start: 2023-07-06 | End: 2023-07-06

## 2023-07-06 RX ORDER — ALBUMIN (HUMAN) 12.5 G/50ML
25 SOLUTION INTRAVENOUS
Status: DISCONTINUED | OUTPATIENT
Start: 2023-07-06 | End: 2023-07-06

## 2023-07-06 RX ORDER — ALBUMIN (HUMAN) 12.5 G/50ML
25 SOLUTION INTRAVENOUS
Status: COMPLETED | OUTPATIENT
Start: 2023-07-08 | End: 2023-07-08

## 2023-07-06 RX ORDER — SODIUM CHLORIDE 9 MG/ML
INJECTION, SOLUTION INTRAVENOUS CONTINUOUS
Status: ACTIVE | OUTPATIENT
Start: 2023-07-06 | End: 2023-07-06

## 2023-07-06 RX ORDER — DIGOXIN 125 MCG
62.5 TABLET ORAL EVERY OTHER DAY
Status: DISCONTINUED | OUTPATIENT
Start: 2023-07-06 | End: 2023-07-09 | Stop reason: HOSPADM

## 2023-07-06 RX ADMIN — ACETAMINOPHEN 500 MG: 500 TABLET ORAL at 21:55

## 2023-07-06 RX ADMIN — LEVOTHYROXINE SODIUM 150 MCG: 0.15 TABLET ORAL at 06:17

## 2023-07-06 RX ADMIN — RIFAXIMIN 400 MG: 200 TABLET ORAL at 09:01

## 2023-07-06 RX ADMIN — MIDODRINE HYDROCHLORIDE 5 MG: 5 TABLET ORAL at 12:40

## 2023-07-06 RX ADMIN — RIFAXIMIN 400 MG: 200 TABLET ORAL at 21:55

## 2023-07-06 RX ADMIN — DIGOXIN 62.5 MCG: 0.12 TABLET ORAL at 21:55

## 2023-07-06 RX ADMIN — LACTULOSE 20 G: 20 SOLUTION ORAL at 09:01

## 2023-07-06 RX ADMIN — ALBUMIN (HUMAN) 25 G: 0.25 INJECTION, SOLUTION INTRAVENOUS at 13:45

## 2023-07-06 RX ADMIN — ONDANSETRON 4 MG: 2 INJECTION INTRAMUSCULAR; INTRAVENOUS at 09:53

## 2023-07-06 RX ADMIN — ALBUMIN (HUMAN) 25 G: 0.25 INJECTION, SOLUTION INTRAVENOUS at 11:32

## 2023-07-06 RX ADMIN — MIDODRINE HYDROCHLORIDE 5 MG: 5 TABLET ORAL at 15:12

## 2023-07-06 RX ADMIN — SODIUM CHLORIDE: 9 INJECTION, SOLUTION INTRAVENOUS at 06:16

## 2023-07-06 RX ADMIN — MIDODRINE HYDROCHLORIDE 5 MG: 5 TABLET ORAL at 09:01

## 2023-07-06 RX ADMIN — CEFTRIAXONE SODIUM 2000 MG: 2 INJECTION, POWDER, FOR SOLUTION INTRAMUSCULAR; INTRAVENOUS at 17:35

## 2023-07-06 RX ADMIN — ALBUMIN (HUMAN) 25 G: 0.25 INJECTION, SOLUTION INTRAVENOUS at 12:31

## 2023-07-06 RX ADMIN — SODIUM CHLORIDE: 9 INJECTION, SOLUTION INTRAVENOUS at 06:15

## 2023-07-06 RX ADMIN — RIFAXIMIN 400 MG: 200 TABLET ORAL at 15:13

## 2023-07-06 RX ADMIN — SODIUM CHLORIDE: 9 INJECTION, SOLUTION INTRAVENOUS at 21:39

## 2023-07-06 ASSESSMENT — PAIN DESCRIPTION - DESCRIPTORS: DESCRIPTORS: ACHING

## 2023-07-06 ASSESSMENT — PAIN DESCRIPTION - ONSET: ONSET: ON-GOING

## 2023-07-06 ASSESSMENT — PAIN DESCRIPTION - ORIENTATION: ORIENTATION: MID

## 2023-07-06 ASSESSMENT — PAIN SCALES - GENERAL: PAINLEVEL_OUTOF10: 3

## 2023-07-06 ASSESSMENT — PAIN DESCRIPTION - LOCATION: LOCATION: HEAD

## 2023-07-06 ASSESSMENT — PAIN DESCRIPTION - PAIN TYPE: TYPE: ACUTE PAIN

## 2023-07-06 ASSESSMENT — PAIN - FUNCTIONAL ASSESSMENT: PAIN_FUNCTIONAL_ASSESSMENT: ACTIVITIES ARE NOT PREVENTED

## 2023-07-06 ASSESSMENT — PAIN DESCRIPTION - FREQUENCY: FREQUENCY: INTERMITTENT

## 2023-07-07 LAB
ALBUMIN SERPL BCG-MCNC: 3.5 G/DL (ref 3.5–5.1)
ALP SERPL-CCNC: 134 U/L (ref 38–126)
ALT SERPL W/O P-5'-P-CCNC: 39 U/L (ref 11–66)
AMMONIA PLAS-MCNC: 31 UMOL/L (ref 11–60)
ANION GAP SERPL CALC-SCNC: 18 MEQ/L (ref 8–16)
ANION GAP SERPL CALC-SCNC: 20 MEQ/L (ref 8–16)
AST SERPL-CCNC: 70 U/L (ref 5–40)
BASOPHILS ABSOLUTE: 0 THOU/MM3 (ref 0–0.1)
BASOPHILS NFR BLD AUTO: 0.1 %
BILIRUB CONJ SERPL-MCNC: 0.7 MG/DL (ref 0–0.3)
BILIRUB SERPL-MCNC: 1.6 MG/DL (ref 0.3–1.2)
BUN SERPL-MCNC: 58 MG/DL (ref 7–22)
BUN SERPL-MCNC: 61 MG/DL (ref 7–22)
CALCIUM SERPL-MCNC: 8.4 MG/DL (ref 8.5–10.5)
CALCIUM SERPL-MCNC: 8.4 MG/DL (ref 8.5–10.5)
CHLORIDE SERPL-SCNC: 104 MEQ/L (ref 98–111)
CHLORIDE SERPL-SCNC: 98 MEQ/L (ref 98–111)
CO2 SERPL-SCNC: 18 MEQ/L (ref 23–33)
CO2 SERPL-SCNC: 19 MEQ/L (ref 23–33)
CREAT SERPL-MCNC: 3.4 MG/DL (ref 0.4–1.2)
CREAT SERPL-MCNC: 3.7 MG/DL (ref 0.4–1.2)
DEPRECATED RDW RBC AUTO: 62.4 FL (ref 35–45)
DEPRECATED RDW RBC AUTO: 69.9 FL (ref 35–45)
EOSINOPHIL NFR BLD AUTO: 0 %
EOSINOPHILS ABSOLUTE: 0 THOU/MM3 (ref 0–0.4)
ERYTHROCYTE [DISTWIDTH] IN BLOOD BY AUTOMATED COUNT: 19.1 % (ref 11.5–14.5)
ERYTHROCYTE [DISTWIDTH] IN BLOOD BY AUTOMATED COUNT: 19.8 % (ref 11.5–14.5)
GFR SERPL CREATININE-BSD FRML MDRD: 12 ML/MIN/1.73M2
GFR SERPL CREATININE-BSD FRML MDRD: 13 ML/MIN/1.73M2
GLUCOSE SERPL-MCNC: 181 MG/DL (ref 70–108)
GLUCOSE SERPL-MCNC: 83 MG/DL (ref 70–108)
HCT VFR BLD AUTO: 26.1 % (ref 37–47)
HCT VFR BLD AUTO: 26.4 % (ref 37–47)
HCT VFR BLD AUTO: 26.5 % (ref 37–47)
HCT VFR BLD AUTO: 27 % (ref 37–47)
HCT VFR BLD AUTO: 28.1 % (ref 37–47)
HGB BLD-MCNC: 6.9 GM/DL (ref 12–16)
HGB BLD-MCNC: 7.7 GM/DL (ref 12–16)
HGB BLD-MCNC: 7.8 GM/DL (ref 12–16)
HGB BLD-MCNC: 7.9 GM/DL (ref 12–16)
HGB BLD-MCNC: 8.1 GM/DL (ref 12–16)
IMM GRANULOCYTES # BLD AUTO: 0.11 THOU/MM3 (ref 0–0.07)
IMM GRANULOCYTES NFR BLD AUTO: 0.8 %
INR PPP: 2.83 (ref 0.85–1.13)
LACTATE SERPL-SCNC: 1.8 MMOL/L (ref 0.5–2)
LACTATE SERPL-SCNC: 1.9 MMOL/L (ref 0.5–2)
LACTATE SERPL-SCNC: 3.7 MMOL/L (ref 0.5–2)
LYMPHOCYTES ABSOLUTE: 0.7 THOU/MM3 (ref 1–4.8)
LYMPHOCYTES NFR BLD AUTO: 5.1 %
MCH RBC QN AUTO: 28 PG (ref 26–33)
MCH RBC QN AUTO: 28.7 PG (ref 26–33)
MCHC RBC AUTO-ENTMCNC: 26 GM/DL (ref 32.2–35.5)
MCHC RBC AUTO-ENTMCNC: 29.5 GM/DL (ref 32.2–35.5)
MCV RBC AUTO: 107.7 FL (ref 81–99)
MCV RBC AUTO: 97.1 FL (ref 81–99)
MONOCYTES ABSOLUTE: 1.9 THOU/MM3 (ref 0.4–1.3)
MONOCYTES NFR BLD AUTO: 13.5 %
NEUTROPHILS NFR BLD AUTO: 80.5 %
NRBC BLD AUTO-RTO: 0 /100 WBC
PATHOLOGIST REVIEW: ABNORMAL
PLATELET # BLD AUTO: 195 THOU/MM3 (ref 130–400)
PLATELET # BLD AUTO: 221 THOU/MM3 (ref 130–400)
PMV BLD AUTO: 10.3 FL (ref 9.4–12.4)
PMV BLD AUTO: 9.6 FL (ref 9.4–12.4)
POTASSIUM SERPL-SCNC: 4.6 MEQ/L (ref 3.5–5.2)
POTASSIUM SERPL-SCNC: 4.6 MEQ/L (ref 3.5–5.2)
PROT SERPL-MCNC: 5.7 G/DL (ref 6.1–8)
RBC # BLD AUTO: 2.46 MILL/MM3 (ref 4.2–5.4)
RBC # BLD AUTO: 2.72 MILL/MM3 (ref 4.2–5.4)
REASON FOR REJECTION: NORMAL
REJECTED TEST: NORMAL
SEGMENTED NEUTROPHILS ABSOLUTE COUNT: 11.4 THOU/MM3 (ref 1.8–7.7)
SODIUM SERPL-SCNC: 135 MEQ/L (ref 135–145)
SODIUM SERPL-SCNC: 142 MEQ/L (ref 135–145)
WBC # BLD AUTO: 13.4 THOU/MM3 (ref 4.8–10.8)
WBC # BLD AUTO: 14.2 THOU/MM3 (ref 4.8–10.8)

## 2023-07-07 PROCEDURE — 87040 BLOOD CULTURE FOR BACTERIA: CPT

## 2023-07-07 PROCEDURE — 94669 MECHANICAL CHEST WALL OSCILL: CPT

## 2023-07-07 PROCEDURE — C9113 INJ PANTOPRAZOLE SODIUM, VIA: HCPCS

## 2023-07-07 PROCEDURE — 6370000000 HC RX 637 (ALT 250 FOR IP): Performed by: STUDENT IN AN ORGANIZED HEALTH CARE EDUCATION/TRAINING PROGRAM

## 2023-07-07 PROCEDURE — 2580000003 HC RX 258

## 2023-07-07 PROCEDURE — 83605 ASSAY OF LACTIC ACID: CPT

## 2023-07-07 PROCEDURE — 85025 COMPLETE CBC W/AUTO DIFF WBC: CPT

## 2023-07-07 PROCEDURE — 85610 PROTHROMBIN TIME: CPT

## 2023-07-07 PROCEDURE — 80053 COMPREHEN METABOLIC PANEL: CPT

## 2023-07-07 PROCEDURE — 94640 AIRWAY INHALATION TREATMENT: CPT

## 2023-07-07 PROCEDURE — 6370000000 HC RX 637 (ALT 250 FOR IP)

## 2023-07-07 PROCEDURE — 2060000000 HC ICU INTERMEDIATE R&B

## 2023-07-07 PROCEDURE — 36415 COLL VENOUS BLD VENIPUNCTURE: CPT

## 2023-07-07 PROCEDURE — 85018 HEMOGLOBIN: CPT

## 2023-07-07 PROCEDURE — 82140 ASSAY OF AMMONIA: CPT

## 2023-07-07 PROCEDURE — 99233 SBSQ HOSP IP/OBS HIGH 50: CPT | Performed by: STUDENT IN AN ORGANIZED HEALTH CARE EDUCATION/TRAINING PROGRAM

## 2023-07-07 PROCEDURE — 82248 BILIRUBIN DIRECT: CPT

## 2023-07-07 PROCEDURE — 6360000002 HC RX W HCPCS

## 2023-07-07 PROCEDURE — 2500000003 HC RX 250 WO HCPCS

## 2023-07-07 PROCEDURE — 85014 HEMATOCRIT: CPT

## 2023-07-07 PROCEDURE — 2580000003 HC RX 258: Performed by: STUDENT IN AN ORGANIZED HEALTH CARE EDUCATION/TRAINING PROGRAM

## 2023-07-07 PROCEDURE — 6360000002 HC RX W HCPCS: Performed by: STUDENT IN AN ORGANIZED HEALTH CARE EDUCATION/TRAINING PROGRAM

## 2023-07-07 RX ORDER — SODIUM CHLORIDE, SODIUM LACTATE, POTASSIUM CHLORIDE, CALCIUM CHLORIDE 600; 310; 30; 20 MG/100ML; MG/100ML; MG/100ML; MG/100ML
INJECTION, SOLUTION INTRAVENOUS CONTINUOUS
Status: DISCONTINUED | OUTPATIENT
Start: 2023-07-07 | End: 2023-07-07

## 2023-07-07 RX ORDER — LIDOCAINE 4 G/G
1 PATCH TOPICAL DAILY
Status: DISCONTINUED | OUTPATIENT
Start: 2023-07-07 | End: 2023-07-09 | Stop reason: HOSPADM

## 2023-07-07 RX ORDER — LACTULOSE 10 G/15ML
20 SOLUTION ORAL 2 TIMES DAILY PRN
Status: DISCONTINUED | OUTPATIENT
Start: 2023-07-07 | End: 2023-07-09 | Stop reason: HOSPADM

## 2023-07-07 RX ORDER — IPRATROPIUM BROMIDE AND ALBUTEROL SULFATE 2.5; .5 MG/3ML; MG/3ML
1 SOLUTION RESPIRATORY (INHALATION)
Status: COMPLETED | OUTPATIENT
Start: 2023-07-07 | End: 2023-07-07

## 2023-07-07 RX ORDER — PANTOPRAZOLE SODIUM 40 MG/10ML
40 INJECTION, POWDER, LYOPHILIZED, FOR SOLUTION INTRAVENOUS 2 TIMES DAILY
Status: DISCONTINUED | OUTPATIENT
Start: 2023-07-07 | End: 2023-07-09 | Stop reason: HOSPADM

## 2023-07-07 RX ADMIN — MIDODRINE HYDROCHLORIDE 5 MG: 5 TABLET ORAL at 10:34

## 2023-07-07 RX ADMIN — ACETAMINOPHEN 500 MG: 500 TABLET ORAL at 13:53

## 2023-07-07 RX ADMIN — PANTOPRAZOLE SODIUM 40 MG: 40 INJECTION, POWDER, FOR SOLUTION INTRAVENOUS at 20:44

## 2023-07-07 RX ADMIN — ACETAMINOPHEN 500 MG: 500 TABLET ORAL at 22:03

## 2023-07-07 RX ADMIN — LEVOTHYROXINE SODIUM 150 MCG: 0.15 TABLET ORAL at 06:31

## 2023-07-07 RX ADMIN — RIFAXIMIN 400 MG: 200 TABLET ORAL at 20:45

## 2023-07-07 RX ADMIN — PANTOPRAZOLE SODIUM 40 MG: 40 INJECTION, POWDER, FOR SOLUTION INTRAVENOUS at 12:01

## 2023-07-07 RX ADMIN — RIFAXIMIN 400 MG: 200 TABLET ORAL at 15:17

## 2023-07-07 RX ADMIN — SODIUM CHLORIDE: 9 INJECTION, SOLUTION INTRAVENOUS at 00:02

## 2023-07-07 RX ADMIN — SODIUM BICARBONATE: 84 INJECTION, SOLUTION INTRAVENOUS at 12:01

## 2023-07-07 RX ADMIN — RIFAXIMIN 400 MG: 200 TABLET ORAL at 10:34

## 2023-07-07 RX ADMIN — SODIUM CHLORIDE, PRESERVATIVE FREE 10 ML: 5 INJECTION INTRAVENOUS at 20:45

## 2023-07-07 RX ADMIN — MIDODRINE HYDROCHLORIDE 5 MG: 5 TABLET ORAL at 17:15

## 2023-07-07 RX ADMIN — IPRATROPIUM BROMIDE AND ALBUTEROL SULFATE 1 DOSE: .5; 3 SOLUTION RESPIRATORY (INHALATION) at 20:41

## 2023-07-07 RX ADMIN — CEFTRIAXONE SODIUM 2000 MG: 2 INJECTION, POWDER, FOR SOLUTION INTRAMUSCULAR; INTRAVENOUS at 17:17

## 2023-07-07 RX ADMIN — SODIUM CHLORIDE, PRESERVATIVE FREE 10 ML: 5 INJECTION INTRAVENOUS at 10:34

## 2023-07-07 ASSESSMENT — PAIN - FUNCTIONAL ASSESSMENT: PAIN_FUNCTIONAL_ASSESSMENT: ACTIVITIES ARE NOT PREVENTED

## 2023-07-07 ASSESSMENT — PAIN DESCRIPTION - DESCRIPTORS: DESCRIPTORS: ACHING

## 2023-07-07 ASSESSMENT — PAIN SCALES - GENERAL: PAINLEVEL_OUTOF10: 3

## 2023-07-07 ASSESSMENT — PAIN DESCRIPTION - ORIENTATION: ORIENTATION: MID

## 2023-07-07 ASSESSMENT — PAIN DESCRIPTION - LOCATION: LOCATION: HEAD

## 2023-07-08 ENCOUNTER — APPOINTMENT (OUTPATIENT)
Dept: ULTRASOUND IMAGING | Age: 83
DRG: 871 | End: 2023-07-08
Payer: MEDICARE

## 2023-07-08 LAB
ALBUMIN SERPL BCG-MCNC: 3 G/DL (ref 3.5–5.1)
ALP SERPL-CCNC: 131 U/L (ref 38–126)
ALT SERPL W/O P-5'-P-CCNC: 35 U/L (ref 11–66)
ANION GAP SERPL CALC-SCNC: 15 MEQ/L (ref 8–16)
ANION GAP SERPL CALC-SCNC: 19 MEQ/L (ref 8–16)
AST SERPL-CCNC: 47 U/L (ref 5–40)
BASE EXCESS BLDA CALC-SCNC: -0.3 MMOL/L (ref -2–3)
BASOPHILS ABSOLUTE: 0 THOU/MM3 (ref 0–0.1)
BASOPHILS NFR BLD AUTO: 0.2 %
BILIRUB CONJ SERPL-MCNC: < 0.2 MG/DL (ref 0–0.3)
BILIRUB SERPL-MCNC: 0.4 MG/DL (ref 0.3–1.2)
BUN SERPL-MCNC: 60 MG/DL (ref 7–22)
BUN SERPL-MCNC: 62 MG/DL (ref 7–22)
CALCIUM SERPL-MCNC: 8.1 MG/DL (ref 8.5–10.5)
CALCIUM SERPL-MCNC: 8.5 MG/DL (ref 8.5–10.5)
CHLORIDE SERPL-SCNC: 95 MEQ/L (ref 98–111)
CHLORIDE SERPL-SCNC: 98 MEQ/L (ref 98–111)
CO2 SERPL-SCNC: 21 MEQ/L (ref 23–33)
CO2 SERPL-SCNC: 21 MEQ/L (ref 23–33)
COLLECTED BY:: ABNORMAL
CREAT SERPL-MCNC: 3.9 MG/DL (ref 0.4–1.2)
CREAT SERPL-MCNC: 4.3 MG/DL (ref 0.4–1.2)
CREAT UR-MCNC: 177.1 MG/DL
DEPRECATED RDW RBC AUTO: 64.7 FL (ref 35–45)
DIGOXIN SERPL-MCNC: 1 NG/ML (ref 0.5–2)
EOSINOPHIL NFR BLD AUTO: 0.4 %
EOSINOPHILS ABSOLUTE: 0.1 THOU/MM3 (ref 0–0.4)
ERYTHROCYTE [DISTWIDTH] IN BLOOD BY AUTOMATED COUNT: 19.6 % (ref 11.5–14.5)
GFR SERPL CREATININE-BSD FRML MDRD: 10 ML/MIN/1.73M2
GFR SERPL CREATININE-BSD FRML MDRD: 11 ML/MIN/1.73M2
GLUCOSE SERPL-MCNC: 187 MG/DL (ref 70–108)
GLUCOSE SERPL-MCNC: 95 MG/DL (ref 70–108)
HCO3 BLDA-SCNC: 22 MMOL/L (ref 23–28)
HCT VFR BLD AUTO: 25.8 % (ref 37–47)
HCT VFR BLD AUTO: 25.9 % (ref 37–47)
HCT VFR BLD AUTO: 26.9 % (ref 37–47)
HGB BLD-MCNC: 7.6 GM/DL (ref 12–16)
HGB BLD-MCNC: 7.6 GM/DL (ref 12–16)
HGB BLD-MCNC: 7.8 GM/DL (ref 12–16)
IMM GRANULOCYTES # BLD AUTO: 0.11 THOU/MM3 (ref 0–0.07)
IMM GRANULOCYTES NFR BLD AUTO: 0.8 %
INR PPP: 3.54 (ref 0.85–1.13)
LYMPHOCYTES ABSOLUTE: 0.6 THOU/MM3 (ref 1–4.8)
LYMPHOCYTES NFR BLD AUTO: 4.5 %
MCH RBC QN AUTO: 28.4 PG (ref 26–33)
MCHC RBC AUTO-ENTMCNC: 29.3 GM/DL (ref 32.2–35.5)
MCV RBC AUTO: 96.6 FL (ref 81–99)
MONOCYTES ABSOLUTE: 1.9 THOU/MM3 (ref 0.4–1.3)
MONOCYTES NFR BLD AUTO: 14.5 %
NEUTROPHILS NFR BLD AUTO: 79.6 %
NRBC BLD AUTO-RTO: 0 /100 WBC
ORGANISM: ABNORMAL
OSMOLALITY UR: 318 MOSMOL/KG (ref 250–750)
PCO2 TEMP ADJ BLDMV: 27 MMHG (ref 41–51)
PH BLDMV: 7.52 [PH] (ref 7.31–7.41)
PLATELET # BLD AUTO: 208 THOU/MM3 (ref 130–400)
PMV BLD AUTO: 9.8 FL (ref 9.4–12.4)
PO2 BLDMV: 39 MMHG (ref 25–40)
POTASSIUM SERPL-SCNC: 3.7 MEQ/L (ref 3.5–5.2)
POTASSIUM SERPL-SCNC: 3.8 MEQ/L (ref 3.5–5.2)
POTASSIUM UR-SCNC: 58.6 MEQ/L
PROT SERPL-MCNC: 5.7 G/DL (ref 6.1–8)
PROT UR-MCNC: 469 MG/DL
PROT/CREAT 24H UR: 2.65 MG/G{CREAT}
RBC # BLD AUTO: 2.68 MILL/MM3 (ref 4.2–5.4)
SAO2 % BLDMV: 81 %
SEGMENTED NEUTROPHILS ABSOLUTE COUNT: 10.5 THOU/MM3 (ref 1.8–7.7)
SODIUM SERPL-SCNC: 134 MEQ/L (ref 135–145)
SODIUM SERPL-SCNC: 135 MEQ/L (ref 135–145)
SODIUM UR-SCNC: 23 MEQ/L
UUN 24H UR-MCNC: 192 MG/DL
WBC # BLD AUTO: 13.2 THOU/MM3 (ref 4.8–10.8)

## 2023-07-08 PROCEDURE — C9113 INJ PANTOPRAZOLE SODIUM, VIA: HCPCS

## 2023-07-08 PROCEDURE — 85018 HEMOGLOBIN: CPT

## 2023-07-08 PROCEDURE — 99232 SBSQ HOSP IP/OBS MODERATE 35: CPT | Performed by: OPHTHALMOLOGY

## 2023-07-08 PROCEDURE — 94640 AIRWAY INHALATION TREATMENT: CPT

## 2023-07-08 PROCEDURE — 84156 ASSAY OF PROTEIN URINE: CPT

## 2023-07-08 PROCEDURE — 6370000000 HC RX 637 (ALT 250 FOR IP): Performed by: STUDENT IN AN ORGANIZED HEALTH CARE EDUCATION/TRAINING PROGRAM

## 2023-07-08 PROCEDURE — 82803 BLOOD GASES ANY COMBINATION: CPT

## 2023-07-08 PROCEDURE — 82570 ASSAY OF URINE CREATININE: CPT

## 2023-07-08 PROCEDURE — 82248 BILIRUBIN DIRECT: CPT

## 2023-07-08 PROCEDURE — 99232 SBSQ HOSP IP/OBS MODERATE 35: CPT | Performed by: INTERNAL MEDICINE

## 2023-07-08 PROCEDURE — P9047 ALBUMIN (HUMAN), 25%, 50ML: HCPCS

## 2023-07-08 PROCEDURE — 6370000000 HC RX 637 (ALT 250 FOR IP)

## 2023-07-08 PROCEDURE — 2580000003 HC RX 258: Performed by: STUDENT IN AN ORGANIZED HEALTH CARE EDUCATION/TRAINING PROGRAM

## 2023-07-08 PROCEDURE — 80162 ASSAY OF DIGOXIN TOTAL: CPT

## 2023-07-08 PROCEDURE — 2580000003 HC RX 258

## 2023-07-08 PROCEDURE — 94669 MECHANICAL CHEST WALL OSCILL: CPT

## 2023-07-08 PROCEDURE — 85014 HEMATOCRIT: CPT

## 2023-07-08 PROCEDURE — 85610 PROTHROMBIN TIME: CPT

## 2023-07-08 PROCEDURE — 2500000003 HC RX 250 WO HCPCS

## 2023-07-08 PROCEDURE — 80053 COMPREHEN METABOLIC PANEL: CPT

## 2023-07-08 PROCEDURE — 36415 COLL VENOUS BLD VENIPUNCTURE: CPT

## 2023-07-08 PROCEDURE — 85025 COMPLETE CBC W/AUTO DIFF WBC: CPT

## 2023-07-08 PROCEDURE — 2060000000 HC ICU INTERMEDIATE R&B

## 2023-07-08 PROCEDURE — 84540 ASSAY OF URINE/UREA-N: CPT

## 2023-07-08 PROCEDURE — 6360000002 HC RX W HCPCS

## 2023-07-08 PROCEDURE — 2700000000 HC OXYGEN THERAPY PER DAY

## 2023-07-08 PROCEDURE — 6360000002 HC RX W HCPCS: Performed by: STUDENT IN AN ORGANIZED HEALTH CARE EDUCATION/TRAINING PROGRAM

## 2023-07-08 PROCEDURE — 84133 ASSAY OF URINE POTASSIUM: CPT

## 2023-07-08 PROCEDURE — 84300 ASSAY OF URINE SODIUM: CPT

## 2023-07-08 PROCEDURE — 83935 ASSAY OF URINE OSMOLALITY: CPT

## 2023-07-08 RX ORDER — FLUTICASONE PROPIONATE 50 MCG
2 SPRAY, SUSPENSION (ML) NASAL 2 TIMES DAILY
Status: DISCONTINUED | OUTPATIENT
Start: 2023-07-08 | End: 2023-07-09 | Stop reason: HOSPADM

## 2023-07-08 RX ORDER — IPRATROPIUM BROMIDE AND ALBUTEROL SULFATE 2.5; .5 MG/3ML; MG/3ML
1 SOLUTION RESPIRATORY (INHALATION) EVERY 6 HOURS PRN
Status: DISCONTINUED | OUTPATIENT
Start: 2023-07-08 | End: 2023-07-09 | Stop reason: HOSPADM

## 2023-07-08 RX ADMIN — RIFAXIMIN 400 MG: 200 TABLET ORAL at 08:30

## 2023-07-08 RX ADMIN — ALBUMIN (HUMAN) 25 G: 0.25 INJECTION, SOLUTION INTRAVENOUS at 08:37

## 2023-07-08 RX ADMIN — DIGOXIN 62.5 MCG: 0.12 TABLET ORAL at 19:51

## 2023-07-08 RX ADMIN — RIFAXIMIN 400 MG: 200 TABLET ORAL at 19:51

## 2023-07-08 RX ADMIN — ALBUMIN (HUMAN) 25 G: 0.25 INJECTION, SOLUTION INTRAVENOUS at 11:00

## 2023-07-08 RX ADMIN — FLUTICASONE PROPIONATE 2 SPRAY: 50 SPRAY, METERED NASAL at 19:51

## 2023-07-08 RX ADMIN — ACETAMINOPHEN 500 MG: 500 TABLET ORAL at 19:51

## 2023-07-08 RX ADMIN — RIFAXIMIN 400 MG: 200 TABLET ORAL at 16:01

## 2023-07-08 RX ADMIN — IPRATROPIUM BROMIDE AND ALBUTEROL SULFATE 1 DOSE: .5; 3 SOLUTION RESPIRATORY (INHALATION) at 20:25

## 2023-07-08 RX ADMIN — CEFTRIAXONE SODIUM 2000 MG: 2 INJECTION, POWDER, FOR SOLUTION INTRAMUSCULAR; INTRAVENOUS at 18:56

## 2023-07-08 RX ADMIN — LEVOTHYROXINE SODIUM 150 MCG: 0.15 TABLET ORAL at 06:36

## 2023-07-08 RX ADMIN — MIDODRINE HYDROCHLORIDE 5 MG: 5 TABLET ORAL at 08:38

## 2023-07-08 RX ADMIN — SODIUM CHLORIDE, PRESERVATIVE FREE 10 ML: 5 INJECTION INTRAVENOUS at 08:30

## 2023-07-08 RX ADMIN — MIDODRINE HYDROCHLORIDE 5 MG: 5 TABLET ORAL at 11:00

## 2023-07-08 RX ADMIN — FLUTICASONE PROPIONATE 2 SPRAY: 50 SPRAY, METERED NASAL at 15:36

## 2023-07-08 RX ADMIN — SODIUM BICARBONATE: 84 INJECTION, SOLUTION INTRAVENOUS at 00:40

## 2023-07-08 RX ADMIN — ALBUMIN (HUMAN) 25 G: 0.25 INJECTION, SOLUTION INTRAVENOUS at 09:31

## 2023-07-08 RX ADMIN — IPRATROPIUM BROMIDE AND ALBUTEROL SULFATE 1 DOSE: .5; 3 SOLUTION RESPIRATORY (INHALATION) at 10:55

## 2023-07-08 RX ADMIN — MIDODRINE HYDROCHLORIDE 5 MG: 5 TABLET ORAL at 16:02

## 2023-07-08 RX ADMIN — PANTOPRAZOLE SODIUM 40 MG: 40 INJECTION, POWDER, FOR SOLUTION INTRAVENOUS at 08:30

## 2023-07-08 RX ADMIN — PANTOPRAZOLE SODIUM 40 MG: 40 INJECTION, POWDER, FOR SOLUTION INTRAVENOUS at 19:51

## 2023-07-08 ASSESSMENT — PAIN DESCRIPTION - LOCATION: LOCATION: HEAD

## 2023-07-09 ENCOUNTER — APPOINTMENT (OUTPATIENT)
Dept: ULTRASOUND IMAGING | Age: 83
DRG: 871 | End: 2023-07-09
Payer: MEDICARE

## 2023-07-09 VITALS
DIASTOLIC BLOOD PRESSURE: 49 MMHG | HEIGHT: 60 IN | WEIGHT: 166.81 LBS | RESPIRATION RATE: 22 BRPM | TEMPERATURE: 98.4 F | OXYGEN SATURATION: 99 % | BODY MASS INDEX: 32.75 KG/M2 | HEART RATE: 72 BPM | SYSTOLIC BLOOD PRESSURE: 114 MMHG

## 2023-07-09 LAB
ALBUMIN SERPL BCG-MCNC: 3.6 G/DL (ref 3.5–5.1)
ALP SERPL-CCNC: 128 U/L (ref 38–126)
ALT SERPL W/O P-5'-P-CCNC: 28 U/L (ref 11–66)
ANION GAP SERPL CALC-SCNC: 19 MEQ/L (ref 8–16)
AST SERPL-CCNC: 38 U/L (ref 5–40)
BACTERIA SPEC AEROBE CULT: NORMAL
BASOPHILS ABSOLUTE: 0 THOU/MM3 (ref 0–0.1)
BASOPHILS NFR BLD AUTO: 0.2 %
BILIRUB CONJ SERPL-MCNC: 0.4 MG/DL (ref 0–0.3)
BILIRUB SERPL-MCNC: 0.8 MG/DL (ref 0.3–1.2)
BUN SERPL-MCNC: 64 MG/DL (ref 7–22)
CALCIUM SERPL-MCNC: 8.5 MG/DL (ref 8.5–10.5)
CHLORIDE SERPL-SCNC: 95 MEQ/L (ref 98–111)
CO2 SERPL-SCNC: 21 MEQ/L (ref 23–33)
CREAT SERPL-MCNC: 4.6 MG/DL (ref 0.4–1.2)
DEPRECATED RDW RBC AUTO: 61.3 FL (ref 35–45)
EOSINOPHIL NFR BLD AUTO: 1 %
EOSINOPHILS ABSOLUTE: 0.1 THOU/MM3 (ref 0–0.4)
ERYTHROCYTE [DISTWIDTH] IN BLOOD BY AUTOMATED COUNT: 18.7 % (ref 11.5–14.5)
GFR SERPL CREATININE-BSD FRML MDRD: 9 ML/MIN/1.73M2
GLUCOSE SERPL-MCNC: 131 MG/DL (ref 70–108)
HCT VFR BLD AUTO: 24.9 % (ref 37–47)
HGB BLD-MCNC: 7.4 GM/DL (ref 12–16)
IMM GRANULOCYTES # BLD AUTO: 0.09 THOU/MM3 (ref 0–0.07)
IMM GRANULOCYTES NFR BLD AUTO: 0.8 %
INR PPP: 3.19 (ref 0.85–1.13)
LYMPHOCYTES ABSOLUTE: 1 THOU/MM3 (ref 1–4.8)
LYMPHOCYTES NFR BLD AUTO: 9.6 %
MCH RBC QN AUTO: 28.1 PG (ref 26–33)
MCHC RBC AUTO-ENTMCNC: 29.7 GM/DL (ref 32.2–35.5)
MCV RBC AUTO: 94.7 FL (ref 81–99)
MONOCYTES ABSOLUTE: 1.9 THOU/MM3 (ref 0.4–1.3)
MONOCYTES NFR BLD AUTO: 17.3 %
NEUTROPHILS NFR BLD AUTO: 71.1 %
NRBC BLD AUTO-RTO: 0 /100 WBC
PLATELET # BLD AUTO: 195 THOU/MM3 (ref 130–400)
PMV BLD AUTO: 10.1 FL (ref 9.4–12.4)
POTASSIUM SERPL-SCNC: 4 MEQ/L (ref 3.5–5.2)
PROT SERPL-MCNC: 5.9 G/DL (ref 6.1–8)
RBC # BLD AUTO: 2.63 MILL/MM3 (ref 4.2–5.4)
SEGMENTED NEUTROPHILS ABSOLUTE COUNT: 7.6 THOU/MM3 (ref 1.8–7.7)
SODIUM SERPL-SCNC: 135 MEQ/L (ref 135–145)
WBC # BLD AUTO: 10.7 THOU/MM3 (ref 4.8–10.8)

## 2023-07-09 PROCEDURE — 85610 PROTHROMBIN TIME: CPT

## 2023-07-09 PROCEDURE — 99239 HOSP IP/OBS DSCHRG MGMT >30: CPT | Performed by: OPHTHALMOLOGY

## 2023-07-09 PROCEDURE — 6370000000 HC RX 637 (ALT 250 FOR IP)

## 2023-07-09 PROCEDURE — 82248 BILIRUBIN DIRECT: CPT

## 2023-07-09 PROCEDURE — 6370000000 HC RX 637 (ALT 250 FOR IP): Performed by: STUDENT IN AN ORGANIZED HEALTH CARE EDUCATION/TRAINING PROGRAM

## 2023-07-09 PROCEDURE — 6360000002 HC RX W HCPCS

## 2023-07-09 PROCEDURE — C9113 INJ PANTOPRAZOLE SODIUM, VIA: HCPCS

## 2023-07-09 PROCEDURE — 36415 COLL VENOUS BLD VENIPUNCTURE: CPT

## 2023-07-09 PROCEDURE — 49083 ABD PARACENTESIS W/IMAGING: CPT

## 2023-07-09 PROCEDURE — 85025 COMPLETE CBC W/AUTO DIFF WBC: CPT

## 2023-07-09 PROCEDURE — 0W9G3ZZ DRAINAGE OF PERITONEAL CAVITY, PERCUTANEOUS APPROACH: ICD-10-PCS | Performed by: OPHTHALMOLOGY

## 2023-07-09 PROCEDURE — 80053 COMPREHEN METABOLIC PANEL: CPT

## 2023-07-09 PROCEDURE — 94640 AIRWAY INHALATION TREATMENT: CPT

## 2023-07-09 PROCEDURE — 2580000003 HC RX 258: Performed by: STUDENT IN AN ORGANIZED HEALTH CARE EDUCATION/TRAINING PROGRAM

## 2023-07-09 RX ORDER — AMPICILLIN 500 MG/1
500 CAPSULE ORAL 4 TIMES DAILY
Qty: 40 CAPSULE | Refills: 0 | Status: SHIPPED | OUTPATIENT
Start: 2023-07-09 | End: 2023-07-19

## 2023-07-09 RX ORDER — FLUTICASONE PROPIONATE 50 MCG
2 SPRAY, SUSPENSION (ML) NASAL 2 TIMES DAILY
Qty: 16 G | Refills: 3 | Status: SHIPPED | OUTPATIENT
Start: 2023-07-09

## 2023-07-09 RX ORDER — IPRATROPIUM BROMIDE AND ALBUTEROL SULFATE 2.5; .5 MG/3ML; MG/3ML
3 SOLUTION RESPIRATORY (INHALATION) EVERY 6 HOURS PRN
Qty: 360 ML | Refills: 1 | Status: SHIPPED | OUTPATIENT
Start: 2023-07-09

## 2023-07-09 RX ADMIN — RIFAXIMIN 400 MG: 200 TABLET ORAL at 08:54

## 2023-07-09 RX ADMIN — IPRATROPIUM BROMIDE AND ALBUTEROL SULFATE 1 DOSE: .5; 3 SOLUTION RESPIRATORY (INHALATION) at 03:46

## 2023-07-09 RX ADMIN — SODIUM CHLORIDE, PRESERVATIVE FREE 10 ML: 5 INJECTION INTRAVENOUS at 08:54

## 2023-07-09 RX ADMIN — MIDODRINE HYDROCHLORIDE 5 MG: 5 TABLET ORAL at 13:06

## 2023-07-09 RX ADMIN — PANTOPRAZOLE SODIUM 40 MG: 40 INJECTION, POWDER, FOR SOLUTION INTRAVENOUS at 08:52

## 2023-07-09 RX ADMIN — FLUTICASONE PROPIONATE 2 SPRAY: 50 SPRAY, METERED NASAL at 08:47

## 2023-07-09 RX ADMIN — IPRATROPIUM BROMIDE AND ALBUTEROL SULFATE 1 DOSE: .5; 3 SOLUTION RESPIRATORY (INHALATION) at 10:32

## 2023-07-09 RX ADMIN — MIDODRINE HYDROCHLORIDE 5 MG: 5 TABLET ORAL at 08:53

## 2023-07-09 RX ADMIN — RIFAXIMIN 400 MG: 200 TABLET ORAL at 13:05

## 2023-07-09 RX ADMIN — LEVOTHYROXINE SODIUM 150 MCG: 0.15 TABLET ORAL at 05:47

## 2023-07-09 RX ADMIN — ACETAMINOPHEN 500 MG: 500 TABLET ORAL at 03:09

## 2023-07-10 ENCOUNTER — HOSPITAL ENCOUNTER (OUTPATIENT)
Dept: NURSING | Age: 83
Discharge: HOME | End: 2023-07-10

## 2023-07-10 ENCOUNTER — TELEPHONE (OUTPATIENT)
Dept: INTERNAL MEDICINE CLINIC | Age: 83
End: 2023-07-10

## 2023-07-10 ENCOUNTER — CARE COORDINATION (OUTPATIENT)
Dept: CASE MANAGEMENT | Age: 83
End: 2023-07-10

## 2023-07-10 LAB — BACTERIA BLD AEROBE CULT: NORMAL

## 2023-07-10 RX ORDER — ALBUMIN (HUMAN) 12.5 G/50ML
25 SOLUTION INTRAVENOUS ONCE
Status: DISCONTINUED | OUTPATIENT
Start: 2023-07-10 | End: 2023-07-13 | Stop reason: HOSPADM

## 2023-07-10 NOTE — TELEPHONE ENCOUNTER
Pt called in to let Dr. Alisha Eldridge know she appreciates everything he has done for her over the years and that she loves him. She is grateful he is following her with hospice. Dr Alisha Eldridge was notified verbally.

## 2023-07-10 NOTE — CARE COORDINATION
[facility] Care Transitions Initial Follow Up Call    Patient: Alejandra Leon Patient : 1940   MRN: [id number]  Reason for Admission: Abdominal pain, nausea, chest pain  Discharge Date: 23 RARS: Readmission Risk Score: 20.6      Last Discharge [address] [facility]       Date Complaint Diagnosis Description Type Department Provider    23 Abdominal Pain; Nausea; Chest Pain SBP (spontaneous bacterial peritonitis) ([address]) . .. ED to Hosp-Admission (Discharged) (ADMITTED) VIRGIL GomezK Lamar Abdalla MD; Gwynne Bernheim. .. Was this an external facility discharge? No Discharge Facility: [facility]    Pt was discharged  from [facility] on 23 with hospice. [facility] contacted [facility]. Spoke with Rosa Maria Sanchez who confirmed pt is active with services. [facility] 23. CT episode resolved per protocol.         Giovanny Pretty RN

## 2023-07-10 NOTE — TELEPHONE ENCOUNTER
Marie Moreno from [address] called asking if Dr Junior Canela would follow for hospice. Per VO/I called hospice back and notified them Dr Junior Canela will follow with hospice.

## 2023-07-11 ENCOUNTER — CLINICAL DOCUMENTATION ONLY (OUTPATIENT)
Facility: CLINIC | Age: 83
End: 2023-07-11

## 2023-07-11 DIAGNOSIS — R06.2 WHEEZING: Primary | ICD-10-CM

## 2023-07-11 LAB
BACTERIA SPEC ANAEROBE CULT: NORMAL
BACTERIA SPEC BFLD CULT: NORMAL
GRAM STN SPEC: NORMAL

## 2023-07-11 RX ORDER — ALBUTEROL SULFATE 2.5 MG/3ML
2.5 SOLUTION RESPIRATORY (INHALATION) 4 TIMES DAILY PRN
Qty: 120 EACH | Refills: 3 | Status: SHIPPED | OUTPATIENT
Start: 2023-07-11 | End: 2023-07-12 | Stop reason: SDUPTHER

## 2023-07-12 ENCOUNTER — TELEPHONE (OUTPATIENT)
Dept: INTERNAL MEDICINE CLINIC | Age: 83
End: 2023-07-12

## 2023-07-12 DIAGNOSIS — R06.2 WHEEZING: ICD-10-CM

## 2023-07-12 LAB
BACTERIA BLD AEROBE CULT: NORMAL
BACTERIA BLD AEROBE CULT: NORMAL

## 2023-07-12 RX ORDER — ALBUTEROL SULFATE 2.5 MG/3ML
2.5 SOLUTION RESPIRATORY (INHALATION) 4 TIMES DAILY PRN
Qty: 120 EACH | Refills: 1 | OUTPATIENT
Start: 2023-07-12

## 2023-07-12 NOTE — TELEPHONE ENCOUNTER
Hospice would like this script to go to their pharmacy.   Therefore I called hospice pharmacy at ext [phone] and spoke to Lul Rincon

## 2023-07-12 NOTE — TELEPHONE ENCOUNTER
Hospice faxed over a note that pt is a hard stick and could not get INR drawn after several attempts. She is not able to get out ot the lab. They are wondering if they should try again today or discuss withdrawing coumadin and INRs. Per vo from Dr. Hunter Tian notified Wade Cage at hospice that Dr. Noemy Callahan recommends discussing stopping Warfarin and INRs. She verbalizes understanding.

## 2023-07-14 ENCOUNTER — HOSPITAL ENCOUNTER (OUTPATIENT)
Dept: INTERVENTIONAL RADIOLOGY/VASCULAR | Age: 83
Discharge: HOME | End: 2023-07-14
Attending: RADIOLOGY | Admitting: RADIOLOGY
Payer: MEDICARE

## 2023-07-14 VITALS
DIASTOLIC BLOOD PRESSURE: 50 MMHG | HEIGHT: 60 IN | WEIGHT: 173.3 LBS | RESPIRATION RATE: 18 BRPM | HEART RATE: 60 BPM | TEMPERATURE: 97.9 F | BODY MASS INDEX: 34.02 KG/M2 | OXYGEN SATURATION: 100 % | SYSTOLIC BLOOD PRESSURE: 117 MMHG

## 2023-07-14 DIAGNOSIS — R18.8 CIRRHOSIS OF LIVER WITH ASCITES, UNSPECIFIED HEPATIC CIRRHOSIS TYPE (HCC): ICD-10-CM

## 2023-07-14 DIAGNOSIS — K74.60 CIRRHOSIS OF LIVER WITH ASCITES, UNSPECIFIED HEPATIC CIRRHOSIS TYPE (HCC): ICD-10-CM

## 2023-07-14 LAB
APTT PPP: 35.1 SECONDS (ref 22–38)
DEPRECATED RDW RBC AUTO: 64.8 FL (ref 35–45)
ERYTHROCYTE [DISTWIDTH] IN BLOOD BY AUTOMATED COUNT: 19 % (ref 11.5–14.5)
HCT VFR BLD AUTO: 33.3 % (ref 37–47)
HGB BLD-MCNC: 9.9 GM/DL (ref 12–16)
INR PPP: 1.33 (ref 0.85–1.13)
MCH RBC QN AUTO: 27.7 PG (ref 26–33)
MCHC RBC AUTO-ENTMCNC: 29.7 GM/DL (ref 32.2–35.5)
MCV RBC AUTO: 93.3 FL (ref 81–99)
PLATELET # BLD AUTO: 309 THOU/MM3 (ref 130–400)
PMV BLD AUTO: 10 FL (ref 9.4–12.4)
RBC # BLD AUTO: 3.57 MILL/MM3 (ref 4.2–5.4)
WBC # BLD AUTO: 7.8 THOU/MM3 (ref 4.8–10.8)

## 2023-07-14 PROCEDURE — 85027 COMPLETE CBC AUTOMATED: CPT

## 2023-07-14 PROCEDURE — 36415 COLL VENOUS BLD VENIPUNCTURE: CPT

## 2023-07-14 PROCEDURE — 85730 THROMBOPLASTIN TIME PARTIAL: CPT

## 2023-07-14 PROCEDURE — C1769 GUIDE WIRE: HCPCS

## 2023-07-14 PROCEDURE — 6360000002 HC RX W HCPCS: Performed by: RADIOLOGY

## 2023-07-14 PROCEDURE — 49418 INSERT TUN IP CATH PERC: CPT

## 2023-07-14 PROCEDURE — 2580000003 HC RX 258: Performed by: RADIOLOGY

## 2023-07-14 PROCEDURE — 85610 PROTHROMBIN TIME: CPT

## 2023-07-14 PROCEDURE — 99152 MOD SED SAME PHYS/QHP 5/>YRS: CPT

## 2023-07-14 RX ORDER — MIDAZOLAM HYDROCHLORIDE 1 MG/ML
1 INJECTION INTRAMUSCULAR; INTRAVENOUS ONCE
Status: COMPLETED | OUTPATIENT
Start: 2023-07-14 | End: 2023-07-14

## 2023-07-14 RX ORDER — FENTANYL CITRATE 50 UG/ML
50 INJECTION, SOLUTION INTRAMUSCULAR; INTRAVENOUS ONCE
Status: COMPLETED | OUTPATIENT
Start: 2023-07-14 | End: 2023-07-14

## 2023-07-14 RX ORDER — SODIUM CHLORIDE 450 MG/100ML
INJECTION, SOLUTION INTRAVENOUS CONTINUOUS
Status: DISCONTINUED | OUTPATIENT
Start: 2023-07-14 | End: 2023-07-14 | Stop reason: HOSPADM

## 2023-07-14 RX ADMIN — SODIUM CHLORIDE: 4.5 INJECTION, SOLUTION INTRAVENOUS at 14:34

## 2023-07-14 RX ADMIN — Medication 2000 MG: at 14:36

## 2023-07-14 RX ADMIN — MIDAZOLAM 1 MG: 1 INJECTION INTRAMUSCULAR; INTRAVENOUS at 13:56

## 2023-07-14 RX ADMIN — FENTANYL CITRATE 50 MCG: 50 INJECTION, SOLUTION INTRAMUSCULAR; INTRAVENOUS at 13:56

## 2023-07-14 ASSESSMENT — PAIN SCALES - GENERAL
PAINLEVEL_OUTOF10: 0

## 2023-07-14 ASSESSMENT — LIFESTYLE VARIABLES
HOW MANY STANDARD DRINKS CONTAINING ALCOHOL DO YOU HAVE ON A TYPICAL DAY: PATIENT DOES NOT DRINK
HOW OFTEN DO YOU HAVE A DRINK CONTAINING ALCOHOL: NEVER

## 2023-07-14 NOTE — PROGRESS NOTES
1200 Patient admitted to 2E08  via stretcher for pleurex cath insertion. Patient NPO. Vital signs obtained. Assessment and data collection intiated. Oriented to room. Policies and procedures for 2E explained. All questions answered with no further questions at this time. Fall prevention and safety precautions discussed with patient. Patient comes from home with spouse, with lots of family help. Pt states she is on hospice, and currently wearing 4 L nasal cannula. Pt was transported via [employer]. Patient has no family at bedside, but states we can call her daughters, Karlos Best and Kimmy Jag for updates/questions.

## 2023-07-14 NOTE — PROGRESS NOTES
Patient given discharge instructions and discussed with patient at bedside. Pt has no further questions at this time. Belongings gathered. Pt leaving for home via [employer] transport.

## 2023-07-14 NOTE — PROGRESS NOTES
1332 Pt arrived to special procedure room 2 for abdominal pleurx drain placement under conscious sedation. No family present. 1333 Procedure explained using teach back method. Pt states understanding. [address] Coby Leigh into assess patient and explain procedure. This procedure has been fully reviewed with the patient and written informed consent has been obtained. [id number] Patient assisted to table in supine position. Monitor attached to patient. Comfort ensured. 1350 Pre-procedure images obtained. 1404 Procedure begins. 1415 Procedure complete. Post-procedure images obtained. 1417 Monitor removed. Patient assisted to bed in supine position. Comfort ensured. 1425 Patient transported to  in stable condition.

## 2023-07-14 NOTE — PROGRESS NOTES
Patient returned from IR. A&Ox4, drowsy. Patient on 4 liters nasal cannula. Right abdominal dressing clean/dry/intact. Patient denies pain. Arabella Savage RN from IR, states to administer antibiotic once patient has returned to 2E.

## 2023-07-14 NOTE — PROGRESS NOTES
Called Agata Kitchen and Charisma Martínez, updated them that procedure has been completed, patient doing well, and instructions for drain care for the hospice nurses are in the folder which will be sent home. Daughters have no further questions at this time. [employer] flight transport set up, earliest  time set for 1700.

## 2023-07-14 NOTE — H&P
[address]  Sedation/Analgesia History & Physical    Pt Name: Deidra Kincaid  MRN: [id number]  YOB: 1940  Provider Performing Procedure: Mabel Carson MD, MD  Primary Care Physician: Carmela Esteban MD    Formulation and discussion of sedation / procedure plans, risks, benefits, side effects and alternatives with patient and/or responsible adult completed. PRE-PROCEDURE   DNR-CCA/DNR-CC [x]Yes []No  Brief History/Pre-Procedure Diagnosis: Recurrent ascites          MEDICAL HISTORY  []CAD/Valve  []Liver Disease  []Lung Disease []Diabetes  []Hypertension []Renal Disease  [x]Additional information:       has a past medical history of AICD (automatic cardioverter/defibrillator) present, Arthritis, Atrial fibrillation ([address]), Biventricular ICD (implantable cardiac defibrillator) in place, C. difficile diarrhea, CAD (coronary artery disease), Cancer ([address]), CHF (congestive heart failure) ([address]), Diverticulitis, GERD (gastroesophageal reflux disease), Hiatal hernia, Hyperlipidemia, Hypertension, Menopause, NAFLD (nonalcoholic fatty liver disease), Nonischemic cardiomyopathy ([address]), Pacemaker, Retroperitoneal hemorrhage, Thyroid disease, and Type 2 diabetes mellitus. SURGICAL HISTORY   has a past surgical history that includes Cholecystectomy, laparoscopic; Cardiac defibrillator placement; Colonoscopy (2008); skin biopsy (2010); Endoscopy, colon, diagnostic; pacemaker placement (1999); Colonoscopy (N/A, 7/16/2019); Upper gastrointestinal endoscopy (N/A, 7/16/2019); Upper gastrointestinal endoscopy (Left, 7/16/2019); Upper gastrointestinal endoscopy (N/A, 12/2/2019); Upper gastrointestinal endoscopy (N/A, 12/15/2022); Upper gastrointestinal endoscopy (N/A, 1/17/2023); Upper gastrointestinal endoscopy (1/17/2023); Upper gastrointestinal endoscopy (3/28/2023); and Upper gastrointestinal endoscopy (3/28/2023).   Additional information:       ALLERGIES   Allergies as of 07/14/2023 - Fully

## 2023-07-14 NOTE — PLAN OF CARE
Problem: Chronic Conditions and Co-morbidities  Goal: Patient's chronic conditions and co-morbidity symptoms are monitored and maintained or improved  Outcome: Progressing  Flowsheets (Taken 7/14/2023 1230)  Care Plan - Patient's Chronic Conditions and Co-Morbidity Symptoms are Monitored and Maintained or Improved: Monitor and assess patient's chronic conditions and comorbid symptoms for stability, deterioration, or improvement     Problem: Discharge Planning  Goal: Discharge to home or other facility with appropriate resources  Outcome: Progressing  Flowsheets (Taken 7/14/2023 1230)  Discharge to home or other facility with appropriate resources: Identify barriers to discharge with patient and caregiver     Problem: Pain  Goal: Verbalizes/displays adequate comfort level or baseline comfort level  Outcome: Progressing  Flowsheets (Taken 7/14/2023 1205)  Verbalizes/displays adequate comfort level or baseline comfort level:   Encourage patient to monitor pain and request assistance   Assess pain using appropriate pain scale     Problem: Safety - Adult  Goal: Free from fall injury  Outcome: Progressing   Care plan reviewed with patient. Patient verbalizes understanding of the plan of care and contributes to goal setting.

## 2023-07-14 NOTE — DISCHARGE INSTRUCTIONS
Please follow instructions given in packet for drain care         Learning About Paracentesis  What is paracentesis? Paracentesis (say \"reql-yj-kfq-STEVE-julio\") is a procedure that removes fluid from the belly. The buildup of fluid may be caused by infection, inflammation, an injury, or other problems. Swelling from too much fluid may cause pain or trouble breathing. The doctor will remove the extra fluid with a needle attached to a tube. Why is paracentesis done? Paracentesis may be done to:  Find the cause of fluid buildup in the belly. Diagnose an infection in the peritoneal fluid. Check for certain types of cancer. Remove a large amount of fluid that is causing pain or trouble breathing or that is affecting how the kidneys or the intestines (bowel) are working. How is the procedure done? You will empty your bladder before the procedure. Your doctor or nurse will clean the area of your belly where the needle will go in. Then sterile towels will be put around the area. You may get a shot of numbing medicine in the skin of your belly. Then your doctor will gently insert a needle where the fluid is. Your doctor may attach a tube (catheter) to the site to help collect the fluid. After the fluid has drained, your doctor will take out the needle or catheter and put a bandage on the site. How long does a paracentesis take? The procedure may take from a few minutes to 30 minutes or more. What happens after the test?  You can do your normal activities after the procedure, unless your doctor tells you not to. After the procedure, you may have some clear fluid draining from the site, especially if a large amount of fluid was taken out. There will be less drainage in 1 to 2 days. Ask your doctor how much drainage to expect. A small gauze pad and bandage may be needed. You may need to change the bandage.   If your doctor thinks that testing the fluid can help find out the cause of a problem, your doctor will send

## 2023-08-11 ENCOUNTER — TELEPHONE (OUTPATIENT)
Dept: CARDIOLOGY CLINIC | Age: 83
End: 2023-08-11

## 2023-10-16 NOTE — TELEPHONE ENCOUNTER
----- Message from Albert Fuentes sent at 11/28/2022 11:46 AM EST -----  Regarding: RE: re:  Francisco Robertson,  Her copay would be $47 per month for Jardiance. ----- Message -----  From: Abdon Barakat MA  Sent: 11/28/2022  10:26 AM EST  To: Albert Fuentes  Subject: re:  jardoiance                                  Would you check on copay for jardiance for Flora and let us know. Thank you.
Do you need to see her back since she is trialing jardiance? Her next scheduled appt is in march.
I spoke with pt and she will agree to jardiance. She would like to try samples first.  I advised her I will get them ready and someone can pick them up for her.    She will let us know if tolerates them
Pt was given samples of jardiance 10 mg. #21  And will start on 12/6/22  She will report in some BPs after that.   I gave her an appt for 12/27/22
Opt out

## 2024-10-11 NOTE — TELEPHONE ENCOUNTER
----- Message from Tod Ernst MD sent at 12/10/2022  9:13 AM EST -----  Inr is high at 4.24. Tried to call [address] [place] [address] Drive, [address] but no answer. What is current coumadin dosage? Hold coumadin Saturday and Sunday, call office on Monday. Will send my chart message.
Message noted.   Await todays INR
Per [facility],   Dose is 3 mg every day except Saturday and Sunday she takes 1.5 mg  She did hold her Coumadin last night which was 3mg    Spoke to pt, she held dose on Sat and Sun. Per RAR, pt will get inr today. Pt states no changes except Dr Chidi Aiken started her on Jardiance.
Female

## (undated) DEVICE — SET ADMIN 25ML L117IN PMP MOD CK VLV RLER CLMP 2 SMRTSITE

## (undated) DEVICE — GLOVE ORTHO 8   MSG9480

## (undated) DEVICE — CATHETER ETER IV 22GA L1IN POLYUR STR RADPQ INTROCAN SFTY

## (undated) DEVICE — TUBING IV STOPCOCK 48 CM 3 W

## (undated) DEVICE — IV START KIT: Brand: MEDLINE INDUSTRIES, INC.

## (undated) DEVICE — SOLUTION IV 1000ML 0.45% SOD CHL PH 5 INJ USP VIAFLX PLAS

## (undated) DEVICE — BIOGUARD A/W CLEANING ADAPTER